# Patient Record
Sex: FEMALE | Race: WHITE | NOT HISPANIC OR LATINO | URBAN - METROPOLITAN AREA
[De-identification: names, ages, dates, MRNs, and addresses within clinical notes are randomized per-mention and may not be internally consistent; named-entity substitution may affect disease eponyms.]

---

## 2020-01-01 ENCOUNTER — INPATIENT (INPATIENT)
Facility: HOSPITAL | Age: 80
LOS: 11 days | Discharge: PSYCHIATRIC OR SUBSTANCE USE FACILITY | End: 2020-08-18
Attending: HOSPITALIST | Admitting: HOSPITALIST
Payer: MEDICARE

## 2020-01-01 ENCOUNTER — APPOINTMENT (OUTPATIENT)
Dept: WOUND CARE | Facility: CLINIC | Age: 80
End: 2020-01-01
Payer: MEDICARE

## 2020-01-01 ENCOUNTER — RX RENEWAL (OUTPATIENT)
Age: 80
End: 2020-01-01

## 2020-01-01 ENCOUNTER — LABORATORY RESULT (OUTPATIENT)
Age: 80
End: 2020-01-01

## 2020-01-01 ENCOUNTER — NON-APPOINTMENT (OUTPATIENT)
Age: 80
End: 2020-01-01

## 2020-01-01 ENCOUNTER — OUTPATIENT (OUTPATIENT)
Dept: OUTPATIENT SERVICES | Facility: HOSPITAL | Age: 80
LOS: 1 days | End: 2020-01-01

## 2020-01-01 ENCOUNTER — INPATIENT (INPATIENT)
Facility: HOSPITAL | Age: 80
LOS: 14 days | Discharge: HOME | End: 2020-09-02
Attending: PSYCHIATRY & NEUROLOGY | Admitting: PSYCHIATRY & NEUROLOGY
Payer: MEDICARE

## 2020-01-01 ENCOUNTER — APPOINTMENT (OUTPATIENT)
Dept: INTERNAL MEDICINE | Facility: CLINIC | Age: 80
End: 2020-01-01
Payer: MEDICARE

## 2020-01-01 ENCOUNTER — TRANSCRIPTION ENCOUNTER (OUTPATIENT)
Age: 80
End: 2020-01-01

## 2020-01-01 VITALS — TEMPERATURE: 97 F

## 2020-01-01 VITALS
SYSTOLIC BLOOD PRESSURE: 182 MMHG | TEMPERATURE: 98 F | OXYGEN SATURATION: 99 % | RESPIRATION RATE: 15 BRPM | DIASTOLIC BLOOD PRESSURE: 81 MMHG | HEART RATE: 64 BPM

## 2020-01-01 VITALS — TEMPERATURE: 98 F | RESPIRATION RATE: 18 BRPM | WEIGHT: 192.02 LBS | HEIGHT: 60 IN

## 2020-01-01 VITALS
HEART RATE: 102 BPM | RESPIRATION RATE: 17 BRPM | SYSTOLIC BLOOD PRESSURE: 104 MMHG | DIASTOLIC BLOOD PRESSURE: 64 MMHG | OXYGEN SATURATION: 98 %

## 2020-01-01 VITALS — TEMPERATURE: 93.8 F

## 2020-01-01 VITALS
BODY MASS INDEX: 34.93 KG/M2 | DIASTOLIC BLOOD PRESSURE: 80 MMHG | TEMPERATURE: 97.7 F | HEIGHT: 61 IN | WEIGHT: 185 LBS | OXYGEN SATURATION: 96 % | SYSTOLIC BLOOD PRESSURE: 130 MMHG | HEART RATE: 84 BPM

## 2020-01-01 VITALS
WEIGHT: 185 LBS | TEMPERATURE: 97.4 F | SYSTOLIC BLOOD PRESSURE: 139 MMHG | BODY MASS INDEX: 39.91 KG/M2 | HEIGHT: 57 IN | DIASTOLIC BLOOD PRESSURE: 85 MMHG | HEART RATE: 103 BPM | RESPIRATION RATE: 16 BRPM

## 2020-01-01 DIAGNOSIS — R60.0 LOCALIZED EDEMA: ICD-10-CM

## 2020-01-01 DIAGNOSIS — I10 ESSENTIAL (PRIMARY) HYPERTENSION: ICD-10-CM

## 2020-01-01 DIAGNOSIS — E03.9 HYPOTHYROIDISM, UNSPECIFIED: ICD-10-CM

## 2020-01-01 DIAGNOSIS — I48.91 UNSPECIFIED ATRIAL FIBRILLATION: ICD-10-CM

## 2020-01-01 DIAGNOSIS — E66.9 OBESITY, UNSPECIFIED: ICD-10-CM

## 2020-01-01 DIAGNOSIS — Z00.00 ENCOUNTER FOR GENERAL ADULT MEDICAL EXAMINATION W/OUT ABNORMAL FINDINGS: ICD-10-CM

## 2020-01-01 DIAGNOSIS — R19.7 DIARRHEA, UNSPECIFIED: ICD-10-CM

## 2020-01-01 DIAGNOSIS — G30.1 ALZHEIMER'S DISEASE WITH LATE ONSET: ICD-10-CM

## 2020-01-01 DIAGNOSIS — I83.893 VARICOSE VEINS OF BILATERAL LOWER EXTREMITIES WITH OTHER COMPLICATIONS: ICD-10-CM

## 2020-01-01 DIAGNOSIS — E53.8 DEFICIENCY OF OTHER SPECIFIED B GROUP VITAMINS: ICD-10-CM

## 2020-01-01 DIAGNOSIS — E11.69 TYPE 2 DIABETES MELLITUS WITH OTHER SPECIFIED COMPLICATION: ICD-10-CM

## 2020-01-01 DIAGNOSIS — Z98.891 HISTORY OF UTERINE SCAR FROM PREVIOUS SURGERY: Chronic | ICD-10-CM

## 2020-01-01 DIAGNOSIS — Z23 ENCOUNTER FOR IMMUNIZATION: ICD-10-CM

## 2020-01-01 DIAGNOSIS — E11.65 TYPE 2 DIABETES MELLITUS WITH HYPERGLYCEMIA: ICD-10-CM

## 2020-01-01 DIAGNOSIS — F02.81 DEMENTIA IN OTHER DISEASES CLASSIFIED ELSEWHERE, UNSPECIFIED SEVERITY, WITH BEHAVIORAL DISTURBANCE: ICD-10-CM

## 2020-01-01 DIAGNOSIS — I49.9 CARDIAC ARRHYTHMIA, UNSPECIFIED: ICD-10-CM

## 2020-01-01 DIAGNOSIS — D69.6 THROMBOCYTOPENIA, UNSPECIFIED: ICD-10-CM

## 2020-01-01 DIAGNOSIS — F03.91 UNSPECIFIED DEMENTIA WITH BEHAVIORAL DISTURBANCE: ICD-10-CM

## 2020-01-01 DIAGNOSIS — Z02.9 ENCOUNTER FOR ADMINISTRATIVE EXAMINATIONS, UNSPECIFIED: ICD-10-CM

## 2020-01-01 DIAGNOSIS — Z29.9 ENCOUNTER FOR PROPHYLACTIC MEASURES, UNSPECIFIED: ICD-10-CM

## 2020-01-01 DIAGNOSIS — R41.0 DISORIENTATION, UNSPECIFIED: ICD-10-CM

## 2020-01-01 DIAGNOSIS — I87.8 OTHER SPECIFIED DISORDERS OF VEINS: ICD-10-CM

## 2020-01-01 DIAGNOSIS — E11.9 TYPE 2 DIABETES MELLITUS W/OUT COMPLICATIONS: ICD-10-CM

## 2020-01-01 DIAGNOSIS — F03.90 UNSPECIFIED DEMENTIA WITHOUT BEHAVIORAL DISTURBANCE: ICD-10-CM

## 2020-01-01 DIAGNOSIS — F22 DELUSIONAL DISORDERS: ICD-10-CM

## 2020-01-01 LAB
ALBUMIN SERPL ELPH-MCNC: 3.5 G/DL — SIGNIFICANT CHANGE UP (ref 3.3–5)
ALBUMIN SERPL ELPH-MCNC: 3.6 G/DL — SIGNIFICANT CHANGE UP (ref 3.3–5)
ALBUMIN SERPL ELPH-MCNC: 3.7 G/DL — SIGNIFICANT CHANGE UP (ref 3.3–5)
ALBUMIN SERPL ELPH-MCNC: 3.9 G/DL
ALBUMIN SERPL ELPH-MCNC: 3.9 G/DL — SIGNIFICANT CHANGE UP (ref 3.3–5)
ALBUMIN SERPL ELPH-MCNC: 4 G/DL — SIGNIFICANT CHANGE UP (ref 3.3–5)
ALBUMIN SERPL ELPH-MCNC: 4.1 G/DL — SIGNIFICANT CHANGE UP (ref 3.3–5)
ALP BLD-CCNC: 73 U/L
ALP SERPL-CCNC: 52 U/L — SIGNIFICANT CHANGE UP (ref 40–120)
ALP SERPL-CCNC: 56 U/L — SIGNIFICANT CHANGE UP (ref 40–120)
ALP SERPL-CCNC: 56 U/L — SIGNIFICANT CHANGE UP (ref 40–120)
ALP SERPL-CCNC: 57 U/L — SIGNIFICANT CHANGE UP (ref 40–120)
ALP SERPL-CCNC: 66 U/L — SIGNIFICANT CHANGE UP (ref 40–120)
ALP SERPL-CCNC: 66 U/L — SIGNIFICANT CHANGE UP (ref 40–120)
ALT FLD-CCNC: 10 U/L — SIGNIFICANT CHANGE UP (ref 4–33)
ALT FLD-CCNC: 11 U/L — SIGNIFICANT CHANGE UP (ref 4–33)
ALT FLD-CCNC: 12 U/L — SIGNIFICANT CHANGE UP (ref 4–33)
ALT FLD-CCNC: 9 U/L — SIGNIFICANT CHANGE UP (ref 4–33)
ALT SERPL-CCNC: 6 U/L
ANION GAP SERPL CALC-SCNC: 13 MMO/L — SIGNIFICANT CHANGE UP (ref 7–14)
ANION GAP SERPL CALC-SCNC: 14 MMO/L — SIGNIFICANT CHANGE UP (ref 7–14)
ANION GAP SERPL CALC-SCNC: 14 MMO/L — SIGNIFICANT CHANGE UP (ref 7–14)
ANION GAP SERPL CALC-SCNC: 14 MMOL/L
ANION GAP SERPL CALC-SCNC: 15 MMO/L — HIGH (ref 7–14)
ANION GAP SERPL CALC-SCNC: 16 MMO/L — HIGH (ref 7–14)
ANION GAP SERPL CALC-SCNC: 18 MMO/L — HIGH (ref 7–14)
ANION GAP SERPL CALC-SCNC: 19 MMO/L — HIGH (ref 7–14)
ANISOCYTOSIS BLD QL: SLIGHT — SIGNIFICANT CHANGE UP
AST SERPL-CCNC: 13 U/L
AST SERPL-CCNC: 15 U/L — SIGNIFICANT CHANGE UP (ref 4–32)
AST SERPL-CCNC: 16 U/L — SIGNIFICANT CHANGE UP (ref 4–32)
AST SERPL-CCNC: 16 U/L — SIGNIFICANT CHANGE UP (ref 4–32)
AST SERPL-CCNC: 17 U/L — SIGNIFICANT CHANGE UP (ref 4–32)
AST SERPL-CCNC: 19 U/L — SIGNIFICANT CHANGE UP (ref 4–32)
AST SERPL-CCNC: 21 U/L — SIGNIFICANT CHANGE UP (ref 4–32)
BASE EXCESS BLDV CALC-SCNC: 3.6 MMOL/L — SIGNIFICANT CHANGE UP
BASE EXCESS BLDV CALC-SCNC: 6.2 MMOL/L — SIGNIFICANT CHANGE UP
BASOPHILS # BLD AUTO: 0.05 K/UL — SIGNIFICANT CHANGE UP (ref 0–0.2)
BASOPHILS # BLD AUTO: 0.05 K/UL — SIGNIFICANT CHANGE UP (ref 0–0.2)
BASOPHILS # BLD AUTO: 0.06 K/UL
BASOPHILS # BLD AUTO: 0.06 K/UL — SIGNIFICANT CHANGE UP (ref 0–0.2)
BASOPHILS # BLD AUTO: 0.06 K/UL — SIGNIFICANT CHANGE UP (ref 0–0.2)
BASOPHILS NFR BLD AUTO: 0.5 % — SIGNIFICANT CHANGE UP (ref 0–2)
BASOPHILS NFR BLD AUTO: 0.5 % — SIGNIFICANT CHANGE UP (ref 0–2)
BASOPHILS NFR BLD AUTO: 0.6 %
BASOPHILS NFR BLD AUTO: 0.6 % — SIGNIFICANT CHANGE UP (ref 0–2)
BASOPHILS NFR BLD AUTO: 0.6 % — SIGNIFICANT CHANGE UP (ref 0–2)
BILIRUB SERPL-MCNC: 0.3 MG/DL — SIGNIFICANT CHANGE UP (ref 0.2–1.2)
BILIRUB SERPL-MCNC: 0.3 MG/DL — SIGNIFICANT CHANGE UP (ref 0.2–1.2)
BILIRUB SERPL-MCNC: 0.4 MG/DL
BILIRUB SERPL-MCNC: 0.4 MG/DL — SIGNIFICANT CHANGE UP (ref 0.2–1.2)
BILIRUB SERPL-MCNC: 0.5 MG/DL — SIGNIFICANT CHANGE UP (ref 0.2–1.2)
BLOOD GAS VENOUS - CREATININE: 0.68 MG/DL — SIGNIFICANT CHANGE UP (ref 0.5–1.3)
BUN SERPL-MCNC: 11 MG/DL — SIGNIFICANT CHANGE UP (ref 7–23)
BUN SERPL-MCNC: 11 MG/DL — SIGNIFICANT CHANGE UP (ref 7–23)
BUN SERPL-MCNC: 14 MG/DL — SIGNIFICANT CHANGE UP (ref 7–23)
BUN SERPL-MCNC: 15 MG/DL — SIGNIFICANT CHANGE UP (ref 7–23)
BUN SERPL-MCNC: 21 MG/DL — SIGNIFICANT CHANGE UP (ref 7–23)
BUN SERPL-MCNC: 26 MG/DL — HIGH (ref 7–23)
BUN SERPL-MCNC: 26 MG/DL — HIGH (ref 7–23)
BUN SERPL-MCNC: 27 MG/DL — HIGH (ref 7–23)
BUN SERPL-MCNC: 30 MG/DL — HIGH (ref 7–23)
BUN SERPL-MCNC: 31 MG/DL — HIGH (ref 7–23)
BUN SERPL-MCNC: 32 MG/DL — HIGH (ref 7–23)
BUN SERPL-MCNC: 33 MG/DL
CALCIUM SERPL-MCNC: 8.7 MG/DL — SIGNIFICANT CHANGE UP (ref 8.4–10.5)
CALCIUM SERPL-MCNC: 8.7 MG/DL — SIGNIFICANT CHANGE UP (ref 8.4–10.5)
CALCIUM SERPL-MCNC: 8.8 MG/DL — SIGNIFICANT CHANGE UP (ref 8.4–10.5)
CALCIUM SERPL-MCNC: 8.9 MG/DL
CALCIUM SERPL-MCNC: 8.9 MG/DL — SIGNIFICANT CHANGE UP (ref 8.4–10.5)
CALCIUM SERPL-MCNC: 8.9 MG/DL — SIGNIFICANT CHANGE UP (ref 8.4–10.5)
CALCIUM SERPL-MCNC: 9 MG/DL — SIGNIFICANT CHANGE UP (ref 8.4–10.5)
CALCIUM SERPL-MCNC: 9.1 MG/DL — SIGNIFICANT CHANGE UP (ref 8.4–10.5)
CALCIUM SERPL-MCNC: 9.2 MG/DL — SIGNIFICANT CHANGE UP (ref 8.4–10.5)
CALCIUM SERPL-MCNC: 9.3 MG/DL — SIGNIFICANT CHANGE UP (ref 8.4–10.5)
CHLORIDE BLDV-SCNC: 104 MMOL/L — SIGNIFICANT CHANGE UP (ref 96–108)
CHLORIDE SERPL-SCNC: 100 MMOL/L — SIGNIFICANT CHANGE UP (ref 98–107)
CHLORIDE SERPL-SCNC: 101 MMOL/L — SIGNIFICANT CHANGE UP (ref 98–107)
CHLORIDE SERPL-SCNC: 101 MMOL/L — SIGNIFICANT CHANGE UP (ref 98–107)
CHLORIDE SERPL-SCNC: 102 MMOL/L — SIGNIFICANT CHANGE UP (ref 98–107)
CHLORIDE SERPL-SCNC: 103 MMOL/L — SIGNIFICANT CHANGE UP (ref 98–107)
CHLORIDE SERPL-SCNC: 108 MMOL/L
CHLORIDE SERPL-SCNC: 97 MMOL/L — LOW (ref 98–107)
CHLORIDE SERPL-SCNC: 99 MMOL/L — SIGNIFICANT CHANGE UP (ref 98–107)
CHLORIDE SERPL-SCNC: 99 MMOL/L — SIGNIFICANT CHANGE UP (ref 98–107)
CHOLEST SERPL-MCNC: 192 MG/DL
CHOLEST/HDLC SERPL: 4.2 RATIO
CLOSURE TME COLL+EPINEP BLD: 238 K/UL — SIGNIFICANT CHANGE UP (ref 150–400)
CO2 SERPL-SCNC: 18 MMOL/L
CO2 SERPL-SCNC: 24 MMOL/L — SIGNIFICANT CHANGE UP (ref 22–31)
CO2 SERPL-SCNC: 25 MMOL/L — SIGNIFICANT CHANGE UP (ref 22–31)
CO2 SERPL-SCNC: 26 MMOL/L — SIGNIFICANT CHANGE UP (ref 22–31)
CO2 SERPL-SCNC: 27 MMOL/L — SIGNIFICANT CHANGE UP (ref 22–31)
CO2 SERPL-SCNC: 27 MMOL/L — SIGNIFICANT CHANGE UP (ref 22–31)
CO2 SERPL-SCNC: 28 MMOL/L — SIGNIFICANT CHANGE UP (ref 22–31)
CORTIS SERPL-MCNC: 11.1 UG/DL — SIGNIFICANT CHANGE UP (ref 2.7–18.4)
CORTIS SERPL-MCNC: 11.3 UG/DL — SIGNIFICANT CHANGE UP (ref 2.7–18.4)
CREAT SERPL-MCNC: 0.68 MG/DL — SIGNIFICANT CHANGE UP (ref 0.5–1.3)
CREAT SERPL-MCNC: 0.7 MG/DL — SIGNIFICANT CHANGE UP (ref 0.5–1.3)
CREAT SERPL-MCNC: 0.73 MG/DL — SIGNIFICANT CHANGE UP (ref 0.5–1.3)
CREAT SERPL-MCNC: 0.81 MG/DL — SIGNIFICANT CHANGE UP (ref 0.5–1.3)
CREAT SERPL-MCNC: 0.86 MG/DL — SIGNIFICANT CHANGE UP (ref 0.5–1.3)
CREAT SERPL-MCNC: 0.89 MG/DL — SIGNIFICANT CHANGE UP (ref 0.5–1.3)
CREAT SERPL-MCNC: 0.9 MG/DL — SIGNIFICANT CHANGE UP (ref 0.5–1.3)
CREAT SERPL-MCNC: 0.99 MG/DL — SIGNIFICANT CHANGE UP (ref 0.5–1.3)
CREAT SERPL-MCNC: 1.01 MG/DL — SIGNIFICANT CHANGE UP (ref 0.5–1.3)
CREAT SERPL-MCNC: 1.03 MG/DL — SIGNIFICANT CHANGE UP (ref 0.5–1.3)
CREAT SERPL-MCNC: 1.08 MG/DL — SIGNIFICANT CHANGE UP (ref 0.5–1.3)
CREAT SERPL-MCNC: 1.85 MG/DL
CULTURE RESULTS: SIGNIFICANT CHANGE UP
CULTURE RESULTS: SIGNIFICANT CHANGE UP
EOSINOPHIL # BLD AUTO: 0.05 K/UL — SIGNIFICANT CHANGE UP (ref 0–0.5)
EOSINOPHIL # BLD AUTO: 0.18 K/UL — SIGNIFICANT CHANGE UP (ref 0–0.5)
EOSINOPHIL # BLD AUTO: 0.18 K/UL — SIGNIFICANT CHANGE UP (ref 0–0.5)
EOSINOPHIL # BLD AUTO: 0.22 K/UL — SIGNIFICANT CHANGE UP (ref 0–0.5)
EOSINOPHIL # BLD AUTO: 0.26 K/UL
EOSINOPHIL NFR BLD AUTO: 0.5 % — SIGNIFICANT CHANGE UP (ref 0–6)
EOSINOPHIL NFR BLD AUTO: 1.6 % — SIGNIFICANT CHANGE UP (ref 0–6)
EOSINOPHIL NFR BLD AUTO: 1.8 % — SIGNIFICANT CHANGE UP (ref 0–6)
EOSINOPHIL NFR BLD AUTO: 2.6 % — SIGNIFICANT CHANGE UP (ref 0–6)
EOSINOPHIL NFR BLD AUTO: 2.8 %
ESTIMATED AVERAGE GLUCOSE: 171 MG/DL
GAS PNL BLDV: 139 MMOL/L — SIGNIFICANT CHANGE UP (ref 136–146)
GAS PNL BLDV: 142 MMOL/L — SIGNIFICANT CHANGE UP (ref 136–146)
GLUCOSE BLDC GLUCOMTR-MCNC: 104 MG/DL — HIGH (ref 70–99)
GLUCOSE BLDC GLUCOMTR-MCNC: 110 MG/DL — HIGH (ref 70–99)
GLUCOSE BLDC GLUCOMTR-MCNC: 112 MG/DL — HIGH (ref 70–99)
GLUCOSE BLDC GLUCOMTR-MCNC: 128 MG/DL — HIGH (ref 70–99)
GLUCOSE BLDC GLUCOMTR-MCNC: 132 MG/DL — HIGH (ref 70–99)
GLUCOSE BLDC GLUCOMTR-MCNC: 133 MG/DL — HIGH (ref 70–99)
GLUCOSE BLDC GLUCOMTR-MCNC: 136 MG/DL — HIGH (ref 70–99)
GLUCOSE BLDC GLUCOMTR-MCNC: 142 MG/DL — HIGH (ref 70–99)
GLUCOSE BLDC GLUCOMTR-MCNC: 143 MG/DL — HIGH (ref 70–99)
GLUCOSE BLDC GLUCOMTR-MCNC: 148 MG/DL — HIGH (ref 70–99)
GLUCOSE BLDC GLUCOMTR-MCNC: 151 MG/DL — HIGH (ref 70–99)
GLUCOSE BLDC GLUCOMTR-MCNC: 156 MG/DL — HIGH (ref 70–99)
GLUCOSE BLDC GLUCOMTR-MCNC: 157 MG/DL — HIGH (ref 70–99)
GLUCOSE BLDC GLUCOMTR-MCNC: 157 MG/DL — HIGH (ref 70–99)
GLUCOSE BLDC GLUCOMTR-MCNC: 158 MG/DL — HIGH (ref 70–99)
GLUCOSE BLDC GLUCOMTR-MCNC: 163 MG/DL — HIGH (ref 70–99)
GLUCOSE BLDC GLUCOMTR-MCNC: 164 MG/DL — HIGH (ref 70–99)
GLUCOSE BLDC GLUCOMTR-MCNC: 165 MG/DL — HIGH (ref 70–99)
GLUCOSE BLDC GLUCOMTR-MCNC: 171 MG/DL — HIGH (ref 70–99)
GLUCOSE BLDC GLUCOMTR-MCNC: 171 MG/DL — HIGH (ref 70–99)
GLUCOSE BLDC GLUCOMTR-MCNC: 172 MG/DL — HIGH (ref 70–99)
GLUCOSE BLDC GLUCOMTR-MCNC: 172 MG/DL — HIGH (ref 70–99)
GLUCOSE BLDC GLUCOMTR-MCNC: 173 MG/DL — HIGH (ref 70–99)
GLUCOSE BLDC GLUCOMTR-MCNC: 174 MG/DL — HIGH (ref 70–99)
GLUCOSE BLDC GLUCOMTR-MCNC: 175 MG/DL — HIGH (ref 70–99)
GLUCOSE BLDC GLUCOMTR-MCNC: 176 MG/DL — HIGH (ref 70–99)
GLUCOSE BLDC GLUCOMTR-MCNC: 177 MG/DL — HIGH (ref 70–99)
GLUCOSE BLDC GLUCOMTR-MCNC: 180 MG/DL — HIGH (ref 70–99)
GLUCOSE BLDC GLUCOMTR-MCNC: 185 MG/DL — HIGH (ref 70–99)
GLUCOSE BLDC GLUCOMTR-MCNC: 189 MG/DL — HIGH (ref 70–99)
GLUCOSE BLDC GLUCOMTR-MCNC: 192 MG/DL — HIGH (ref 70–99)
GLUCOSE BLDC GLUCOMTR-MCNC: 193 MG/DL — HIGH (ref 70–99)
GLUCOSE BLDC GLUCOMTR-MCNC: 195 MG/DL — HIGH (ref 70–99)
GLUCOSE BLDC GLUCOMTR-MCNC: 197 MG/DL — HIGH (ref 70–99)
GLUCOSE BLDC GLUCOMTR-MCNC: 197 MG/DL — HIGH (ref 70–99)
GLUCOSE BLDC GLUCOMTR-MCNC: 198 MG/DL — HIGH (ref 70–99)
GLUCOSE BLDC GLUCOMTR-MCNC: 198 MG/DL — HIGH (ref 70–99)
GLUCOSE BLDC GLUCOMTR-MCNC: 202 MG/DL — HIGH (ref 70–99)
GLUCOSE BLDC GLUCOMTR-MCNC: 205 MG/DL — HIGH (ref 70–99)
GLUCOSE BLDC GLUCOMTR-MCNC: 206 MG/DL — HIGH (ref 70–99)
GLUCOSE BLDC GLUCOMTR-MCNC: 206 MG/DL — HIGH (ref 70–99)
GLUCOSE BLDC GLUCOMTR-MCNC: 210 MG/DL — HIGH (ref 70–99)
GLUCOSE BLDC GLUCOMTR-MCNC: 214 MG/DL — HIGH (ref 70–99)
GLUCOSE BLDC GLUCOMTR-MCNC: 215 MG/DL — HIGH (ref 70–99)
GLUCOSE BLDC GLUCOMTR-MCNC: 216 MG/DL — HIGH (ref 70–99)
GLUCOSE BLDC GLUCOMTR-MCNC: 217 MG/DL — HIGH (ref 70–99)
GLUCOSE BLDC GLUCOMTR-MCNC: 218 MG/DL — HIGH (ref 70–99)
GLUCOSE BLDC GLUCOMTR-MCNC: 219 MG/DL — HIGH (ref 70–99)
GLUCOSE BLDC GLUCOMTR-MCNC: 220 MG/DL — HIGH (ref 70–99)
GLUCOSE BLDC GLUCOMTR-MCNC: 222 MG/DL — HIGH (ref 70–99)
GLUCOSE BLDC GLUCOMTR-MCNC: 223 MG/DL — HIGH (ref 70–99)
GLUCOSE BLDC GLUCOMTR-MCNC: 224 MG/DL — HIGH (ref 70–99)
GLUCOSE BLDC GLUCOMTR-MCNC: 226 MG/DL — HIGH (ref 70–99)
GLUCOSE BLDC GLUCOMTR-MCNC: 228 MG/DL — HIGH (ref 70–99)
GLUCOSE BLDC GLUCOMTR-MCNC: 229 MG/DL — HIGH (ref 70–99)
GLUCOSE BLDC GLUCOMTR-MCNC: 233 MG/DL — HIGH (ref 70–99)
GLUCOSE BLDC GLUCOMTR-MCNC: 233 MG/DL — HIGH (ref 70–99)
GLUCOSE BLDC GLUCOMTR-MCNC: 241 MG/DL — HIGH (ref 70–99)
GLUCOSE BLDC GLUCOMTR-MCNC: 241 MG/DL — HIGH (ref 70–99)
GLUCOSE BLDC GLUCOMTR-MCNC: 243 MG/DL — HIGH (ref 70–99)
GLUCOSE BLDC GLUCOMTR-MCNC: 243 MG/DL — HIGH (ref 70–99)
GLUCOSE BLDC GLUCOMTR-MCNC: 244 MG/DL — HIGH (ref 70–99)
GLUCOSE BLDC GLUCOMTR-MCNC: 246 MG/DL — HIGH (ref 70–99)
GLUCOSE BLDC GLUCOMTR-MCNC: 248 MG/DL — HIGH (ref 70–99)
GLUCOSE BLDC GLUCOMTR-MCNC: 248 MG/DL — HIGH (ref 70–99)
GLUCOSE BLDC GLUCOMTR-MCNC: 255 MG/DL — HIGH (ref 70–99)
GLUCOSE BLDC GLUCOMTR-MCNC: 258 MG/DL — HIGH (ref 70–99)
GLUCOSE BLDC GLUCOMTR-MCNC: 260 MG/DL — HIGH (ref 70–99)
GLUCOSE BLDC GLUCOMTR-MCNC: 260 MG/DL — HIGH (ref 70–99)
GLUCOSE BLDC GLUCOMTR-MCNC: 266 MG/DL — HIGH (ref 70–99)
GLUCOSE BLDC GLUCOMTR-MCNC: 268 MG/DL — HIGH (ref 70–99)
GLUCOSE BLDC GLUCOMTR-MCNC: 283 MG/DL — HIGH (ref 70–99)
GLUCOSE BLDC GLUCOMTR-MCNC: 284 MG/DL — HIGH (ref 70–99)
GLUCOSE BLDC GLUCOMTR-MCNC: 298 MG/DL — HIGH (ref 70–99)
GLUCOSE BLDC GLUCOMTR-MCNC: 330 MG/DL — HIGH (ref 70–99)
GLUCOSE BLDC GLUCOMTR-MCNC: 394 MG/DL — HIGH (ref 70–99)
GLUCOSE BLDV-MCNC: 185 MG/DL — HIGH (ref 70–99)
GLUCOSE BLDV-MCNC: 270 MG/DL — HIGH (ref 70–99)
GLUCOSE SERPL-MCNC: 173 MG/DL
GLUCOSE SERPL-MCNC: 181 MG/DL — HIGH (ref 70–99)
GLUCOSE SERPL-MCNC: 182 MG/DL — HIGH (ref 70–99)
GLUCOSE SERPL-MCNC: 196 MG/DL — HIGH (ref 70–99)
GLUCOSE SERPL-MCNC: 197 MG/DL — HIGH (ref 70–99)
GLUCOSE SERPL-MCNC: 202 MG/DL — HIGH (ref 70–99)
GLUCOSE SERPL-MCNC: 205 MG/DL — HIGH (ref 70–99)
GLUCOSE SERPL-MCNC: 206 MG/DL — HIGH (ref 70–99)
GLUCOSE SERPL-MCNC: 207 MG/DL — HIGH (ref 70–99)
GLUCOSE SERPL-MCNC: 222 MG/DL — HIGH (ref 70–99)
GLUCOSE SERPL-MCNC: 254 MG/DL — HIGH (ref 70–99)
GLUCOSE SERPL-MCNC: 267 MG/DL — HIGH (ref 70–99)
HBA1C BLD-MCNC: 7.5 % — HIGH (ref 4–5.6)
HBA1C MFR BLD HPLC: 7.6 %
HCO3 BLDV-SCNC: 27 MMOL/L — SIGNIFICANT CHANGE UP (ref 20–27)
HCO3 BLDV-SCNC: 28 MMOL/L — HIGH (ref 20–27)
HCT VFR BLD CALC: 31.4 % — LOW (ref 34.5–45)
HCT VFR BLD CALC: 33 % — LOW (ref 34.5–45)
HCT VFR BLD CALC: 33.5 % — LOW (ref 34.5–45)
HCT VFR BLD CALC: 33.9 % — LOW (ref 34.5–45)
HCT VFR BLD CALC: 33.9 % — LOW (ref 34.5–45)
HCT VFR BLD CALC: 34.5 %
HCT VFR BLD CALC: 34.6 % — SIGNIFICANT CHANGE UP (ref 34.5–45)
HCT VFR BLD CALC: 35.7 % — SIGNIFICANT CHANGE UP (ref 34.5–45)
HCT VFR BLD CALC: 35.9 % — SIGNIFICANT CHANGE UP (ref 34.5–45)
HCT VFR BLD CALC: 37.6 % — SIGNIFICANT CHANGE UP (ref 34.5–45)
HCT VFR BLD CALC: 38 % — SIGNIFICANT CHANGE UP (ref 34.5–45)
HCT VFR BLD CALC: 38.4 % — SIGNIFICANT CHANGE UP (ref 34.5–45)
HCT VFR BLD CALC: 40.7 % — SIGNIFICANT CHANGE UP (ref 34.5–45)
HCT VFR BLDV CALC: 34.8 % — SIGNIFICANT CHANGE UP (ref 34.5–45)
HCT VFR BLDV CALC: 40.1 % — SIGNIFICANT CHANGE UP (ref 34.5–45)
HDLC SERPL-MCNC: 46 MG/DL
HGB BLD-MCNC: 10.2 G/DL — LOW (ref 11.5–15.5)
HGB BLD-MCNC: 10.6 G/DL — LOW (ref 11.5–15.5)
HGB BLD-MCNC: 10.7 G/DL — LOW (ref 11.5–15.5)
HGB BLD-MCNC: 10.8 G/DL
HGB BLD-MCNC: 10.9 G/DL — LOW (ref 11.5–15.5)
HGB BLD-MCNC: 10.9 G/DL — LOW (ref 11.5–15.5)
HGB BLD-MCNC: 11 G/DL — LOW (ref 11.5–15.5)
HGB BLD-MCNC: 11.2 G/DL — LOW (ref 11.5–15.5)
HGB BLD-MCNC: 11.6 G/DL — SIGNIFICANT CHANGE UP (ref 11.5–15.5)
HGB BLD-MCNC: 11.9 G/DL — SIGNIFICANT CHANGE UP (ref 11.5–15.5)
HGB BLD-MCNC: 12 G/DL — SIGNIFICANT CHANGE UP (ref 11.5–15.5)
HGB BLD-MCNC: 12.2 G/DL — SIGNIFICANT CHANGE UP (ref 11.5–15.5)
HGB BLD-MCNC: 12.6 G/DL — SIGNIFICANT CHANGE UP (ref 11.5–15.5)
HGB BLDV-MCNC: 11.3 G/DL — LOW (ref 11.5–15.5)
HGB BLDV-MCNC: 13 G/DL — SIGNIFICANT CHANGE UP (ref 11.5–15.5)
IMM GRANULOCYTES NFR BLD AUTO: 0.5 % — SIGNIFICANT CHANGE UP (ref 0–1.5)
IMM GRANULOCYTES NFR BLD AUTO: 0.6 % — SIGNIFICANT CHANGE UP (ref 0–1.5)
IMM GRANULOCYTES NFR BLD AUTO: 0.6 % — SIGNIFICANT CHANGE UP (ref 0–1.5)
IMM GRANULOCYTES NFR BLD AUTO: 0.8 %
IMM GRANULOCYTES NFR BLD AUTO: 0.9 % — SIGNIFICANT CHANGE UP (ref 0–1.5)
LACTATE BLDV-MCNC: 1.9 MMOL/L — SIGNIFICANT CHANGE UP (ref 0.5–2)
LACTATE SERPL-SCNC: 1.9 MMOL/L — SIGNIFICANT CHANGE UP (ref 0.5–2)
LACTATE SERPL-SCNC: 2.4 MMOL/L — HIGH (ref 0.5–2)
LDH SERPL L TO P-CCNC: 188 U/L — SIGNIFICANT CHANGE UP (ref 135–225)
LDLC SERPL CALC-MCNC: 110 MG/DL
LG PLATELETS BLD QL AUTO: SLIGHT — SIGNIFICANT CHANGE UP
LYMPHOCYTES # BLD AUTO: 1.14 K/UL
LYMPHOCYTES # BLD AUTO: 19 % — SIGNIFICANT CHANGE UP (ref 13–44)
LYMPHOCYTES # BLD AUTO: 2.04 K/UL — SIGNIFICANT CHANGE UP (ref 1–3.3)
LYMPHOCYTES # BLD AUTO: 2.08 K/UL — SIGNIFICANT CHANGE UP (ref 1–3.3)
LYMPHOCYTES # BLD AUTO: 2.09 K/UL — SIGNIFICANT CHANGE UP (ref 1–3.3)
LYMPHOCYTES # BLD AUTO: 2.17 K/UL — SIGNIFICANT CHANGE UP (ref 1–3.3)
LYMPHOCYTES # BLD AUTO: 20.5 % — SIGNIFICANT CHANGE UP (ref 13–44)
LYMPHOCYTES # BLD AUTO: 20.6 % — SIGNIFICANT CHANGE UP (ref 13–44)
LYMPHOCYTES # BLD AUTO: 23.9 % — SIGNIFICANT CHANGE UP (ref 13–44)
LYMPHOCYTES NFR BLD AUTO: 12.2 %
MAGNESIUM SERPL-MCNC: 1.6 MG/DL — SIGNIFICANT CHANGE UP (ref 1.6–2.6)
MAGNESIUM SERPL-MCNC: 1.8 MG/DL — SIGNIFICANT CHANGE UP (ref 1.6–2.6)
MAGNESIUM SERPL-MCNC: 1.8 MG/DL — SIGNIFICANT CHANGE UP (ref 1.6–2.6)
MAGNESIUM SERPL-MCNC: 2 MG/DL — SIGNIFICANT CHANGE UP (ref 1.6–2.6)
MAGNESIUM SERPL-MCNC: 2.1 MG/DL — SIGNIFICANT CHANGE UP (ref 1.6–2.6)
MAN DIFF?: NORMAL
MANUAL SMEAR VERIFICATION: SIGNIFICANT CHANGE UP
MCHC RBC-ENTMCNC: 26.8 PG — LOW (ref 27–34)
MCHC RBC-ENTMCNC: 27 PG — SIGNIFICANT CHANGE UP (ref 27–34)
MCHC RBC-ENTMCNC: 27.3 PG — SIGNIFICANT CHANGE UP (ref 27–34)
MCHC RBC-ENTMCNC: 27.5 PG — SIGNIFICANT CHANGE UP (ref 27–34)
MCHC RBC-ENTMCNC: 27.5 PG — SIGNIFICANT CHANGE UP (ref 27–34)
MCHC RBC-ENTMCNC: 27.6 PG — SIGNIFICANT CHANGE UP (ref 27–34)
MCHC RBC-ENTMCNC: 27.8 PG — SIGNIFICANT CHANGE UP (ref 27–34)
MCHC RBC-ENTMCNC: 27.8 PG — SIGNIFICANT CHANGE UP (ref 27–34)
MCHC RBC-ENTMCNC: 27.9 PG — SIGNIFICANT CHANGE UP (ref 27–34)
MCHC RBC-ENTMCNC: 28.2 PG
MCHC RBC-ENTMCNC: 28.6 PG — SIGNIFICANT CHANGE UP (ref 27–34)
MCHC RBC-ENTMCNC: 28.9 PG — SIGNIFICANT CHANGE UP (ref 27–34)
MCHC RBC-ENTMCNC: 29 PG — SIGNIFICANT CHANGE UP (ref 27–34)
MCHC RBC-ENTMCNC: 30.9 % — LOW (ref 32–36)
MCHC RBC-ENTMCNC: 30.9 % — LOW (ref 32–36)
MCHC RBC-ENTMCNC: 31 % — LOW (ref 32–36)
MCHC RBC-ENTMCNC: 31 % — LOW (ref 32–36)
MCHC RBC-ENTMCNC: 31.2 % — LOW (ref 32–36)
MCHC RBC-ENTMCNC: 31.3 GM/DL
MCHC RBC-ENTMCNC: 31.6 % — LOW (ref 32–36)
MCHC RBC-ENTMCNC: 32.1 % — SIGNIFICANT CHANGE UP (ref 32–36)
MCHC RBC-ENTMCNC: 32.2 % — SIGNIFICANT CHANGE UP (ref 32–36)
MCHC RBC-ENTMCNC: 32.4 % — SIGNIFICANT CHANGE UP (ref 32–36)
MCHC RBC-ENTMCNC: 32.5 % — SIGNIFICANT CHANGE UP (ref 32–36)
MCHC RBC-ENTMCNC: 33 % — SIGNIFICANT CHANGE UP (ref 32–36)
MCHC RBC-ENTMCNC: 33.6 % — SIGNIFICANT CHANGE UP (ref 32–36)
MCV RBC AUTO: 84.6 FL — SIGNIFICANT CHANGE UP (ref 80–100)
MCV RBC AUTO: 85.6 FL — SIGNIFICANT CHANGE UP (ref 80–100)
MCV RBC AUTO: 85.6 FL — SIGNIFICANT CHANGE UP (ref 80–100)
MCV RBC AUTO: 85.8 FL — SIGNIFICANT CHANGE UP (ref 80–100)
MCV RBC AUTO: 86.2 FL — SIGNIFICANT CHANGE UP (ref 80–100)
MCV RBC AUTO: 87.5 FL — SIGNIFICANT CHANGE UP (ref 80–100)
MCV RBC AUTO: 87.6 FL — SIGNIFICANT CHANGE UP (ref 80–100)
MCV RBC AUTO: 88.3 FL — SIGNIFICANT CHANGE UP (ref 80–100)
MCV RBC AUTO: 88.7 FL — SIGNIFICANT CHANGE UP (ref 80–100)
MCV RBC AUTO: 89 FL — SIGNIFICANT CHANGE UP (ref 80–100)
MCV RBC AUTO: 89.3 FL — SIGNIFICANT CHANGE UP (ref 80–100)
MCV RBC AUTO: 89.3 FL — SIGNIFICANT CHANGE UP (ref 80–100)
MCV RBC AUTO: 90.1 FL
MICROCYTES BLD QL: SLIGHT — SIGNIFICANT CHANGE UP
MONOCYTES # BLD AUTO: 0.54 K/UL — SIGNIFICANT CHANGE UP (ref 0–0.9)
MONOCYTES # BLD AUTO: 0.56 K/UL
MONOCYTES # BLD AUTO: 0.68 K/UL — SIGNIFICANT CHANGE UP (ref 0–0.9)
MONOCYTES # BLD AUTO: 0.73 K/UL — SIGNIFICANT CHANGE UP (ref 0–0.9)
MONOCYTES # BLD AUTO: 0.78 K/UL — SIGNIFICANT CHANGE UP (ref 0–0.9)
MONOCYTES NFR BLD AUTO: 6 %
MONOCYTES NFR BLD AUTO: 6.3 % — SIGNIFICANT CHANGE UP (ref 2–14)
MONOCYTES NFR BLD AUTO: 6.7 % — SIGNIFICANT CHANGE UP (ref 2–14)
MONOCYTES NFR BLD AUTO: 6.9 % — SIGNIFICANT CHANGE UP (ref 2–14)
MONOCYTES NFR BLD AUTO: 7.1 % — SIGNIFICANT CHANGE UP (ref 2–14)
NEUTROPHILS # BLD AUTO: 5.65 K/UL — SIGNIFICANT CHANGE UP (ref 1.8–7.4)
NEUTROPHILS # BLD AUTO: 7.04 K/UL — SIGNIFICANT CHANGE UP (ref 1.8–7.4)
NEUTROPHILS # BLD AUTO: 7.22 K/UL
NEUTROPHILS # BLD AUTO: 7.51 K/UL — HIGH (ref 1.8–7.4)
NEUTROPHILS # BLD AUTO: 7.79 K/UL — HIGH (ref 1.8–7.4)
NEUTROPHILS NFR BLD AUTO: 66 % — SIGNIFICANT CHANGE UP (ref 43–77)
NEUTROPHILS NFR BLD AUTO: 69.9 % — SIGNIFICANT CHANGE UP (ref 43–77)
NEUTROPHILS NFR BLD AUTO: 70.9 % — SIGNIFICANT CHANGE UP (ref 43–77)
NEUTROPHILS NFR BLD AUTO: 70.9 % — SIGNIFICANT CHANGE UP (ref 43–77)
NEUTROPHILS NFR BLD AUTO: 77.6 %
NRBC # FLD: 0 K/UL — SIGNIFICANT CHANGE UP (ref 0–0)
PCO2 BLDV: 44 MMHG — SIGNIFICANT CHANGE UP (ref 41–51)
PCO2 BLDV: 53 MMHG — HIGH (ref 41–51)
PH BLDV: 7.38 PH — SIGNIFICANT CHANGE UP (ref 7.32–7.43)
PH BLDV: 7.42 PH — SIGNIFICANT CHANGE UP (ref 7.32–7.43)
PHOSPHATE SERPL-MCNC: 2.8 MG/DL — SIGNIFICANT CHANGE UP (ref 2.5–4.5)
PHOSPHATE SERPL-MCNC: 3 MG/DL — SIGNIFICANT CHANGE UP (ref 2.5–4.5)
PHOSPHATE SERPL-MCNC: 3.3 MG/DL — SIGNIFICANT CHANGE UP (ref 2.5–4.5)
PHOSPHATE SERPL-MCNC: 3.4 MG/DL — SIGNIFICANT CHANGE UP (ref 2.5–4.5)
PHOSPHATE SERPL-MCNC: 3.6 MG/DL — SIGNIFICANT CHANGE UP (ref 2.5–4.5)
PHOSPHATE SERPL-MCNC: 3.6 MG/DL — SIGNIFICANT CHANGE UP (ref 2.5–4.5)
PHOSPHATE SERPL-MCNC: 3.8 MG/DL — SIGNIFICANT CHANGE UP (ref 2.5–4.5)
PHOSPHATE SERPL-MCNC: 3.8 MG/DL — SIGNIFICANT CHANGE UP (ref 2.5–4.5)
PHOSPHATE SERPL-MCNC: 3.9 MG/DL — SIGNIFICANT CHANGE UP (ref 2.5–4.5)
PHOSPHATE SERPL-MCNC: 4.2 MG/DL — SIGNIFICANT CHANGE UP (ref 2.5–4.5)
PLATELET # BLD AUTO: 124 K/UL — LOW (ref 150–400)
PLATELET # BLD AUTO: 277 K/UL — SIGNIFICANT CHANGE UP (ref 150–400)
PLATELET # BLD AUTO: 280 K/UL
PLATELET # BLD AUTO: 291 K/UL — SIGNIFICANT CHANGE UP (ref 150–400)
PLATELET # BLD AUTO: 302 K/UL — SIGNIFICANT CHANGE UP (ref 150–400)
PLATELET # BLD AUTO: 311 K/UL — SIGNIFICANT CHANGE UP (ref 150–400)
PLATELET # BLD AUTO: 312 K/UL — SIGNIFICANT CHANGE UP (ref 150–400)
PLATELET # BLD AUTO: 315 K/UL — SIGNIFICANT CHANGE UP (ref 150–400)
PLATELET # BLD AUTO: 329 K/UL — SIGNIFICANT CHANGE UP (ref 150–400)
PLATELET # BLD AUTO: 358 K/UL — SIGNIFICANT CHANGE UP (ref 150–400)
PLATELET # BLD AUTO: 360 K/UL — SIGNIFICANT CHANGE UP (ref 150–400)
PLATELET # BLD AUTO: 382 K/UL — SIGNIFICANT CHANGE UP (ref 150–400)
PLATELET # BLD AUTO: 392 K/UL — SIGNIFICANT CHANGE UP (ref 150–400)
PMV BLD: 10.1 FL — SIGNIFICANT CHANGE UP (ref 7–13)
PMV BLD: 10.2 FL — SIGNIFICANT CHANGE UP (ref 7–13)
PMV BLD: 10.4 FL — SIGNIFICANT CHANGE UP (ref 7–13)
PMV BLD: 10.5 FL — SIGNIFICANT CHANGE UP (ref 7–13)
PMV BLD: 10.5 FL — SIGNIFICANT CHANGE UP (ref 7–13)
PMV BLD: 10.6 FL — SIGNIFICANT CHANGE UP (ref 7–13)
PMV BLD: 10.7 FL — SIGNIFICANT CHANGE UP (ref 7–13)
PMV BLD: 10.7 FL — SIGNIFICANT CHANGE UP (ref 7–13)
PMV BLD: 10.8 FL — SIGNIFICANT CHANGE UP (ref 7–13)
PMV BLD: 11.1 FL — SIGNIFICANT CHANGE UP (ref 7–13)
PO2 BLDV: 27 MMHG — LOW (ref 35–40)
PO2 BLDV: 44 MMHG — HIGH (ref 35–40)
POTASSIUM BLDV-SCNC: 3.8 MMOL/L — SIGNIFICANT CHANGE UP (ref 3.4–4.5)
POTASSIUM BLDV-SCNC: 4 MMOL/L — SIGNIFICANT CHANGE UP (ref 3.4–4.5)
POTASSIUM SERPL-MCNC: 3.4 MMOL/L — LOW (ref 3.5–5.3)
POTASSIUM SERPL-MCNC: 3.4 MMOL/L — LOW (ref 3.5–5.3)
POTASSIUM SERPL-MCNC: 3.5 MMOL/L — SIGNIFICANT CHANGE UP (ref 3.5–5.3)
POTASSIUM SERPL-MCNC: 3.5 MMOL/L — SIGNIFICANT CHANGE UP (ref 3.5–5.3)
POTASSIUM SERPL-MCNC: 3.7 MMOL/L — SIGNIFICANT CHANGE UP (ref 3.5–5.3)
POTASSIUM SERPL-MCNC: 3.9 MMOL/L — SIGNIFICANT CHANGE UP (ref 3.5–5.3)
POTASSIUM SERPL-MCNC: 3.9 MMOL/L — SIGNIFICANT CHANGE UP (ref 3.5–5.3)
POTASSIUM SERPL-MCNC: 4.1 MMOL/L — SIGNIFICANT CHANGE UP (ref 3.5–5.3)
POTASSIUM SERPL-MCNC: 4.4 MMOL/L — SIGNIFICANT CHANGE UP (ref 3.5–5.3)
POTASSIUM SERPL-SCNC: 3.4 MMOL/L — LOW (ref 3.5–5.3)
POTASSIUM SERPL-SCNC: 3.4 MMOL/L — LOW (ref 3.5–5.3)
POTASSIUM SERPL-SCNC: 3.5 MMOL/L — SIGNIFICANT CHANGE UP (ref 3.5–5.3)
POTASSIUM SERPL-SCNC: 3.5 MMOL/L — SIGNIFICANT CHANGE UP (ref 3.5–5.3)
POTASSIUM SERPL-SCNC: 3.7 MMOL/L — SIGNIFICANT CHANGE UP (ref 3.5–5.3)
POTASSIUM SERPL-SCNC: 3.9 MMOL/L — SIGNIFICANT CHANGE UP (ref 3.5–5.3)
POTASSIUM SERPL-SCNC: 3.9 MMOL/L — SIGNIFICANT CHANGE UP (ref 3.5–5.3)
POTASSIUM SERPL-SCNC: 4.1 MMOL/L — SIGNIFICANT CHANGE UP (ref 3.5–5.3)
POTASSIUM SERPL-SCNC: 4.4 MMOL/L — SIGNIFICANT CHANGE UP (ref 3.5–5.3)
POTASSIUM SERPL-SCNC: 4.5 MMOL/L
PROT SERPL-MCNC: 6.2 G/DL — SIGNIFICANT CHANGE UP (ref 6–8.3)
PROT SERPL-MCNC: 6.3 G/DL — SIGNIFICANT CHANGE UP (ref 6–8.3)
PROT SERPL-MCNC: 6.6 G/DL
PROT SERPL-MCNC: 6.7 G/DL — SIGNIFICANT CHANGE UP (ref 6–8.3)
PROT SERPL-MCNC: 6.7 G/DL — SIGNIFICANT CHANGE UP (ref 6–8.3)
PROT SERPL-MCNC: 7 G/DL — SIGNIFICANT CHANGE UP (ref 6–8.3)
PROT SERPL-MCNC: 7.2 G/DL — SIGNIFICANT CHANGE UP (ref 6–8.3)
RBC # BLD: 3.67 M/UL — LOW (ref 3.8–5.2)
RBC # BLD: 3.77 M/UL — LOW (ref 3.8–5.2)
RBC # BLD: 3.81 M/UL — SIGNIFICANT CHANGE UP (ref 3.8–5.2)
RBC # BLD: 3.83 M/UL
RBC # BLD: 3.92 M/UL — SIGNIFICANT CHANGE UP (ref 3.8–5.2)
RBC # BLD: 3.95 M/UL — SIGNIFICANT CHANGE UP (ref 3.8–5.2)
RBC # BLD: 3.96 M/UL — SIGNIFICANT CHANGE UP (ref 3.8–5.2)
RBC # BLD: 4.02 M/UL — SIGNIFICANT CHANGE UP (ref 3.8–5.2)
RBC # BLD: 4.14 M/UL — SIGNIFICANT CHANGE UP (ref 3.8–5.2)
RBC # BLD: 4.29 M/UL — SIGNIFICANT CHANGE UP (ref 3.8–5.2)
RBC # BLD: 4.33 M/UL — SIGNIFICANT CHANGE UP (ref 3.8–5.2)
RBC # BLD: 4.44 M/UL — SIGNIFICANT CHANGE UP (ref 3.8–5.2)
RBC # BLD: 4.56 M/UL — SIGNIFICANT CHANGE UP (ref 3.8–5.2)
RBC # FLD: 14.2 % — SIGNIFICANT CHANGE UP (ref 10.3–14.5)
RBC # FLD: 14.4 % — SIGNIFICANT CHANGE UP (ref 10.3–14.5)
RBC # FLD: 14.4 % — SIGNIFICANT CHANGE UP (ref 10.3–14.5)
RBC # FLD: 14.6 % — HIGH (ref 10.3–14.5)
RBC # FLD: 14.6 % — HIGH (ref 10.3–14.5)
RBC # FLD: 14.7 % — HIGH (ref 10.3–14.5)
RBC # FLD: 14.7 % — HIGH (ref 10.3–14.5)
RBC # FLD: 14.8 % — HIGH (ref 10.3–14.5)
RBC # FLD: 14.9 % — HIGH (ref 10.3–14.5)
RBC # FLD: 14.9 % — HIGH (ref 10.3–14.5)
RBC # FLD: 16.1 %
SAO2 % BLDV: 43.7 % — LOW (ref 60–85)
SAO2 % BLDV: 75.1 % — SIGNIFICANT CHANGE UP (ref 60–85)
SARS-COV-2 RNA SPEC QL NAA+PROBE: SIGNIFICANT CHANGE UP
SODIUM SERPL-SCNC: 139 MMOL/L — SIGNIFICANT CHANGE UP (ref 135–145)
SODIUM SERPL-SCNC: 140 MMOL/L — SIGNIFICANT CHANGE UP (ref 135–145)
SODIUM SERPL-SCNC: 141 MMOL/L
SODIUM SERPL-SCNC: 141 MMOL/L — SIGNIFICANT CHANGE UP (ref 135–145)
SODIUM SERPL-SCNC: 142 MMOL/L — SIGNIFICANT CHANGE UP (ref 135–145)
SODIUM SERPL-SCNC: 143 MMOL/L — SIGNIFICANT CHANGE UP (ref 135–145)
SODIUM SERPL-SCNC: 143 MMOL/L — SIGNIFICANT CHANGE UP (ref 135–145)
SODIUM SERPL-SCNC: 144 MMOL/L — SIGNIFICANT CHANGE UP (ref 135–145)
SPECIMEN SOURCE: SIGNIFICANT CHANGE UP
SPECIMEN SOURCE: SIGNIFICANT CHANGE UP
T3RU NFR SERPL: 1 TBI
T4 AB SER-ACNC: 6.04 UG/DL — SIGNIFICANT CHANGE UP (ref 5.1–13)
T4 FREE SERPL-MCNC: 0.56 NG/DL — LOW (ref 0.9–1.8)
T4 FREE SERPL-MCNC: 0.81 NG/DL — LOW (ref 0.9–1.8)
T4 FREE SERPL-MCNC: 1.1 NG/DL
TRIGL SERPL-MCNC: 181 MG/DL
TSH SERPL-ACNC: 7.11 UIU/ML
TSH SERPL-MCNC: 45.13 UIU/ML — HIGH (ref 0.27–4.2)
VIT B12 SERPL-MCNC: 150 PG/ML — LOW (ref 200–900)
VIT B12 SERPL-MCNC: 667 PG/ML
WBC # BLD: 10.08 K/UL — SIGNIFICANT CHANGE UP (ref 3.8–10.5)
WBC # BLD: 10.17 K/UL — SIGNIFICANT CHANGE UP (ref 3.8–10.5)
WBC # BLD: 10.5 K/UL — SIGNIFICANT CHANGE UP (ref 3.8–10.5)
WBC # BLD: 10.54 K/UL — HIGH (ref 3.8–10.5)
WBC # BLD: 10.58 K/UL — HIGH (ref 3.8–10.5)
WBC # BLD: 11 K/UL — HIGH (ref 3.8–10.5)
WBC # BLD: 7.13 K/UL — SIGNIFICANT CHANGE UP (ref 3.8–10.5)
WBC # BLD: 7.25 K/UL — SIGNIFICANT CHANGE UP (ref 3.8–10.5)
WBC # BLD: 8.55 K/UL — SIGNIFICANT CHANGE UP (ref 3.8–10.5)
WBC # BLD: 9.14 K/UL — SIGNIFICANT CHANGE UP (ref 3.8–10.5)
WBC # BLD: 9.6 K/UL — SIGNIFICANT CHANGE UP (ref 3.8–10.5)
WBC # BLD: 9.68 K/UL — SIGNIFICANT CHANGE UP (ref 3.8–10.5)
WBC # FLD AUTO: 10.08 K/UL — SIGNIFICANT CHANGE UP (ref 3.8–10.5)
WBC # FLD AUTO: 10.17 K/UL — SIGNIFICANT CHANGE UP (ref 3.8–10.5)
WBC # FLD AUTO: 10.5 K/UL — SIGNIFICANT CHANGE UP (ref 3.8–10.5)
WBC # FLD AUTO: 10.54 K/UL — HIGH (ref 3.8–10.5)
WBC # FLD AUTO: 10.58 K/UL — HIGH (ref 3.8–10.5)
WBC # FLD AUTO: 11 K/UL — HIGH (ref 3.8–10.5)
WBC # FLD AUTO: 7.13 K/UL — SIGNIFICANT CHANGE UP (ref 3.8–10.5)
WBC # FLD AUTO: 7.25 K/UL — SIGNIFICANT CHANGE UP (ref 3.8–10.5)
WBC # FLD AUTO: 8.55 K/UL — SIGNIFICANT CHANGE UP (ref 3.8–10.5)
WBC # FLD AUTO: 9.14 K/UL — SIGNIFICANT CHANGE UP (ref 3.8–10.5)
WBC # FLD AUTO: 9.31 K/UL
WBC # FLD AUTO: 9.6 K/UL — SIGNIFICANT CHANGE UP (ref 3.8–10.5)
WBC # FLD AUTO: 9.68 K/UL — SIGNIFICANT CHANGE UP (ref 3.8–10.5)

## 2020-01-01 PROCEDURE — 99233 SBSQ HOSP IP/OBS HIGH 50: CPT | Mod: GC

## 2020-01-01 PROCEDURE — 99232 SBSQ HOSP IP/OBS MODERATE 35: CPT

## 2020-01-01 PROCEDURE — 93010 ELECTROCARDIOGRAM REPORT: CPT

## 2020-01-01 PROCEDURE — 99233 SBSQ HOSP IP/OBS HIGH 50: CPT

## 2020-01-01 PROCEDURE — 99222 1ST HOSP IP/OBS MODERATE 55: CPT

## 2020-01-01 PROCEDURE — 99239 HOSP IP/OBS DSCHRG MGMT >30: CPT

## 2020-01-01 PROCEDURE — 93306 TTE W/DOPPLER COMPLETE: CPT | Mod: 26

## 2020-01-01 PROCEDURE — 71045 X-RAY EXAM CHEST 1 VIEW: CPT | Mod: 26

## 2020-01-01 PROCEDURE — 99223 1ST HOSP IP/OBS HIGH 75: CPT

## 2020-01-01 PROCEDURE — 99223 1ST HOSP IP/OBS HIGH 75: CPT | Mod: GC

## 2020-01-01 PROCEDURE — 93970 EXTREMITY STUDY: CPT | Mod: 26

## 2020-01-01 PROCEDURE — G0008: CPT

## 2020-01-01 PROCEDURE — 99284 EMERGENCY DEPT VISIT MOD MDM: CPT

## 2020-01-01 PROCEDURE — 99238 HOSP IP/OBS DSCHRG MGMT 30/<: CPT

## 2020-01-01 PROCEDURE — 36415 COLL VENOUS BLD VENIPUNCTURE: CPT

## 2020-01-01 PROCEDURE — 90662 IIV NO PRSV INCREASED AG IM: CPT

## 2020-01-01 PROCEDURE — 93922 UPR/L XTREMITY ART 2 LEVELS: CPT

## 2020-01-01 PROCEDURE — 99497 ADVNCD CARE PLAN 30 MIN: CPT | Mod: GC,25

## 2020-01-01 PROCEDURE — 74018 RADEX ABDOMEN 1 VIEW: CPT | Mod: 26

## 2020-01-01 PROCEDURE — 99072 ADDL SUPL MATRL&STAF TM PHE: CPT

## 2020-01-01 PROCEDURE — 99204 OFFICE O/P NEW MOD 45 MIN: CPT | Mod: 25

## 2020-01-01 PROCEDURE — 99231 SBSQ HOSP IP/OBS SF/LOW 25: CPT

## 2020-01-01 PROCEDURE — 99204 OFFICE O/P NEW MOD 45 MIN: CPT

## 2020-01-01 RX ORDER — HYDROCORTISONE 20 MG
25 TABLET ORAL EVERY 8 HOURS
Refills: 0 | Status: DISCONTINUED | OUTPATIENT
Start: 2020-01-01 | End: 2020-01-01

## 2020-01-01 RX ORDER — ASPIRIN/CALCIUM CARB/MAGNESIUM 324 MG
81 TABLET ORAL DAILY
Refills: 0 | Status: DISCONTINUED | OUTPATIENT
Start: 2020-01-01 | End: 2020-01-01

## 2020-01-01 RX ORDER — CIPROFLOXACIN LACTATE 400MG/40ML
0 VIAL (ML) INTRAVENOUS
Qty: 60 | Refills: 0 | DISCHARGE

## 2020-01-01 RX ORDER — INSULIN LISPRO 100/ML
VIAL (ML) SUBCUTANEOUS
Refills: 0 | Status: DISCONTINUED | OUTPATIENT
Start: 2020-01-01 | End: 2020-01-01

## 2020-01-01 RX ORDER — FUROSEMIDE 40 MG
20 TABLET ORAL DAILY
Refills: 0 | Status: DISCONTINUED | OUTPATIENT
Start: 2020-01-01 | End: 2020-01-01

## 2020-01-01 RX ORDER — OLANZAPINE 15 MG/1
5 TABLET, FILM COATED ORAL AT BEDTIME
Refills: 0 | Status: DISCONTINUED | OUTPATIENT
Start: 2020-01-01 | End: 2020-01-01

## 2020-01-01 RX ORDER — AMOXICILLIN AND CLAVULANATE POTASSIUM 875; 125 MG/1; MG/1
875-125 TABLET, COATED ORAL
Qty: 20 | Refills: 0 | Status: DISCONTINUED | COMMUNITY
Start: 2020-01-01

## 2020-01-01 RX ORDER — DEXTROSE 50 % IN WATER 50 %
12.5 SYRINGE (ML) INTRAVENOUS ONCE
Refills: 0 | Status: DISCONTINUED | OUTPATIENT
Start: 2020-01-01 | End: 2020-01-01

## 2020-01-01 RX ORDER — LISINOPRIL 2.5 MG/1
40 TABLET ORAL DAILY
Refills: 0 | Status: DISCONTINUED | OUTPATIENT
Start: 2020-01-01 | End: 2020-01-01

## 2020-01-01 RX ORDER — INSULIN LISPRO 100/ML
VIAL (ML) SUBCUTANEOUS AT BEDTIME
Refills: 0 | Status: DISCONTINUED | OUTPATIENT
Start: 2020-01-01 | End: 2020-01-01

## 2020-01-01 RX ORDER — LISINOPRIL 2.5 MG/1
1 TABLET ORAL
Qty: 0 | Refills: 0 | DISCHARGE
Start: 2020-01-01

## 2020-01-01 RX ORDER — METFORMIN ER 500 MG 500 MG/1
500 TABLET ORAL
Qty: 360 | Refills: 1 | Status: ACTIVE | COMMUNITY
Start: 2020-01-01 | End: 1900-01-01

## 2020-01-01 RX ORDER — LISINOPRIL 20 MG/1
20 TABLET ORAL DAILY
Qty: 90 | Refills: 1 | Status: ACTIVE | COMMUNITY
Start: 2020-01-01 | End: 1900-01-01

## 2020-01-01 RX ORDER — OLANZAPINE 15 MG/1
5 TABLET, FILM COATED ORAL
Refills: 0 | Status: DISCONTINUED | OUTPATIENT
Start: 2020-01-01 | End: 2020-01-01

## 2020-01-01 RX ORDER — LEVOTHYROXINE SODIUM 125 MCG
25 TABLET ORAL AT BEDTIME
Refills: 0 | Status: DISCONTINUED | OUTPATIENT
Start: 2020-01-01 | End: 2020-01-01

## 2020-01-01 RX ORDER — PREGABALIN 225 MG/1
1000 CAPSULE ORAL DAILY
Refills: 0 | Status: DISCONTINUED | OUTPATIENT
Start: 2020-01-01 | End: 2020-01-01

## 2020-01-01 RX ORDER — ASPIRIN/CALCIUM CARB/MAGNESIUM 324 MG
1 TABLET ORAL
Qty: 0 | Refills: 0 | DISCHARGE
Start: 2020-01-01

## 2020-01-01 RX ORDER — OLANZAPINE 15 MG/1
1.25 TABLET, FILM COATED ORAL EVERY 6 HOURS
Refills: 0 | Status: DISCONTINUED | OUTPATIENT
Start: 2020-01-01 | End: 2020-01-01

## 2020-01-01 RX ORDER — VANCOMYCIN HCL 1 G
1000 VIAL (EA) INTRAVENOUS ONCE
Refills: 0 | Status: COMPLETED | OUTPATIENT
Start: 2020-01-01 | End: 2020-01-01

## 2020-01-01 RX ORDER — ESCITALOPRAM OXALATE 10 MG/1
1 TABLET, FILM COATED ORAL
Qty: 30 | Refills: 0
Start: 2020-01-01 | End: 2020-01-01

## 2020-01-01 RX ORDER — GLUCAGON INJECTION, SOLUTION 0.5 MG/.1ML
1 INJECTION, SOLUTION SUBCUTANEOUS ONCE
Refills: 0 | Status: DISCONTINUED | OUTPATIENT
Start: 2020-01-01 | End: 2020-01-01

## 2020-01-01 RX ORDER — LORAZEPAM 1 MG/1
1 TABLET ORAL
Qty: 120 | Refills: 0 | Status: DISCONTINUED | COMMUNITY
Start: 2020-01-01

## 2020-01-01 RX ORDER — DEXTROSE 50 % IN WATER 50 %
25 SYRINGE (ML) INTRAVENOUS ONCE
Refills: 0 | Status: DISCONTINUED | OUTPATIENT
Start: 2020-01-01 | End: 2020-01-01

## 2020-01-01 RX ORDER — INSULIN GLARGINE 100 [IU]/ML
10 INJECTION, SOLUTION SUBCUTANEOUS AT BEDTIME
Refills: 0 | Status: DISCONTINUED | OUTPATIENT
Start: 2020-01-01 | End: 2020-01-01

## 2020-01-01 RX ORDER — DEXTROSE 50 % IN WATER 50 %
15 SYRINGE (ML) INTRAVENOUS ONCE
Refills: 0 | Status: DISCONTINUED | OUTPATIENT
Start: 2020-01-01 | End: 2020-01-01

## 2020-01-01 RX ORDER — FUROSEMIDE 80 MG/1
80 TABLET ORAL
Qty: 90 | Refills: 0 | Status: DISCONTINUED | COMMUNITY
Start: 2020-01-01

## 2020-01-01 RX ORDER — LANOLIN ALCOHOL/MO/W.PET/CERES
6 CREAM (GRAM) TOPICAL AT BEDTIME
Refills: 0 | Status: DISCONTINUED | OUTPATIENT
Start: 2020-01-01 | End: 2020-01-01

## 2020-01-01 RX ORDER — LISINOPRIL 2.5 MG/1
1 TABLET ORAL
Qty: 30 | Refills: 0
Start: 2020-01-01 | End: 2020-01-01

## 2020-01-01 RX ORDER — INSULIN GLARGINE 100 [IU]/ML
15 INJECTION, SOLUTION SUBCUTANEOUS AT BEDTIME
Refills: 0 | Status: DISCONTINUED | OUTPATIENT
Start: 2020-01-01 | End: 2020-01-01

## 2020-01-01 RX ORDER — OLANZAPINE 15 MG/1
2.5 TABLET, FILM COATED ORAL
Refills: 0 | Status: DISCONTINUED | OUTPATIENT
Start: 2020-01-01 | End: 2020-01-01

## 2020-01-01 RX ORDER — METFORMIN HYDROCHLORIDE 850 MG/1
0 TABLET ORAL
Qty: 0 | Refills: 0 | DISCHARGE
Start: 2020-01-01

## 2020-01-01 RX ORDER — RIVAROXABAN 10 MG/1
10 TABLET, FILM COATED ORAL
Qty: 30 | Refills: 0 | Status: DISCONTINUED | COMMUNITY
Start: 2020-01-01

## 2020-01-01 RX ORDER — LANOLIN ALCOHOL/MO/W.PET/CERES
3 CREAM (GRAM) TOPICAL AT BEDTIME
Refills: 0 | Status: DISCONTINUED | OUTPATIENT
Start: 2020-01-01 | End: 2020-01-01

## 2020-01-01 RX ORDER — FUROSEMIDE 40 MG
80 TABLET ORAL DAILY
Refills: 0 | Status: DISCONTINUED | OUTPATIENT
Start: 2020-01-01 | End: 2020-01-01

## 2020-01-01 RX ORDER — HALOPERIDOL DECANOATE 100 MG/ML
0.5 INJECTION INTRAMUSCULAR ONCE
Refills: 0 | Status: COMPLETED | OUTPATIENT
Start: 2020-01-01 | End: 2020-01-01

## 2020-01-01 RX ORDER — BETAMETHASONE DIPROPIONATE 0.5 MG/G
0.05 CREAM TOPICAL
Qty: 45 | Refills: 0 | Status: DISCONTINUED | COMMUNITY
Start: 2020-01-01

## 2020-01-01 RX ORDER — PREGABALIN 225 MG/1
1 CAPSULE ORAL
Qty: 0 | Refills: 0 | DISCHARGE
Start: 2020-01-01

## 2020-01-01 RX ORDER — ASPIRIN/CALCIUM CARB/MAGNESIUM 324 MG
1 TABLET ORAL
Qty: 30 | Refills: 0
Start: 2020-01-01 | End: 2020-01-01

## 2020-01-01 RX ORDER — OLANZAPINE 15 MG/1
2.5 TABLET, FILM COATED ORAL ONCE
Refills: 0 | Status: DISCONTINUED | OUTPATIENT
Start: 2020-01-01 | End: 2020-01-01

## 2020-01-01 RX ORDER — RAMIPRIL 5 MG
0 CAPSULE ORAL
Qty: 180 | Refills: 0 | DISCHARGE

## 2020-01-01 RX ORDER — HALOPERIDOL DECANOATE 100 MG/ML
0.5 INJECTION INTRAMUSCULAR ONCE
Refills: 0 | Status: DISCONTINUED | OUTPATIENT
Start: 2020-01-01 | End: 2020-01-01

## 2020-01-01 RX ORDER — OLANZAPINE 15 MG/1
1 TABLET, FILM COATED ORAL
Qty: 30 | Refills: 0
Start: 2020-01-01 | End: 2020-01-01

## 2020-01-01 RX ORDER — GABAPENTIN 400 MG/1
0 CAPSULE ORAL
Qty: 90 | Refills: 0 | DISCHARGE

## 2020-01-01 RX ORDER — METOPROLOL TARTRATE 50 MG
100 TABLET ORAL DAILY
Refills: 0 | Status: DISCONTINUED | OUTPATIENT
Start: 2020-01-01 | End: 2020-01-01

## 2020-01-01 RX ORDER — GABAPENTIN 300 MG/1
300 CAPSULE ORAL
Qty: 90 | Refills: 0 | Status: DISCONTINUED | COMMUNITY
Start: 2020-01-01

## 2020-01-01 RX ORDER — LISINOPRIL 2.5 MG/1
20 TABLET ORAL DAILY
Refills: 0 | Status: DISCONTINUED | OUTPATIENT
Start: 2020-01-01 | End: 2020-01-01

## 2020-01-01 RX ORDER — MELOXICAM 15 MG/1
15 TABLET ORAL
Qty: 30 | Refills: 0 | Status: DISCONTINUED | COMMUNITY
Start: 2020-01-01

## 2020-01-01 RX ORDER — METFORMIN HYDROCHLORIDE 850 MG/1
0 TABLET ORAL
Qty: 360 | Refills: 0 | DISCHARGE

## 2020-01-01 RX ORDER — HYDROCORTISONE 20 MG
50 TABLET ORAL ONCE
Refills: 0 | Status: COMPLETED | OUTPATIENT
Start: 2020-01-01 | End: 2020-01-01

## 2020-01-01 RX ORDER — LEVOTHYROXINE SODIUM 125 MCG
50 TABLET ORAL
Refills: 0 | Status: DISCONTINUED | OUTPATIENT
Start: 2020-01-01 | End: 2020-01-01

## 2020-01-01 RX ORDER — LEVOTHYROXINE SODIUM 125 MCG
75 TABLET ORAL DAILY
Refills: 0 | Status: DISCONTINUED | OUTPATIENT
Start: 2020-01-01 | End: 2020-01-01

## 2020-01-01 RX ORDER — HALOPERIDOL DECANOATE 100 MG/ML
2 INJECTION INTRAMUSCULAR EVERY 6 HOURS
Refills: 0 | Status: DISCONTINUED | OUTPATIENT
Start: 2020-01-01 | End: 2020-01-01

## 2020-01-01 RX ORDER — FUROSEMIDE 40 MG/1
40 TABLET ORAL DAILY
Qty: 90 | Refills: 1 | Status: ACTIVE | COMMUNITY
Start: 2020-01-01 | End: 1900-01-01

## 2020-01-01 RX ORDER — ENOXAPARIN SODIUM 100 MG/ML
40 INJECTION SUBCUTANEOUS DAILY
Refills: 0 | Status: DISCONTINUED | OUTPATIENT
Start: 2020-01-01 | End: 2020-01-01

## 2020-01-01 RX ORDER — LEVOTHYROXINE SODIUM 0.07 MG/1
75 TABLET ORAL
Qty: 90 | Refills: 1 | Status: ACTIVE | COMMUNITY
Start: 2020-01-01 | End: 1900-01-01

## 2020-01-01 RX ORDER — INSULIN GLARGINE 100 [IU]/ML
8 INJECTION, SOLUTION SUBCUTANEOUS AT BEDTIME
Refills: 0 | Status: DISCONTINUED | OUTPATIENT
Start: 2020-01-01 | End: 2020-01-01

## 2020-01-01 RX ORDER — FUROSEMIDE 40 MG
40 TABLET ORAL DAILY
Refills: 0 | Status: DISCONTINUED | OUTPATIENT
Start: 2020-01-01 | End: 2020-01-01

## 2020-01-01 RX ORDER — INSULIN GLARGINE 100 [IU]/ML
20 INJECTION, SOLUTION SUBCUTANEOUS AT BEDTIME
Refills: 0 | Status: DISCONTINUED | OUTPATIENT
Start: 2020-01-01 | End: 2020-01-01

## 2020-01-01 RX ORDER — LEVOTHYROXINE SODIUM 125 MCG
1 TABLET ORAL
Qty: 0 | Refills: 0 | DISCHARGE
Start: 2020-01-01

## 2020-01-01 RX ORDER — OLANZAPINE 5 MG/1
5 TABLET, FILM COATED ORAL
Qty: 30 | Refills: 0 | Status: ACTIVE | COMMUNITY
Start: 2020-01-01

## 2020-01-01 RX ORDER — LEVOTHYROXINE SODIUM 125 MCG
12.5 TABLET ORAL DAILY
Refills: 0 | Status: DISCONTINUED | OUTPATIENT
Start: 2020-01-01 | End: 2020-01-01

## 2020-01-01 RX ORDER — METOPROLOL SUCCINATE 100 MG/1
100 TABLET, EXTENDED RELEASE ORAL
Qty: 90 | Refills: 1 | Status: ACTIVE | COMMUNITY
Start: 2020-01-01 | End: 1900-01-01

## 2020-01-01 RX ORDER — METOPROLOL TARTRATE 50 MG
0 TABLET ORAL
Qty: 30 | Refills: 0 | DISCHARGE

## 2020-01-01 RX ORDER — HYDROCORTISONE 20 MG
50 TABLET ORAL EVERY 8 HOURS
Refills: 0 | Status: COMPLETED | OUTPATIENT
Start: 2020-01-01 | End: 2020-01-01

## 2020-01-01 RX ORDER — LEVOTHYROXINE SODIUM 125 MCG
1 TABLET ORAL
Qty: 30 | Refills: 0
Start: 2020-01-01 | End: 2020-01-01

## 2020-01-01 RX ORDER — HYDROCORTISONE 20 MG
50 TABLET ORAL EVERY 8 HOURS
Refills: 0 | Status: DISCONTINUED | OUTPATIENT
Start: 2020-01-01 | End: 2020-01-01

## 2020-01-01 RX ORDER — HALOPERIDOL DECANOATE 100 MG/ML
2 INJECTION INTRAMUSCULAR ONCE
Refills: 0 | Status: COMPLETED | OUTPATIENT
Start: 2020-01-01 | End: 2020-01-01

## 2020-01-01 RX ORDER — INSULIN GLARGINE 100 [IU]/ML
5 INJECTION, SOLUTION SUBCUTANEOUS AT BEDTIME
Refills: 0 | Status: DISCONTINUED | OUTPATIENT
Start: 2020-01-01 | End: 2020-01-01

## 2020-01-01 RX ORDER — METFORMIN HYDROCHLORIDE 850 MG/1
1 TABLET ORAL
Qty: 60 | Refills: 0
Start: 2020-01-01 | End: 2020-01-01

## 2020-01-01 RX ORDER — LEVOTHYROXINE SODIUM 125 MCG
50 TABLET ORAL AT BEDTIME
Refills: 0 | Status: DISCONTINUED | OUTPATIENT
Start: 2020-01-01 | End: 2020-01-01

## 2020-01-01 RX ORDER — POTASSIUM CHLORIDE 20 MEQ
40 PACKET (EA) ORAL EVERY 4 HOURS
Refills: 0 | Status: COMPLETED | OUTPATIENT
Start: 2020-01-01 | End: 2020-01-01

## 2020-01-01 RX ORDER — RIVAROXABAN 15 MG-20MG
0 KIT ORAL
Qty: 30 | Refills: 0 | DISCHARGE

## 2020-01-01 RX ORDER — FUROSEMIDE 40 MG
0 TABLET ORAL
Qty: 90 | Refills: 0 | DISCHARGE

## 2020-01-01 RX ORDER — MELOXICAM 15 MG/1
0 TABLET ORAL
Qty: 30 | Refills: 0 | DISCHARGE

## 2020-01-01 RX ORDER — PREGABALIN 225 MG/1
1 CAPSULE ORAL
Qty: 30 | Refills: 0
Start: 2020-01-01 | End: 2020-01-01

## 2020-01-01 RX ORDER — LEVOTHYROXINE SODIUM 125 MCG
75 TABLET ORAL
Refills: 0 | Status: DISCONTINUED | OUTPATIENT
Start: 2020-01-01 | End: 2020-01-01

## 2020-01-01 RX ORDER — METOPROLOL TARTRATE 50 MG
1 TABLET ORAL
Qty: 30 | Refills: 0
Start: 2020-01-01 | End: 2020-01-01

## 2020-01-01 RX ORDER — FUROSEMIDE 40 MG
1 TABLET ORAL
Qty: 0 | Refills: 0 | DISCHARGE
Start: 2020-01-01

## 2020-01-01 RX ORDER — HALOPERIDOL DECANOATE 100 MG/ML
1 INJECTION INTRAMUSCULAR EVERY 6 HOURS
Refills: 0 | Status: DISCONTINUED | OUTPATIENT
Start: 2020-01-01 | End: 2020-01-01

## 2020-01-01 RX ORDER — SODIUM CHLORIDE 9 MG/ML
1000 INJECTION, SOLUTION INTRAVENOUS
Refills: 0 | Status: DISCONTINUED | OUTPATIENT
Start: 2020-01-01 | End: 2020-01-01

## 2020-01-01 RX ORDER — MAGNESIUM OXIDE 400 MG ORAL TABLET 241.3 MG
400 TABLET ORAL
Refills: 0 | Status: COMPLETED | OUTPATIENT
Start: 2020-01-01 | End: 2020-01-01

## 2020-01-01 RX ORDER — RAMIPRIL 10 MG/1
10 CAPSULE ORAL
Qty: 180 | Refills: 0 | Status: DISCONTINUED | COMMUNITY
Start: 2020-02-11 | End: 2020-01-01

## 2020-01-01 RX ORDER — B-COMPLEX WITH VITAMIN C
500-200 TABLET ORAL
Qty: 180 | Refills: 0 | Status: DISCONTINUED | COMMUNITY
Start: 2020-01-01

## 2020-01-01 RX ORDER — ASPIRIN ENTERIC COATED TABLETS 81 MG 81 MG/1
81 TABLET, DELAYED RELEASE ORAL DAILY
Qty: 90 | Refills: 1 | Status: ACTIVE | COMMUNITY
Start: 2020-01-01 | End: 1900-01-01

## 2020-01-01 RX ORDER — LEVOTHYROXINE SODIUM 125 MCG
50 TABLET ORAL ONCE
Refills: 0 | Status: COMPLETED | OUTPATIENT
Start: 2020-01-01 | End: 2020-01-01

## 2020-01-01 RX ORDER — ESCITALOPRAM OXALATE 10 MG/1
10 TABLET, FILM COATED ORAL DAILY
Refills: 0 | Status: DISCONTINUED | OUTPATIENT
Start: 2020-01-01 | End: 2020-01-01

## 2020-01-01 RX ORDER — ESCITALOPRAM OXALATE 10 MG/1
5 TABLET, FILM COATED ORAL DAILY
Refills: 0 | Status: DISCONTINUED | OUTPATIENT
Start: 2020-01-01 | End: 2020-01-01

## 2020-01-01 RX ORDER — ESCITALOPRAM OXALATE 10 MG/1
10 TABLET ORAL
Qty: 30 | Refills: 0 | Status: ACTIVE | COMMUNITY
Start: 2020-01-01

## 2020-01-01 RX ORDER — FUROSEMIDE 40 MG
1 TABLET ORAL
Qty: 30 | Refills: 0
Start: 2020-01-01 | End: 2020-01-01

## 2020-01-01 RX ORDER — METFORMIN HYDROCHLORIDE 850 MG/1
1000 TABLET ORAL
Refills: 0 | Status: DISCONTINUED | OUTPATIENT
Start: 2020-01-01 | End: 2020-01-01

## 2020-01-01 RX ADMIN — Medication 100 MILLIGRAM(S): at 08:46

## 2020-01-01 RX ADMIN — Medication 80 MILLIGRAM(S): at 05:21

## 2020-01-01 RX ADMIN — Medication 100 MILLIGRAM(S): at 09:28

## 2020-01-01 RX ADMIN — OLANZAPINE 2.5 MILLIGRAM(S): 15 TABLET, FILM COATED ORAL at 17:44

## 2020-01-01 RX ADMIN — Medication 81 MILLIGRAM(S): at 11:15

## 2020-01-01 RX ADMIN — ENOXAPARIN SODIUM 40 MILLIGRAM(S): 100 INJECTION SUBCUTANEOUS at 12:01

## 2020-01-01 RX ADMIN — OLANZAPINE 5 MILLIGRAM(S): 15 TABLET, FILM COATED ORAL at 20:40

## 2020-01-01 RX ADMIN — Medication 6 MILLIGRAM(S): at 21:53

## 2020-01-01 RX ADMIN — Medication 20 MILLIGRAM(S): at 08:47

## 2020-01-01 RX ADMIN — Medication 100 MILLIGRAM(S): at 11:39

## 2020-01-01 RX ADMIN — OLANZAPINE 2.5 MILLIGRAM(S): 15 TABLET, FILM COATED ORAL at 17:10

## 2020-01-01 RX ADMIN — Medication 1: at 21:48

## 2020-01-01 RX ADMIN — Medication 50 MICROGRAM(S): at 08:06

## 2020-01-01 RX ADMIN — Medication 50 MILLIGRAM(S): at 05:18

## 2020-01-01 RX ADMIN — OLANZAPINE 5 MILLIGRAM(S): 15 TABLET, FILM COATED ORAL at 21:53

## 2020-01-01 RX ADMIN — OLANZAPINE 5 MILLIGRAM(S): 15 TABLET, FILM COATED ORAL at 21:51

## 2020-01-01 RX ADMIN — Medication 40 MILLIGRAM(S): at 06:14

## 2020-01-01 RX ADMIN — Medication 4: at 09:22

## 2020-01-01 RX ADMIN — METFORMIN HYDROCHLORIDE 1000 MILLIGRAM(S): 850 TABLET ORAL at 10:16

## 2020-01-01 RX ADMIN — Medication 6 MILLIGRAM(S): at 21:52

## 2020-01-01 RX ADMIN — METFORMIN HYDROCHLORIDE 1000 MILLIGRAM(S): 850 TABLET ORAL at 21:45

## 2020-01-01 RX ADMIN — Medication 20 MILLIGRAM(S): at 08:45

## 2020-01-01 RX ADMIN — OLANZAPINE 2.5 MILLIGRAM(S): 15 TABLET, FILM COATED ORAL at 17:34

## 2020-01-01 RX ADMIN — Medication 100 MILLIGRAM(S): at 05:07

## 2020-01-01 RX ADMIN — Medication 6 MILLIGRAM(S): at 20:40

## 2020-01-01 RX ADMIN — METFORMIN HYDROCHLORIDE 1000 MILLIGRAM(S): 850 TABLET ORAL at 21:14

## 2020-01-01 RX ADMIN — PREGABALIN 1000 MICROGRAM(S): 225 CAPSULE ORAL at 11:31

## 2020-01-01 RX ADMIN — INSULIN GLARGINE 20 UNIT(S): 100 INJECTION, SOLUTION SUBCUTANEOUS at 21:08

## 2020-01-01 RX ADMIN — PREGABALIN 1000 MICROGRAM(S): 225 CAPSULE ORAL at 11:38

## 2020-01-01 RX ADMIN — Medication 100 MILLIGRAM(S): at 12:27

## 2020-01-01 RX ADMIN — PREGABALIN 1000 MICROGRAM(S): 225 CAPSULE ORAL at 12:24

## 2020-01-01 RX ADMIN — METFORMIN HYDROCHLORIDE 1000 MILLIGRAM(S): 850 TABLET ORAL at 20:10

## 2020-01-01 RX ADMIN — PREGABALIN 1000 MICROGRAM(S): 225 CAPSULE ORAL at 08:45

## 2020-01-01 RX ADMIN — METFORMIN HYDROCHLORIDE 1000 MILLIGRAM(S): 850 TABLET ORAL at 20:55

## 2020-01-01 RX ADMIN — OLANZAPINE 5 MILLIGRAM(S): 15 TABLET, FILM COATED ORAL at 18:20

## 2020-01-01 RX ADMIN — Medication 75 MICROGRAM(S): at 05:47

## 2020-01-01 RX ADMIN — Medication 81 MILLIGRAM(S): at 13:17

## 2020-01-01 RX ADMIN — Medication 2: at 22:16

## 2020-01-01 RX ADMIN — Medication 40 MILLIGRAM(S): at 05:47

## 2020-01-01 RX ADMIN — LISINOPRIL 40 MILLIGRAM(S): 2.5 TABLET ORAL at 05:39

## 2020-01-01 RX ADMIN — Medication 6: at 18:05

## 2020-01-01 RX ADMIN — PREGABALIN 1000 MICROGRAM(S): 225 CAPSULE ORAL at 20:45

## 2020-01-01 RX ADMIN — METFORMIN HYDROCHLORIDE 1000 MILLIGRAM(S): 850 TABLET ORAL at 08:17

## 2020-01-01 RX ADMIN — Medication 75 MICROGRAM(S): at 06:44

## 2020-01-01 RX ADMIN — METFORMIN HYDROCHLORIDE 1000 MILLIGRAM(S): 850 TABLET ORAL at 08:46

## 2020-01-01 RX ADMIN — Medication 6 MILLIGRAM(S): at 20:55

## 2020-01-01 RX ADMIN — Medication 100 MILLIGRAM(S): at 06:14

## 2020-01-01 RX ADMIN — OLANZAPINE 2.5 MILLIGRAM(S): 15 TABLET, FILM COATED ORAL at 18:06

## 2020-01-01 RX ADMIN — OLANZAPINE 2.5 MILLIGRAM(S): 15 TABLET, FILM COATED ORAL at 17:31

## 2020-01-01 RX ADMIN — METFORMIN HYDROCHLORIDE 1000 MILLIGRAM(S): 850 TABLET ORAL at 11:15

## 2020-01-01 RX ADMIN — PREGABALIN 1000 MICROGRAM(S): 225 CAPSULE ORAL at 08:16

## 2020-01-01 RX ADMIN — Medication 100 MILLIGRAM(S): at 06:44

## 2020-01-01 RX ADMIN — ESCITALOPRAM OXALATE 10 MILLIGRAM(S): 10 TABLET, FILM COATED ORAL at 08:39

## 2020-01-01 RX ADMIN — ESCITALOPRAM OXALATE 10 MILLIGRAM(S): 10 TABLET, FILM COATED ORAL at 09:28

## 2020-01-01 RX ADMIN — PREGABALIN 1000 MICROGRAM(S): 225 CAPSULE ORAL at 11:15

## 2020-01-01 RX ADMIN — PREGABALIN 1000 MICROGRAM(S): 225 CAPSULE ORAL at 12:31

## 2020-01-01 RX ADMIN — METFORMIN HYDROCHLORIDE 1000 MILLIGRAM(S): 850 TABLET ORAL at 22:08

## 2020-01-01 RX ADMIN — Medication 6 MILLIGRAM(S): at 21:45

## 2020-01-01 RX ADMIN — INSULIN GLARGINE 15 UNIT(S): 100 INJECTION, SOLUTION SUBCUTANEOUS at 21:45

## 2020-01-01 RX ADMIN — Medication 2: at 08:08

## 2020-01-01 RX ADMIN — Medication 20 MILLIGRAM(S): at 08:16

## 2020-01-01 RX ADMIN — Medication 250 MILLIGRAM(S): at 02:05

## 2020-01-01 RX ADMIN — OLANZAPINE 5 MILLIGRAM(S): 15 TABLET, FILM COATED ORAL at 20:55

## 2020-01-01 RX ADMIN — Medication 81 MILLIGRAM(S): at 12:31

## 2020-01-01 RX ADMIN — Medication 75 MICROGRAM(S): at 11:07

## 2020-01-01 RX ADMIN — LISINOPRIL 40 MILLIGRAM(S): 2.5 TABLET ORAL at 06:14

## 2020-01-01 RX ADMIN — INSULIN GLARGINE 20 UNIT(S): 100 INJECTION, SOLUTION SUBCUTANEOUS at 21:33

## 2020-01-01 RX ADMIN — HALOPERIDOL DECANOATE 1 MILLIGRAM(S): 100 INJECTION INTRAMUSCULAR at 14:15

## 2020-01-01 RX ADMIN — Medication 81 MILLIGRAM(S): at 11:31

## 2020-01-01 RX ADMIN — Medication 100 MILLIGRAM(S): at 05:53

## 2020-01-01 RX ADMIN — OLANZAPINE 5 MILLIGRAM(S): 15 TABLET, FILM COATED ORAL at 21:38

## 2020-01-01 RX ADMIN — Medication 100 MILLIGRAM(S): at 05:18

## 2020-01-01 RX ADMIN — OLANZAPINE 5 MILLIGRAM(S): 15 TABLET, FILM COATED ORAL at 21:54

## 2020-01-01 RX ADMIN — Medication 80 MILLIGRAM(S): at 12:50

## 2020-01-01 RX ADMIN — Medication 40 MILLIEQUIVALENT(S): at 14:38

## 2020-01-01 RX ADMIN — Medication 2: at 12:29

## 2020-01-01 RX ADMIN — INSULIN GLARGINE 8 UNIT(S): 100 INJECTION, SOLUTION SUBCUTANEOUS at 21:52

## 2020-01-01 RX ADMIN — OLANZAPINE 2.5 MILLIGRAM(S): 15 TABLET, FILM COATED ORAL at 18:14

## 2020-01-01 RX ADMIN — Medication 75 MICROGRAM(S): at 08:51

## 2020-01-01 RX ADMIN — Medication 1: at 17:52

## 2020-01-01 RX ADMIN — Medication 80 MILLIGRAM(S): at 06:15

## 2020-01-01 RX ADMIN — Medication 50 MILLIGRAM(S): at 10:10

## 2020-01-01 RX ADMIN — OLANZAPINE 5 MILLIGRAM(S): 15 TABLET, FILM COATED ORAL at 17:46

## 2020-01-01 RX ADMIN — ESCITALOPRAM OXALATE 5 MILLIGRAM(S): 10 TABLET, FILM COATED ORAL at 08:47

## 2020-01-01 RX ADMIN — Medication 100 MILLIGRAM(S): at 11:32

## 2020-01-01 RX ADMIN — METFORMIN HYDROCHLORIDE 1000 MILLIGRAM(S): 850 TABLET ORAL at 20:40

## 2020-01-01 RX ADMIN — LISINOPRIL 20 MILLIGRAM(S): 2.5 TABLET ORAL at 08:46

## 2020-01-01 RX ADMIN — OLANZAPINE 5 MILLIGRAM(S): 15 TABLET, FILM COATED ORAL at 21:52

## 2020-01-01 RX ADMIN — OLANZAPINE 5 MILLIGRAM(S): 15 TABLET, FILM COATED ORAL at 20:42

## 2020-01-01 RX ADMIN — Medication 4: at 08:00

## 2020-01-01 RX ADMIN — INSULIN GLARGINE 10 UNIT(S): 100 INJECTION, SOLUTION SUBCUTANEOUS at 21:57

## 2020-01-01 RX ADMIN — LISINOPRIL 40 MILLIGRAM(S): 2.5 TABLET ORAL at 11:32

## 2020-01-01 RX ADMIN — METFORMIN HYDROCHLORIDE 1000 MILLIGRAM(S): 850 TABLET ORAL at 08:52

## 2020-01-01 RX ADMIN — Medication 75 MICROGRAM(S): at 05:07

## 2020-01-01 RX ADMIN — PREGABALIN 1000 MICROGRAM(S): 225 CAPSULE ORAL at 08:47

## 2020-01-01 RX ADMIN — METFORMIN HYDROCHLORIDE 1000 MILLIGRAM(S): 850 TABLET ORAL at 21:53

## 2020-01-01 RX ADMIN — OLANZAPINE 2.5 MILLIGRAM(S): 15 TABLET, FILM COATED ORAL at 17:45

## 2020-01-01 RX ADMIN — LISINOPRIL 40 MILLIGRAM(S): 2.5 TABLET ORAL at 06:44

## 2020-01-01 RX ADMIN — OLANZAPINE 1.25 MILLIGRAM(S): 15 TABLET, FILM COATED ORAL at 06:44

## 2020-01-01 RX ADMIN — Medication 1: at 12:14

## 2020-01-01 RX ADMIN — INSULIN GLARGINE 8 UNIT(S): 100 INJECTION, SOLUTION SUBCUTANEOUS at 21:42

## 2020-01-01 RX ADMIN — Medication 25 MICROGRAM(S): at 12:32

## 2020-01-01 RX ADMIN — ENOXAPARIN SODIUM 40 MILLIGRAM(S): 100 INJECTION SUBCUTANEOUS at 17:52

## 2020-01-01 RX ADMIN — Medication 0: at 20:52

## 2020-01-01 RX ADMIN — ENOXAPARIN SODIUM 40 MILLIGRAM(S): 100 INJECTION SUBCUTANEOUS at 10:33

## 2020-01-01 RX ADMIN — LISINOPRIL 20 MILLIGRAM(S): 2.5 TABLET ORAL at 11:38

## 2020-01-01 RX ADMIN — Medication 75 MICROGRAM(S): at 11:15

## 2020-01-01 RX ADMIN — LISINOPRIL 20 MILLIGRAM(S): 2.5 TABLET ORAL at 12:24

## 2020-01-01 RX ADMIN — Medication 75 MICROGRAM(S): at 11:38

## 2020-01-01 RX ADMIN — Medication 6 MILLIGRAM(S): at 21:50

## 2020-01-01 RX ADMIN — Medication 20 MILLIGRAM(S): at 09:28

## 2020-01-01 RX ADMIN — METFORMIN HYDROCHLORIDE 1000 MILLIGRAM(S): 850 TABLET ORAL at 20:42

## 2020-01-01 RX ADMIN — Medication 6 MILLIGRAM(S): at 21:07

## 2020-01-01 RX ADMIN — OLANZAPINE 5 MILLIGRAM(S): 15 TABLET, FILM COATED ORAL at 18:24

## 2020-01-01 RX ADMIN — HALOPERIDOL DECANOATE 1 MILLIGRAM(S): 100 INJECTION INTRAMUSCULAR at 09:55

## 2020-01-01 RX ADMIN — Medication 75 MICROGRAM(S): at 06:14

## 2020-01-01 RX ADMIN — PREGABALIN 1000 MICROGRAM(S): 225 CAPSULE ORAL at 13:17

## 2020-01-01 RX ADMIN — METFORMIN HYDROCHLORIDE 1000 MILLIGRAM(S): 850 TABLET ORAL at 20:47

## 2020-01-01 RX ADMIN — Medication 75 MICROGRAM(S): at 08:39

## 2020-01-01 RX ADMIN — Medication 6 MILLIGRAM(S): at 21:35

## 2020-01-01 RX ADMIN — LISINOPRIL 40 MILLIGRAM(S): 2.5 TABLET ORAL at 06:28

## 2020-01-01 RX ADMIN — Medication 100 MILLIGRAM(S): at 05:28

## 2020-01-01 RX ADMIN — Medication 81 MILLIGRAM(S): at 12:24

## 2020-01-01 RX ADMIN — Medication 100 MILLIGRAM(S): at 05:47

## 2020-01-01 RX ADMIN — HALOPERIDOL DECANOATE 0.5 MILLIGRAM(S): 100 INJECTION INTRAMUSCULAR at 12:59

## 2020-01-01 RX ADMIN — OLANZAPINE 5 MILLIGRAM(S): 15 TABLET, FILM COATED ORAL at 17:35

## 2020-01-01 RX ADMIN — Medication 4: at 12:25

## 2020-01-01 RX ADMIN — Medication 4: at 07:52

## 2020-01-01 RX ADMIN — Medication 2: at 12:30

## 2020-01-01 RX ADMIN — Medication 3 MILLIGRAM(S): at 21:47

## 2020-01-01 RX ADMIN — Medication 3 MILLIGRAM(S): at 21:02

## 2020-01-01 RX ADMIN — INSULIN GLARGINE 15 UNIT(S): 100 INJECTION, SOLUTION SUBCUTANEOUS at 21:46

## 2020-01-01 RX ADMIN — Medication 6 MILLIGRAM(S): at 20:10

## 2020-01-01 RX ADMIN — Medication 1: at 08:51

## 2020-01-01 RX ADMIN — Medication 50 MILLIGRAM(S): at 22:17

## 2020-01-01 RX ADMIN — OLANZAPINE 1.25 MILLIGRAM(S): 15 TABLET, FILM COATED ORAL at 05:18

## 2020-01-01 RX ADMIN — Medication 2: at 17:10

## 2020-01-01 RX ADMIN — Medication 40 MILLIGRAM(S): at 05:07

## 2020-01-01 RX ADMIN — Medication 81 MILLIGRAM(S): at 12:32

## 2020-01-01 RX ADMIN — LISINOPRIL 40 MILLIGRAM(S): 2.5 TABLET ORAL at 05:07

## 2020-01-01 RX ADMIN — Medication 100 MILLIGRAM(S): at 11:15

## 2020-01-01 RX ADMIN — LISINOPRIL 20 MILLIGRAM(S): 2.5 TABLET ORAL at 09:28

## 2020-01-01 RX ADMIN — Medication 80 MILLIGRAM(S): at 05:01

## 2020-01-01 RX ADMIN — Medication 0: at 21:18

## 2020-01-01 RX ADMIN — LISINOPRIL 40 MILLIGRAM(S): 2.5 TABLET ORAL at 05:47

## 2020-01-01 RX ADMIN — Medication 81 MILLIGRAM(S): at 08:47

## 2020-01-01 RX ADMIN — Medication 80 MILLIGRAM(S): at 05:53

## 2020-01-01 RX ADMIN — Medication 6 MILLIGRAM(S): at 20:42

## 2020-01-01 RX ADMIN — LISINOPRIL 40 MILLIGRAM(S): 2.5 TABLET ORAL at 10:16

## 2020-01-01 RX ADMIN — Medication 2: at 07:45

## 2020-01-01 RX ADMIN — Medication 4: at 21:52

## 2020-01-01 RX ADMIN — Medication 81 MILLIGRAM(S): at 17:02

## 2020-01-01 RX ADMIN — Medication 81 MILLIGRAM(S): at 09:28

## 2020-01-01 RX ADMIN — Medication 40 MILLIGRAM(S): at 10:16

## 2020-01-01 RX ADMIN — Medication 10: at 12:14

## 2020-01-01 RX ADMIN — Medication 4: at 12:29

## 2020-01-01 RX ADMIN — Medication 1: at 08:14

## 2020-01-01 RX ADMIN — Medication 2: at 08:45

## 2020-01-01 RX ADMIN — INSULIN GLARGINE 20 UNIT(S): 100 INJECTION, SOLUTION SUBCUTANEOUS at 21:45

## 2020-01-01 RX ADMIN — Medication 12.5 MICROGRAM(S): at 05:52

## 2020-01-01 RX ADMIN — Medication 75 MICROGRAM(S): at 07:22

## 2020-01-01 RX ADMIN — Medication 20 MILLIGRAM(S): at 11:15

## 2020-01-01 RX ADMIN — Medication 81 MILLIGRAM(S): at 08:16

## 2020-01-01 RX ADMIN — Medication 81 MILLIGRAM(S): at 10:16

## 2020-01-01 RX ADMIN — Medication 6 MILLIGRAM(S): at 21:51

## 2020-01-01 RX ADMIN — Medication 100 MILLIGRAM(S): at 06:16

## 2020-01-01 RX ADMIN — Medication 75 MICROGRAM(S): at 09:28

## 2020-01-01 RX ADMIN — Medication 75 MICROGRAM(S): at 09:21

## 2020-01-01 RX ADMIN — Medication 40 MILLIGRAM(S): at 05:39

## 2020-01-01 RX ADMIN — ESCITALOPRAM OXALATE 5 MILLIGRAM(S): 10 TABLET, FILM COATED ORAL at 12:24

## 2020-01-01 RX ADMIN — Medication 20 MILLIGRAM(S): at 08:39

## 2020-01-01 RX ADMIN — OLANZAPINE 2.5 MILLIGRAM(S): 15 TABLET, FILM COATED ORAL at 16:20

## 2020-01-01 RX ADMIN — Medication 40 MILLIEQUIVALENT(S): at 10:34

## 2020-01-01 RX ADMIN — Medication 100 MILLIGRAM(S): at 05:39

## 2020-01-01 RX ADMIN — METFORMIN HYDROCHLORIDE 1000 MILLIGRAM(S): 850 TABLET ORAL at 21:07

## 2020-01-01 RX ADMIN — PREGABALIN 1000 MICROGRAM(S): 225 CAPSULE ORAL at 12:37

## 2020-01-01 RX ADMIN — Medication 100 MILLIGRAM(S): at 08:38

## 2020-01-01 RX ADMIN — INSULIN GLARGINE 20 UNIT(S): 100 INJECTION, SOLUTION SUBCUTANEOUS at 21:12

## 2020-01-01 RX ADMIN — PREGABALIN 1000 MICROGRAM(S): 225 CAPSULE ORAL at 09:21

## 2020-01-01 RX ADMIN — Medication 2: at 17:27

## 2020-01-01 RX ADMIN — Medication 80 MILLIGRAM(S): at 06:29

## 2020-01-01 RX ADMIN — Medication 6 MILLIGRAM(S): at 21:43

## 2020-01-01 RX ADMIN — PREGABALIN 1000 MICROGRAM(S): 225 CAPSULE ORAL at 08:39

## 2020-01-01 RX ADMIN — OLANZAPINE 5 MILLIGRAM(S): 15 TABLET, FILM COATED ORAL at 17:19

## 2020-01-01 RX ADMIN — Medication 100 MILLIGRAM(S): at 10:16

## 2020-01-01 RX ADMIN — Medication 1: at 12:27

## 2020-01-01 RX ADMIN — Medication 1: at 20:42

## 2020-01-01 RX ADMIN — METFORMIN HYDROCHLORIDE 1000 MILLIGRAM(S): 850 TABLET ORAL at 20:49

## 2020-01-01 RX ADMIN — Medication 50 MICROGRAM(S): at 10:52

## 2020-01-01 RX ADMIN — Medication 75 MICROGRAM(S): at 10:16

## 2020-01-01 RX ADMIN — Medication 4: at 08:01

## 2020-01-01 RX ADMIN — Medication 50 MICROGRAM(S): at 08:48

## 2020-01-01 RX ADMIN — LISINOPRIL 40 MILLIGRAM(S): 2.5 TABLET ORAL at 09:21

## 2020-01-01 RX ADMIN — Medication 81 MILLIGRAM(S): at 10:33

## 2020-01-01 RX ADMIN — METFORMIN HYDROCHLORIDE 1000 MILLIGRAM(S): 850 TABLET ORAL at 12:23

## 2020-01-01 RX ADMIN — INSULIN GLARGINE 20 UNIT(S): 100 INJECTION, SOLUTION SUBCUTANEOUS at 21:50

## 2020-01-01 RX ADMIN — Medication 81 MILLIGRAM(S): at 12:23

## 2020-01-01 RX ADMIN — OLANZAPINE 5 MILLIGRAM(S): 15 TABLET, FILM COATED ORAL at 20:10

## 2020-01-01 RX ADMIN — LISINOPRIL 40 MILLIGRAM(S): 2.5 TABLET ORAL at 08:02

## 2020-01-01 RX ADMIN — Medication 100 MILLIGRAM(S): at 05:01

## 2020-01-01 RX ADMIN — Medication 6 MILLIGRAM(S): at 22:17

## 2020-01-01 RX ADMIN — Medication 20 MILLIGRAM(S): at 12:24

## 2020-01-01 RX ADMIN — ESCITALOPRAM OXALATE 5 MILLIGRAM(S): 10 TABLET, FILM COATED ORAL at 08:16

## 2020-01-01 RX ADMIN — Medication 100 MILLIGRAM(S): at 06:28

## 2020-01-01 RX ADMIN — PREGABALIN 1000 MICROGRAM(S): 225 CAPSULE ORAL at 12:23

## 2020-01-01 RX ADMIN — PREGABALIN 1000 MICROGRAM(S): 225 CAPSULE ORAL at 10:16

## 2020-01-01 RX ADMIN — OLANZAPINE 2.5 MILLIGRAM(S): 15 TABLET, FILM COATED ORAL at 20:42

## 2020-01-01 RX ADMIN — LISINOPRIL 40 MILLIGRAM(S): 2.5 TABLET ORAL at 05:53

## 2020-01-01 RX ADMIN — LISINOPRIL 40 MILLIGRAM(S): 2.5 TABLET ORAL at 06:16

## 2020-01-01 RX ADMIN — OLANZAPINE 1.25 MILLIGRAM(S): 15 TABLET, FILM COATED ORAL at 12:51

## 2020-01-01 RX ADMIN — LISINOPRIL 20 MILLIGRAM(S): 2.5 TABLET ORAL at 08:17

## 2020-01-01 RX ADMIN — Medication 81 MILLIGRAM(S): at 12:01

## 2020-01-01 RX ADMIN — LISINOPRIL 40 MILLIGRAM(S): 2.5 TABLET ORAL at 05:28

## 2020-01-01 RX ADMIN — ENOXAPARIN SODIUM 40 MILLIGRAM(S): 100 INJECTION SUBCUTANEOUS at 12:50

## 2020-01-01 RX ADMIN — Medication 20 MILLIGRAM(S): at 11:31

## 2020-01-01 RX ADMIN — PREGABALIN 1000 MICROGRAM(S): 225 CAPSULE ORAL at 09:28

## 2020-01-01 RX ADMIN — INSULIN GLARGINE 5 UNIT(S): 100 INJECTION, SOLUTION SUBCUTANEOUS at 21:51

## 2020-01-01 RX ADMIN — Medication 75 MICROGRAM(S): at 12:24

## 2020-01-01 RX ADMIN — METFORMIN HYDROCHLORIDE 1000 MILLIGRAM(S): 850 TABLET ORAL at 08:38

## 2020-01-01 RX ADMIN — Medication 81 MILLIGRAM(S): at 08:45

## 2020-01-01 RX ADMIN — OLANZAPINE 5 MILLIGRAM(S): 15 TABLET, FILM COATED ORAL at 18:02

## 2020-01-01 RX ADMIN — ENOXAPARIN SODIUM 40 MILLIGRAM(S): 100 INJECTION SUBCUTANEOUS at 12:32

## 2020-01-01 RX ADMIN — Medication 2: at 17:04

## 2020-01-01 RX ADMIN — Medication 1: at 21:45

## 2020-01-01 RX ADMIN — Medication 2: at 08:20

## 2020-01-01 RX ADMIN — Medication 40 MILLIEQUIVALENT(S): at 09:43

## 2020-01-01 RX ADMIN — Medication 81 MILLIGRAM(S): at 09:21

## 2020-01-01 RX ADMIN — Medication 1: at 17:30

## 2020-01-01 RX ADMIN — LISINOPRIL 20 MILLIGRAM(S): 2.5 TABLET ORAL at 08:38

## 2020-01-01 RX ADMIN — LISINOPRIL 40 MILLIGRAM(S): 2.5 TABLET ORAL at 05:01

## 2020-01-01 RX ADMIN — OLANZAPINE 5 MILLIGRAM(S): 15 TABLET, FILM COATED ORAL at 21:50

## 2020-01-01 RX ADMIN — ENOXAPARIN SODIUM 40 MILLIGRAM(S): 100 INJECTION SUBCUTANEOUS at 13:17

## 2020-01-01 RX ADMIN — OLANZAPINE 1.25 MILLIGRAM(S): 15 TABLET, FILM COATED ORAL at 13:31

## 2020-01-01 RX ADMIN — Medication 20 MILLIGRAM(S): at 11:38

## 2020-01-01 RX ADMIN — METFORMIN HYDROCHLORIDE 1000 MILLIGRAM(S): 850 TABLET ORAL at 09:28

## 2020-01-01 RX ADMIN — Medication 40 MILLIGRAM(S): at 06:44

## 2020-01-01 RX ADMIN — LISINOPRIL 40 MILLIGRAM(S): 2.5 TABLET ORAL at 12:27

## 2020-01-01 RX ADMIN — Medication 4: at 17:32

## 2020-01-01 RX ADMIN — Medication 2: at 17:02

## 2020-01-01 RX ADMIN — INSULIN GLARGINE 10 UNIT(S): 100 INJECTION, SOLUTION SUBCUTANEOUS at 21:52

## 2020-01-01 RX ADMIN — OLANZAPINE 2.5 MILLIGRAM(S): 15 TABLET, FILM COATED ORAL at 20:45

## 2020-01-01 RX ADMIN — Medication 75 MICROGRAM(S): at 08:46

## 2020-01-01 RX ADMIN — Medication 81 MILLIGRAM(S): at 08:38

## 2020-01-01 RX ADMIN — OLANZAPINE 5 MILLIGRAM(S): 15 TABLET, FILM COATED ORAL at 21:49

## 2020-01-01 RX ADMIN — OLANZAPINE 2.5 MILLIGRAM(S): 15 TABLET, FILM COATED ORAL at 18:07

## 2020-01-01 RX ADMIN — Medication 75 MICROGRAM(S): at 05:39

## 2020-01-01 RX ADMIN — Medication 2: at 08:11

## 2020-01-01 RX ADMIN — INSULIN GLARGINE 20 UNIT(S): 100 INJECTION, SOLUTION SUBCUTANEOUS at 21:23

## 2020-01-01 RX ADMIN — LISINOPRIL 40 MILLIGRAM(S): 2.5 TABLET ORAL at 11:15

## 2020-01-01 RX ADMIN — OLANZAPINE 5 MILLIGRAM(S): 15 TABLET, FILM COATED ORAL at 21:45

## 2020-01-01 RX ADMIN — Medication 6 MILLIGRAM(S): at 21:49

## 2020-01-01 RX ADMIN — Medication 40 MILLIGRAM(S): at 09:21

## 2020-01-01 RX ADMIN — INSULIN GLARGINE 20 UNIT(S): 100 INJECTION, SOLUTION SUBCUTANEOUS at 21:30

## 2020-01-01 RX ADMIN — OLANZAPINE 5 MILLIGRAM(S): 15 TABLET, FILM COATED ORAL at 21:07

## 2020-01-01 RX ADMIN — INSULIN GLARGINE 10 UNIT(S): 100 INJECTION, SOLUTION SUBCUTANEOUS at 20:41

## 2020-01-01 RX ADMIN — Medication 81 MILLIGRAM(S): at 11:38

## 2020-01-01 RX ADMIN — METFORMIN HYDROCHLORIDE 1000 MILLIGRAM(S): 850 TABLET ORAL at 21:30

## 2020-01-01 RX ADMIN — Medication 1: at 16:37

## 2020-01-01 RX ADMIN — Medication 50 MICROGRAM(S): at 08:27

## 2020-01-01 RX ADMIN — PREGABALIN 1000 MICROGRAM(S): 225 CAPSULE ORAL at 12:01

## 2020-01-01 RX ADMIN — Medication 75 MICROGRAM(S): at 08:16

## 2020-08-06 NOTE — ED PROVIDER NOTE - NS ED ROS FT
CONSTITUTIONAL: No fevers, no chills  Eyes: No vision changes  Cardiovascular: No Chest pain  Respiratory: No SOB  Gastrointestinal: No n/v/d, no abd pain  Genitourinary: no dysuria, no hematuria  PSYCHIATRIC: +Hx depression  Endocrine: No unexplained weight gain

## 2020-08-06 NOTE — ED ADULT NURSE NOTE - OBJECTIVE STATEMENT
Padmini RN:  Pt received in spot 13, A&OX4, NAD.  Pt sent in by Dr. Baker for IV antibiotics for possible cellulitis to lower extremities.  Pt with B/L lower extremity swelling and redness noted.  Pt states "they're swollen."  Pt denies any other physical complaints.  Appears comfortable.  Labs sent, 18g to L AC.  Rectal temp 99.2F, stage 1 to sacrum.  Report given to primary RN.  Will continue to monitor.

## 2020-08-06 NOTE — ED ADULT TRIAGE NOTE - CCCP TRG CHIEF CMPLNT
Spoke to Chandler Hassan and she states Pt is doing better today, will call surgeon if any further problems. b/l LLE swelling b/l LE swelling

## 2020-08-06 NOTE — ED PROVIDER NOTE - PHYSICAL EXAMINATION
General:  NAD  HEENT: pupils equal and reactive, normal external ears bilaterally   Cardiac: RRR, no MRG appreciated  Resp: lungs clear to auscultation bilaterally, symmetric chest wall rise  Abd: soft, nontender, nondistended,   : no CVA tenderness  Neuro: Moving all extremities  Skin:  normal color for race  Lower ext: BL swelling with redness to both LE, bl EXT warm, no pitting edema of LE

## 2020-08-06 NOTE — ED ADULT TRIAGE NOTE - CHIEF COMPLAINT QUOTE
Pt sent by Dr. Baker for IV antibiotics for cellutitis to b/l lower extremities. B/l legs observed to be swollen at this time. pmhx of type 2 DM.

## 2020-08-06 NOTE — ED PROVIDER NOTE - CLINICAL SUMMARY MEDICAL DECISION MAKING FREE TEXT BOX
Darren PGY-3:  81yo f HX dm here with LE swelling, as per daughter states this is acute 1 month ago had no leg swelling, is s/p course augmentin. In this elderly diabetic F will cover for mRSA w/ vanc, BL duplex studies of LE to r/o DVT as cause of swelling and anticipate admission for further tx for cellulitis

## 2020-08-06 NOTE — ED PROVIDER NOTE - OBJECTIVE STATEMENT
# [id number] marcelina Collateral from daughter :  Went to PCP due to worsening leg swelling, worried about cellulitis prompting referral to ED. Not responding to abx. No fevers, "legs feel like they are on fire"    PCP:  [phone] Olivia     # [id number] marcelina Collateral from daughter Raquel [phone] Olivia    :  Went to PCP due to worsening leg swelling, worried about cellulitis prompting referral to ED. Not responding to abx. No fevers, "legs feel like they are on fire"    PCP:     # [id number] marcelina 81yo F h/o HTN, DM    Pt poor historian, does not know why she is on furosemide and xarelto. denies heart failure. Denies CP/SOB.    Collateral from daughter Raquel [phone] Sadafpalomakyreelety :  Went to PCP due to worsening leg swelling, worried about cellulitis prompting referral to ED. Not responding to abx. No fevers, "legs feel like they are on fire".     PCP:     # [id number] marcelina 79yo F h/o HTN, DM here w/ cc of BL LE swelling and redness    Pt poor historian, does not know why she is on furosemide and xarelto. attempted to use  # [id number] marcelina unable to communicate with patient. poor historian, does not know history or why pt is here.  denies heart failure. Denies CP/SOB.    Collateral from daughter Raquel [phone] Olivia :  Went to PCP due to worsening leg swelling, worried about cellulitis prompting referral to ED. Not responding to abx. No fevers, "legs feel like they are on fire".

## 2020-08-06 NOTE — ED ADULT NURSE REASSESSMENT NOTE - NS ED NURSE REASSESS COMMENT FT1
Pt A&Ox3. Reports she would like to leave AMA, she does not believe it is necessary to stay. MD Simmons explained risks of leaving AMA with education. IV access discontinued. NAD. VS as noted.

## 2020-08-06 NOTE — ED PROVIDER NOTE - ATTENDING CONTRIBUTION TO CARE
I have seen and examined the patient on the patient´s visit date. I have reviewed the note written by Darren Hernandez MD  on that visit day. I have supervised and participated as necessary in the performance of procedures indicated for patient management and was available at all phases of the patient´s visit when needed. We discussed the history, physical exam findings, management plan, and  medical decision making. I have made my additions, exceptions, and revisions within the chart and I agree with H and P as documented in its entirety. The data and my interpretation of any data collected from labs, interventions and imaging appear below as well as my independent medical decision making and considerations    The patient is a 80y obese Azeri speaking Female who has an unclear past medical and surgery history PTED with bilateral lower extremity swelling and redness and pain not responsive to antibiotics; most history has been collected from daughter Raquel by Dr Hernandez    PTED with   Vital Signs Last 24 Hrs  T(C): 37 (07 Aug 2020 00:39), Max: 37.3 (06 Aug 2020 22:17)  T(F): 98.6 (07 Aug 2020 00:39), Max: 99.2 (06 Aug 2020 22:17)  HR: 75 (07 Aug 2020 00:39) (64 - 103)  BP: 112/68 (07 Aug 2020 00:39) (112/68 - 182/81)  BP(mean): --  RR: 20 (07 Aug 2020 00:39) (15 - 22)  SpO2: 95% (07 Aug 2020 00:39) (95% - 100%)  PE: as described; my additions and exceptions are noted in the chart  DATA:   EKG:  Lab Results:                        10.6   8.55  )-----------( 311      ( 06 Aug 2020 22:00 )             33.5   Mean Cell Volume: 84.6 fL (08-06-20 @ 22:00) Auto Neutrophil %: 66.0 % (08-06-20 @ 22:00) Auto Eosinophil %: 2.6 % (08-06-20 @ 22:00)    08-06    141  |  99  |  14  ----------------------------<  197<H>  3.7   |  28  |  0.70    Ca    9.1      06 Aug 2020 22:00    TPro  7.0  /  Alb  4.1  /  TBili  0.3  /  DBili  x   /  AST  15  /  ALT  11  /  AlkPhos  57  08-06     IMAGING: IMPRESSION:     Poor visualization of Bilateral posterior tibial and peroneal veins but no obvious thrombosis seen   MDM:  IMP (WILTON): multiple antibiotic failures and negative studies= less likely infectious; most probably stasis dermatitis given bilateral redness and failure to progress Patient ability to care for self impaired apparently   Management (Plan):  would continue antibiotics only 2/2 unclear whether partially treated cellulitis failing 2/2 venous stasis and pt inability to care for self (leg elevation, etic) vs stasis dermatitis from venous insufficiency  admit   vascular consult inpt I have seen and examined the patient on the patient´s visit date. I have reviewed the note written by Darren Hernandez MD  on that visit day. I have supervised and participated as necessary in the performance of procedures indicated for patient management and was available at all phases of the patient´s visit when needed. We discussed the history, physical exam findings, management plan, and  medical decision making. I have made my additions, exceptions, and revisions within the chart and I agree with H and P as documented in its entirety. The data and my interpretation of any data collected from labs, interventions and imaging appear below as well as my independent medical decision making and considerations    The patient is a 80y obese Canadian speaking Female who has an unclear past medical and surgery history PTED with bilateral lower extremity swelling and redness and pain not responsive to antibiotics; most history has been collected from daughter Raquel by Dr Hernandez   Vital Signs Last 24 Hrs  T(C): 37 (07 Aug 2020 00:39), Max: 37.3 (06 Aug 2020 22:17)  T(F): 98.6 (07 Aug 2020 00:39), Max: 99.2 (06 Aug 2020 22:17)  HR: 75 (07 Aug 2020 00:39) (64 - 103)  BP: 112/68 (07 Aug 2020 00:39) (112/68 - 182/81)  BP(mean): --  RR: 20 (07 Aug 2020 00:39) (15 - 22)  SpO2: 95% (07 Aug 2020 00:39) (95% - 100%)  PE: as described; my additions and exceptions are noted in the chart  DATA:   Lab Results:                10.6   8.55  )-----------( 311      ( 06 Aug 2020 22:00 )             33.5   Mean Cell Volume: 84.6 fL (08-06-20 @ 22:00) Auto Neutrophil %: 66.0 % (08-06-20 @ 22:00) Auto Eosinophil %: 2.6 % (08-06-20 @ 22:00)    08-06    141  |  99  |  14  ----------------------------<  197<H>  3.7   |  28  |  0.70    Ca    9.1      06 Aug 2020 22:00  TPro  7.0  /  Alb  4.1  /  TBili  0.3  /  DBili  x   /  AST  15  /  ALT  11  /  AlkPhos  57  08-06     IMAGING: IMPRESSION:   Poor visualization of Bilateral posterior tibial and peroneal veins but no obvious thrombosis seen   MDM:  IMP (WILTON): multiple antibiotic failures and negative studies= less likely infectious; most probably stasis dermatitis given bilateral redness and failure to progress Patient ability to care for self impaired apparently   Management (Plan):  would continue antibiotics only 2/2 unclear whether partially treated cellulitis failing 2/2 venous stasis and pt inability to care for self (leg elevation, etic) vs stasis dermatitis from venous insufficiency  admit vascular consult inpt

## 2020-08-07 NOTE — H&P ADULT - NSHPLABSRESULTS_GEN_ALL_CORE
LABS: Personally reviewed labs, imaging, and ECG                          10.7   10.08 )-----------( 312      ( 07 Aug 2020 09:40 )             34.6       08-07    141  |  100  |  11  ----------------------------<  222<H>  3.9   |  26  |  0.68    Ca    8.9      07 Aug 2020 09:40  Phos  3.4     08-07  Mg     1.6     08-07    TPro  6.7  /  Alb  3.9  /  TBili  0.5  /  DBili  x   /  AST  17  /  ALT  9   /  AlkPhos  56  08-07       LIVER FUNCTIONS - ( 07 Aug 2020 09:40 )  Alb: 3.9 g/dL / Pro: 6.7 g/dL / ALK PHOS: 56 u/L / ALT: 9 u/L / AST: 17 u/L / GGT: x                            Lactate Trend  08-07 @ 09:40 Lactate:2.4             CAPILLARY BLOOD GLUCOSE      POCT Blood Glucose.: 174 mg/dL (07 Aug 2020 12:14)            RADIOLOGY & ADDITIONAL TESTS:    < from: US Duplex Venous Lower Ext Complete, Bilateral (08.06.20 @ 23:32) >  EXAM:  US DPLX LWR EXT VEINS COMPL BI      PROCEDURE DATE:  Aug  6 2020     INTERPRETATION:  CLINICAL INDICATION: Bilateral leg swelling, assess DVT.    TECHNIQUE: Grayscale, color Doppler and spectral Doppler ultrasound was utilized to evaluate bilateral lower extremity deep venous system.    COMPARISON: None.    FINDINGS: There is no obvious thrombosis in bilateral common femoral veins, femoral veins, popliteal veins or gastrocnemius veins. Bilateral posterior tibial veins and peroneal veins were not visualized due to soft tissue edema.    IMPRESSION:    Bilateral posterior tibial veins and peroneal vein were not visualized, limiting complete evaluation. No obvious thrombosis in the visualized bilateral lower extremity deep veins.    JORDAN MULLIGAN M.D., ATTENDING RADIOLOGIST  This document has been electronically signed. Aug  6 2020 11:36PM  < end of copied text >      EKG:  showing atrial fibrillation LABS: Personally reviewed labs, imaging, and ECG                          10.7   10.08 )-----------( 312      ( 07 Aug 2020 09:40 )             34.6       08-07    141  |  100  |  11  ----------------------------<  222<H>  3.9   |  26  |  0.68    Ca    8.9      07 Aug 2020 09:40  Phos  3.4     08-07  Mg     1.6     08-07    TPro  6.7  /  Alb  3.9  /  TBili  0.5  /  DBili  x   /  AST  17  /  ALT  9   /  AlkPhos  56  08-07       LIVER FUNCTIONS - ( 07 Aug 2020 09:40 )  Alb: 3.9 g/dL / Pro: 6.7 g/dL / ALK PHOS: 56 u/L / ALT: 9 u/L / AST: 17 u/L / GGT: x                            Lactate Trend  08-07 @ 09:40 Lactate:2.4             CAPILLARY BLOOD GLUCOSE      POCT Blood Glucose.: 174 mg/dL (07 Aug 2020 12:14)            RADIOLOGY & ADDITIONAL TESTS:    < from: US Duplex Venous Lower Ext Complete, Bilateral (08.06.20 @ 23:32) >  EXAM:  US DPLX LWR EXT VEINS COMPL BI      PROCEDURE DATE:  Aug  6 2020     INTERPRETATION:  CLINICAL INDICATION: Bilateral leg swelling, assess DVT.    TECHNIQUE: Grayscale, color Doppler and spectral Doppler ultrasound was utilized to evaluate bilateral lower extremity deep venous system.    COMPARISON: None.    FINDINGS: There is no obvious thrombosis in bilateral common femoral veins, femoral veins, popliteal veins or gastrocnemius veins. Bilateral posterior tibial veins and peroneal veins were not visualized due to soft tissue edema.    IMPRESSION:    Bilateral posterior tibial veins and peroneal vein were not visualized, limiting complete evaluation. No obvious thrombosis in the visualized bilateral lower extremity deep veins.    JORDAN MULLIGAN M.D., ATTENDING RADIOLOGIST  This document has been electronically signed. Aug  6 2020 11:36PM  < end of copied text >      EKG: irregular rate, P waves of different morphology, ND interval prolonged 160.

## 2020-08-07 NOTE — H&P ADULT - PROBLEM SELECTOR PLAN 7
Transitions of Care Status:  1.  Name of PCP: Marie  2.  PCP Contacted on Admission: [x] Y    [ ] N    3.  PCP contacted at Discharge: [ ] Y    [ ] N    [ ] N/A  4.  Post-Discharge Appointment Date and Location:  5.  Summary of Handoff given to PCP:

## 2020-08-07 NOTE — BEHAVIORAL HEALTH ASSESSMENT NOTE - NSBHCONSULTMEDS_PSY_A_CORE FT
Start standing Zyprexa 2.5mg po 2000  Start melatonin 3mg po qhs Start standing Zyprexa 2.5mg po 2000, as long as qtc <500ms   Start melatonin 3mg po qhs

## 2020-08-07 NOTE — H&P ADULT - NSHPPHYSICALEXAM_GEN_ALL_CORE
Physical Exam:  Gen: Alert, elderly woman with obese body habitus, in NAD  HEENT: NCAT, EOMI, clear conjunctiva, no erythema or exudates in the oropharynx, mmm  Neck: Supple, no JVD, no LAD  CV: RRR, S1S2, no m/r/g  Resp: CTAB, normal respiratory effort  Abd: Soft, NT, ND, normal bowel sounds  Ext: no edema, no clubbing or cyanosis  Neuro: AOx3, CN2-12 grossly intact, EMMANUEL  Skin: warm, perfused Physical Exam:  Gen: Alert, elderly woman with obese body habitus, in NAD  HEENT: NCAT, EOMI, clear conjunctiva, mmm  Neck: Supple, no JVD, no LAD  CV: irregularly irregular, S1S2 +  Resp: Bibasilar crackles, normal respiratory effort  Abd: Soft, NT, ND, normal bowel sounds  Ext: Symmetric edema, erythema in BLE from feet up to high shins - chronic venous stasis changes. No ulcers, purulence, abscess, crepitus. Not tender to palpation.    Neuro: AOx3, CN2-12 grossly intact, EMMANUEL  Skin: warm, perfused

## 2020-08-07 NOTE — H&P ADULT - PROBLEM SELECTOR PLAN 2
Per PCP, no known history of Afib. Irregularly irregular HS and EKG showing afib. She was on dipyramidole and clopidogrel in the past, but unclear for what indication.  - CTM to verify if paroxysmal (resolves within 7 days)  - Consider AC  - Check TSH, free T4 Per PCP, no known history of Afib. Irregularly irregular HS and EKG with irregular rate/rhythm, different p wave morphologies, and prolonged IA interval. She was on dipyramidole and clopidogrel in the past, but unclear for what indication.  - Monitor on telemetry  - Consult cardiology  - Consider AC  - Check TSH, free T4

## 2020-08-07 NOTE — H&P ADULT - PROBLEM SELECTOR PLAN 4
- Start with lasix 40mg daily, monitor fluid status  - On Ramipril 10mg daily BID at home, start Lisinopril 40mg daily  - On Metoprolol scc  daily at home, continue dose

## 2020-08-07 NOTE — BEHAVIORAL HEALTH ASSESSMENT NOTE - NSBHADMITCOUNSEL_PSY_A_CORE
importance of adherence to chosen treatment/instructions for management, treatment and follow up/risk factor reduction/client/family/caregiver education/risks and benefits of treatment options

## 2020-08-07 NOTE — BEHAVIORAL HEALTH ASSESSMENT NOTE - NSBHCHARTREVIEWVS_PSY_A_CORE FT
Vital Signs Last 24 Hrs  T(C): 36.9 (07 Aug 2020 12:25), Max: 37.3 (06 Aug 2020 22:17)  T(F): 98.5 (07 Aug 2020 12:25), Max: 99.2 (06 Aug 2020 22:17)  HR: 84 (07 Aug 2020 12:25) (64 - 103)  BP: 147/83 (07 Aug 2020 12:25) (112/68 - 182/81)  BP(mean): --  RR: 18 (07 Aug 2020 12:25) (15 - 22)  SpO2: 97% (07 Aug 2020 12:25) (95% - 100%)

## 2020-08-07 NOTE — H&P ADULT - PROBLEM SELECTOR PLAN 1
Worsening b/l leg swelling, redness x 1 month, unresponsive to augmentin prescribed by PCP for cellulitis, likely secondary to chronic venous insufficiency vs. newfound CHF given concurrent finding of crackles on lung exam and Afib on EKG.  - Check TTE, CXR  - Start with lasix dose 40mg daily and monitor volume status, strict I/Os, fluid restriction  - DASH diet  - Fall precautions  - BLE duplex negative for DVT's

## 2020-08-07 NOTE — BEHAVIORAL HEALTH ASSESSMENT NOTE - SUMMARY
80F (primarily Cook Islander-speaking) w/ hx of HTN, DM, obesity, and anxiety presented on 8/6 with worsening b/l leg swelling, redness x 1 month likely secondary to chronic venous insufficiency vs. newfound CHF. Pt with longstanding delusions/paranoia mostly around issues with her neighbors (going on around 2 years as per daughter), has no psychotropic medication trials to her knowledge for this particular issue, but may have been briefly treated for a nervous breakdown in the remote past. Will initiate antipsychotic at this time to target her paranoid delusions. No safety concerns currently.

## 2020-08-07 NOTE — H&P ADULT - PROBLEM SELECTOR PLAN 3
Per PCP and daughter, pt exhibits paranoid thinking and behavior. Additionally has visual hallucinations, may be d/t underlying psychiatric disorder.  - Psych consult

## 2020-08-07 NOTE — BEHAVIORAL HEALTH ASSESSMENT NOTE - NSBHCHARTREVIEWLAB_PSY_A_CORE FT
CBC Full  -  ( 07 Aug 2020 09:40 )  WBC Count : 10.08 K/uL  RBC Count : 3.92 M/uL  Hemoglobin : 10.7 g/dL  Hematocrit : 34.6 %  Platelet Count - Automated : 312 K/uL  Mean Cell Volume : 88.3 fL  Mean Cell Hemoglobin : 27.3 pg  Mean Cell Hemoglobin Concentration : 30.9 %  Auto Neutrophil # : 7.04 K/uL  Auto Lymphocyte # : 2.08 K/uL  Auto Monocyte # : 0.68 K/uL  Auto Eosinophil # : 0.18 K/uL  Auto Basophil # : 0.05 K/uL  Auto Neutrophil % : 69.9 %  Auto Lymphocyte % : 20.6 %  Auto Monocyte % : 6.7 %  Auto Eosinophil % : 1.8 %  Auto Basophil % : 0.5 %    08-07    141  |  100  |  11  ----------------------------<  222<H>  3.9   |  26  |  0.68    Ca    8.9      07 Aug 2020 09:40  Phos  3.4     08-07  Mg     1.6     08-07    TPro  6.7  /  Alb  3.9  /  TBili  0.5  /  DBili  x   /  AST  17  /  ALT  9   /  AlkPhos  56  08-07    LIVER FUNCTIONS - ( 07 Aug 2020 09:40 )  Alb: 3.9 g/dL / Pro: 6.7 g/dL / ALK PHOS: 56 u/L / ALT: 9 u/L / AST: 17 u/L / GGT: x

## 2020-08-07 NOTE — H&P ADULT - NSHPSOCIALHISTORY_GEN_ALL_CORE
Lives with . 2 daughters nearby.    Pt reports no smoking, no alcohol use, no recreational drug use.

## 2020-08-07 NOTE — H&P ADULT - PROBLEM SELECTOR PLAN 5
- On metformin 500mg BID, will hold for now  - Monitor FS   - SSI before meals and at bedtime  - Check A1c

## 2020-08-07 NOTE — BEHAVIORAL HEALTH ASSESSMENT NOTE - NSBHADMITCOORDWITH_PSY_A_CORE
medical staff/family/Caregiver/Discussed plan of care and risks/benefits of psychotropic medications with pt's daughter Raquel

## 2020-08-07 NOTE — H&P ADULT - ASSESSMENT
80F (primarily Barbadian-speaking) w/ hx of HTN, DM, obesity, and anxiety presented on 8/6 with worsening b/l leg swelling, redness x 1 month likely secondary to chronic venous insufficiency vs. newfound CHF.

## 2020-08-07 NOTE — H&P ADULT - NSHPREVIEWOFSYSTEMS_GEN_ALL_CORE
General: No fevers, chills  HEENT: No headaches, acute visual changes, rhinorrhea, sore throat, dysphagia  CVS: No chest pain  Resp: No cough, dyspnea, wheezing  GI: No abdominal pain, nausea, vomiting, diarrhea  : No dysuria, frequency, hematuria, or discharge. + stress incontinence  MSK: No joint pain or swelling  Ext: + edema  Skin: No rashes or itching  Heme: No easy bruising or petechiae  Neuro: No numbness or tingling  Psych: + visual hallucinations, anxiety, paranoia  Endocrine: No excessive heat or cold symptoms, polyuria, polydipsia
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
## 2020-08-07 NOTE — BEHAVIORAL HEALTH ASSESSMENT NOTE - NSBHCONSULTRECOMMENDOTHER_PSY_A_CORE FT
The patient would also benefit from maintenance of regular sleep/wake cycles, frequent re-orientation, family member at bedside, ensuring personal eyeglasses or hearing aides available if used, avoidance of benzodiazepines and anticholinergic medications, and judicious use of opiates for pain control if necessary.

## 2020-08-07 NOTE — BEHAVIORAL HEALTH ASSESSMENT NOTE - COMMENTS ON VIOLENCE RISK/PROTECTIVE FACTORS:
Writer received call from Mati, stating his desire to get into the IOP program as recommended by Dr. Henriquez. Writer will attempt to call him again today and get him scheduled.   n/a

## 2020-08-07 NOTE — H&P ADULT - HISTORY OF PRESENT ILLNESS
80F (primarily French-speaking) w/ hx of HTN, DM, obesity, and anxiety presented on 8/6 from PCP's office for worsening b/l leg swelling and redness x 1 month.  Pt is a poor historian with difficulty confirming and remembering her medical history. Most of the history of obtained from PCP (Dr. Salazar) and her daughter Raquel.  For the past ~month, pt has had leg swelling, worse than her baseline swelling, and new redness.  PCP reports he noted phlebitis/cellulitis and prescribed augmentin on 7/15. Leg swelling/redness did not improve on augmentin.  Pt reports no pain, although in the ED she was reported to express "fire on her legs."  She denies fever/chills, leg itchiness. She denies exposure to wooded/grassy areas, ticks/insect bites, seawater/beaches.  Per PCP and daughter, patient has issues keeping her medical appointments and has questionable adherence to her medications, including furosemide, ramipril.  PCP denies history of Afib or other heart disease. PCP reports he prescribed xarelto 10mg daily for concern for PVD/lower extremities DVT in patient.  Pt takes gabapentin for diabetic neuropathy.  Pt denies numbness, tingling, or pain of her feet.  Pt was also prescribed betamethasone diproprionate and lactic-acid urea lotion for the skin changes on legs. At baseline, pt has bilateral leg weakness and uses a cane to walk.  She has frequent falls and had a recent fall without injury.     Of note, both daughter and PCP reports she has paranoid thinking and behavior - e.g. thinking that certain people are out to hurt her, trying to not leave the house for this reason.  Per daughter, patient also has visual hallucinations. On history taking,  also noted patient would frequently interrupt to talk about unrelated subjects - e.g. a man at the bank, a woman who lives upstairs. 80F (primarily Divehi-speaking) w/ hx of HTN, DM, obesity, and anxiety presented on 8/6 from PCP's office for worsening b/l leg swelling and redness x 1 month.  Pt is a poor historian with difficulty confirming and remembering her medical history. Most of the history of obtained from PCP (Dr. Salazar) and her daughter Raquel.  For the past ~month, pt has had leg swelling, worse than her baseline swelling, and new redness.  PCP reports he noted phlebitis/cellulitis and prescribed augmentin on 7/15. Leg swelling/redness did not improve on augmentin.  Pt reports no pain, although in the ED she was reported to express "fire on her legs."  She denies fever/chills, leg itchiness. She denies exposure to wooded/grassy areas, ticks/insect bites, seawater/beaches.  Per PCP and daughter, patient has issues keeping her medical appointments and has questionable adherence to her medications, including furosemide, ramipril.  PCP denies history of Afib or other heart disease. PCP reports he prescribed xarelto 10mg daily for concern for PVD/lower extremities DVT in patient.  Pt takes gabapentin for diabetic neuropathy.  Pt denies numbness, tingling, or pain of her feet.  Pt was also prescribed betamethasone diproprionate and lactic-acid urea lotion for the skin changes on legs. At baseline, pt has bilateral leg weakness and uses a cane to walk.  She has frequent falls and had a recent fall without injury.     Of note, both daughter and PCP reports she has paranoid thinking and behavior - e.g. thinking that certain people are out to hurt her, trying to not leave the house for this reason.  Per daughter, patient also has visual hallucinations. On history taking,  also noted patient would frequently interrupt to talk about unrelated subjects - e.g. a man at the bank, a woman who lives upstairs.     Other possible PMH include hypothyroidism given she was on synthroid in the past, depression, and possible VTE given she was prescribed clopidogrel and dipyridamole several years ago.

## 2020-08-07 NOTE — CONSULT NOTE ADULT - SUBJECTIVE AND OBJECTIVE BOX
EP Attending    HISTORY OF PRESENT ILLNESS:   Ms George is an 81yo community-dwelling woman who speaks only Cambodian.  Interview conducted by  phone in her native language.  She presented with subacute worsening of baseline LE edema and worsening redness.  Getting more short of breath too.  Symptom onset over at least 1 month.  She has very limited knowlege of her medical history, and the interview tends to ramble into social issues with her  and her upstairs apartment.  She states she is short of breath with minimal exertion- walking on flat ground, and her legs are hot, swollen, and painful to the touch.  She does not feel any palpitations or racing heartbeats.  No syncope or pre-syncope.     Apparently, she has been prescribed low-dose Xarelto for possible DVT, without a clear diagnosis.  This redness has also been refractory to empiric antibiotics (augmentin)    A 10 pt ROS is otherwise negative.    PAST MEDICAL & SURGICAL HISTORY:  Anxiety  Obesity  DM (diabetes mellitus)  HTN (hypertension)  History of         MEDICATIONS  (STANDING):  dextrose 5%. 1000 milliLiter(s) (50 mL/Hr) IV Continuous <Continuous>  dextrose 50% Injectable 12.5 Gram(s) IV Push once  dextrose 50% Injectable 25 Gram(s) IV Push once  dextrose 50% Injectable 25 Gram(s) IV Push once  enoxaparin Injectable 40 milliGRAM(s) SubCutaneous daily  insulin lispro (HumaLOG) corrective regimen sliding scale   SubCutaneous three times a day before meals  insulin lispro (HumaLOG) corrective regimen sliding scale   SubCutaneous at bedtime  lisinopril 40 milliGRAM(s) Oral daily  metoprolol succinate  milliGRAM(s) Oral daily    Allergies    No Known Allergies    Intolerances      FAMILY HISTORY:    Non-contributary for premature coronary disease or sudden cardiac death    SOCIAL HISTORY:    [x ] Non-smoker  [ ] Smoker  [ ] Alcohol    PHYSICAL EXAM:  T(C): 36.9 (20 @ 12:25), Max: 37.3 (20 @ 22:17)  HR: 84 (20 @ 12:25) (64 - 103)  BP: 147/83 (20 @ 12:25) (112/68 - 182/81)  RR: 18 (20 @ 12:25) (15 - 22)  SpO2: 97% (08-07-20 @ 12:25) (95% - 100%)  Wt(kg): --    Appearance: small frame but obese woman in no acute distress, cooperative.   HEENT:   Normal oral mucosa, PERRL, EOMI	  Lymphatic: No lymphadenopathy.  +++ edema above the knee.  Cardiovascular: irregular S1 S2, No JVD, No murmurs , Peripheral pulses palpable 2+ bilateral radial arteries.  Unable to appreciate pedal pulses due to edema.  Respiratory: Lungs clear to auscultation, normal effort 	  Gastrointestinal:  Soft, Non-tender, + BS	  Skin: No rashes, No ecchymoses, No cyanosis, warm to touch  Musculoskeletal: Normal range of motion, normal strength.  Below the knee, legs have redness to the point of being purple in the toes, and skin changes consistent with chronic venous stasis.  Psychiatry:  Mood & affect difficult to ascertain due to language barrier.  Rambling speech and tangential thought processes.      TELEMETRY: 	None    ECG:  	Reported as Multifocal Atrial Tachy, but not on chart.  None to review.  	  LABS:	 	                          10.7   10.08 )-----------( 312      ( 07 Aug 2020 09:40 )             34.6     08-07    141  |  100  |  11  ----------------------------<  222<H>  3.9   |  26  |  0.68    Ca    8.9      07 Aug 2020 09:40  Phos  3.4     08-07  Mg     1.6     08-07    TPro  6.7  /  Alb  3.9  /  TBili  0.5  /  DBili  x   /  AST  17  /  ALT  9   /  AlkPhos  56  08-07    ASSESSMENT/PLAN: 	80y Female with leg edema and shortness of breath.  Undocumented atrial arrhythmia without clear symptom-rhythm correlation.  Continue home beta blocker, but move to telemetry floor for continuous monitoring.  EKG with release orders has been placed to allow nurses to call for an EKG when going >100bpm on telemetry.  Echocardiogram.  No apparent DVT above the knee, but below the knees not well visualized.  No empiric anticoagulation recommended beyond an aspirin 81mg daily.  Replace oral K >4 and Mg>2 while diuresing.  Will follow.      Campbell Sanchez M.D.  Cardiac Electrophysiology    office [phone]  pager [phone]

## 2020-08-08 NOTE — PROGRESS NOTE ADULT - PROBLEM SELECTOR PLAN 7
Transitions of Care Status:  1.  Name of PCP: Marie  2.  PCP Contacted on Admission: [x] Y    [ ] N    3.  PCP contacted at Discharge: [ ] Y    [ ] N    [ ] N/A  4.  Post-Discharge Appointment Date and Location:  5.  Summary of Handoff given to PCP: - DVT prophylaxis: Lovenox

## 2020-08-08 NOTE — PROGRESS NOTE ADULT - ATTENDING COMMENTS
Patient seen and examined by myself , case discussed  with resident ,agree with the above finding and plan  B/LE edema and erythema; likely secondary to chronic venous insufficiency  , monitoring off Abx ,   Afib, CHF; c/w lasix, ACEI, BB, EP eval appreciated f/u TTE   Paranoid delusions : psych eval appreciated , c/w ATC and  PRN Zyprexa

## 2020-08-08 NOTE — PROGRESS NOTE ADULT - ATTENDING COMMENTS
Patient seen by PA earlier this morning.  By time of my arrival to [facility] this afternoon, patient had already left the building.  Was refusing telemetry monitoring and EKG, so unable to make any progress on possible arrhythmia issues. Seen and agree w/ PA.  Work up for arrhythmia proceeding with great difficulty. Refusing telemetry monitoring and EKG.  Reiterated importance of these tests.  Also refusing bloodwork.  Suspect psychiatric problems as well contributing to this, as she is unable to comply with most basic / noninvasive protocols for inpatient workup.

## 2020-08-08 NOTE — PROGRESS NOTE ADULT - SUBJECTIVE AND OBJECTIVE BOX
Andrea Cooper, PGY1  Pager [phone]    INCOMPLETE NOTE - IN PROGRESS    Patient is a 80y old  Female who presents with a chief complaint of Leg swelling, redness (07 Aug 2020 17:42)      SUBJECTIVE/INTERVAL EVENTS: Patient seen and examined at bedside.    MEDICATIONS  (STANDING):  dextrose 5%. 1000 milliLiter(s) (50 mL/Hr) IV Continuous <Continuous>  dextrose 50% Injectable 12.5 Gram(s) IV Push once  dextrose 50% Injectable 25 Gram(s) IV Push once  dextrose 50% Injectable 25 Gram(s) IV Push once  enoxaparin Injectable 40 milliGRAM(s) SubCutaneous daily  insulin lispro (HumaLOG) corrective regimen sliding scale   SubCutaneous three times a day before meals  insulin lispro (HumaLOG) corrective regimen sliding scale   SubCutaneous at bedtime  lisinopril 40 milliGRAM(s) Oral daily  magnesium oxide 400 milliGRAM(s) Oral two times a day with meals  melatonin 3 milliGRAM(s) Oral at bedtime  metoprolol succinate  milliGRAM(s) Oral daily  OLANZapine 2.5 milliGRAM(s) Oral <User Schedule>    MEDICATIONS  (PRN):  dextrose 40% Gel 15 Gram(s) Oral once PRN Blood Glucose LESS THAN 70 milliGRAM(s)/deciliter  glucagon  Injectable 1 milliGRAM(s) IntraMuscular once PRN Glucose LESS THAN 70 milligrams/deciliter  OLANZapine 1.25 milliGRAM(s) Oral every 6 hours PRN agitation  OLANZapine Injectable 1.25 milliGRAM(s) IntraMuscular every 6 hours PRN agitation      VITAL SIGNS:  T(F): 98.5 (08-08-20 @ 05:24), Max: 98.5 (08-07-20 @ 12:25)  HR: 90 (08-08-20 @ 05:24) (63 - 90)  BP: 141/74 (08-08-20 @ 05:24) (139/90 - 158/103)  RR: 18 (08-08-20 @ 05:24) (18 - 18)  SpO2: 97% (08-08-20 @ 05:24) (97% - 99%)    I&O's Summary    07 Aug 2020 07:01  -  08 Aug 2020 06:39  --------------------------------------------------------  IN: 118 mL / OUT: 300 mL / NET: -182 mL      Daily Height in cm: 152.4 (07 Aug 2020 07:04)    Daily     PHYSICAL EXAM:  Gen: Alert, NAD  HEENT: NCAT, conjunctiva clear, sclera anicteric, no erythema or exudates in the oropharynx, mmm  Neck: Supple, no JVD  CV: RRR, S1S2, no m/r/g  Resp: CTAB, normal respiratory effort  Abd: Soft, nontender, nondistended, normal bowel sounds  Ext: no edema, no clubbing or cyanosis  Neuro: AOx3, CN2-12 grossly intact, EMMANUEL  SKIN: warm, perfused    LABS:                        10.7   10.08 )-----------( 312      ( 07 Aug 2020 09:40 )             34.6     Hgb Trend: 10.7<--, 10.6<--  08-07    141  |  100  |  11  ----------------------------<  222<H>  3.9   |  26  |  0.68    Ca    8.9      07 Aug 2020 09:40  Phos  3.4     08-07  Mg     1.6     08-07    TPro  6.7  /  Alb  3.9  /  TBili  0.5  /  DBili  x   /  AST  17  /  ALT  9   /  AlkPhos  56  08-07    Creatinine Trend: 0.68<--, 0.70<--  LIVER FUNCTIONS - ( 07 Aug 2020 09:40 )  Alb: 3.9 g/dL / Pro: 6.7 g/dL / ALK PHOS: 56 u/L / ALT: 9 u/L / AST: 17 u/L / GGT: x                     CAPILLARY BLOOD GLUCOSE      POCT Blood Glucose.: 136 mg/dL (07 Aug 2020 21:40)  POCT Blood Glucose.: 176 mg/dL (07 Aug 2020 18:11)  POCT Blood Glucose.: 157 mg/dL (07 Aug 2020 17:26)  POCT Blood Glucose.: 174 mg/dL (07 Aug 2020 12:14)      RADIOLOGY & ADDITIONAL TESTS: Reviewed    Imaging Personally Reviewed:    Consultant(s) Notes Reviewed:      Care Discussed with Consultants/Other Providers: Andrea Cooper, PGY1  Pager [phone]      Patient is a 80y old  Female who presents with a chief complaint of Leg swelling, redness (07 Aug 2020 17:42)      SUBJECTIVE/INTERVAL EVENTS: Overnight, patient refused labs and took of telemtery monitoring. Pt walking around and reports no complaints this morning. Patient seen and examined at bedside.    MEDICATIONS  (STANDING):  dextrose 5%. 1000 milliLiter(s) (50 mL/Hr) IV Continuous <Continuous>  dextrose 50% Injectable 12.5 Gram(s) IV Push once  dextrose 50% Injectable 25 Gram(s) IV Push once  dextrose 50% Injectable 25 Gram(s) IV Push once  enoxaparin Injectable 40 milliGRAM(s) SubCutaneous daily  insulin lispro (HumaLOG) corrective regimen sliding scale   SubCutaneous three times a day before meals  insulin lispro (HumaLOG) corrective regimen sliding scale   SubCutaneous at bedtime  lisinopril 40 milliGRAM(s) Oral daily  magnesium oxide 400 milliGRAM(s) Oral two times a day with meals  melatonin 3 milliGRAM(s) Oral at bedtime  metoprolol succinate  milliGRAM(s) Oral daily  OLANZapine 2.5 milliGRAM(s) Oral <User Schedule>    MEDICATIONS  (PRN):  dextrose 40% Gel 15 Gram(s) Oral once PRN Blood Glucose LESS THAN 70 milliGRAM(s)/deciliter  glucagon  Injectable 1 milliGRAM(s) IntraMuscular once PRN Glucose LESS THAN 70 milligrams/deciliter  OLANZapine 1.25 milliGRAM(s) Oral every 6 hours PRN agitation  OLANZapine Injectable 1.25 milliGRAM(s) IntraMuscular every 6 hours PRN agitation      VITAL SIGNS:  T(F): 98.5 (08-08-20 @ 05:24), Max: 98.5 (08-07-20 @ 12:25)  HR: 90 (08-08-20 @ 05:24) (63 - 90)  BP: 141/74 (08-08-20 @ 05:24) (139/90 - 158/103)  RR: 18 (08-08-20 @ 05:24) (18 - 18)  SpO2: 97% (08-08-20 @ 05:24) (97% - 99%)    I&O's Summary    07 Aug 2020 07:01  -  08 Aug 2020 06:39  --------------------------------------------------------  IN: 118 mL / OUT: 300 mL / NET: -182 mL      Daily Height in cm: 152.4 (07 Aug 2020 07:04)    Daily       Physical Exam:  	Gen: Alert, elderly woman with obese body habitus, in NAD  	HEENT: NCAT, EOMI, clear conjunctiva, mmm  	Neck: Supple, no JVD, no LAD  	CV: irregularly irregular, S1S2 +  	Resp: Bibasilar crackles, normal respiratory effort  	Abd: Soft, NT, ND, normal bowel sounds  	Ext: Symmetric edema, erythema in BLE from feet up to high shins - chronic venous stasis changes. No ulcers, purulence, abscess, crepitus. Not tender to palpation.    	Neuro: AOx3, CN2-12 grossly intact, EMMANUEL    Skin: warm, perfused    LABS:                        10.7   10.08 )-----------( 312      ( 07 Aug 2020 09:40 )             34.6     Hgb Trend: 10.7<--, 10.6<--  08-07    141  |  100  |  11  ----------------------------<  222<H>  3.9   |  26  |  0.68    Ca    8.9      07 Aug 2020 09:40  Phos  3.4     08-07  Mg     1.6     08-07    TPro  6.7  /  Alb  3.9  /  TBili  0.5  /  DBili  x   /  AST  17  /  ALT  9   /  AlkPhos  56  08-07    Creatinine Trend: 0.68<--, 0.70<--  LIVER FUNCTIONS - ( 07 Aug 2020 09:40 )  Alb: 3.9 g/dL / Pro: 6.7 g/dL / ALK PHOS: 56 u/L / ALT: 9 u/L / AST: 17 u/L / GGT: x                     CAPILLARY BLOOD GLUCOSE      POCT Blood Glucose.: 136 mg/dL (07 Aug 2020 21:40)  POCT Blood Glucose.: 176 mg/dL (07 Aug 2020 18:11)  POCT Blood Glucose.: 157 mg/dL (07 Aug 2020 17:26)  POCT Blood Glucose.: 174 mg/dL (07 Aug 2020 12:14)      RADIOLOGY & ADDITIONAL TESTS: Reviewed    Imaging Personally Reviewed:    Consultant(s) Notes Reviewed:      Care Discussed with Consultants/Other Providers: Andrea Cooper, PGY1  Pager [phone]      Patient is a 80y old  Female who presents with a chief complaint of Leg swelling, redness (07 Aug 2020 17:42)      SUBJECTIVE/INTERVAL EVENTS: Overnight, patient refused labs and took of telemtery monitoring. Pt walking around and reports no complaints this morning. Patient seen and examined at bedside.    MEDICATIONS  (STANDING):  dextrose 5%. 1000 milliLiter(s) (50 mL/Hr) IV Continuous <Continuous>  dextrose 50% Injectable 12.5 Gram(s) IV Push once  dextrose 50% Injectable 25 Gram(s) IV Push once  dextrose 50% Injectable 25 Gram(s) IV Push once  enoxaparin Injectable 40 milliGRAM(s) SubCutaneous daily  insulin lispro (HumaLOG) corrective regimen sliding scale   SubCutaneous three times a day before meals  insulin lispro (HumaLOG) corrective regimen sliding scale   SubCutaneous at bedtime  lisinopril 40 milliGRAM(s) Oral daily  magnesium oxide 400 milliGRAM(s) Oral two times a day with meals  melatonin 3 milliGRAM(s) Oral at bedtime  metoprolol succinate  milliGRAM(s) Oral daily  OLANZapine 2.5 milliGRAM(s) Oral <User Schedule>    MEDICATIONS  (PRN):  dextrose 40% Gel 15 Gram(s) Oral once PRN Blood Glucose LESS THAN 70 milliGRAM(s)/deciliter  glucagon  Injectable 1 milliGRAM(s) IntraMuscular once PRN Glucose LESS THAN 70 milligrams/deciliter  OLANZapine 1.25 milliGRAM(s) Oral every 6 hours PRN agitation  OLANZapine Injectable 1.25 milliGRAM(s) IntraMuscular every 6 hours PRN agitation      VITAL SIGNS:  T(F): 98.5 (08-08-20 @ 05:24), Max: 98.5 (08-07-20 @ 12:25)  HR: 90 (08-08-20 @ 05:24) (63 - 90)  BP: 141/74 (08-08-20 @ 05:24) (139/90 - 158/103)  RR: 18 (08-08-20 @ 05:24) (18 - 18)  SpO2: 97% (08-08-20 @ 05:24) (97% - 99%)    I&O's Summary    07 Aug 2020 07:01  -  08 Aug 2020 06:39  --------------------------------------------------------  IN: 118 mL / OUT: 300 mL / NET: -182 mL      Daily Height in cm: 152.4 (07 Aug 2020 07:04)    Daily       Physical Exam:  	Gen: Alert, elderly woman with obese body habitus, in NAD  	HEENT: NCAT, EOMI, clear conjunctiva, mmm  	Neck: Supple, no JVD, no LAD  	CV: irregular, S1S2 +  	Resp: normal respiratory effort, CTAB   	Abd: Soft, NT, ND, normal bowel sounds  	Ext: Symmetric edema, erythema in BLE to high shins - chronic venous stasis changes. No ulcers, purulence, abscess, crepitus. Not tender to palpation.    	Neuro: AOx3, CN2-12 grossly intact, EMMANUEL    Skin: warm, perfused    LABS:                        10.7   10.08 )-----------( 312      ( 07 Aug 2020 09:40 )             34.6     Hgb Trend: 10.7<--, 10.6<--  08-07    141  |  100  |  11  ----------------------------<  222<H>  3.9   |  26  |  0.68    Ca    8.9      07 Aug 2020 09:40  Phos  3.4     08-07  Mg     1.6     08-07    TPro  6.7  /  Alb  3.9  /  TBili  0.5  /  DBili  x   /  AST  17  /  ALT  9   /  AlkPhos  56  08-07    Creatinine Trend: 0.68<--, 0.70<--  LIVER FUNCTIONS - ( 07 Aug 2020 09:40 )  Alb: 3.9 g/dL / Pro: 6.7 g/dL / ALK PHOS: 56 u/L / ALT: 9 u/L / AST: 17 u/L / GGT: x                     CAPILLARY BLOOD GLUCOSE      POCT Blood Glucose.: 136 mg/dL (07 Aug 2020 21:40)  POCT Blood Glucose.: 176 mg/dL (07 Aug 2020 18:11)  POCT Blood Glucose.: 157 mg/dL (07 Aug 2020 17:26)  POCT Blood Glucose.: 174 mg/dL (07 Aug 2020 12:14)      RADIOLOGY & ADDITIONAL TESTS: Reviewed    Imaging Personally Reviewed:    Consultant(s) Notes Reviewed:      Care Discussed with Consultants/Other Providers:

## 2020-08-08 NOTE — PROGRESS NOTE ADULT - PROBLEM SELECTOR PLAN 2
Per PCP, no known history of Afib. Irregularly irregular HS and EKG with irregular rate/rhythm, different p wave morphologies, and prolonged WI interval. She was on dipyramidole and clopidogrel in the past, but unclear for what indication.  - Monitor on telemetry  - Consult cardiology  - Consider AC  - Check TSH, free T4 Per PCP, no known history of Afib. Irregularly irregular HS and EKG with irregular rate/rhythm, different p wave morphologies, and prolonged NC interval. She was on dipyramidole and clopidogrel in the past, but unclear for what indication.  - EP following, appreciate recs - start aspirin 81mg   - CTM on telemetry (pt refusing)  - F/u TSH, free T4 (pt refusing labs)

## 2020-08-08 NOTE — PROGRESS NOTE ADULT - PROBLEM SELECTOR PROBLEM 5
Type 2 diabetes mellitus with other specified complication, without long-term current use of insulin Hypertension, unspecified type

## 2020-08-08 NOTE — PROGRESS NOTE ADULT - PROBLEM SELECTOR PLAN 1
Worsening b/l leg swelling, redness x 1 month, unresponsive to augmentin prescribed by PCP for cellulitis, likely secondary to chronic venous insufficiency vs. newfound CHF given concurrent finding of crackles on lung exam and Afib on EKG.  - Check TTE, CXR  - Start with lasix dose 40mg daily and monitor volume status, strict I/Os, fluid restriction  - DASH diet  - Fall precautions  - BLE duplex negative for DVT's Worsening b/l leg swelling, redness x 1 month, unresponsive to augmentin prescribed by PCP for cellulitis, likely secondary to chronic venous insufficiency vs. newfound CHF given concurrent finding of crackles on lung exam and Afib on EKG.  - CXR clear  - F/u TTE  - C/w home Lasix 80mg daily and monitor volume status, strict I/Os, fluid restriction  - DASH diet  - Fall precautions  - BLE duplex negative for DVT's, although unable to visualize above knees

## 2020-08-08 NOTE — PROGRESS NOTE ADULT - PROBLEM SELECTOR PLAN 3
Per PCP and daughter, pt exhibits paranoid thinking and behavior. Additionally has visual hallucinations, may be d/t underlying psychiatric disorder.  - Psych consult Paranoid delusions per PCP and daughter and on exam. Additionally has visual hallucinations, may be d/t underlying psychiatric disorder.  - Psych following, appreciate recs  - Olanzapine 2.5mg PO at 20:00, PRN Olazapine 1.25mg PO/IM q6h  - Melatonin 4mg PO QHS  - EKG daily to check QTc

## 2020-08-08 NOTE — PROGRESS NOTE ADULT - PROBLEM SELECTOR PLAN 5
- On metformin 500mg BID, will hold for now  - Monitor FS   - SSI before meals and at bedtime  - Check A1c - Lasix 80mg daily, monitor fluid status  - c/w Lisinopril 40mg daily, on Ramipril 10mg daily BID at home  - c/w home Metoprolol scc  daily

## 2020-08-08 NOTE — PROGRESS NOTE ADULT - PROBLEM SELECTOR PROBLEM 6
Prophylactic measure Type 2 diabetes mellitus with other specified complication, without long-term current use of insulin

## 2020-08-08 NOTE — PROGRESS NOTE ADULT - SUBJECTIVE AND OBJECTIVE BOX
Patient appears comfortable, refusing labs or tele. Per RN, upon using [place]  phone she was A&Ox3   	    MEDICATIONS  (STANDING):  dextrose 5%. 1000 milliLiter(s) (50 mL/Hr) IV Continuous <Continuous>  dextrose 50% Injectable 12.5 Gram(s) IV Push once  dextrose 50% Injectable 25 Gram(s) IV Push once  dextrose 50% Injectable 25 Gram(s) IV Push once  enoxaparin Injectable 40 milliGRAM(s) SubCutaneous daily  furosemide    Tablet 80 milliGRAM(s) Oral daily  insulin lispro (HumaLOG) corrective regimen sliding scale   SubCutaneous three times a day before meals  insulin lispro (HumaLOG) corrective regimen sliding scale   SubCutaneous at bedtime  lisinopril 40 milliGRAM(s) Oral daily  melatonin 3 milliGRAM(s) Oral at bedtime  metoprolol succinate  milliGRAM(s) Oral daily  OLANZapine 2.5 milliGRAM(s) Oral <User Schedule>      LABS:	 	                          10.7   10.08 )-----------( 312      ( 07 Aug 2020 09:40 )             34.6     Hemoglobin: 10.7 g/dL (08-07 @ 09:40)  Hemoglobin: 10.6 g/dL (08-06 @ 22:00)    08-07    141  |  100  |  11  ----------------------------<  222<H>  3.9   |  26  |  0.68    Ca    8.9      07 Aug 2020 09:40  Phos  3.4     08-07  Mg     1.6     08-07    TPro  6.7  /  Alb  3.9  /  TBili  0.5  /  DBili  x   /  AST  17  /  ALT  9   /  AlkPhos  56  08-07      PHYSICAL EXAM:  T(C): 36.9 (08-08-20 @ 05:24), Max: 36.9 (08-08-20 @ 05:24)  HR: 90 (08-08-20 @ 05:24) (63 - 90)  BP: 141/74 (08-08-20 @ 05:24) (139/90 - 158/103)  RR: 18 (08-08-20 @ 05:24) (18 - 18)  SpO2: 97% (08-08-20 @ 05:24) (97% - 99%)    Gen: Appears well in NAD  HEENT:  (-)icterus (-)pallor  CV: N S1 S2 1/6 LACI (+)2 Pulses B/l  Resp:  Clear to ausculatation B/L, normal effort  GI: (+) BS Soft, NT, ND  Lymph:  (-)Edema, (-)obvious lymphadenopathy  Skin: Warm to touch, Normal turgor  Psych: unable to determine    TELEMETRY: 	SR, APCs          ASSESSMENT/PLAN: 	  80y Female with leg edema and shortness of breath.  Undocumented atrial arrhythmia without clear symptom-rhythm correlation.    --Continue home beta blocker  --EKG with release orders has been placed to allow nurses to call for an EKG when going >100bpm on telemetry.  --await Echocardiogram.  --No apparent DVT above the knee, but below the knees not well visualized.  No empiric anticoagulation recommended beyond an aspirin 81mg daily.  --Replace oral K >4 and Mg>2 while diuresing.

## 2020-08-08 NOTE — PROGRESS NOTE ADULT - PROBLEM SELECTOR PLAN 6
- DVT prophylaxis: Lovenox - On metformin 500mg BID, will hold for now  - Monitor FS   - SSI before meals and at bedtime  - Check A1c

## 2020-08-08 NOTE — PROGRESS NOTE ADULT - ASSESSMENT
80F (primarily [place]an-speaking) w/ hx of HTN, DM, obesity, and anxiety presented on 8/6 with worsening b/l leg swelling, redness x 1 month likely secondary to chronic venous insufficiency vs. newfound CHF. 80F (primarily [place]-speaking) w/ hx of HTN, DM, obesity, and anxiety presented on 8/6 with worsening b/l leg swelling, redness x 1 month likely secondary to chronic venous insufficiency vs. new CHF.

## 2020-08-09 NOTE — PROGRESS NOTE ADULT - PROBLEM SELECTOR PLAN 1
Worsening b/l leg swelling, redness x 1 month, unresponsive to augmentin prescribed by PCP for cellulitis, likely secondary to chronic venous insufficiency vs. newfound CHF given concurrent finding of crackles on lung exam and Afib on EKG.  - CXR clear  - F/u TTE  - C/w home Lasix 80mg daily and monitor volume status, strict I/Os, fluid restriction  - DASH diet  - Fall precautions  - BLE duplex negative for DVT's, although unable to visualize above knees

## 2020-08-09 NOTE — PROGRESS NOTE ADULT - PROBLEM SELECTOR PLAN 5
Paranoid delusions per PCP and daughter and on exam. Additionally has visual hallucinations, may be d/t underlying psychiatric disorder.  - Psych following, appreciate recs  - Olanzapine 2.5mg PO at 20:00, PRN Olazapine 1.25mg PO/IM q6h  - Melatonin 4mg PO QHS  - EKG daily to check QTc

## 2020-08-09 NOTE — PROGRESS NOTE ADULT - SUBJECTIVE AND OBJECTIVE BOX
PROGRESS NOTE:   ************************************************  Authoted by: Mark Hellerman, MD PGY2  Internal Medicine  Pager: JACOB: [phone]; [facility]: [phone]  *************************************************    Patient is a 80y old  Female who presents with a chief complaint of Leg swelling, redness (08 Aug 2020 13:03)      SUBJECTIVE / OVERNIGHT EVENTS:   - No events overnight  - The patient was seen and examined at bedside.     REVIEW OF SYSTEMS:  - No fever, chills, chest pain, sob, n/v/d    Gen: Alert, elderly woman with obese body habitus, in NAD  HEENT: NCAT, EOMI, clear conjunctiva, mmm  Neck: no JVD, no LAD  CV: irregular, S1S2 +  Resp: normal respiratory effort, CTAB   Abd: Soft, NT, ND, normal bowel sounds  Ext: Symmetric edema, erythema in BLE to high shins - chronic venous stasis changes. No ulcers, purulence, abscess, crepitus. Not tender to palpation.    Neuro: AOx3  Skin: warm, perfused    MEDICATIONS  (STANDING):  aspirin  chewable 81 milliGRAM(s) Oral daily  dextrose 5%. 1000 milliLiter(s) (50 mL/Hr) IV Continuous <Continuous>  dextrose 50% Injectable 12.5 Gram(s) IV Push once  dextrose 50% Injectable 25 Gram(s) IV Push once  dextrose 50% Injectable 25 Gram(s) IV Push once  enoxaparin Injectable 40 milliGRAM(s) SubCutaneous daily  furosemide    Tablet 80 milliGRAM(s) Oral daily  insulin lispro (HumaLOG) corrective regimen sliding scale   SubCutaneous three times a day before meals  insulin lispro (HumaLOG) corrective regimen sliding scale   SubCutaneous at bedtime  lisinopril 40 milliGRAM(s) Oral daily  melatonin 3 milliGRAM(s) Oral at bedtime  metoprolol succinate  milliGRAM(s) Oral daily  OLANZapine 2.5 milliGRAM(s) Oral <User Schedule>  potassium chloride    Tablet ER 40 milliEquivalent(s) Oral every 4 hours    MEDICATIONS  (PRN):  dextrose 40% Gel 15 Gram(s) Oral once PRN Blood Glucose LESS THAN 70 milliGRAM(s)/deciliter  glucagon  Injectable 1 milliGRAM(s) IntraMuscular once PRN Glucose LESS THAN 70 milligrams/deciliter  OLANZapine 1.25 milliGRAM(s) Oral every 6 hours PRN agitation  OLANZapine Injectable 1.25 milliGRAM(s) IntraMuscular every 6 hours PRN agitation      CAPILLARY BLOOD GLUCOSE      POCT Blood Glucose.: 180 mg/dL (09 Aug 2020 07:54)  POCT Blood Glucose.: 244 mg/dL (08 Aug 2020 21:18)  POCT Blood Glucose.: 210 mg/dL (08 Aug 2020 16:45)    I&O's Summary    08 Aug 2020 07:01  -  09 Aug 2020 07:00  --------------------------------------------------------  IN: 1263 mL / OUT: 2200 mL / NET: -937 mL        PHYSICAL EXAM:  Vital Signs Last 24 Hrs  T(C): 36.9 (09 Aug 2020 06:15), Max: 37.1 (08 Aug 2020 13:00)  T(F): 98.5 (09 Aug 2020 06:15), Max: 98.7 (08 Aug 2020 13:00)  HR: 70 (09 Aug 2020 06:15) (47 - 88)  BP: 154/81 (09 Aug 2020 06:15) (134/62 - 154/81)  BP(mean): --  RR: 18 (09 Aug 2020 06:15) (17 - 18)  SpO2: 99% (09 Aug 2020 06:15) (97% - 99%)    CONSTITUTIONAL: NAD, well-developed  RESPIRATORY: Normal respiratory effort; lungs are clear to auscultation bilaterally  CARDIOVASCULAR: Regular rate and rhythm, normal S1 and S2, no murmur/rub/gallop; No lower extremity edema; Peripheral pulses are 2+ bilaterally  ABDOMEN: Nontender to palpation, normoactive bowel sounds, no rebound/guarding; No hepatosplenomegaly  MUSCLOSKELETAL: no clubbing or cyanosis of digits; no joint swelling or tenderness to palpation  PSYCH: A+O to person, place, and time; affect appropriate    LABS:                        10.2   7.13  )-----------( 315      ( 09 Aug 2020 06:11 )             31.4     08-09    144  |  103  |  11  ----------------------------<  181<H>  3.4<L>   |  28  |  0.73    Ca    8.7      09 Aug 2020 06:11  Phos  3.9     08-09  Mg     1.8     08-09    TPro  6.2  /  Alb  3.5  /  TBili  0.3  /  DBili  x   /  AST  16  /  ALT  9   /  AlkPhos  52  08-09              Culture - Blood (collected 07 Aug 2020 02:05)  Source: .Blood Blood-Peripheral  Preliminary Report (08 Aug 2020 03:01):    No growth to date.    Culture - Blood (collected 07 Aug 2020 02:04)  Source: .Blood Blood-Venous  Preliminary Report (08 Aug 2020 03:01):    No growth to date.        RADIOLOGY & ADDITIONAL TESTS:  Results Reviewed:   Imaging Personally Reviewed:  Electrocardiogram Personally Reviewed:    COORDINATION OF CARE:  Care Discussed with Consultants/Other Providers [Y/N]:  Prior or Outpatient Records Reviewed [Y/N]:

## 2020-08-09 NOTE — PROGRESS NOTE ADULT - ATTENDING COMMENTS
Patient seen and examined by myself , case discussed  with resident ,agree with the above finding and plan  B/LE edema and erythema; likely secondary to chronic venous insufficiency  , monitoring off Abx , improving with lasix, appears less erythematous today    Afib, CHF; c/w lasix, ACEI, BB, EP eval appreciated f/u TTE   Paranoid delusions : psych eval appreciated , c/w ATC and  PRN Zyprexa  Hypothyroid : pt noted to have TSH of 45 and FT4 0.5 ,  will start low dose synthroid as pt with Afib , will monitor for arrythmia . consider Endo eval in am  B12 deficiency : will supplement

## 2020-08-09 NOTE — PROGRESS NOTE ADULT - PROBLEM SELECTOR PLAN 3
TSH 45, FT4 0.5  Starting IV synthroid 25 mcg daily with plan to transition to PO synthroid 50 PO daily

## 2020-08-09 NOTE — PROGRESS NOTE ADULT - ASSESSMENT
80F (primarily [place]-speaking) w/ hx of HTN, DM, obesity, and anxiety presented on 8/6 with worsening b/l leg swelling, redness x 1 month likely secondary to chronic venous insufficiency vs. new CHF.

## 2020-08-09 NOTE — PROGRESS NOTE ADULT - PROBLEM SELECTOR PLAN 2
Per PCP, no known history of Afib. Irregularly irregular HS and EKG with irregular rate/rhythm, different p wave morphologies, and prolonged [place] interval. She was on dipyramidole and clopidogrel in the past, but unclear for what indication.  - EP following, appreciate recs - start aspirin 81mg   - CTM on telemetry (pt refusing)  - TSH 45, FT4 0.5

## 2020-08-09 NOTE — CONSULT NOTE ADULT - SUBJECTIVE AND OBJECTIVE BOX
CHIEF COMPLAINT:Patient is a 80y old  Female who presents with a chief complaint of Leg swelling, redness (09 Aug 2020 11:04)      HISTORY OF PRESENT ILLNESS:HPI:  80F (primarily Pal[place]-speaking) w/ hx of HTN, DM, obesity, and anxiety presented on  from PCP's office for worsening b/l leg swelling and redness x 1 month.  Pt is a poor historian with difficulty confirming and remembering her medical history. Most of the history of obtained from PCP (Dr. Salazar) and her daughter Raquel.  For the past ~month, pt has had leg swelling, worse than her baseline swelling, and new redness.  PCP reports he noted phlebitis/cellulitis and prescribed augmentin on 7/15. Leg swelling/redness did not improve on augmentin.  Pt reports no pain, although in the ED she was reported to express "fire on her legs."  She denies fever/chills, leg itchiness. She denies exposure to wooded/grassy areas, ticks/insect bites, seawater/beaches.  Per PCP and daughter, patient has issues keeping her medical appointments and has questionable adherence to her medications, including furosemide, ramipril.  PCP denies history of Afib or other heart disease. PCP reports he prescribed xarelto 10mg daily for concern for PVD/lower extremities DVT in patient.  Pt takes gabapentin for diabetic neuropathy.  Pt denies numbness, tingling, or pain of her feet.  Pt was also prescribed betamethasone diproprionate and lactic-acid urea lotion for the skin changes on legs. At baseline, pt has bilateral leg weakness and uses a cane to walk.  She has frequent falls and had a recent fall without injury.     Of note, both daughter and PCP reports she has paranoid thinking and behavior - e.g. thinking that certain people are out to hurt her, trying to not leave the house for this reason.  Per daughter, patient also has visual hallucinations. On history taking,  also noted patient would frequently interrupt to talk about unrelated subjects - e.g. a man at the bank, a woman who lives upstairs.     Other possible PMH include hypothyroidism given she was on synthroid in the past, depression, and possible VTE given she was prescribed clopidogrel and dipyridamole several years ago. (07 Aug 2020 11:27)      PAST MEDICAL & SURGICAL HISTORY:  Anxiety  Obesity  DM (diabetes mellitus)  HTN (hypertension)  History of           MEDICATIONS:  aspirin  chewable 81 milliGRAM(s) Oral daily  enoxaparin Injectable 40 milliGRAM(s) SubCutaneous daily  furosemide    Tablet 80 milliGRAM(s) Oral daily  lisinopril 40 milliGRAM(s) Oral daily  metoprolol succinate  milliGRAM(s) Oral daily        melatonin 3 milliGRAM(s) Oral at bedtime  OLANZapine 1.25 milliGRAM(s) Oral every 6 hours PRN  OLANZapine 2.5 milliGRAM(s) Oral <User Schedule>  OLANZapine Injectable 1.25 milliGRAM(s) IntraMuscular every 6 hours PRN      dextrose 40% Gel 15 Gram(s) Oral once PRN  dextrose 50% Injectable 12.5 Gram(s) IV Push once  dextrose 50% Injectable 25 Gram(s) IV Push once  dextrose 50% Injectable 25 Gram(s) IV Push once  glucagon  Injectable 1 milliGRAM(s) IntraMuscular once PRN  insulin lispro (HumaLOG) corrective regimen sliding scale   SubCutaneous three times a day before meals  insulin lispro (HumaLOG) corrective regimen sliding scale   SubCutaneous at bedtime    cyanocobalamin 1000 MICROGram(s) Oral daily  dextrose 5%. 1000 milliLiter(s) IV Continuous <Continuous>      FAMILY HISTORY:      Non-contributory    SOCIAL HISTORY:    [ ] Tobacco  [ ] Drugs  [ ] Alcohol    Allergies    No Known Allergies    Intolerances    	    REVIEW OF SYSTEMS:  CONSTITUTIONAL: No fever  EYES: No eye pain, visual disturbances, or discharge  ENMT:  No difficulty hearing, tinnitus  NECK: No pain or stiffness  RESPIRATORY: No cough, wheezing,  CARDIOVASCULAR: No chest pain, palpitations, passing out, dizziness, or leg swelling  GASTROINTESTINAL:  No nausea, vomiting, diarrhea or constipation. No melena.  GENITOURINARY: No dysuria, hematuria  NEUROLOGICAL: No stroke like symptoms  SKIN: No burning or lesions   ENDOCRINE: No heat or cold intolerance  MUSCULOSKELETAL: No joint pain or swelling  PSYCHIATRIC: No  anxiety, mood swings  HEME/LYMPH: No bleeding gums  ALLERGY AND IMMUNOLOGIC: No hives or eczema	    All other ROS negative    PHYSICAL EXAM:  T(C): 37 (20 @ 19:30), Max: 37 (20 @ 19:30)  HR: 79 (20 @ 19:30) (47 - 98)  BP: 140/75 (20 @ 19:30) (128/66 - 154/81)  RR: 17 (20 @ 19:30) (16 - 18)  SpO2: 97% (20 @ 19:30) (97% - 99%)  Wt(kg): --  I&O's Summary    08 Aug 2020 07:  -  09 Aug 2020 07:00  --------------------------------------------------------  IN: 1263 mL / OUT: 2200 mL / NET: -937 mL    09 Aug 2020 07:  -  09 Aug 2020 23:25  --------------------------------------------------------  IN: 840 mL / OUT: 2400 mL / NET: -1560 mL        Appearance: Normal	  HEENT:   Normal oral mucosa, EOMI	  Cardiovascular:  S1 S2, No JVD,    Respiratory: Lungs clear to auscultation	  Psychiatry: Alert  Gastrointestinal:  Soft, Non-tender, + BS	  Skin: No rashes   Neurologic: Non-focal  Extremities:  No edema  Vascular: Peripheral pulses palpable    	    	  	  CARDIAC MARKERS:  Labs personally reviewed by me                                  10.2   7.13  )-----------( 315      ( 09 Aug 2020 06:11 )             31.4         144  |  103  |  11  ----------------------------<  181<H>  3.4<L>   |  28  |  0.73    Ca    8.7      09 Aug 2020 06:11  Phos  3.9     08-09  Mg     1.8     -    TPro  6.2  /  Alb  3.5  /  TBili  0.3  /  DBili  x   /  AST  16  /  ALT  9   /  AlkPhos  52            EKG: Personally reviewed by me - REJI        Assessment /Plan:   1. tachycardia - So far, no sustained arrhythmia.  She has sinus rhythm with frequent APCs but no sustained multifocal atrial tachycardia or AFib.  Aspirin 81mg only / no anticoagulation recommended at this time per EP  TTE normal   Consider outpt 30 day monitor          Mikey Nj DO [facility]  Cardiovascular Medicine  [address] [place] [address], [address]  Office [phone]  Cell [phone]

## 2020-08-09 NOTE — PROGRESS NOTE ADULT - PROBLEM SELECTOR PLAN 6
- Lasix 80mg daily, monitor fluid status  - c/w Lisinopril 40mg daily, on Ramipril 10mg daily BID at home  - c/w home Metoprolol scc  daily

## 2020-08-09 NOTE — PROGRESS NOTE ADULT - SUBJECTIVE AND OBJECTIVE BOX
Patient appears comfortable.  She is wearing telemtry today.  She waves me away as she is trying to eat breakfast.  ROS refused.    aspirin  chewable 81 milliGRAM(s) Oral daily  dextrose 40% Gel 15 Gram(s) Oral once PRN  dextrose 5%. 1000 milliLiter(s) IV Continuous <Continuous>  dextrose 50% Injectable 12.5 Gram(s) IV Push once  dextrose 50% Injectable 25 Gram(s) IV Push once  dextrose 50% Injectable 25 Gram(s) IV Push once  enoxaparin Injectable 40 milliGRAM(s) SubCutaneous daily  furosemide    Tablet 80 milliGRAM(s) Oral daily  glucagon  Injectable 1 milliGRAM(s) IntraMuscular once PRN  insulin lispro (HumaLOG) corrective regimen sliding scale   SubCutaneous three times a day before meals  insulin lispro (HumaLOG) corrective regimen sliding scale   SubCutaneous at bedtime  lisinopril 40 milliGRAM(s) Oral daily  melatonin 3 milliGRAM(s) Oral at bedtime  metoprolol succinate  milliGRAM(s) Oral daily  OLANZapine 1.25 milliGRAM(s) Oral every 6 hours PRN  OLANZapine 2.5 milliGRAM(s) Oral <User Schedule>  OLANZapine Injectable 1.25 milliGRAM(s) IntraMuscular every 6 hours PRN  potassium chloride    Tablet ER 40 milliEquivalent(s) Oral every 4 hours                            10.2   7.13  )-----------( 315      ( 09 Aug 2020 06:11 )             31.4       08-09    144  |  103  |  11  ----------------------------<  181<H>  3.4<L>   |  28  |  0.73    Ca    8.7      09 Aug 2020 06:11  Phos  3.9     08-09  Mg     1.8     08-09    TPro  6.2  /  Alb  3.5  /  TBili  0.3  /  DBili  x   /  AST  16  /  ALT  9   /  AlkPhos  52  08-09      T(C): 36.9 (08-09-20 @ 10:35), Max: 37.1 (08-08-20 @ 13:00)  HR: 98 (08-09-20 @ 10:35) (47 - 98)  BP: 128/66 (08-09-20 @ 10:35) (128/66 - 154/81)  RR: 16 (08-09-20 @ 10:35) (16 - 18)  SpO2: 98% (08-09-20 @ 10:35) (97% - 99%)  Wt(kg): --    I&O's Summary    08 Aug 2020 07:01  -  09 Aug 2020 07:00  --------------------------------------------------------  IN: 1263 mL / OUT: 2200 mL / NET: -937 mL    Gen: Appears well in NAD  HEENT:  (-)icterus (-)pallor  CV: N S1 S2 1/6 LACI (+)2 Pulses B/l  Resp:  Clear to ausculatation B/L, normal effort  GI: (+) BS Soft, NT, ND  Lymph:  (-)Edema, (-)obvious lymphadenopathy  Skin: Warm to touch, Normal turgor  Psych: unable to determine    TELEMETRY: 	SR, with frequent PACs.  Area reported as AFib has significant artifact and cannot be confirmed.      ASSESSMENT/PLAN: 	  80y Female with leg edema and shortness of breath.  Undocumented atrial arrhythmia without clear symptom-rhythm correlation.    --Continue home beta blocker  --EKG with release orders has been placed to allow nurses to call for an EKG when going >100bpm on telemetry.  Advised Tele technician to order as needed.  --await Echocardiogram.  --So far, no sustained arrhythmia.  She has sinus rhythm with frequent APCs but no sustained multifocal atrial tachycardia or AFib.  Aspirin 81mg only / no anticoagulation recommended at this time.    Campbell Sanchez M.D.  Cardiac Electrophysiology  [phone]

## 2020-08-10 NOTE — CONSULT NOTE ADULT - SUBJECTIVE AND OBJECTIVE BOX
HPI:  80F (primarily [place]-speaking) w/ hx of HTN, DM, obesity, anxiety, ?Hypothyroidism who presented on  from PCP's office for worsening b/l leg swelling and redness x 1 month.  Pt is a poor historian with difficulty confirming and remembering her medical history. Most of the history was obtained from PCP (Dr. Salazar) and her daughter Raquel.  For the past ~month, pt has had leg swelling, worse than her baseline swelling, and new redness. PCP reports he noted phlebitis/cellulitis and prescribed augmentin on 7/15. Leg swelling/redness did not improve on augmentin.  Pt reports no pain, although in the ED she was reported to express "fire on her legs."  She denies fever/chills, leg itchiness. She denies exposure to wooded/grassy areas, ticks/insect bites, seawater/beaches.  Per PCP and daughter, patient has issues keeping her medical appointments and has questionable adherence to her medications, including furosemide, ramipril.  PCP denies history of Afib or other heart disease. PCP reports he prescribed xarelto 10mg daily for concern for PVD/lower extremities DVT in patient.  Pt takes gabapentin for diabetic neuropathy.  Pt denies numbness, tingling, or pain of her feet.  Pt was also prescribed betamethasone diproprionate and lactic-acid urea lotion for the skin changes on legs. At baseline, pt has bilateral leg weakness and uses a cane to walk.  She has frequent falls and had a recent fall without injury. Of note, both daughter and PCP reports she has paranoid thinking and behavior - e.g. thinking that certain people are out to hurt her, trying to not leave the house for this reason.  Per daughter, patient also has visual hallucinations. On history taking,  also noted patient would frequently interrupt to talk about unrelated subjects - e.g. a man at the bank, a woman who lives upstairs.     LE edema attributed to chronic venous insufficiency. TTE unremarkable. No evidence of arrhythmia on telemetry. Patient started on olanzapine given underlying paranoid delusions. TSH noted to be elevated at 45. Patient started on IV synthroid 50mcg and steroids.    Endocrine consulted given elevated TSH levels.     Endocrine History:    History obtained from Daughter Raquel. Patient was getting agitated when asked questions and was not aware that she was in the hospital-- even while using [place] interpretor. She reported no to all review of system questions as noted below.     At baseline, patient lives at home with . She is independent with ADLs. Takes her own medications. Per daughter, patient has a h/o paranoia, but not officially diagnosed and not on any medications.     Hypothyroidism:  Patient diagnosed with hypothyroidism many years ago. Per daughter patient should be on synthroid daily. Spoke to patient's pharmacy ([facility])-- they reported that patient was last prescribed synthroid in 2018-- at that time patient was taking 150mcg. Unclear why it was discontinued or not re-filled.    No reports of weight loss/gain, heat/cold intolerance, palpitations, constipation, diarrhea  Currently post-menopausal  Change in baseline mental status-- delusions have worsened    Diabetes:   Diagnosed many years ago. Currently taking Metformin 500mg BID. Never used insulin. She checks her BG levels every morning, although unclear what the levels are. H/o neuropathy but no retinopathy.    PAST MEDICAL & SURGICAL HISTORY:  Anxiety  Obesity  DM (diabetes mellitus)  HTN (hypertension)  History of     FAMILY HISTORY:  Sister has DM2    Social History:  - Lives with   - Denies tobacco, EtOH or illicit drug use    Outpatient Medications:  - Gabapentin 300mg TID  - Ramipril 10mg BID  - Metformin ER 500mg BID  - Meloxicam  - Xarelto 10mg daily    MEDICATIONS  (STANDING):  aspirin  chewable 81 milliGRAM(s) Oral daily  cyanocobalamin 1000 MICROGram(s) Oral daily  dextrose 5%. 1000 milliLiter(s) (50 mL/Hr) IV Continuous <Continuous>  dextrose 50% Injectable 12.5 Gram(s) IV Push once  dextrose 50% Injectable 25 Gram(s) IV Push once  dextrose 50% Injectable 25 Gram(s) IV Push once  enoxaparin Injectable 40 milliGRAM(s) SubCutaneous daily  furosemide    Tablet 80 milliGRAM(s) Oral daily  hydrocortisone sodium succinate Injectable 50 milliGRAM(s) IV Push every 8 hours  insulin lispro (HumaLOG) corrective regimen sliding scale   SubCutaneous three times a day before meals  insulin lispro (HumaLOG) corrective regimen sliding scale   SubCutaneous at bedtime  levothyroxine Injectable 50 MICROGram(s) IV Push at bedtime  lisinopril 40 milliGRAM(s) Oral daily  melatonin 6 milliGRAM(s) Oral at bedtime  metoprolol succinate  milliGRAM(s) Oral daily  OLANZapine 2.5 milliGRAM(s) Oral <User Schedule>  OLANZapine 5 milliGRAM(s) Oral at bedtime    MEDICATIONS  (PRN):  dextrose 40% Gel 15 Gram(s) Oral once PRN Blood Glucose LESS THAN 70 milliGRAM(s)/deciliter  glucagon  Injectable 1 milliGRAM(s) IntraMuscular once PRN Glucose LESS THAN 70 milligrams/deciliter  OLANZapine 1.25 milliGRAM(s) Oral every 6 hours PRN agitation  OLANZapine Injectable 1.25 milliGRAM(s) IntraMuscular every 6 hours PRN agitation    Allergies  No Known Allergies    Review of Systems:  Constitutional: No fever  Eyes: No blurry vision  Neuro: No tremors  HEENT: No pain  Cardiovascular: No chest pain, palpitations  Respiratory: No SOB, no cough  GI: No nausea, vomiting, abdominal pain  : No dysuria  Skin: no rash  Endocrine: no polyuria, polydipsia    ALL OTHER SYSTEMS REVIEWED AND NEGATIVE    PHYSICAL EXAM:  VITALS: T(C): 36.5 (08-10-20 @ 12:26)  T(F): 97.7 (08-10-20 @ 12:26), Max: 98.6 (20 @ 19:30)  HR: 74 (08-10-20 @ 12:30) (56 - 79)  BP: 134/81 (08-10-20 @ 12:26) (127/85 - 140/75)  RR:  (16 - 18)  SpO2:  (92% - 98%)  Wt(kg): 91 kg  GENERAL: NAD, well-groomed  MSK: moving all extremities  Neuro: A&Ox1 (to self only)  Patient refusing the rest of the exam-- was getting very agiated    POCT Blood Glucose.: 233 mg/dL (08-10-20 @ 11:52)  POCT Blood Glucose.: 243 mg/dL (08-10-20 @ 09:27)  POCT Blood Glucose.: 173 mg/dL (20 @ 21:09)  POCT Blood Glucose.: 172 mg/dL (20 @ 17:17)  POCT Blood Glucose.: 214 mg/dL (20 @ 12:27)  POCT Blood Glucose.: 180 mg/dL (20 @ 07:54)  POCT Blood Glucose.: 244 mg/dL (20 @ 21:18)  POCT Blood Glucose.: 210 mg/dL (20 @ 16:45)  POCT Blood Glucose.: 228 mg/dL (20 @ 07:46)  POCT Blood Glucose.: 136 mg/dL (20 @ 21:40)  POCT Blood Glucose.: 176 mg/dL (20 @ 18:11)  POCT Blood Glucose.: 157 mg/dL (20 @ 17:26)                            12.2   11.00 )-----------( 392      ( 10 Aug 2020 09:45 )             38.0       08-10    142  |  101  |  15  ----------------------------<  267<H>  4.4   |  26  |  0.86    EGFR if : 73  EGFR if non : 63    Ca    9.1      08-10  Mg     1.8     -10  Phos  3.8     -10    TPro  7.2  /  Alb  4.0  /  TBili  0.5  /  DBili  x   /  AST  21  /  ALT  12  /  AlkPhos  66  08-10    Thyroid Function Tests:   @ 06:11 TSH 45.13 FreeT4 0.56 T3 -- Anti TPO -- Anti Thyroglobulin Ab -- TSI -- HPI:  80F (primarily [place]-speaking) w/ hx of HTN, DM, obesity, anxiety, ?Hypothyroidism who presented on  from PCP's office for worsening b/l leg swelling and redness x 1 month.  Pt is a poor historian with difficulty confirming and remembering her medical history. Most of the history was obtained from PCP (Dr. Salazar) and her daughter Raquel.  For the past ~month, pt has had leg swelling, worse than her baseline swelling, and new redness. PCP reports he noted phlebitis/cellulitis and prescribed augmentin on 7/15. Leg swelling/redness did not improve on augmentin.  Pt reports no pain, although in the ED she was reported to express "fire on her legs."  She denies fever/chills, leg itchiness. She denies exposure to wooded/grassy areas, ticks/insect bites, seawater/beaches.  Per PCP and daughter, patient has issues keeping her medical appointments and has questionable adherence to her medications, including furosemide, ramipril.  PCP denies history of Afib or other heart disease. PCP reports he prescribed xarelto 10mg daily for concern for PVD/lower extremities DVT in patient.  Pt takes gabapentin for diabetic neuropathy.  Pt denies numbness, tingling, or pain of her feet.  Pt was also prescribed betamethasone diproprionate and lactic-acid urea lotion for the skin changes on legs. At baseline, pt has bilateral leg weakness and uses a cane to walk.  She has frequent falls and had a recent fall without injury. Of note, both daughter and PCP reports she has paranoid thinking and behavior - e.g. thinking that certain people are out to hurt her, trying to not leave the house for this reason.  Per daughter, patient also has visual hallucinations. On history taking,  also noted patient would frequently interrupt to talk about unrelated subjects - e.g. a man at the bank, a woman who lives upstairs.     LE edema attributed to chronic venous insufficiency. TTE unremarkable. No evidence of arrhythmia on telemetry. Patient started on olanzapine given underlying paranoid delusions. TSH noted to be elevated at 45. Patient started on IV synthroid 50mcg and steroids.    Endocrine consulted given elevated TSH levels.     Endocrine History:    History obtained from Daughter Raquel. Patient was getting agitated when asked questions and was not aware that she was in the hospital-- even while using [place] interpretor. She reported no to all review of system questions as noted below.     At baseline, patient lives at home with . She is independent with ADLs. Takes her own medications. Per daughter, patient has a h/o paranoia, but not officially diagnosed and not on any medications.     Hypothyroidism:  Patient diagnosed with hypothyroidism many years ago. Per daughter patient should be on synthroid daily. Spoke to patient's pharmacy ([facility])-- they reported that patient was last prescribed synthroid in 2018-- at that time patient was taking 150mcg. Unclear why it was discontinued or not re-filled.    No reports of weight loss/gain, heat/cold intolerance, palpitations, constipation, diarrhea  Currently post-menopausal  Change in baseline mental status-- delusions have worsened    Diabetes:   Diagnosed many years ago. Currently taking Metformin 500mg BID. Never used insulin. She checks her BG levels every morning, although unclear what the levels are. H/o neuropathy but no retinopathy.    PAST MEDICAL & SURGICAL HISTORY:  Anxiety  Obesity  DM (diabetes mellitus)  HTN (hypertension)  History of     FAMILY HISTORY:  Sister has DM2    Social History:  - Lives with   - Denies tobacco, EtOH or illicit drug use    Outpatient Medications:  - Gabapentin 300mg TID  - Ramipril 10mg BID  - Metformin ER 500mg BID  - Meloxicam  - Xarelto 10mg daily    MEDICATIONS  (STANDING):  aspirin  chewable 81 milliGRAM(s) Oral daily  cyanocobalamin 1000 MICROGram(s) Oral daily  dextrose 5%. 1000 milliLiter(s) (50 mL/Hr) IV Continuous <Continuous>  dextrose 50% Injectable 12.5 Gram(s) IV Push once  dextrose 50% Injectable 25 Gram(s) IV Push once  dextrose 50% Injectable 25 Gram(s) IV Push once  enoxaparin Injectable 40 milliGRAM(s) SubCutaneous daily  furosemide    Tablet 80 milliGRAM(s) Oral daily  hydrocortisone sodium succinate Injectable 50 milliGRAM(s) IV Push every 8 hours  insulin lispro (HumaLOG) corrective regimen sliding scale   SubCutaneous three times a day before meals  insulin lispro (HumaLOG) corrective regimen sliding scale   SubCutaneous at bedtime  levothyroxine Injectable 50 MICROGram(s) IV Push at bedtime  lisinopril 40 milliGRAM(s) Oral daily  melatonin 6 milliGRAM(s) Oral at bedtime  metoprolol succinate  milliGRAM(s) Oral daily  OLANZapine 2.5 milliGRAM(s) Oral <User Schedule>  OLANZapine 5 milliGRAM(s) Oral at bedtime    MEDICATIONS  (PRN):  dextrose 40% Gel 15 Gram(s) Oral once PRN Blood Glucose LESS THAN 70 milliGRAM(s)/deciliter  glucagon  Injectable 1 milliGRAM(s) IntraMuscular once PRN Glucose LESS THAN 70 milligrams/deciliter  OLANZapine 1.25 milliGRAM(s) Oral every 6 hours PRN agitation  OLANZapine Injectable 1.25 milliGRAM(s) IntraMuscular every 6 hours PRN agitation    Allergies  No Known Allergies    Review of Systems:  Constitutional: No fever  Eyes: No blurry vision  Neuro: No tremors  HEENT: No pain  Cardiovascular: No chest pain, palpitations  Respiratory: No SOB, no cough  GI: No nausea, vomiting, abdominal pain  : No dysuria  Skin: no rash  Endocrine: no polyuria, polydipsia    ALL OTHER SYSTEMS REVIEWED AND NEGATIVE    PHYSICAL EXAM:  VITALS: T(C): 36.5 (08-10-20 @ 12:26)  T(F): 97.7 (08-10-20 @ 12:26), Max: 98.6 (20 @ 19:30)  HR: 74 (08-10-20 @ 12:30) (56 - 79)  BP: 134/81 (08-10-20 @ 12:26) (127/85 - 140/75)  RR:  (16 - 18)  SpO2:  (92% - 98%)  Wt(kg): 91 kg  GENERAL: NAD, well-groomed  MSK: moving all extremities  Neuro: A&Ox1 (to self only)  Patient refusing the rest of the exam-- was getting very agitated    POCT Blood Glucose.: 233 mg/dL (08-10-20 @ 11:52)  POCT Blood Glucose.: 243 mg/dL (08-10-20 @ 09:27)  POCT Blood Glucose.: 173 mg/dL (20 @ 21:09)  POCT Blood Glucose.: 172 mg/dL (20 @ 17:17)  POCT Blood Glucose.: 214 mg/dL (20 @ 12:27)  POCT Blood Glucose.: 180 mg/dL (20 @ 07:54)  POCT Blood Glucose.: 244 mg/dL (20 @ 21:18)  POCT Blood Glucose.: 210 mg/dL (20 @ 16:45)  POCT Blood Glucose.: 228 mg/dL (20 @ 07:46)  POCT Blood Glucose.: 136 mg/dL (20 @ 21:40)  POCT Blood Glucose.: 176 mg/dL (20 @ 18:11)  POCT Blood Glucose.: 157 mg/dL (20 @ 17:26)                            12.2   11.00 )-----------( 392      ( 10 Aug 2020 09:45 )             38.0       08-10    142  |  101  |  15  ----------------------------<  267<H>  4.4   |  26  |  0.86    EGFR if : 73  EGFR if non : 63    Ca    9.1      08-10  Mg     1.8     -10  Phos  3.8     -10    TPro  7.2  /  Alb  4.0  /  TBili  0.5  /  DBili  x   /  AST  21  /  ALT  12  /  AlkPhos  66  08-10    Thyroid Function Tests:   @ 06:11 TSH 45.13 FreeT4 0.56 T3 -- Anti TPO -- Anti Thyroglobulin Ab -- TSI --

## 2020-08-10 NOTE — PROGRESS NOTE ADULT - PROBLEM SELECTOR PLAN 6
- Lasix 80mg daily, monitor fluid status  - c/w Lisinopril 40mg daily, on Ramipril 10mg daily BID at home  - c/w home Metoprolol scc  daily - Lasix 80mg daily, monitor fluid status  - c/w Lisinopril 40mg daily, (on Ramipril 10mg daily BID at home)  - c/w home Metoprolol scc  daily

## 2020-08-10 NOTE — PROGRESS NOTE ADULT - PROBLEM SELECTOR PROBLEM 9
Prophylactic measure Repair Performed By Another Provider Text (Leave Blank If You Do Not Want): After the tissue was excised the defect was repaired by another provider.

## 2020-08-10 NOTE — PROGRESS NOTE ADULT - ASSESSMENT
80F (primarily Honduran-speaking) w/ hx of HTN, DM, obesity, and anxiety presented on 8/6 with worsening b/l leg swelling, redness x 1 month likely secondary to chronic venous insufficiency vs. new CHF.

## 2020-08-10 NOTE — RAPID RESPONSE TEAM SUMMARY - NSSITUATIONBACKGROUNDRRT_GEN_ALL_CORE
80F (primarily Maldivian-speaking) w/ hx of HTN, DM, obesity, and anxiety presented on 8/6 with worsening b/l leg swelling, redness x 1 month likely secondary to chronic venous insufficiency.  Patient had TTE done with normal EF. Patient also with dementia with psychosis.  Patient was given IM Xyprexa 1.5mg at 6AM.  RRT 80F (primarily Macedonian-speaking) w/ hx of HTN, DM, obesity, and anxiety presented on 8/6 with worsening b/l leg swelling, redness x 1 month likely secondary to chronic venous insufficiency.  Patient had TTE done with normal EF. Patient also with dementia with psychosis.  Patient was given IM Xyprexa 1.5mg at 6AM.  RRT called for unresponsiveness.  Patient's VSS.  Patient's awake and moving all 4 extremities.  Labs not sent in AM but sent during rapid.  Differential diagnosis includes medication-induced vs hypothyroidism masking adrenal insufficiency.

## 2020-08-10 NOTE — PROGRESS NOTE ADULT - PROBLEM SELECTOR PLAN 4
Paranoid delusions per PCP and daughter and on exam. Additionally has visual hallucinations, may be d/t underlying psychiatric disorder.  - Psych following, appreciate recs  - Olanzapine 2.5mg PO at 20:00, PRN Olazapine 1.25mg PO/IM q6h  - Melatonin 4mg PO QHS  - EKG daily to check QTc Paranoid delusions per PCP and daughter and on exam. Additionally has visual hallucinations, may be d/t underlying psychiatric disorder.  - Psych following, appreciate recs  - Olanzapine 2.5mg PO at 6pm, will add addnl 5mg standing olanzapine at bedtime  given increased agitation   - keep PRN Olanzapine 1.25mg PO/IM q6h  - Melatonin 6mg PO QHS  - EKG daily to check QTc

## 2020-08-10 NOTE — PROVIDER CONTACT NOTE (OTHER) - SITUATION
Patient noted to be confused to time, place, and situation. Patient stated that the nurse is her daughter and we are at her daughter's house. Patient in bed. Patient not agitated at this time.

## 2020-08-10 NOTE — PROVIDER CONTACT NOTE (OTHER) - ASSESSMENT
Patient appears confused to situation and place at this time. Patient agitated, yelling at staff. Reorientation provided as needed.

## 2020-08-10 NOTE — PROGRESS NOTE ADULT - PROBLEM SELECTOR PLAN 9
- DVT prophylaxis: Lovenox - DVT prophylaxis: Lovenox  Diet: CC Diet   Dispo: Pending resolution of agitation and regimen for hypothyroidism

## 2020-08-10 NOTE — PROGRESS NOTE BEHAVIORAL HEALTH - NSBHFUPINTERVALHXFT_PSY_A_CORE
Chart reviewed and case d/w floor RN. This AM pt was rather agitated, yelling, ripped out IV, etc. Given Zyprexa PRN at 6AM (1.5mg). She remained inconsolable after. Primary team was going to give her Haldol PRN but then she was found to be unresponsive by RN, and Rapid Response was called. She recovered spontaneously. Chart noted for mild leukocytosis.     Pt seen on rounds, interviewed with rudimentary Hebrew. Reports she is in "a house" but when told it was a hospital she agrees in a suggestible manner. She confabulates about what she does not understand. Denies any concerns or requests at this time, and seems calm. No focal neuro deficits noted on exam. Exam rather limited by her dementia/delirium.

## 2020-08-10 NOTE — PROGRESS NOTE ADULT - PROBLEM SELECTOR PLAN 1
Worsening b/l leg swelling, redness x 1 month, unresponsive to augmentin prescribed by PCP for cellulitis, likely secondary to chronic venous insufficiency vs. newfound CHF given concurrent finding of crackles on lung exam and Afib on EKG.  - CXR clear  - TTE overall unremarkable, EF>50%   - C/w home Lasix 80mg daily and monitor volume status, strict I/Os, fluid restriction  - DASH diet  - Fall precautions  - BLE duplex negative for DVT's, although unable to visualize above knees

## 2020-08-10 NOTE — PROVIDER CONTACT NOTE (OTHER) - BACKGROUND
Patient admitted for Lower extremity edema. PMH of anxiety, HTN, DM type 2. Patient received IM zyprexa at 650 AM for agitation, see EMAR.

## 2020-08-10 NOTE — PROGRESS NOTE ADULT - PROBLEM SELECTOR PLAN 5
Paranoid delusions per PCP and daughter and on exam. Additionally has visual hallucinations, may be d/t underlying psychiatric disorder.  - Psych following, appreciate recs  - Olanzapine 2.5mg PO at 20:00, PRN Olazapine 1.25mg PO/IM q6h  - Melatonin 4mg PO QHS  - EKG daily to check QTc Paranoid delusions per PCP and daughter and on exam. Additionally has visual hallucinations, may be d/t underlying psychiatric disorder.  - Psych following, appreciate recs  Olanzapine 2.5mg PO at 6pm, will add addnl 5mg standing olanzapine at bedtime  given increased agitation   - keep PRN Olanzapine 1.25mg PO/IM q6h  - Melatonin 6mg PO QHS  - EKG daily to check QTc

## 2020-08-10 NOTE — CONSULT NOTE ADULT - ATTENDING COMMENTS
80F with severe hypothyroidism, was on levothyroxine in the past (per pharmacy 150 mcg in 2018 but not filled since then).  Here noted with high TSH, low FT4 and this AM patient was altered and therefore decided to treat more aggressively with IV levothyroxine and IV hydrocortisone for concern of possible myxedema like picture, although vital signs are not fully supportive. Patient is now alert and yelling in Tristanian and per primary team responded well to therapies. Can taper off steroid and recheck HPA axis once off steroid. Continue levothyroxine 50 mcg IV daily for now. Will follow. Complex patient high level decision making.    Gina Carver MD  Division of Endocrinology  Pager: [phone]    If after 6PM or before 9AM, or on weekends/holidays, please call endocrine answering service for assistance ([phone]).  For nonurgent matters email [email or web address] for assistance.

## 2020-08-10 NOTE — PROGRESS NOTE ADULT - ATTENDING COMMENTS
S/P RRT for altered MS, etiology not clear, ? due to medication vs severe hypothyroidism, r/o adrenal insuff.   D/W endo attending, will give one dose of Hydrocortisone 50mg IVP and Synthroid 50mcg IVP X1 as per Endo rec.   Will f/u Endo consult rec.

## 2020-08-10 NOTE — PROGRESS NOTE ADULT - PROBLEM SELECTOR PLAN 2
Per PCP, no known history of Afib. Irregularly irregular HS and EKG with irregular rate/rhythm, different p wave morphologies, and prolonged AL interval. She was on dipyramidole and clopidogrel in the past, but unclear for what indication.  - EP following, appreciate recs - start aspirin 81mg   - CTM on telemetry (pt refusing)  - TSH 45, FT4 0.5 Per PCP, no known history of Afib. Irregularly irregular HS and EKG with irregular rate/rhythm, different p wave morphologies, and prolonged ID interval. She was on dipyramidole and clopidogrel in the past, but unclear for what indication.  - EP following, appreciate recs - c/w aspirin 81mg   - CTM on telemetry (pt refusing)  - TSH 45, FT4 0.5

## 2020-08-10 NOTE — PROVIDER CONTACT NOTE (OTHER) - RECOMMENDATIONS
MD to bedside to assess patient and update patient on plan of care Haley Rojas to bedside to assess patient and update patient on plan of care

## 2020-08-10 NOTE — PROVIDER CONTACT NOTE (OTHER) - ASSESSMENT
Patient noted to be confused to time, place, and situation. Patient stated that the nurse is her daughter and she is at her daughter's house.  Patient in bed. Patient not agitated at this time.

## 2020-08-10 NOTE — PROVIDER CONTACT NOTE (OTHER) - SITUATION
Patient agitated at this time. Patient threatening to leave, refusing finger sticks, AM labs, tele monitoring, and care at this time.

## 2020-08-10 NOTE — PROVIDER CONTACT NOTE (OTHER) - ACTION/TREATMENT ORDERED:
MD at bedside to assess patient. PRN medications ordered for agitation. MD Ricky Carcamo at bedside to assess patient. PRN medications ordered for agitation.

## 2020-08-10 NOTE — PROVIDER CONTACT NOTE (OTHER) - ACTION/TREATMENT ORDERED:
Reorient patient as needed and continue to monitor. Patient kept safe under nurse supervision. MD Amparo Murcia at the bedside to assess patient.

## 2020-08-10 NOTE — PROGRESS NOTE ADULT - ATTENDING COMMENTS
Agree w/ PA.  Patient unable to be understood with  even in Armenian because she rambles. Possible dementia.  Telemetry shows frequent PACs, but no sustained MAT or AFib.  No change in medica lmanagement.

## 2020-08-10 NOTE — CONSULT NOTE ADULT - ASSESSMENT
80F (primarily Kyrgyz-speaking) with PMhx of HTN, DM, obesity, anxiety, hypothyroidism, and paranoid delusions presenting with LE edema attributed to chronic venous insufficiency. Family also reported worsening of baseline paranoia, now on olanzapine. In addition, patient noted to have elevated TSH levels and low FT4 for which Endocrine was consulted.    Patient has a h/o of hypothyroidism; however per discussion with outpatient pharmacy she has been off of synthroid since 2018. At that time she was on 150mcg daily. Patient noted to be altered with intermittent bradycardia initially on presentation. TSH 45 and FT4 0.5. Although, low clinical suspicion for myxedema coma, current clinical signs and thyroid studies are consistent with severe hypothyroidism likely 2/2 to poor adherence.     Problem 2: Hypothyroidism  - Continue Synthroid 50mcg IV qhs  - Repeat TFTs in 3 days  - Patient currently on hydrocortisone [id number]. Transition to [id number] starting 8/11/20  - Recheck cortisol levels after patient is tapered off of steroids    Problem 2: Type 2 Diabetes  - Moderate dose correctional scale AC meals and qhs  - Consistent Carb diet  - Fingerstick goal (140-180)    Problem 3: Vitamin B12 deficiency  - Patient on supplements     Discussed plan with primary team    Case discussed with Attending Physician. Follow up Attending attestation for final recommendations. Please feel free to page the number below for any further questions.     Yeimi Castanon M.D  Endocrine Fellow  Pager #[phone] 80F (primarily Lao-speaking) with PMhx of HTN, DM, obesity, anxiety, hypothyroidism, and paranoid delusions presenting with LE edema attributed to chronic venous insufficiency. Family also reported worsening of baseline paranoia, now on olanzapine. In addition, patient noted to have elevated TSH levels and low FT4 for which Endocrine was consulted.    Patient has a h/o of hypothyroidism; however per discussion with outpatient pharmacy she has been off of synthroid since 2018. At that time she was on 150mcg daily. Patient noted to be altered with intermittent bradycardia initially on presentation. TSH 45 and FT4 0.5. Although, low clinical suspicion for myxedema coma, current clinical signs and thyroid studies are consistent with severe hypothyroidism likely 2/2 to poor adherence.     Problem 1: Severe Hypothyroidism  - Continue Synthroid 50mcg IV daily - next dose due tomorrow AM so rescheduled the time to avoid getting 2 doses today.  - Repeat Free T4 in 3 days  - Patient currently on hydrocortisone [id number]. Transition to [id number] starting 8/11/20  - Recheck cortisol levels after patient is tapered off of steroids    Problem 2: Type 2 Diabetes  - Moderate dose correctional scale AC meals and qhs  - Consistent Carb diet  - Fingerstick goal (140-180)  -Plan to resume metformin at dc if no contraindication    Problem 3: Vitamin B12 deficiency  - Patient on supplements     Discussed plan with primary team    Case discussed with Attending Physician. Follow up Attending attestation for final recommendations. Please feel free to page the number below for any further questions.     Yeimi Castanon M.D  Endocrine Fellow  Pager #[phone]

## 2020-08-10 NOTE — PROGRESS NOTE ADULT - SUBJECTIVE AND OBJECTIVE BOX
Patient is a 80y old  Female who presents with a chief complaint of Leg swelling, redness (09 Aug 2020 20:25)      SUBJECTIVE / OVERNIGHT EVENTS: Overnight patient with delusion, thinking nurse staff was her niece. Intermittently refusing labs and to be placed on telemetry.     MEDICATIONS  (STANDING):  aspirin  chewable 81 milliGRAM(s) Oral daily  cyanocobalamin 1000 MICROGram(s) Oral daily  dextrose 5%. 1000 milliLiter(s) (50 mL/Hr) IV Continuous <Continuous>  dextrose 50% Injectable 12.5 Gram(s) IV Push once  dextrose 50% Injectable 25 Gram(s) IV Push once  dextrose 50% Injectable 25 Gram(s) IV Push once  enoxaparin Injectable 40 milliGRAM(s) SubCutaneous daily  furosemide    Tablet 80 milliGRAM(s) Oral daily  insulin lispro (HumaLOG) corrective regimen sliding scale   SubCutaneous three times a day before meals  insulin lispro (HumaLOG) corrective regimen sliding scale   SubCutaneous at bedtime  levothyroxine 12.5 MICROGram(s) Oral daily  lisinopril 40 milliGRAM(s) Oral daily  melatonin 3 milliGRAM(s) Oral at bedtime  metoprolol succinate  milliGRAM(s) Oral daily  OLANZapine 2.5 milliGRAM(s) Oral <User Schedule>    MEDICATIONS  (PRN):  dextrose 40% Gel 15 Gram(s) Oral once PRN Blood Glucose LESS THAN 70 milliGRAM(s)/deciliter  glucagon  Injectable 1 milliGRAM(s) IntraMuscular once PRN Glucose LESS THAN 70 milligrams/deciliter  OLANZapine 1.25 milliGRAM(s) Oral every 6 hours PRN agitation  OLANZapine Injectable 1.25 milliGRAM(s) IntraMuscular every 6 hours PRN agitation      Vital Signs Last 24 Hrs  T(C): 36.9 (10 Aug 2020 05:45), Max: 37 (09 Aug 2020 19:30)  T(F): 98.5 (10 Aug 2020 05:45), Max: 98.6 (09 Aug 2020 19:30)  HR: 70 (10 Aug 2020 05:45) (62 - 98)  BP: 130/80 (10 Aug 2020 05:45) (128/66 - 152/88)  BP(mean): --  RR: 17 (10 Aug 2020 05:45) (16 - 17)  SpO2: 98% (10 Aug 2020 05:45) (97% - 98%)  CAPILLARY BLOOD GLUCOSE      POCT Blood Glucose.: 173 mg/dL (09 Aug 2020 21:09)  POCT Blood Glucose.: 172 mg/dL (09 Aug 2020 17:17)  POCT Blood Glucose.: 214 mg/dL (09 Aug 2020 12:27)  POCT Blood Glucose.: 180 mg/dL (09 Aug 2020 07:54)    I&O's Summary    09 Aug 2020 07:01  -  10 Aug 2020 07:00  --------------------------------------------------------  IN: 1380 mL / OUT: 2900 mL / NET: -1520 mL        PHYSICAL EXAM:  GENERAL: NAD, well-developed  HEAD:  Atraumatic, Normocephalic  EYES: EOMI, PERRLA, conjunctiva and sclera clear  NECK: Supple, No JVD  CHEST/LUNG: Clear to auscultation bilaterally; No wheeze  HEART: Regular rate and rhythm; No murmurs, rubs, or gallops  ABDOMEN: Soft, Nontender, Nondistended; Bowel sounds present  EXTREMITIES:  2+ Peripheral Pulses, No clubbing, cyanosis, or edema  PSYCH: AAOx3  NEUROLOGY: non-focal  SKIN: No rashes or lesions    LABS:                        10.2   7.13  )-----------( 315      ( 09 Aug 2020 06:11 )             31.4     08-09    144  |  103  |  11  ----------------------------<  181<H>  3.4<L>   |  28  |  0.73    Ca    8.7      09 Aug 2020 06:11  Phos  3.9     08-09  Mg     1.8     08-09    TPro  6.2  /  Alb  3.5  /  TBili  0.3  /  DBili  x   /  AST  16  /  ALT  9   /  AlkPhos  52  08-09                  RADIOLOGY & ADDITIONAL TESTS:    Imaging Personally Reviewed:    Consultant(s) Notes Reviewed:      Care Discussed with Consultants/Other Providers: Patient is a 80y old  Female who presents with a chief complaint of Leg swelling, redness (09 Aug 2020 20:25)      SUBJECTIVE / OVERNIGHT EVENTS: Overnight patient with delusion, thinking nurse staff was her niece. Intermittently refusing labs and to be placed on telemetry. Patient agitated this AM requiring frequent redirection. RRT was called 40 mins later due to change in mental status. Patient was found to to be arousable.     MEDICATIONS  (STANDING):  aspirin  chewable 81 milliGRAM(s) Oral daily  cyanocobalamin 1000 MICROGram(s) Oral daily  dextrose 5%. 1000 milliLiter(s) (50 mL/Hr) IV Continuous <Continuous>  dextrose 50% Injectable 12.5 Gram(s) IV Push once  dextrose 50% Injectable 25 Gram(s) IV Push once  dextrose 50% Injectable 25 Gram(s) IV Push once  enoxaparin Injectable 40 milliGRAM(s) SubCutaneous daily  furosemide    Tablet 80 milliGRAM(s) Oral daily  insulin lispro (HumaLOG) corrective regimen sliding scale   SubCutaneous three times a day before meals  insulin lispro (HumaLOG) corrective regimen sliding scale   SubCutaneous at bedtime  levothyroxine 12.5 MICROGram(s) Oral daily  lisinopril 40 milliGRAM(s) Oral daily  melatonin 3 milliGRAM(s) Oral at bedtime  metoprolol succinate  milliGRAM(s) Oral daily  OLANZapine 2.5 milliGRAM(s) Oral <User Schedule>    MEDICATIONS  (PRN):  dextrose 40% Gel 15 Gram(s) Oral once PRN Blood Glucose LESS THAN 70 milliGRAM(s)/deciliter  glucagon  Injectable 1 milliGRAM(s) IntraMuscular once PRN Glucose LESS THAN 70 milligrams/deciliter  OLANZapine 1.25 milliGRAM(s) Oral every 6 hours PRN agitation  OLANZapine Injectable 1.25 milliGRAM(s) IntraMuscular every 6 hours PRN agitation      Vital Signs Last 24 Hrs  T(C): 36.9 (10 Aug 2020 05:45), Max: 37 (09 Aug 2020 19:30)  T(F): 98.5 (10 Aug 2020 05:45), Max: 98.6 (09 Aug 2020 19:30)  HR: 70 (10 Aug 2020 05:45) (62 - 98)  BP: 130/80 (10 Aug 2020 05:45) (128/66 - 152/88)  BP(mean): --  RR: 17 (10 Aug 2020 05:45) (16 - 17)  SpO2: 98% (10 Aug 2020 05:45) (97% - 98%)  CAPILLARY BLOOD GLUCOSE      POCT Blood Glucose.: 173 mg/dL (09 Aug 2020 21:09)  POCT Blood Glucose.: 172 mg/dL (09 Aug 2020 17:17)  POCT Blood Glucose.: 214 mg/dL (09 Aug 2020 12:27)  POCT Blood Glucose.: 180 mg/dL (09 Aug 2020 07:54)    I&O's Summary    09 Aug 2020 07:01  -  10 Aug 2020 07:00  --------------------------------------------------------  IN: 1380 mL / OUT: 2900 mL / NET: -1520 mL        PHYSICAL EXAM:  GENERAL: NAD, sittyin in chair, yelling in Albanian   HEAD:  Atraumatic, Normocephalic  EYES: conjunctiva and sclera clear  CHEST/LUNG: Clear to auscultation bilaterally; No wheeze  HEART: Regular rate and rhythm; No murmurs, rubs, or gallops  ABDOMEN: Soft, Nontender, Nondistended; Bowel sounds present  EXTREMITIES:  2+ Peripheral Pulses, No clubbing, cyanosis, or edema  PSYCH: AAOx2  NEUROLOGY: non-focal, able to move all extremities   SKIN: LE redness improved, 1+ edema improved from previos     LABS:                        10.2   7.13  )-----------( 315      ( 09 Aug 2020 06:11 )             31.4     08-09    144  |  103  |  11  ----------------------------<  181<H>  3.4<L>   |  28  |  0.73    Ca    8.7      09 Aug 2020 06:11  Phos  3.9     08-09  Mg     1.8     08-09    TPro  6.2  /  Alb  3.5  /  TBili  0.3  /  DBili  x   /  AST  16  /  ALT  9   /  AlkPhos  52  08-09

## 2020-08-10 NOTE — PROGRESS NOTE ADULT - SUBJECTIVE AND OBJECTIVE BOX
Patient appears comfortable. Events noted. Unable to obtain ROS.     MEDICATIONS  (STANDING):  aspirin  chewable 81 milliGRAM(s) Oral daily  cyanocobalamin 1000 MICROGram(s) Oral daily  dextrose 5%. 1000 milliLiter(s) (50 mL/Hr) IV Continuous <Continuous>  dextrose 50% Injectable 12.5 Gram(s) IV Push once  dextrose 50% Injectable 25 Gram(s) IV Push once  dextrose 50% Injectable 25 Gram(s) IV Push once  enoxaparin Injectable 40 milliGRAM(s) SubCutaneous daily  furosemide    Tablet 80 milliGRAM(s) Oral daily  insulin lispro (HumaLOG) corrective regimen sliding scale   SubCutaneous three times a day before meals  insulin lispro (HumaLOG) corrective regimen sliding scale   SubCutaneous at bedtime  levothyroxine 12.5 MICROGram(s) Oral daily  lisinopril 40 milliGRAM(s) Oral daily  melatonin 6 milliGRAM(s) Oral at bedtime  metoprolol succinate  milliGRAM(s) Oral daily  OLANZapine 2.5 milliGRAM(s) Oral <User Schedule>    MEDICATIONS  (PRN):  dextrose 40% Gel 15 Gram(s) Oral once PRN Blood Glucose LESS THAN 70 milliGRAM(s)/deciliter  glucagon  Injectable 1 milliGRAM(s) IntraMuscular once PRN Glucose LESS THAN 70 milligrams/deciliter  OLANZapine 1.25 milliGRAM(s) Oral every 6 hours PRN agitation  OLANZapine Injectable 1.25 milliGRAM(s) IntraMuscular every 6 hours PRN agitation      LABS:                     12.2   11.00 )-----------( 392      ( 10 Aug 2020 09:45 )             38.0     142  |  101  |  15  ----------------------------<  267<H>  4.4   |  26  |  0.86    Ca    9.1      10 Aug 2020 09:45  Phos  3.8     08-10  Mg     1.8     08-10    TPro  7.2  /  Alb  4.0  /  TBili  0.5  /  DBili  x   /  AST  21  /  ALT  12  /  AlkPhos  66  08-10    Creatinine Trend: 0.86<--, 0.73<--, 0.68<--, 0.70<--           PHYSICAL EXAM  Vital Signs Last 24 Hrs  T(C): 36.5 (10 Aug 2020 12:26), Max: 37 (09 Aug 2020 19:30)  T(F): 97.7 (10 Aug 2020 12:26), Max: 98.6 (09 Aug 2020 19:30)  HR: 74 (10 Aug 2020 12:30) (56 - 79)  BP: 134/81 (10 Aug 2020 12:26) (127/85 - 140/75)  RR: 18 (10 Aug 2020 12:26) (16 - 18)  SpO2: 96% (10 Aug 2020 12:26) (92% - 98%)      Gen: Appears well in NAD  HEENT:  (-)icterus (-)pallor  CV: N S1 S2 1/6 LACI (+)2 Pulses B/l  Resp:  Clear to ausculatation B/L, normal effort  GI: (+) BS Soft, NT, ND  Lymph:  (-)Edema, (-)obvious lymphadenopathy  Skin: Warm to touch, Normal turgor  Psych: unable to determine    TELEMETRY: 	SR/ST with frequent PACs.     < from: Transthoracic Echocardiogram (08.09.20 @ 11:14) >  CONCLUSIONS:  1. Normal left ventricular internal dimensions and wall  thicknesses.  2. Normal left ventricular systolic function.  3. Normal right ventricular size and function.  *** No previous Echo exam.  ------------------------------------------------------------------------  Confirmed on  8/9/2020 - 17:37:12 by Marquez Cramer M.D.    < end of copied text >      ASSESSMENT/PLAN: 	  80y Female with leg edema and shortness of breath.  Undocumented atrial arrhythmia without clear symptom-rhythm correlation.    --Continue home beta blocker  --EKG with release orders has been placed to allow nurses to call for an EKG when going >100bpm on telemetry.  Advised Tele technician to order as needed.  --Echo noted with Normal LV/RV function  --So far, no sustained arrhythmia.  She has sinus rhythm with frequent APCs but no sustained multifocal atrial tachycardia or AFib.  Aspirin 81mg only / no anticoagulation recommended at this time.

## 2020-08-10 NOTE — PROVIDER CONTACT NOTE (OTHER) - ASSESSMENT
Patient alert at this time. Patient agitated, yelling and swinging at staff. Patient refusing to take medications and becomes more agitated while encouraging and educating patient on importance of medications.

## 2020-08-10 NOTE — PROGRESS NOTE ADULT - PROBLEM SELECTOR PLAN 3
TSH 45, FT4 0.5 cpuld be cause of arrythmia  - Now on 12.5 mcgs PO daily  - Endo Consult in AM? TSH 45, FT4 0.5 could be cause of arrythmia  - Increased to 50 mcgs IV  - started on stress dose steroids hydrocortisone 50mg q8 for 3 doses, and will decrease to hydrocortisone 25 mgs q8 for 3 doses  - Endo consulted, awating recs

## 2020-08-11 NOTE — PROGRESS NOTE ADULT - PROBLEM SELECTOR PLAN 1
Worsening b/l leg swelling, redness x 1 month, unresponsive to augmentin prescribed by PCP for cellulitis, likely secondary to chronic venous insufficiency vs. newfound CHF given concurrent finding of crackles on lung exam and Afib on EKG.  - CXR clear  - TTE overall unremarkable, EF>50%   - C/w home Lasix 80mg daily and monitor volume status, strict I/Os, fluid restriction  - DASH diet  - Fall precautions  - BLE duplex negative for DVT's, although unable to visualize above knees Severe hypothyroidism, initially concerning for myxedema coma but has been alert. TSH 45, FT4 0.5. Also could be cause of arrythmia.  - c/w Synthroid 50 IV daily   - S/p stress dose steroids hydrocortisone 50mg q8 for 3 doses, plan was to decrease dose to 25 mg q8 for 3 doses today. However, concerning for increased agitation with steroids.  Per endo, ok to d/c hydrocortisone. 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 TSH 45, FT4 0.5. Also could be cause of arrythmia.  - c/w Synthroid 50 IV daily   - S/p stress dose steroids hydrocortisone 50mg q8 for 3 doses, plan was to decrease dose to 25 mg q8 for 3 doses today. However, concerning for increased agitation with steroids and low concern for adrenal insufficiency. Per [place], ok to d/c hydrocortisone.  - check AM cortisol tomorrow.

## 2020-08-11 NOTE — PROGRESS NOTE ADULT - ATTENDING COMMENTS
Gina Carver MD  Division of Endocrinology  Pager: [phone]    If after 6PM or before 9AM, or on weekends/holidays, please call endocrine answering service for assistance ([phone]).  For nonurgent matters email [email or web address] for assistance.

## 2020-08-11 NOTE — PROGRESS NOTE ADULT - SUBJECTIVE AND OBJECTIVE BOX
Patient is a 80y old  Female who presents with a chief complaint of Leg swelling, redness (11 Aug 2020 13:01)      INTERVAL HISTORY: feels ok    	  MEDICATIONS:  furosemide    Tablet 80 milliGRAM(s) Oral daily  lisinopril 40 milliGRAM(s) Oral daily  metoprolol succinate  milliGRAM(s) Oral daily        PHYSICAL EXAM:  T(C): 36.7 (08-11-20 @ 21:34), Max: 36.7 (08-11-20 @ 21:34)  HR: 67 (08-11-20 @ 21:34) (67 - 99)  BP: 120/58 (08-11-20 @ 21:34) (107/67 - 153/87)  RR: 17 (08-11-20 @ 21:34) (17 - 18)  SpO2: 97% (08-11-20 @ 21:34) (97% - 98%)  Wt(kg): --  I&O's Summary    10 Aug 2020 07:01  -  11 Aug 2020 07:00  --------------------------------------------------------  IN: 0 mL / OUT: 200 mL / NET: -200 mL    11 Aug 2020 07:01  -  11 Aug 2020 23:49  --------------------------------------------------------  IN: 640 mL / OUT: 0 mL / NET: 640 mL          Appearance: In no distress	  HEENT:    PERRL, EOMI	  Cardiovascular:  S1 S2, No JVD  Respiratory: Lungs clear to auscultation	  Gastrointestinal:  Soft, Non-tender, + BS	  Vascularature:  No edema of LE  Psychiatric: Appropriate affect   Neuro: no acute focal deficits                               11.0   10.58 )-----------( 382      ( 11 Aug 2020 06:30 )             33.9     08-11    142  |  99  |  27<H>  ----------------------------<  254<H>  4.1   |  24  |  1.03    Ca    8.8      11 Aug 2020 06:30  Phos  3.6     08-11  Mg     2.0     08-11    TPro  6.7  /  Alb  3.7  /  TBili  0.5  /  DBili  x   /  AST  19  /  ALT  10  /  AlkPhos  66  08-11        Labs personally reviewed      Assessment /Plan:   1. tachycardia - So far, no sustained arrhythmia.  She has sinus rhythm with frequent APCs but no sustained multifocal atrial tachycardia or AFib.  Aspirin 81mg only / no anticoagulation recommended at this time per EP  TTE normal   Consider outpt 30 day monitor         Mikey Nj DO [facility]  Cardiovascular Medicine  [address] [place] [address], [address]  Office: [phone]  Cell: [phone]

## 2020-08-11 NOTE — PROGRESS NOTE ADULT - PROBLEM SELECTOR PROBLEM 6
Hypertension, unspecified type Type 2 diabetes mellitus with other specified complication, without long-term current use of insulin

## 2020-08-11 NOTE — PROGRESS NOTE ADULT - PROBLEM SELECTOR PLAN 8
- B12< 150  - started on cyanocobalamin 1000mcgs daily - DVT prophylaxis: Lovenox  Diet: CC Diet   Dispo: Pending resolution of agitation and regimen for hypothyroidism

## 2020-08-11 NOTE — PROGRESS NOTE ADULT - SUBJECTIVE AND OBJECTIVE BOX
Patient appears comfortable. No distress. Unable to obtain ROS.       MEDICATIONS  (STANDING):  aspirin  chewable 81 milliGRAM(s) Oral daily  cyanocobalamin 1000 MICROGram(s) Oral daily  dextrose 5%. 1000 milliLiter(s) (50 mL/Hr) IV Continuous <Continuous>  dextrose 50% Injectable 12.5 Gram(s) IV Push once  dextrose 50% Injectable 25 Gram(s) IV Push once  dextrose 50% Injectable 25 Gram(s) IV Push once  enoxaparin Injectable 40 milliGRAM(s) SubCutaneous daily  furosemide    Tablet 80 milliGRAM(s) Oral daily  hydrocortisone sodium succinate Injectable 25 milliGRAM(s) IV Push every 8 hours  insulin lispro (HumaLOG) corrective regimen sliding scale   SubCutaneous three times a day before meals  insulin lispro (HumaLOG) corrective regimen sliding scale   SubCutaneous at bedtime  levothyroxine Injectable 50 MICROGram(s) IV Push <User Schedule>  lisinopril 40 milliGRAM(s) Oral daily  melatonin 6 milliGRAM(s) Oral at bedtime  metoprolol succinate  milliGRAM(s) Oral daily  OLANZapine 2.5 milliGRAM(s) Oral <User Schedule>  OLANZapine 5 milliGRAM(s) Oral at bedtime    MEDICATIONS  (PRN):  dextrose 40% Gel 15 Gram(s) Oral once PRN Blood Glucose LESS THAN 70 milliGRAM(s)/deciliter  glucagon  Injectable 1 milliGRAM(s) IntraMuscular once PRN Glucose LESS THAN 70 milligrams/deciliter  OLANZapine 1.25 milliGRAM(s) Oral every 6 hours PRN agitation      LABS:                            11.0   10.58 )-----------( 382      ( 11 Aug 2020 06:30 )             33.9     Hemoglobin: 11.0 g/dL (08-11 @ 06:30)  Hemoglobin: 12.2 g/dL (08-10 @ 09:45)  Hemoglobin: 10.2 g/dL (08-09 @ 06:11)  Hemoglobin: 10.7 g/dL (08-07 @ 09:40)  Hemoglobin: 10.6 g/dL (08-06 @ 22:00)    08-11    142  |  99  |  27<H>  ----------------------------<  254<H>  4.1   |  24  |  1.03    Ca    8.8      11 Aug 2020 06:30  Phos  3.6     08-11  Mg     2.0     08-11    TPro  6.7  /  Alb  3.7  /  TBili  0.5  /  DBili  x   /  AST  19  /  ALT  10  /  AlkPhos  66  08-11    Creatinine Trend: 1.03<--, 0.86<--, 0.73<--, 0.68<--, 0.70<--             08-10-20 @ 07:01  -  08-11-20 @ 07:00  --------------------------------------------------------  IN: 0 mL / OUT: 200 mL / NET: -200 mL        PHYSICAL EXAM  Vital Signs Last 24 Hrs  T(C): 36.4 (11 Aug 2020 05:16), Max: 36.7 (10 Aug 2020 22:14)  T(F): 97.6 (11 Aug 2020 05:16), Max: 98 (10 Aug 2020 22:14)  HR: 99 (11 Aug 2020 08:21) (56 - 99)  BP: 153/87 (11 Aug 2020 08:00) (107/67 - 153/87)  BP(mean): --  RR: 18 (11 Aug 2020 05:16) (18 - 18)  SpO2: 98% (11 Aug 2020 05:16) (96% - 98%)      Gen: Appears well in NAD  HEENT:  (-)icterus (-)pallor  CV: N S1 S2 1/6 LACI (+)2 Pulses B/l  Resp:  Clear to auscultation B/L, normal effort  GI: (+) BS Soft, NT, ND  Lymph:  (-)Edema, (-)obvious lymphadenopathy  Skin: Warm to touch, Normal turgor  Psych: unable to determine    TELEMETRY: SR with frequent PACs    < from: Transthoracic Echocardiogram (08.09.20 @ 11:14) >  CONCLUSIONS:  1. Normal left ventricular internal dimensions and wall  thicknesses.  2. Normal left ventricular systolic function.  3. Normal right ventricular size and function.  *** No previous Echo exam.  ------------------------------------------------------------------------  Confirmed on  8/9/2020 - 17:37:12 by Marquez Cramer M.D.    < end of copied text >      ASSESSMENT/PLAN: 	  80y Female with leg edema and shortness of breath.  Undocumented atrial arrhythmia without clear symptom-rhythm correlation.    --Continue home beta blocker  --EKG with release orders has been placed to allow nurses to call for an EKG when going >100bpm on telemetry.  Advised Tele technician to order as needed.  --Echo noted with Normal LV/RV function  --So far, no sustained arrhythmia.  She has sinus rhythm with frequent APCs but no sustained multifocal atrial tachycardia or AFib.  Aspirin 81mg only / no anticoagulation recommended at this time.  --Will continue to monitor    Leroy Colón PA-C  Pager: [phone]

## 2020-08-11 NOTE — PROGRESS NOTE ADULT - SUBJECTIVE AND OBJECTIVE BOX
Andrea Cooper, PGY1  Pager [phone]    INCOMPLETE NOTE - IN PROGRESS    Patient is a 80y old  Female who presents with a chief complaint of Leg swelling, redness (10 Aug 2020 16:01)      SUBJECTIVE/INTERVAL EVENTS: Patient seen and examined at bedside.    MEDICATIONS  (STANDING):  aspirin  chewable 81 milliGRAM(s) Oral daily  cyanocobalamin 1000 MICROGram(s) Oral daily  dextrose 5%. 1000 milliLiter(s) (50 mL/Hr) IV Continuous <Continuous>  dextrose 50% Injectable 12.5 Gram(s) IV Push once  dextrose 50% Injectable 25 Gram(s) IV Push once  dextrose 50% Injectable 25 Gram(s) IV Push once  enoxaparin Injectable 40 milliGRAM(s) SubCutaneous daily  furosemide    Tablet 80 milliGRAM(s) Oral daily  hydrocortisone sodium succinate Injectable 25 milliGRAM(s) IV Push every 8 hours  insulin lispro (HumaLOG) corrective regimen sliding scale   SubCutaneous three times a day before meals  insulin lispro (HumaLOG) corrective regimen sliding scale   SubCutaneous at bedtime  levothyroxine Injectable 50 MICROGram(s) IV Push <User Schedule>  lisinopril 40 milliGRAM(s) Oral daily  melatonin 6 milliGRAM(s) Oral at bedtime  metoprolol succinate  milliGRAM(s) Oral daily  OLANZapine 2.5 milliGRAM(s) Oral <User Schedule>  OLANZapine 5 milliGRAM(s) Oral at bedtime    MEDICATIONS  (PRN):  dextrose 40% Gel 15 Gram(s) Oral once PRN Blood Glucose LESS THAN 70 milliGRAM(s)/deciliter  glucagon  Injectable 1 milliGRAM(s) IntraMuscular once PRN Glucose LESS THAN 70 milligrams/deciliter  haloperidol    Injectable 2 milliGRAM(s) IntraMuscular every 6 hours PRN Severe Agitation  OLANZapine 1.25 milliGRAM(s) Oral every 6 hours PRN agitation  OLANZapine Injectable 1.25 milliGRAM(s) IntraMuscular every 6 hours PRN agitation      VITAL SIGNS:  T(F): 97.6 (08-11-20 @ 05:16), Max: 98.2 (08-10-20 @ 09:25)  HR: 95 (08-11-20 @ 05:16) (56 - 95)  BP: 107/67 (08-11-20 @ 05:16) (107/67 - 134/81)  RR: 18 (08-11-20 @ 05:16) (16 - 18)  SpO2: 98% (08-11-20 @ 05:16) (92% - 98%)    I&O's Summary    09 Aug 2020 07:01  -  10 Aug 2020 07:00  --------------------------------------------------------  IN: 1380 mL / OUT: 2900 mL / NET: -1520 mL    10 Aug 2020 07:01  -  11 Aug 2020 06:27  --------------------------------------------------------  IN: 0 mL / OUT: 0 mL / NET: 0 mL      Daily     Daily     PHYSICAL EXAM:  Gen: Alert, NAD  HEENT: NCAT, conjunctiva clear, sclera anicteric, no erythema or exudates in the oropharynx, mmm  Neck: Supple, no JVD  CV: RRR, S1S2, no m/r/g  Resp: CTAB, normal respiratory effort  Abd: Soft, nontender, nondistended, normal bowel sounds  Ext: no edema, no clubbing or cyanosis  Neuro: AOx3, CN2-12 grossly intact, EMMANUEL  SKIN: warm, perfused    LABS:                        12.2   11.00 )-----------( 392      ( 10 Aug 2020 09:45 )             38.0     Hgb Trend: 12.2<--, 10.2<--, 10.7<--, 10.6<--  08-10    142  |  101  |  15  ----------------------------<  267<H>  4.4   |  26  |  0.86    Ca    9.1      10 Aug 2020 09:45  Phos  3.8     08-10  Mg     1.8     08-10    TPro  7.2  /  Alb  4.0  /  TBili  0.5  /  DBili  x   /  AST  21  /  ALT  12  /  AlkPhos  66  08-10    Creatinine Trend: 0.86<--, 0.73<--, 0.68<--, 0.70<--  LIVER FUNCTIONS - ( 10 Aug 2020 09:45 )  Alb: 4.0 g/dL / Pro: 7.2 g/dL / ALK PHOS: 66 u/L / ALT: 12 u/L / AST: 21 u/L / GGT: x                     CAPILLARY BLOOD GLUCOSE      POCT Blood Glucose.: 255 mg/dL (10 Aug 2020 21:22)  POCT Blood Glucose.: 260 mg/dL (10 Aug 2020 16:48)  POCT Blood Glucose.: 233 mg/dL (10 Aug 2020 11:52)  POCT Blood Glucose.: 243 mg/dL (10 Aug 2020 09:27)      RADIOLOGY & ADDITIONAL TESTS: Reviewed    Imaging Personally Reviewed:    Consultant(s) Notes Reviewed:      Care Discussed with Consultants/Other Providers: Andrea Harrison, PGY1  Pager [phone]    Patient is a 80y old  Female who presents with a chief complaint of Leg swelling, redness (10 Aug 2020 16:01)      SUBJECTIVE/INTERVAL EVENTS: Patient seen and examined at bedside.    MEDICATIONS  (STANDING):  aspirin  chewable 81 milliGRAM(s) Oral daily  cyanocobalamin 1000 MICROGram(s) Oral daily  dextrose 5%. 1000 milliLiter(s) (50 mL/Hr) IV Continuous <Continuous>  dextrose 50% Injectable 12.5 Gram(s) IV Push once  dextrose 50% Injectable 25 Gram(s) IV Push once  dextrose 50% Injectable 25 Gram(s) IV Push once  enoxaparin Injectable 40 milliGRAM(s) SubCutaneous daily  furosemide    Tablet 80 milliGRAM(s) Oral daily  hydrocortisone sodium succinate Injectable 25 milliGRAM(s) IV Push every 8 hours  insulin lispro (HumaLOG) corrective regimen sliding scale   SubCutaneous three times a day before meals  insulin lispro (HumaLOG) corrective regimen sliding scale   SubCutaneous at bedtime  levothyroxine Injectable 50 MICROGram(s) IV Push <User Schedule>  lisinopril 40 milliGRAM(s) Oral daily  melatonin 6 milliGRAM(s) Oral at bedtime  metoprolol succinate  milliGRAM(s) Oral daily  OLANZapine 2.5 milliGRAM(s) Oral <User Schedule>  OLANZapine 5 milliGRAM(s) Oral at bedtime    MEDICATIONS  (PRN):  dextrose 40% Gel 15 Gram(s) Oral once PRN Blood Glucose LESS THAN 70 milliGRAM(s)/deciliter  glucagon  Injectable 1 milliGRAM(s) IntraMuscular once PRN Glucose LESS THAN 70 milligrams/deciliter  haloperidol    Injectable 2 milliGRAM(s) IntraMuscular every 6 hours PRN Severe Agitation  OLANZapine 1.25 milliGRAM(s) Oral every 6 hours PRN agitation  OLANZapine Injectable 1.25 milliGRAM(s) IntraMuscular every 6 hours PRN agitation      VITAL SIGNS:  T(F): 97.6 (08-11-20 @ 05:16), Max: 98.2 (08-10-20 @ 09:25)  HR: 95 (08-11-20 @ 05:16) (56 - 95)  BP: 107/67 (08-11-20 @ 05:16) (107/67 - 134/81)  RR: 18 (08-11-20 @ 05:16) (16 - 18)  SpO2: 98% (08-11-20 @ 05:16) (92% - 98%)    I&O's Summary    09 Aug 2020 07:01  -  10 Aug 2020 07:00  --------------------------------------------------------  IN: 1380 mL / OUT: 2900 mL / NET: -1520 mL    10 Aug 2020 07:01  -  11 Aug 2020 06:27  --------------------------------------------------------  IN: 0 mL / OUT: 0 mL / NET: 0 mL      Daily     Daily     PHYSICAL EXAM:  GENERAL: NAD, resting in bed, calm   HEAD:  Atraumatic, Normocephalic  EYES: EOMI, conjunctiva and sclera clear  CHEST/LUNG: Clear to auscultation bilaterally; No wheeze  HEART: Regular rate and rhythm; No murmurs, rubs, or gallops  ABDOMEN: Soft, Nontender, Nondistended; Bowel sounds present  EXTREMITIES: LE edema improved, 1+ to mid-shins. LE redness much improved, up to ankles.  PSYCH: AAOx2  NEUROLOGY: non-focal, able to move all extremities        LABS:                        12.2   11.00 )-----------( 392      ( 10 Aug 2020 09:45 )             38.0     Hgb Trend: 12.2<--, 10.2<--, 10.7<--, 10.6<--  08-10    142  |  101  |  15  ----------------------------<  267<H>  4.4   |  26  |  0.86    Ca    9.1      10 Aug 2020 09:45  Phos  3.8     08-10  Mg     1.8     08-10    TPro  7.2  /  Alb  4.0  /  TBili  0.5  /  DBili  x   /  AST  21  /  ALT  12  /  AlkPhos  66  08-10    Creatinine Trend: 0.86<--, 0.73<--, 0.68<--, 0.70<--  LIVER FUNCTIONS - ( 10 Aug 2020 09:45 )  Alb: 4.0 g/dL / Pro: 7.2 g/dL / ALK PHOS: 66 u/L / ALT: 12 u/L / AST: 21 u/L / GGT: x                     CAPILLARY BLOOD GLUCOSE      POCT Blood Glucose.: 255 mg/dL (10 Aug 2020 21:22)  POCT Blood Glucose.: 260 mg/dL (10 Aug 2020 16:48)  POCT Blood Glucose.: 233 mg/dL (10 Aug 2020 11:52)  POCT Blood Glucose.: 243 mg/dL (10 Aug 2020 09:27)      RADIOLOGY & ADDITIONAL TESTS: Reviewed    Imaging Personally Reviewed:    Consultant(s) Notes Reviewed:      Care Discussed with Consultants/Other Providers:

## 2020-08-11 NOTE — PROGRESS NOTE ADULT - PROBLEM SELECTOR PLAN 6
- Lasix 80mg daily, monitor fluid status  - c/w Lisinopril 40mg daily, (on Ramipril 10mg daily BID at home)  - c/w home Metoprolol scc  daily FS in 200s, possibly d/t steroids  - On metformin 500mg BID, will hold for now  - Monitor FS   - SSI before meals and at bedtime

## 2020-08-11 NOTE — PROGRESS NOTE ADULT - PROBLEM SELECTOR PLAN 2
Per PCP, no known history of Afib. Irregularly irregular HS and EKG with irregular rate/rhythm, different p wave morphologies, and prolonged AR interval. She was on dipyramidole and clopidogrel in the past, but unclear for what indication.  - EP following, appreciate recs - c/w aspirin 81mg   - CTM on telemetry (pt refusing)  - TSH 45, FT4 0.5 Worsening b/l leg swelling, redness x 1 month, unresponsive to augmentin prescribed by PCP for cellulitis, likely secondary to chronic venous insufficiency vs. hypothyroidism.   - C/w home Lasix 80mg daily and monitor volume status, strict I/Os, fluid restriction  - DASH diet  - Fall precautions  - TTE overall unremarkable, EF>50%   - BLE duplex negative for DVT's, although unable to visualize above knees

## 2020-08-11 NOTE — PROGRESS NOTE ADULT - ASSESSMENT
80F (primarily Bahamian-speaking) with PMhx of HTN, DM2, obesity, anxiety, hypothyroidism, and paranoid delusions presenting with LE edema attributed to chronic venous insufficiency. Family also reported worsening of baseline paranoia, now on olanzapine. In addition, patient noted to have elevated TSH levels and low FT4 for which Endocrine was consulted. Patient has a h/o of hypothyroidism; however per discussion with outpatient pharmacy she has been off of synthroid since 2018. At that time she was on 150mcg daily. Patient noted to be altered with intermittent bradycardia initially on presentation. TSH 45 and FT4 0.5. Although, low clinical suspicion for myxedema coma, current clinical signs and thyroid studies are consistent with severe hypothyroidism likely 2/2 to poor adherence.  Complex patient, high level decision making.    Interval events - increased agitation may be secondary to IV steroids.    Problem 1: Severe Hypothyroidism  -Since low clinical suspicion of adrenal insufficiency would discontinue IV hydrocortisone for now (received only a few doses x 1 day so ok to stop abruptly). Will monitor clinically to watch for any signs of adrenal insufficiency such as hypotension, hypothermia. If any concern for hemodynamic instability can resume hydrocortisone 50mg IV q12h.  - Continue Synthroid 50mcg IV daily in AM   - Repeat Free T4 in 2 days (8/13/20)  - Recheck 8AM cortisol level tomorrow. (level checked today while on steroid is not reliable).    Problem 2: Type 2 Diabetes  -BG elevated - please change diet to consistent carb diet.  Expect down trend in glucose off steroid.  - continue Moderate dose correctional scale AC meals and qhs  - Fingerstick goal (100-180)  -Plan to resume metformin 500mg BID at dc if no contraindication    Problem 3: Vitamin B12 deficiency  - Patient on supplements

## 2020-08-11 NOTE — PROGRESS NOTE BEHAVIORAL HEALTH - NSBHFUPINTERVALHXFT_PSY_A_CORE
Chart reviewed. Overnight night refusing PO medications. Intermittently frustrated/irritable. Today awake, much improved, more attentive, oriented to self and "hospital" as well as general context ("leg problems"). She is irritated by environment, wants to go home, upset that "there are people here that are making money and not doing anything!" She denied psychiatric concerns though limited in her awareness/insight.

## 2020-08-11 NOTE — PROGRESS NOTE ADULT - PROBLEM SELECTOR PLAN 7
- On metformin 500mg BID, will hold for now  - Monitor FS   - SSI before meals and at bedtime - B12< 150  - started on cyanocobalamin 1000mcgs daily

## 2020-08-11 NOTE — PROGRESS NOTE ADULT - PROBLEM SELECTOR PLAN 4
Paranoid delusions per PCP and daughter and on exam. Additionally has visual hallucinations, may be d/t underlying psychiatric disorder.  - Psych following, appreciate recs  - Olanzapine 2.5mg PO at 6pm, will add addnl 5mg standing olanzapine at bedtime  given increased agitation   - keep PRN Olanzapine 1.25mg PO/IM q6h  - Melatonin 6mg PO QHS  - EKG daily to check QTc Paranoid delusions per PCP and daughter and on exam. Additionally has visual hallucinations, may be d/t underlying psychiatric disorder.  - Psych following, appreciate recs  - Olanzapine 2.5mg PO at 6pm, will add addnl 5mg standing olanzapine at bedtime  given increased agitation   - Added PRN Haldol 2mg IM q6h for moderate to severe agitation given agitation not managed on olanzapine.  - Melatonin 6mg PO QHS  - EKG daily to check QTc

## 2020-08-11 NOTE — PROGRESS NOTE ADULT - ATTENDING COMMENTS
Seen and agree w/ PA.  Patient speaks only Wolof and may have some dementia. Always difficult to obtain ROS.  Monitoring shows only sinus with APC's, no AFIB and no sustained MAT.  Continue current cardiac plan.

## 2020-08-11 NOTE — PROGRESS NOTE ADULT - PROBLEM SELECTOR PLAN 9
- DVT prophylaxis: Lovenox  Diet: CC Diet   Dispo: Pending resolution of agitation and regimen for hypothyroidism Transitions of Care Status:  1.  Name of PCP: Marie  2.  PCP Contacted on Admission: [x] Y    [ ] N    3.  PCP contacted at Discharge: [ ] Y    [ ] N    [ ] N/A  4.  Post-Discharge Appointment Date and Location:  5.  Summary of Handoff given to PCP:

## 2020-08-11 NOTE — PROGRESS NOTE ADULT - PROBLEM SELECTOR PLAN 5
Paranoid delusions per PCP and daughter and on exam. Additionally has visual hallucinations, may be d/t underlying psychiatric disorder.  - Psych following, appreciate recs  Olanzapine 2.5mg PO at 6pm, will add addnl 5mg standing olanzapine at bedtime  given increased agitation   - keep PRN Olanzapine 1.25mg PO/IM q6h  - Melatonin 6mg PO QHS  - EKG daily to check QTc - Lasix 80mg daily, monitor fluid status  - c/w Lisinopril 40mg daily, (on Ramipril 10mg daily BID at home)  - c/w home Metoprolol scc  daily

## 2020-08-11 NOTE — PROGRESS NOTE ADULT - PROBLEM SELECTOR PROBLEM 7
Type 2 diabetes mellitus with other specified complication, without long-term current use of insulin B12 deficiency

## 2020-08-11 NOTE — PROGRESS NOTE ADULT - PROBLEM SELECTOR PLAN 3
TSH 45, FT4 0.5 could be cause of arrythmia  - Increased to 50 mcgs IV  - started on stress dose steroids hydrocortisone 50mg q8 for 3 doses, and will decrease to hydrocortisone 25 mgs q8 for 3 doses  - Endo consulted, awating recs Per PCP, no known history of Afib. Irregularly irregular HS and EKG with irregular rate/rhythm, different p wave morphologies, and prolonged WA interval. She was on dipyramidole and clopidogrel in the past, but unclear for what indication.  - EP following, appreciate recs - c/w aspirin 81mg   - CTM on telemetry (pt refusing)  - TSH 45, FT4 0.5

## 2020-08-11 NOTE — PROGRESS NOTE ADULT - SUBJECTIVE AND OBJECTIVE BOX
Chief Complaint: Severe hypothyroidism, DM2    History: Patient noted by staff to have increased agitation overnight.  No hypoglycemia events noted. No hypotension noted.    MEDICATIONS  (STANDING):  aspirin  chewable 81 milliGRAM(s) Oral daily  cyanocobalamin 1000 MICROGram(s) Oral daily  dextrose 5%. 1000 milliLiter(s) (50 mL/Hr) IV Continuous <Continuous>  dextrose 50% Injectable 12.5 Gram(s) IV Push once  dextrose 50% Injectable 25 Gram(s) IV Push once  dextrose 50% Injectable 25 Gram(s) IV Push once  enoxaparin Injectable 40 milliGRAM(s) SubCutaneous daily  furosemide    Tablet 80 milliGRAM(s) Oral daily  insulin lispro (HumaLOG) corrective regimen sliding scale   SubCutaneous three times a day before meals  insulin lispro (HumaLOG) corrective regimen sliding scale   SubCutaneous at bedtime  levothyroxine Injectable 50 MICROGram(s) IV Push <User Schedule>  lisinopril 40 milliGRAM(s) Oral daily  melatonin 6 milliGRAM(s) Oral at bedtime  metoprolol succinate  milliGRAM(s) Oral daily  OLANZapine 2.5 milliGRAM(s) Oral <User Schedule>  OLANZapine 5 milliGRAM(s) Oral at bedtime    MEDICATIONS  (PRN):  dextrose 40% Gel 15 Gram(s) Oral once PRN Blood Glucose LESS THAN 70 milliGRAM(s)/deciliter  glucagon  Injectable 1 milliGRAM(s) IntraMuscular once PRN Glucose LESS THAN 70 milligrams/deciliter  haloperidol    Injectable 2 milliGRAM(s) IntraMuscular every 6 hours PRN Moderate-Severe Agitation  OLANZapine 1.25 milliGRAM(s) Oral every 6 hours PRN agitation      Allergies    No Known Allergies    Intolerances      Review of Systems:  UNABLE TO OBTAIN    PHYSICAL EXAM:  VITALS: T(C): 36.6 (08-11-20 @ 11:39)  T(F): 97.8 (08-11-20 @ 11:39), Max: 98 (08-10-20 @ 22:14)  HR: 69 (08-11-20 @ 11:39) (69 - 99)  BP: 134/68 (08-11-20 @ 11:39) (107/67 - 153/87)  RR:  (18 - 18)  SpO2:  (98% - 98%)  Wt(kg): --  GENERAL: NAD,   EYES: No proptosis,  anicteric  HEENT:  Atraumatic, Normocephalic, moist mucous membranes  RESPIRATORY: nonlabored respirations  PSYCH: Alert and awake    CAPILLARY BLOOD GLUCOSE      POCT Blood Glucose.: 394 mg/dL (11 Aug 2020 11:34)  POCT Blood Glucose.: 229 mg/dL (11 Aug 2020 07:41)  POCT Blood Glucose.: 255 mg/dL (10 Aug 2020 21:22)  POCT Blood Glucose.: 260 mg/dL (10 Aug 2020 16:48)      08-11    142  |  99  |  27<H>  ----------------------------<  254<H>  4.1   |  24  |  1.03    EGFR if : 59  EGFR if non : 51    Ca    8.8      08-11  Mg     2.0     08-11  Phos  3.6     08-11    TPro  6.7  /  Alb  3.7  /  TBili  0.5  /  DBili  x   /  AST  19  /  ALT  10  /  AlkPhos  66  08-11      A1C with Estimated Average Glucose: 7.5 % (08-09-20 @ 06:11)      Thyroid Function Tests:  08-09 @ 06:11 TSH 45.13 FreeT4 0.56 T3 -- Anti TPO -- Anti Thyroglobulin Ab -- TSI --

## 2020-08-11 NOTE — PROGRESS NOTE ADULT - ATTENDING COMMENTS
D/W Dr Hinojosa, Matthew attending. Since no clear signs of adrenal insufficiency at this time, will d/c Hydrocortisone and repeat AM Cortisol level tomorrow as per Endo rec. D/W Dr Hinojosa, Matthew attending. Since no clear signs of adrenal insufficiency at this time, will d/c Hydrocortisone and repeat AM Cortisol level tomorrow as per Endo rec.    Spoke to daughter Raquel and Luciana on the phone to provide an update, a family meeting with 2 daughters scheduled for Thursday at 2:30Pm.

## 2020-08-11 NOTE — PROGRESS NOTE ADULT - ASSESSMENT
80F (primarily Peruvian-speaking) w/ hx of HTN, DM, obesity, and anxiety presented on 8/6 with worsening b/l leg swelling, redness x 1 month likely secondary to chronic venous insufficiency vs. new CHF. 80F (primarily Indonesian-speaking) w/ hx of HTN, DM, obesity, and anxiety presented on 8/6 with worsening b/l leg swelling, redness x 1 month likely secondary to chronic venous insufficiency vs hypothyroidism. Course complicated by severe hypothyroidism, started on IV synthroid and IV hydrocortisone.

## 2020-08-12 NOTE — PROVIDER CONTACT NOTE (OTHER) - SITUATION
Pt refusing FS, Medication, and EKG at this time. Pt noted to get aggressive and attempt to hit staff. Education provided to patient on importance.

## 2020-08-12 NOTE — PROGRESS NOTE ADULT - SUBJECTIVE AND OBJECTIVE BOX
Patient appears comfortable. No distress. Refused tele overnight. Unable to obtain ROS.       MEDICATIONS  (STANDING):  aspirin  chewable 81 milliGRAM(s) Oral daily  cyanocobalamin 1000 MICROGram(s) Oral daily  dextrose 5%. 1000 milliLiter(s) (50 mL/Hr) IV Continuous <Continuous>  dextrose 50% Injectable 12.5 Gram(s) IV Push once  dextrose 50% Injectable 25 Gram(s) IV Push once  dextrose 50% Injectable 25 Gram(s) IV Push once  enoxaparin Injectable 40 milliGRAM(s) SubCutaneous daily  furosemide    Tablet 80 milliGRAM(s) Oral daily  insulin lispro (HumaLOG) corrective regimen sliding scale   SubCutaneous three times a day before meals  insulin lispro (HumaLOG) corrective regimen sliding scale   SubCutaneous at bedtime  levothyroxine Injectable 50 MICROGram(s) IV Push <User Schedule>  lisinopril 40 milliGRAM(s) Oral daily  melatonin 6 milliGRAM(s) Oral at bedtime  metoprolol succinate  milliGRAM(s) Oral daily  OLANZapine 2.5 milliGRAM(s) Oral <User Schedule>  OLANZapine 5 milliGRAM(s) Oral at bedtime    MEDICATIONS  (PRN):  dextrose 40% Gel 15 Gram(s) Oral once PRN Blood Glucose LESS THAN 70 milliGRAM(s)/deciliter  glucagon  Injectable 1 milliGRAM(s) IntraMuscular once PRN Glucose LESS THAN 70 milligrams/deciliter  haloperidol    Injectable 1 milliGRAM(s) IntraMuscular every 6 hours PRN Severe Agitation  OLANZapine 1.25 milliGRAM(s) Oral every 6 hours PRN agitation      LABS:                            10.9   9.68  )-----------( 329      ( 12 Aug 2020 06:30 )             33.0     143  |  100  |  32<H>  ----------------------------<  196<H>  3.7   |  28  |  1.01    Ca    8.7      12 Aug 2020 06:30  Phos  3.6     08-12  Mg     2.0     08-12    TPro  6.3  /  Alb  3.6  /  TBili  0.4  /  DBili  x   /  AST  16  /  ALT  9   /  AlkPhos  56  08-12    Creatinine Trend: 1.01<--, 1.03<--, 0.86<--, 0.73<--, 0.68<--, 0.70<--      PHYSICAL EXAM  Vital Signs Last 24 Hrs  T(C): 36.6 (12 Aug 2020 08:40), Max: 36.7 (11 Aug 2020 21:34)  T(F): 97.9 (12 Aug 2020 08:40), Max: 98 (11 Aug 2020 21:34)  HR: 73 (12 Aug 2020 08:40) (67 - 73)  BP: 133/76 (12 Aug 2020 08:40) (120/58 - 133/76)  RR: 18 (12 Aug 2020 08:40) (17 - 18)  SpO2: 96% (12 Aug 2020 08:40) (96% - 97%)    Gen: Appears well in NAD  HEENT:  (-)icterus (-)pallor  CV: N S1 S2 1/6 LACI (+)2 Pulses B/l  Resp:  Clear to auscultation B/L, normal effort  GI: (+) BS Soft, NT, ND  Lymph:  (-)Edema, (-)obvious lymphadenopathy  Skin: Warm to touch, Normal turgor  Psych: unable to determine    TELEMETRY: SR with frequent PACs    < from: Transthoracic Echocardiogram (08.09.20 @ 11:14) >  CONCLUSIONS:  1. Normal left ventricular internal dimensions and wall  thicknesses.  2. Normal left ventricular systolic function.  3. Normal right ventricular size and function.  *** No previous Echo exam.  ------------------------------------------------------------------------  Confirmed on  8/9/2020 - 17:37:12 by Marquez Cramer M.D.    < end of copied text >      ASSESSMENT/PLAN: 	  80y Female with leg edema and shortness of breath.  Undocumented atrial arrhythmia without clear symptom-rhythm correlation.    --Continue home beta blocker  --EKG with release orders has been placed to allow nurses to call for an EKG when going >100bpm on telemetry.  Advised Tele technician to order as needed.  --Echo noted with Normal LV/RV function  --So far, no sustained arrhythmia.  She has sinus rhythm with frequent APCs but no sustained multifocal atrial tachycardia or AFib.  Aspirin 81mg only / no anticoagulation recommended at this time.  --Will continue to monitor

## 2020-08-12 NOTE — PROGRESS NOTE ADULT - PROBLEM SELECTOR PLAN 5
- Lasix 80mg daily, monitor fluid status  - c/w Lisinopril 40mg daily, (on Ramipril 10mg daily BID at home)  - c/w home Metoprolol scc  daily

## 2020-08-12 NOTE — PROVIDER CONTACT NOTE (OTHER) - SITUATION
Despite multiple attempts pt continues to refuse tele box monitoring or EKG. Education provided to patient.

## 2020-08-12 NOTE — PROGRESS NOTE BEHAVIORAL HEALTH - NSBHCONSULTMEDS_PSY_A_CORE FT
Olanzapine 2.5mg Q6PM and 5mg HS
Olanzapine 2.5mg Q6PM and 5mg HS
Recommend Increasing standing Zyprexa to 2.5mg 6PM and 5mg HS

## 2020-08-12 NOTE — PROVIDER CONTACT NOTE (OTHER) - SITUATION
Pt refusing FS, VS, telemetry, and EKG at this time. Pt noted to get aggressive and attempt to hit staff. Education provided to patient on importance.

## 2020-08-12 NOTE — PROGRESS NOTE BEHAVIORAL HEALTH - NSBHCHARTREVIEWLAB_PSY_A_CORE FT
CBC Full  -  ( 12 Aug 2020 06:30 )  WBC Count : 9.68 K/uL  RBC Count : 3.77 M/uL  Hemoglobin : 10.9 g/dL  Hematocrit : 33.0 %  Platelet Count - Automated : 329 K/uL  Mean Cell Volume : 87.5 fL  Mean Cell Hemoglobin : 28.9 pg  Mean Cell Hemoglobin Concentration : 33.0 %  Auto Neutrophil # : x  Auto Lymphocyte # : x  Auto Monocyte # : x  Auto Eosinophil # : x  Auto Basophil # : x  Auto Neutrophil % : x  Auto Lymphocyte % : x  Auto Monocyte % : x  Auto Eosinophil % : x  Auto Basophil % : x      08-12    143  |  100  |  32<H>  ----------------------------<  196<H>  3.7   |  28  |  1.01    Ca    8.7      12 Aug 2020 06:30  Phos  3.6     08-12  Mg     2.0     08-12    TPro  6.3  /  Alb  3.6  /  TBili  0.4  /  DBili  x   /  AST  16  /  ALT  9   /  AlkPhos  56  08-12      LIVER FUNCTIONS - ( 12 Aug 2020 06:30 )  Alb: 3.6 g/dL / Pro: 6.3 g/dL / ALK PHOS: 56 u/L / ALT: 9 u/L / AST: 16 u/L / GGT: x

## 2020-08-12 NOTE — PROGRESS NOTE ADULT - PROBLEM SELECTOR PLAN 3
Per PCP, no known history of Afib. Irregularly irregular HS and EKG with irregular rate/rhythm, different p wave morphologies, and prolonged IA interval. She was on dipyramidole and clopidogrel in the past, but unclear for what indication.  - EP following, appreciate recs - c/w aspirin 81mg   - CTM on telemetry (pt refusing)  - TSH 45, FT4 0.5 Per PCP, no known history of Afib. Irregularly irregular HS and EKG with irregular rate/rhythm, different p wave morphologies, and prolonged MA interval.  - EP following, appreciate recs - c/w aspirin 81mg   - CTM on telemetry (pt refusing)  - Per cards, can consider 30 day outpatient monitoring

## 2020-08-12 NOTE — PROGRESS NOTE ADULT - ATTENDING COMMENTS
Agree w/ PA.  nothing extra to do today, continue aspirin and beta blocker.  no sustained arrhythmia.

## 2020-08-12 NOTE — PROVIDER CONTACT NOTE (OTHER) - ASSESSMENT
A&O x2, reorientation provided as needed. Breathing non-labored on room air. Pt denying tele monitoring. Vital signs stable. Pt noted to be in and out of sleep at this time.

## 2020-08-12 NOTE — PROGRESS NOTE ADULT - SUBJECTIVE AND OBJECTIVE BOX
Andrea Cooper, PGY1  Pager [phone]    INCOMPLETE NOTE - IN PROGRESS    Patient is a 80y old  Female who presents with a chief complaint of Leg swelling, redness (11 Aug 2020 16:48)      SUBJECTIVE/INTERVAL EVENTS: Patient seen and examined at bedside.    MEDICATIONS  (STANDING):  aspirin  chewable 81 milliGRAM(s) Oral daily  cyanocobalamin 1000 MICROGram(s) Oral daily  dextrose 5%. 1000 milliLiter(s) (50 mL/Hr) IV Continuous <Continuous>  dextrose 50% Injectable 12.5 Gram(s) IV Push once  dextrose 50% Injectable 25 Gram(s) IV Push once  dextrose 50% Injectable 25 Gram(s) IV Push once  enoxaparin Injectable 40 milliGRAM(s) SubCutaneous daily  furosemide    Tablet 80 milliGRAM(s) Oral daily  insulin lispro (HumaLOG) corrective regimen sliding scale   SubCutaneous three times a day before meals  insulin lispro (HumaLOG) corrective regimen sliding scale   SubCutaneous at bedtime  levothyroxine Injectable 50 MICROGram(s) IV Push <User Schedule>  lisinopril 40 milliGRAM(s) Oral daily  melatonin 6 milliGRAM(s) Oral at bedtime  metoprolol succinate  milliGRAM(s) Oral daily  OLANZapine 2.5 milliGRAM(s) Oral <User Schedule>  OLANZapine 5 milliGRAM(s) Oral at bedtime    MEDICATIONS  (PRN):  dextrose 40% Gel 15 Gram(s) Oral once PRN Blood Glucose LESS THAN 70 milliGRAM(s)/deciliter  glucagon  Injectable 1 milliGRAM(s) IntraMuscular once PRN Glucose LESS THAN 70 milligrams/deciliter  haloperidol    Injectable 1 milliGRAM(s) IntraMuscular every 6 hours PRN Severe Agitation  OLANZapine 1.25 milliGRAM(s) Oral every 6 hours PRN agitation      VITAL SIGNS:  T(F): 97.7 (08-12-20 @ 06:09), Max: 98 (08-11-20 @ 21:34)  HR: 67 (08-12-20 @ 06:09) (67 - 99)  BP: 122/66 (08-12-20 @ 06:09) (120/58 - 153/87)  RR: 18 (08-12-20 @ 06:09) (17 - 18)  SpO2: 97% (08-12-20 @ 06:09) (97% - 98%)    I&O's Summary    10 Aug 2020 07:01  -  11 Aug 2020 07:00  --------------------------------------------------------  IN: 0 mL / OUT: 200 mL / NET: -200 mL    11 Aug 2020 07:01  -  12 Aug 2020 06:27  --------------------------------------------------------  IN: 640 mL / OUT: 200 mL / NET: 440 mL      Daily     Daily     PHYSICAL EXAM:  GENERAL: NAD, resting in bed   HEAD:  Atraumatic, Normocephalic  EYES: EOMI, conjunctiva and sclera clear  CHEST/LUNG: Clear to auscultation bilaterally; No wheeze  HEART: Regular rate and rhythm; No murmurs, rubs, or gallops  ABDOMEN: Soft, Nontender, Nondistended; Bowel sounds present  EXTREMITIES: LE edema improved, 1+ to mid-shins. LE redness much improved, up to ankles.  PSYCH: AAOx2  NEUROLOGY: non-focal, able to move all extremities    LABS:                        11.0   10.58 )-----------( 382      ( 11 Aug 2020 06:30 )             33.9     Hgb Trend: 11.0<--, 12.2<--, 10.2<--, 10.7<--, 10.6<--  08-11    142  |  99  |  27<H>  ----------------------------<  254<H>  4.1   |  24  |  1.03    Ca    8.8      11 Aug 2020 06:30  Phos  3.6     08-11  Mg     2.0     08-11    TPro  6.7  /  Alb  3.7  /  TBili  0.5  /  DBili  x   /  AST  19  /  ALT  10  /  AlkPhos  66  08-11    Creatinine Trend: 1.03<--, 0.86<--, 0.73<--, 0.68<--, 0.70<--  LIVER FUNCTIONS - ( 11 Aug 2020 06:30 )  Alb: 3.7 g/dL / Pro: 6.7 g/dL / ALK PHOS: 66 u/L / ALT: 10 u/L / AST: 19 u/L / GGT: x                     CAPILLARY BLOOD GLUCOSE      POCT Blood Glucose.: 195 mg/dL (11 Aug 2020 20:51)  POCT Blood Glucose.: 217 mg/dL (11 Aug 2020 16:48)  POCT Blood Glucose.: 394 mg/dL (11 Aug 2020 11:34)  POCT Blood Glucose.: 229 mg/dL (11 Aug 2020 07:41)      RADIOLOGY & ADDITIONAL TESTS: Reviewed    Imaging Personally Reviewed:    Consultant(s) Notes Reviewed:      Care Discussed with Consultants/Other Providers: Andrea Harrison, PGY1  Pager [phone]    Patient is a 80y old  Female who presents with a chief complaint of Leg swelling, redness (11 Aug 2020 16:48)      SUBJECTIVE/INTERVAL EVENTS: Patient seen and examined at bedside.    MEDICATIONS  (STANDING):  aspirin  chewable 81 milliGRAM(s) Oral daily  cyanocobalamin 1000 MICROGram(s) Oral daily  dextrose 5%. 1000 milliLiter(s) (50 mL/Hr) IV Continuous <Continuous>  dextrose 50% Injectable 12.5 Gram(s) IV Push once  dextrose 50% Injectable 25 Gram(s) IV Push once  dextrose 50% Injectable 25 Gram(s) IV Push once  enoxaparin Injectable 40 milliGRAM(s) SubCutaneous daily  furosemide    Tablet 80 milliGRAM(s) Oral daily  insulin lispro (HumaLOG) corrective regimen sliding scale   SubCutaneous three times a day before meals  insulin lispro (HumaLOG) corrective regimen sliding scale   SubCutaneous at bedtime  levothyroxine Injectable 50 MICROGram(s) IV Push <User Schedule>  lisinopril 40 milliGRAM(s) Oral daily  melatonin 6 milliGRAM(s) Oral at bedtime  metoprolol succinate  milliGRAM(s) Oral daily  OLANZapine 2.5 milliGRAM(s) Oral <User Schedule>  OLANZapine 5 milliGRAM(s) Oral at bedtime    MEDICATIONS  (PRN):  dextrose 40% Gel 15 Gram(s) Oral once PRN Blood Glucose LESS THAN 70 milliGRAM(s)/deciliter  glucagon  Injectable 1 milliGRAM(s) IntraMuscular once PRN Glucose LESS THAN 70 milligrams/deciliter  haloperidol    Injectable 1 milliGRAM(s) IntraMuscular every 6 hours PRN Severe Agitation  OLANZapine 1.25 milliGRAM(s) Oral every 6 hours PRN agitation      VITAL SIGNS:  T(F): 97.7 (08-12-20 @ 06:09), Max: 98 (08-11-20 @ 21:34)  HR: 67 (08-12-20 @ 06:09) (67 - 99)  BP: 122/66 (08-12-20 @ 06:09) (120/58 - 153/87)  RR: 18 (08-12-20 @ 06:09) (17 - 18)  SpO2: 97% (08-12-20 @ 06:09) (97% - 98%)    I&O's Summary    10 Aug 2020 07:01  -  11 Aug 2020 07:00  --------------------------------------------------------  IN: 0 mL / OUT: 200 mL / NET: -200 mL    11 Aug 2020 07:01  -  12 Aug 2020 06:27  --------------------------------------------------------  IN: 640 mL / OUT: 200 mL / NET: 440 mL      Daily     Daily     PHYSICAL EXAM:  GENERAL: NAD, resting in bed   HEAD:  Atraumatic, Normocephalic  EYES: EOMI, conjunctiva and sclera clear  CHEST/LUNG: Clear to auscultation bilaterally; No wheeze  HEART: Regular rate and rhythm; No murmurs, rubs, or gallops  ABDOMEN: Soft, Nontender, Nondistended; Bowel sounds present  EXTREMITIES: BLE edema and redness to mid-shins, worsened compared to day prior.  PSYCH: AAOx2  NEUROLOGY: non-focal, able to move all extremities    LABS:                        11.0   10.58 )-----------( 382      ( 11 Aug 2020 06:30 )             33.9     Hgb Trend: 11.0<--, 12.2<--, 10.2<--, 10.7<--, 10.6<--  08-11    142  |  99  |  27<H>  ----------------------------<  254<H>  4.1   |  24  |  1.03    Ca    8.8      11 Aug 2020 06:30  Phos  3.6     08-11  Mg     2.0     08-11    TPro  6.7  /  Alb  3.7  /  TBili  0.5  /  DBili  x   /  AST  19  /  ALT  10  /  AlkPhos  66  08-11    Creatinine Trend: 1.03<--, 0.86<--, 0.73<--, 0.68<--, 0.70<--  LIVER FUNCTIONS - ( 11 Aug 2020 06:30 )  Alb: 3.7 g/dL / Pro: 6.7 g/dL / ALK PHOS: 66 u/L / ALT: 10 u/L / AST: 19 u/L / GGT: x                     CAPILLARY BLOOD GLUCOSE      POCT Blood Glucose.: 195 mg/dL (11 Aug 2020 20:51)  POCT Blood Glucose.: 217 mg/dL (11 Aug 2020 16:48)  POCT Blood Glucose.: 394 mg/dL (11 Aug 2020 11:34)  POCT Blood Glucose.: 229 mg/dL (11 Aug 2020 07:41)      RADIOLOGY & ADDITIONAL TESTS: Reviewed    Imaging Personally Reviewed:    Consultant(s) Notes Reviewed:      Care Discussed with Consultants/Other Providers:

## 2020-08-12 NOTE — PROGRESS NOTE ADULT - ASSESSMENT
80F (primarily [place] Verdean-speaking) w/ hx of HTN, DM, obesity, and anxiety presented on 8/6 with worsening b/l leg swelling, redness x 1 month likely secondary to chronic venous insufficiency vs hypothyroidism. Course complicated by severe hypothyroidism, started on IV synthroid and IV hydrocortisone. 80F (primarily Belgian-speaking) w/ hx of HTN, DM, obesity, and anxiety presented on 8/6 with worsening b/l leg swelling, redness x 1 month likely secondary to chronic venous insufficiency. 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

## 2020-08-12 NOTE — PROGRESS NOTE ADULT - ASSESSMENT
80F (primarily Portuguese-speaking) with PMhx of HTN, DM2, obesity, anxiety, hypothyroidism, and paranoid delusions presenting with LE edema attributed to chronic venous insufficiency. Family also reported worsening of baseline paranoia, now on olanzapine. In addition, patient noted to have elevated TSH levels and low FT4 for which Endocrine was consulted. Patient has a h/o of hypothyroidism; however per discussion with outpatient pharmacy she has been off of synthroid since 2018. At that time she was on 150mcg daily. Patient noted to be altered with intermittent bradycardia initially on presentation. TSH 45 and FT4 0.5. Although, low clinical suspicion for myxedema coma, current clinical signs and thyroid studies are consistent with severe hypothyroidism likely 2/2 to poor adherence.  Complex patient, high level decision making.    Interval events - increased agitation may be secondary to IV steroids.    Problem 1: Severe Hypothyroidism  -Since low clinical suspicion of adrenal insufficiency would discontinue IV hydrocortisone for now (received only a few doses x 1 day so ok to stop abruptly). Will monitor clinically to watch for any signs of adrenal insufficiency such as hypotension, hypothermia. If any concern for hemodynamic instability can resume hydrocortisone 50mg IV q12h.  - Continue Synthroid 50mcg IV daily in AM   - Repeat Free T4 in 2 days (8/13/20)    Problem 2: Type 2 Diabetes  -BG elevated - please change diet to consistent carb diet.  Expect down trend in glucose off steroid.  - continue Moderate dose correctional scale AC meals and qhs  - Fingerstick goal (100-180)  -Plan to resume metformin 500mg BID at dc if no contraindication    Problem 3: Vitamin B12 deficiency  - Patient on supplements 80F (primarily Finnish-speaking) with PMhx of HTN, DM2, obesity, anxiety, hypothyroidism, and paranoid delusions presenting with LE edema attributed to chronic venous insufficiency. Family also reported worsening of baseline paranoia, now on olanzapine. In addition, patient noted to have elevated TSH levels and low FT4 for which Endocrine was consulted. Patient has a h/o of hypothyroidism; however per discussion with outpatient pharmacy she has been off of synthroid since 2018. At that time she was on 150mcg daily. Patient noted to be altered with intermittent bradycardia initially on presentation. TSH 45 and FT4 0.5. Although, low clinical suspicion for myxedema coma, current clinical signs and thyroid studies are consistent with severe hypothyroidism likely 2/2 to poor adherence.  Complex patient, high level decision making.    Problem 1: Severe Hypothyroidism  -Since low clinical suspicion of adrenal insufficiency discontinued IV hydrocortisone yesterday AM.  Cortisol at 6:30AM today is 11.3 indicating adequate adrenal function. No further steroid is needed for endocrine purposes.  - Continue levothyroxine 50mcg IV daily in AM   - Repeat Free T4 in AM    Problem 2: Type 2 Diabetes  -BG elevated with some improvement noted after stopped steroid.  -Add Lantus 5 units qhs  - continue Moderate dose correctional scale AC meals and qhs  - Fingerstick goal (100-180)  -Plan to resume metformin 500mg BID at dc if no contraindication    Problem 3: Vitamin B12 deficiency  - Patient on supplements

## 2020-08-12 NOTE — PROGRESS NOTE BEHAVIORAL HEALTH - CASE SUMMARY
80 F PMH dementia, HTN, DM, CHF, admitted for cellulitis/CHF, with backdrop hx of paranoia/confusion. highly irritable and requiring PRNs as well as CO. Unclear dispo, pending family meeting 8/13.

## 2020-08-12 NOTE — PROVIDER CONTACT NOTE (OTHER) - ASSESSMENT
A&O x2, reorientation provided as needed. Breathing non-labored on room air. Pt refusing tele monitoring. Pt refuses VS. Pt noted to be in and out of sleep at this time.

## 2020-08-12 NOTE — PROGRESS NOTE ADULT - PROBLEM SELECTOR PLAN 8
- DVT prophylaxis: Lovenox  Diet: CC Diet   Dispo: Pending resolution of agitation and regimen for hypothyroidism

## 2020-08-12 NOTE — PROGRESS NOTE ADULT - PROBLEM SELECTOR PLAN 2
Worsening b/l leg swelling, redness x 1 month, unresponsive to augmentin prescribed by PCP for cellulitis, likely secondary to chronic venous insufficiency vs. hypothyroidism.   - C/w home Lasix 80mg daily and monitor volume status, strict I/Os, fluid restriction  - DASH diet  - Fall precautions  - TTE overall unremarkable, EF>50%   - BLE duplex negative for DVT's, although unable to visualize above knees Worsening b/l leg swelling, redness x 1 month, unresponsive to augmentin prescribed by PCP for cellulitis, likely secondary to chronic venous insufficiency   - C/w home Lasix 80mg daily and monitor volume status, strict I/Os, fluid restriction  - DASH diet  - Fall precautions

## 2020-08-12 NOTE — PROGRESS NOTE ADULT - SUBJECTIVE AND OBJECTIVE BOX
Chief Complaint: Severe hypothyroidism    History: Agitation noted this AM, received Haldol.      MEDICATIONS  (STANDING):  aspirin  chewable 81 milliGRAM(s) Oral daily  cyanocobalamin 1000 MICROGram(s) Oral daily  dextrose 5%. 1000 milliLiter(s) (50 mL/Hr) IV Continuous <Continuous>  dextrose 50% Injectable 12.5 Gram(s) IV Push once  dextrose 50% Injectable 25 Gram(s) IV Push once  dextrose 50% Injectable 25 Gram(s) IV Push once  enoxaparin Injectable 40 milliGRAM(s) SubCutaneous daily  furosemide    Tablet 80 milliGRAM(s) Oral daily  insulin lispro (HumaLOG) corrective regimen sliding scale   SubCutaneous three times a day before meals  insulin lispro (HumaLOG) corrective regimen sliding scale   SubCutaneous at bedtime  levothyroxine Injectable 50 MICROGram(s) IV Push <User Schedule>  lisinopril 40 milliGRAM(s) Oral daily  melatonin 6 milliGRAM(s) Oral at bedtime  metoprolol succinate  milliGRAM(s) Oral daily  OLANZapine 2.5 milliGRAM(s) Oral <User Schedule>  OLANZapine 5 milliGRAM(s) Oral at bedtime    MEDICATIONS  (PRN):  dextrose 40% Gel 15 Gram(s) Oral once PRN Blood Glucose LESS THAN 70 milliGRAM(s)/deciliter  glucagon  Injectable 1 milliGRAM(s) IntraMuscular once PRN Glucose LESS THAN 70 milligrams/deciliter  haloperidol    Injectable 1 milliGRAM(s) IntraMuscular every 6 hours PRN Severe Agitation  OLANZapine 1.25 milliGRAM(s) Oral every 6 hours PRN agitation      Allergies    No Known Allergies    Intolerances      Review of Systems:  Constitutional: No fever  Eyes: No blurry vision  Neuro: No tremors  HEENT: No pain  Cardiovascular: No chest pain, palpitations  Respiratory: No SOB, no cough  GI: No nausea, vomiting, abdominal pain  : No dysuria  Skin: no rash  Psych: no depression  Endocrine: no polyuria, polydipsia  Hem/lymph: no swelling  Osteoporosis: no fractures    ALL OTHER SYSTEMS REVIEWED AND NEGATIVE    UNABLE TO OBTAIN    PHYSICAL EXAM:  VITALS: T(C): 36.4 (08-12-20 @ 12:05)  T(F): 97.6 (08-12-20 @ 12:05), Max: 98 (08-11-20 @ 21:34)  HR: 70 (08-12-20 @ 12:05) (67 - 73)  BP: 112/69 (08-12-20 @ 12:05) (112/69 - 133/76)  RR:  (17 - 18)  SpO2:  (96% - 97%)  Wt(kg): --  GENERAL: NAD, well-groomed, well-developed  EYES: No proptosis, no lid lag, anicteric  HEENT:  Atraumatic, Normocephalic, moist mucous membranes  THYROID: Normal size, no palpable nodules  RESPIRATORY: Clear to auscultation bilaterally; No rales, rhonchi, wheezing  CARDIOVASCULAR: Regular rate and rhythm; No murmurs; no peripheral edema  GI: Soft, nontender, non distended  SKIN: Dry, intact, No rashes or lesions  MUSCULOSKELETAL: Full range of motion, normal strength  NEURO: extraocular movements intact, no tremor  PSYCH: Alert and oriented x 3, normal affect, normal mood    CAPILLARY BLOOD GLUCOSE      POCT Blood Glucose.: 248 mg/dL (12 Aug 2020 12:22)  POCT Blood Glucose.: 197 mg/dL (12 Aug 2020 07:36)  POCT Blood Glucose.: 195 mg/dL (11 Aug 2020 20:51)  POCT Blood Glucose.: 217 mg/dL (11 Aug 2020 16:48)      08-12    143  |  100  |  32<H>  ----------------------------<  196<H>  3.7   |  28  |  1.01    EGFR if : 60  EGFR if non : 52    Ca    8.7      08-12  Mg     2.0     08-12  Phos  3.6     08-12    TPro  6.3  /  Alb  3.6  /  TBili  0.4  /  DBili  x   /  AST  16  /  ALT  9   /  AlkPhos  56  08-12      A1C with Estimated Average Glucose: 7.5 % (08-09-20 @ 06:11)      Thyroid Function Tests:  08-09 @ 06:11 TSH 45.13 FreeT4 0.56 T3 -- Anti TPO -- Anti Thyroglobulin Ab -- TSI -- Chief Complaint: Severe hypothyroidism    History: Agitation noted this AM, received Haldol.  Poor historian.    MEDICATIONS  (STANDING):  aspirin  chewable 81 milliGRAM(s) Oral daily  cyanocobalamin 1000 MICROGram(s) Oral daily  dextrose 5%. 1000 milliLiter(s) (50 mL/Hr) IV Continuous <Continuous>  dextrose 50% Injectable 12.5 Gram(s) IV Push once  dextrose 50% Injectable 25 Gram(s) IV Push once  dextrose 50% Injectable 25 Gram(s) IV Push once  enoxaparin Injectable 40 milliGRAM(s) SubCutaneous daily  furosemide    Tablet 80 milliGRAM(s) Oral daily  insulin lispro (HumaLOG) corrective regimen sliding scale   SubCutaneous three times a day before meals  insulin lispro (HumaLOG) corrective regimen sliding scale   SubCutaneous at bedtime  levothyroxine Injectable 50 MICROGram(s) IV Push <User Schedule>  lisinopril 40 milliGRAM(s) Oral daily  melatonin 6 milliGRAM(s) Oral at bedtime  metoprolol succinate  milliGRAM(s) Oral daily  OLANZapine 2.5 milliGRAM(s) Oral <User Schedule>  OLANZapine 5 milliGRAM(s) Oral at bedtime    MEDICATIONS  (PRN):  dextrose 40% Gel 15 Gram(s) Oral once PRN Blood Glucose LESS THAN 70 milliGRAM(s)/deciliter  glucagon  Injectable 1 milliGRAM(s) IntraMuscular once PRN Glucose LESS THAN 70 milligrams/deciliter  haloperidol    Injectable 1 milliGRAM(s) IntraMuscular every 6 hours PRN Severe Agitation  OLANZapine 1.25 milliGRAM(s) Oral every 6 hours PRN agitation      Allergies    No Known Allergies    Intolerances      Review of Systems:    UNABLE TO OBTAIN    PHYSICAL EXAM:  VITALS: T(C): 36.4 (08-12-20 @ 12:05)  T(F): 97.6 (08-12-20 @ 12:05), Max: 98 (08-11-20 @ 21:34)  HR: 70 (08-12-20 @ 12:05) (67 - 73)  BP: 112/69 (08-12-20 @ 12:05) (112/69 - 133/76)  RR:  (17 - 18)  SpO2:  (96% - 97%)  Wt(kg): --  GENERAL: NAD, well-groomed, well-developed  EYES: No proptosis, no lid lag, anicteric  HEENT:  Atraumatic, Normocephalic, moist mucous membranes  RESPIRATORY: nonlabored respirations    CAPILLARY BLOOD GLUCOSE      POCT Blood Glucose.: 248 mg/dL (12 Aug 2020 12:22)  POCT Blood Glucose.: 197 mg/dL (12 Aug 2020 07:36)  POCT Blood Glucose.: 195 mg/dL (11 Aug 2020 20:51)  POCT Blood Glucose.: 217 mg/dL (11 Aug 2020 16:48)      08-12    143  |  100  |  32<H>  ----------------------------<  196<H>  3.7   |  28  |  1.01    EGFR if : 60  EGFR if non : 52    Ca    8.7      08-12  Mg     2.0     08-12  Phos  3.6     08-12    TPro  6.3  /  Alb  3.6  /  TBili  0.4  /  DBili  x   /  AST  16  /  ALT  9   /  AlkPhos  56  08-12      A1C with Estimated Average Glucose: 7.5 % (08-09-20 @ 06:11)      Thyroid Function Tests:  08-09 @ 06:11 TSH 45.13 FreeT4 0.56 T3 -- Anti TPO -- Anti Thyroglobulin Ab -- TSI --

## 2020-08-12 NOTE — PROGRESS NOTE ADULT - ATTENDING COMMENTS
Gina Carver MD  Division of Endocrinology  Pager: [phone]    If after 6PM or before 9AM, or on weekends/holidays, please call endocrine answering service for assistance ([phone]).  For nonurgent matters email [email or web address] for assistance.

## 2020-08-12 NOTE — PROGRESS NOTE BEHAVIORAL HEALTH - NSBHFUPINTERVALHXFT_PSY_A_CORE
Per chart, pt received no PRNs overnight but did receive Haldol 1mg IM at 9:00 this morning for agitation. Per PCA, pt has been very agitated and is unlikely to cooperate with EKG. On interview, pt is frustrated/irritable. Pt states that everybody is bothering her and that she has not been feeling good all day. Pt states that she needs to go home as no one is helping her here. Per chart, pt received no PRNs overnight but did receive Haldol 1mg IM at 9:00 this morning for agitation. Per PCA, pt has been very agitated and is unlikely to cooperate with EKG. On interview, pt is frustrated/irritable. Pt states that everybody is bothering her and that she has not been feeling good all day. Pt states that she needs to go home as no one is helping her here.    Spoke with pt's two daughters, Raquel and Luciana regarding discharge plan. Daughters believe pt is best suited for a rehab facility and plan to talk with the medical team and SW tomorrow at an in person meeting at 2:30pm. Discussed option of pt returning home on Zyprexa and following up outpatient with psychiatry vs. a [facility] admission to the geriatric floor. At this time, daughters do not feel comfortable making this decision though Raquel does not feel the pt would be able to return home given that the pt currently lives with her 90 y.o.  who would likely not be able to take care of the pt. Raquel also believes that the pt would "go crazy" if the option of [facility] was brought up to her as the pt denies any psychiatric conditions and is completely in denial.  Daughter also does not think the pt would take her medication in the outpatient setting. Daughters would appreciate if psychiatric team could join the family meeting on 8/13.

## 2020-08-12 NOTE — PROVIDER CONTACT NOTE (OTHER) - ASSESSMENT
A&O x2, reorientation provided as needed. Primarily Occitan speaking. Breathing non-labored on room air. B/L LE red and eliseo in appearance. VS stable. Pt denies cardiac symptoms at this time.

## 2020-08-12 NOTE — PROGRESS NOTE ADULT - PROBLEM SELECTOR PLAN 1
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 TSH 45, FT4 0.5. Also could be cause of arrythmia.  - c/w Synthroid 50 IV daily   - S/p stress dose steroids hydrocortisone 50mg q8 for 3 doses, plan was to decrease dose to 25 mg q8 for 3 doses today. However, concerning for increased agitation with steroids and low concern for adrenal insufficiency. Per [place], ok to d/c hydrocortisone.  - check AM cortisol tomorrow. 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 TSH 45, FT4 0.5. Also could be cause of arrythmia.  - c/w Synthroid 50 IV daily   - S/p stress dose steroids hydrocortisone 50mg q8 for 3 doses. 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 TSH 45, FT4 0.5. Also could be cause of arrythmia.  - c/w Synthroid 50 IV daily   - S/p stress dose steroids hydrocortisone 50mg q8 for 3 doses. AM cortisol wnl, unlikely to be in adrenal insufficiency  - Repeat T4 on 8/13/20

## 2020-08-12 NOTE — PROGRESS NOTE ADULT - PROBLEM SELECTOR PLAN 6
FS in 200s, possibly d/t steroids  - On metformin 500mg BID, will hold for now  - Monitor FS   - SSI before meals and at bedtime

## 2020-08-13 NOTE — CHART NOTE - NSCHARTNOTEFT_GEN_A_CORE
Paged by RN    Patient in afib she received an EKG this morning. Otherwise refusing to wear telemetry. She is asymptomatic no dizziness, chest pain, shortness of breath. Prior hx of afib appreciated.     Available for any further concerns or questions.     Drew Paged by RN    Patient in afib HR 89 she received an EKG this morning. Otherwise refusing to wear telemetry. She is asymptomatic no dizziness, chest pain, shortness of breath. Prior hx of afib appreciated.     Available for any further concerns or questions.     Drew

## 2020-08-13 NOTE — PROGRESS NOTE ADULT - ATTENDING COMMENTS
Gina Carver MD  Division of Endocrinology  Pager: [phone]    If after 6PM or before 9AM, or on weekends/holidays, please call endocrine answering service for assistance ([phone]).  For nonurgent matters email [email or web address] for assistance.

## 2020-08-13 NOTE — DISCHARGE NOTE PROVIDER - NSDCMRMEDTOKEN_GEN_ALL_CORE_FT
aspirin 81 mg oral tablet, chewable: 1 tab(s) orally once a day  cyanocobalamin 1000 mcg oral tablet: 1 tab(s) orally once a day  FUROSEMIDE  80 MG TABS:   glipiZIDE 10 mg oral tablet: 1 tab(s) orally once a day  levothyroxine 75 mcg (0.075 mg) oral tablet: 1 tab(s) orally   lisinopril 40 mg oral tablet: 1 tab(s) orally once a day  METFORMIN HYDROCHLORIDE ER  500 MG TB24:   METOPROLOL SUCCINATE ER  100 MG TB24:   RAMIPRIL  10 MG CAPS: aspirin 81 mg oral tablet, chewable: 1 tab(s) orally once a day  cyanocobalamin 1000 mcg oral tablet: 1 tab(s) orally once a day  furosemide 40 mg oral tablet: 1 tab(s) orally once a day  glipiZIDE 10 mg oral tablet: 1 tab(s) orally once a day  levothyroxine 75 mcg (0.075 mg) oral tablet: 1 tab(s) orally   METFORMIN HYDROCHLORIDE ER  500 MG TB24:   METOPROLOL SUCCINATE ER  100 MG TB24:   RAMIPRIL  10 MG CAPS: aspirin 81 mg oral tablet, chewable: 1 tab(s) orally once a day  cyanocobalamin 1000 mcg oral tablet: 1 tab(s) orally once a day  furosemide 40 mg oral tablet: 1 tab(s) orally once a day  levothyroxine 75 mcg (0.075 mg) oral tablet: 1 tab(s) orally   metFORMIN 1000 mg oral tablet, extended release: orally 2 times a day  METOPROLOL SUCCINATE ER  100 MG TB24:   RAMIPRIL  10 MG CAPS: aspirin 81 mg oral delayed release tablet: 1 tab(s) orally once a day  cyanocobalamin 1000 mcg oral tablet: 1 tab(s) orally once a day  furosemide 40 mg oral tablet: 1 tab(s) orally once a day  levothyroxine 75 mcg (0.075 mg) oral tablet: 1 tab(s) orally   metFORMIN 1000 mg oral tablet, extended release: orally 2 times a day  METOPROLOL SUCCINATE ER  100 MG TB24:   RAMIPRIL  10 MG CAPS:

## 2020-08-13 NOTE — DIETITIAN INITIAL EVALUATION ADULT. - ENERGY NEEDS
Pt's height: 60" ->8/7            IBW: 100#+/-10% Pt's height: 60" ->8/7             IBW: 100#+/-10%

## 2020-08-13 NOTE — PROVIDER CONTACT NOTE (OTHER) - SITUATION
Pt refusing FS and medication at time despite education. Pt noted to get aggressive with staff with attempted. Pt noted to be extremely agitated after daughters visit.

## 2020-08-13 NOTE — PROGRESS NOTE ADULT - PROBLEM SELECTOR PLAN 6
FS in 200s, possibly d/t steroids  - On metformin 500mg BID, will hold for now  - Monitor FS   - SSI before meals and at bedtime - On metformin 500mg BID at home, will hold for now  - Monitor FS   - SSI before meals and at bedtime  - Lantus 5mg QHS

## 2020-08-13 NOTE — PROGRESS NOTE ADULT - PROBLEM SELECTOR PLAN 1
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 TSH 45, FT4 0.5. Also could be cause of arrythmia.  - c/w Synthroid 50 IV daily   - S/p stress dose steroids hydrocortisone 50mg q8 for 3 doses. 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 TSH 45, FT4 0.5 --> 0.81 this AM  - c/w Synthroid 50 IV daily  - f/u with Endo regarding transition from IV to PO synthroid

## 2020-08-13 NOTE — DIETITIAN INITIAL EVALUATION ADULT. - OTHER INFO
Pt 81 yo female (Bhutanese-speaking) with PMHx of HTN, DM, obesity and anxiety presented on 8/6 with worsening b/l leg swelling, redness x 1 month likely secondary to chronic venous insufficiency; Pt also with dementia with psychosis - per chart review.  At time of visit Pt asleep; nurse asked RDN not to awake/bother Pt. Per nurse Pt ate ~50% of breakfast this morning. Per nurse Pt does not eat lunch but eats dinner. Nurse also stated Pt's bottom denture harts her (according to yesterday's conversation with Pt). Will recommend to change PO diet consistency to Mechanical Soft. Will recommend to add PO supplement: Glucerna shake - 2x daily to Pt's diet rx. No report of swallowing difficulty; no report of nausea, vomiting or diarrhea @ this time. +BM (8/12) - per flow sheet. No report of pressure injury @ present.   Of note Pt's HbA1c level 7.5% (8/9). Unable to discuss Consistent Carbohydrate diet at present. RDN remains available. Consult nutrition if warranted.

## 2020-08-13 NOTE — PROGRESS NOTE ADULT - PROBLEM SELECTOR PLAN 4
Paranoid delusions per PCP and daughter and on exam. Additionally has visual hallucinations, may be d/t underlying psychiatric disorder.  - Psych following, appreciate recs  - Olanzapine 2.5mg PO at 6pm, will add addnl 5mg standing olanzapine at bedtime  given increased agitation   - Added PRN Haldol 2mg IM q6h for moderate to severe agitation given agitation not managed on olanzapine.  - Melatonin 6mg PO QHS  - EKG daily to check QTc  - Family meeting with psych at 2:30PM 8/13 - family meeting done today; [place] [facility] [place]  - Olanzapine 2.5mg PO at 6pm, 5mg standing olanzapine at bedtime  given increased agitation   - PRN Haldol 1mg IM q6h for moderate to severe agitation given agitation not managed on olanzapine.  - Melatonin 6mg PO QHS

## 2020-08-13 NOTE — PROGRESS NOTE ADULT - PROBLEM SELECTOR PLAN 5
- Lasix 80mg daily, monitor fluid status  - c/w Lisinopril 40mg daily, (on Ramipril 10mg daily BID at home)  - c/w home Metoprolol scc  daily - Lasix 40mg daily  - c/w Lisinopril 40mg daily, (on Ramipril 10mg daily BID at home)  - c/w home Metoprolol scc  daily

## 2020-08-13 NOTE — DISCHARGE NOTE PROVIDER - HOSPITAL COURSE
HPI:    80F (primarily Greek-speaking) w/ hx of HTN, DM, obesity, and anxiety presented on 8/6 from PCP's office for worsening b/l leg swelling and redness x 1 month.  Pt is a poor historian with difficulty confirming and remembering her medical history. Most of the history of obtained from PCP (Dr. Salazar) and her daughter Raquel.  For the past ~month, pt has had leg swelling, worse than her baseline swelling, and new redness.  PCP reports he noted phlebitis/cellulitis and prescribed augmentin on 7/15. Leg swelling/redness did not improve on augmentin.  Pt reports no pain, although in the ED she was reported to express "fire on her legs."  She denies fever/chills, leg itchiness. She denies exposure to wooded/grassy areas, ticks/insect bites, seawater/beaches.  Per PCP and daughter, patient has issues keeping her medical appointments and has questionable adherence to her medications, including furosemide, ramipril.  PCP denies history of Afib or other heart disease. PCP reports he prescribed xarelto 10mg daily for concern for PVD/lower extremities DVT in patient.  Pt takes gabapentin for diabetic neuropathy.  Pt denies numbness, tingling, or pain of her feet.  Pt was also prescribed betamethasone diproprionate and lactic-acid urea lotion for the skin changes on legs. At baseline, pt has bilateral leg weakness and uses a cane to walk.  She has frequent falls and had a recent fall without injury.         Of note, both daughter and PCP reports she has paranoid thinking and behavior - e.g. thinking that certain people are out to hurt her, trying to not leave the house for this reason.  Per daughter, patient also has visual hallucinations. On history taking,  also noted patient would frequently interrupt to talk about unrelated subjects - e.g. a man at the bank, a woman who lives upstairs.         Other possible PMH include hypothyroidism given she was on synthroid in the past. (07 Aug 2020 11:27)        Hospital course:    Pt was admitted for BLE edema. She was started on lasix 80mg daily (home dose), metoprolol succinate 100mg daily, lisinopril 40 daily. TTE showed normal left ventricular internal dimensions and wall thicknesses, normal left and right ventricular systolic function. CXR clear lungs. She was found to have arrhythmia on EKG, possibly multifocal atrial tachycardia. Per cardiology, pt was continued on metoprolol and started on aspirin 81mg daily.  Electrophysiology had no further recommendations, with possible consideration of 30 day outpatient cardiac monitoring. Patient was found to have severe hypothyroidism, with TSH 45 and T4 0.56. She was started on IV synthroid and IV hydrocortisone for possible adrenal insufficiency. Once IV hydrocortisone was discontinued, AM cortisol was checked and was within normal limits. Free T4 improved to 0.81 and pt was transitioned to PO synthroid 75mcg. Leg edema markedly improved with lasix, elevation, and synthroid.  Leg edema likely secondary to chronic venous insufficiency vs. hypothyroidism. Leg edema and cardiac arrhythmia may be d/t underlying hypothyroidism. Pt was also found to have b12 deficiency and was started on b12 1000mg PO daily.        Psych consulted for paranoia/delusional thinking and agitation. Per psych, pt was given standing olanzapine 2.5mg 6pm and 5mg at bedtime. PRN olanzapine 1.25mg q6h and PRN haldol 1mg IM for agitation. Throughout the course, pt was agitated, physically aggressive with staff, especially for fingersticks and sliding scale insulin injections. Pt frequently refusing telemetry and blood draws.  After lengthy discussion with pt's 2 daughters and psych team, it was agreed that patient required inpatient psychiatric admission. HPI:    80F (primarily Arabic-speaking) w/ hx of HTN, DM, obesity, and anxiety presented on 8/6 from PCP's office for worsening b/l leg swelling and redness x 1 month.  Pt is a poor historian with difficulty confirming and remembering her medical history. Most of the history of obtained from PCP (Dr. Salazar) and her daughter Raquel.  For the past ~month, pt has had leg swelling, worse than her baseline swelling, and new redness.  PCP reports he noted phlebitis/cellulitis and prescribed augmentin on 7/15. Leg swelling/redness did not improve on augmentin.  Pt reports no pain, although in the ED she was reported to express "fire on her legs."  She denies fever/chills, leg itchiness. She denies exposure to wooded/grassy areas, ticks/insect bites, seawater/beaches.  Per PCP and daughter, patient has issues keeping her medical appointments and has questionable adherence to her medications, including furosemide, ramipril.  PCP denies history of Afib or other heart disease. PCP reports he prescribed xarelto 10mg daily for concern for PVD/lower extremities DVT in patient.  Pt takes gabapentin for diabetic neuropathy.  Pt denies numbness, tingling, or pain of her feet.  Pt was also prescribed betamethasone diproprionate and lactic-acid urea lotion for the skin changes on legs. At baseline, pt has bilateral leg weakness and uses a cane to walk.  She has frequent falls and had a recent fall without injury.         Of note, both daughter and PCP reports she has paranoid thinking and behavior - e.g. thinking that certain people are out to hurt her, trying to not leave the house for this reason.  Per daughter, patient also has visual hallucinations. On history taking,  also noted patient would frequently interrupt to talk about unrelated subjects - e.g. a man at the bank, a woman who lives upstairs.         Other possible PMH include hypothyroidism given she was on synthroid in the past. (07 Aug 2020 11:27)        Hospital course:    Pt was admitted for BLE edema. She was started on lasix 80mg daily (home dose), metoprolol succinate 100mg daily, lisinopril 40 daily. TTE showed normal left ventricular internal dimensions and wall thicknesses, normal left and right ventricular systolic function. CXR clear lungs. She was found to have arrhythmia on EKG, possibly multifocal atrial tachycardia. Per cardiology, pt was continued on metoprolol and started on aspirin 81mg daily.  Electrophysiology had no further recommendations, with possible consideration of 30 day outpatient cardiac monitoring. Patient was found to have severe hypothyroidism, with TSH 45 and T4 0.56. She was started on IV synthroid and IV hydrocortisone for possible adrenal insufficiency. Once IV hydrocortisone was discontinued, AM cortisol was checked and was within normal limits. Free T4 improved to 0.81 and pt was transitioned to PO synthroid 75mcg. Leg edema markedly improved with lasix, elevation, and synthroid.  Leg edema likely secondary to chronic venous insufficiency vs. hypothyroidism. Leg edema and cardiac arrhythmia may be d/t underlying hypothyroidism. Pt was also found to have b12 deficiency and was started on b12 1000mg PO daily.  Per endo, pt's family reported pt is not taking glipizide at home, only metformin. Endo recommended discharge on increased dose of metformin at 1000mg BID.         Psych consulted for paranoia/delusional thinking and agitation. Per psych, pt was given standing olanzapine 2.5mg 6pm and 5mg at bedtime. PRN olanzapine 1.25mg q6h and PRN haldol 1mg IM for agitation. Throughout the course, pt was agitated, physically aggressive with staff, especially for fingersticks and sliding scale insulin injections. Pt frequently refusing telemetry and blood draws.  After lengthy discussion with pt's 2 daughters and psych team, it was agreed that patient required inpatient psychiatric admission. HPI:    80F (primarily Pashto-speaking) w/ hx of HTN, DM, obesity, and anxiety presented on 8/6 from PCP's office for worsening b/l leg swelling and redness x 1 month.  Pt is a poor historian with difficulty confirming and remembering her medical history. Most of the history of obtained from PCP (Dr. Salazar) and her daughter Raquel.  For the past ~month, pt has had leg swelling, worse than her baseline swelling, and new redness.  PCP reports he noted phlebitis/cellulitis and prescribed augmentin on 7/15. Leg swelling/redness did not improve on augmentin.  Pt reports no pain, although in the ED she was reported to express "fire on her legs."  She denies fever/chills, leg itchiness. She denies exposure to wooded/grassy areas, ticks/insect bites, seawater/beaches.  Per PCP and daughter, patient has issues keeping her medical appointments and has questionable adherence to her medications, including furosemide, ramipril.  PCP denies history of Afib or other heart disease. PCP reports he prescribed xarelto 10mg daily for concern for PVD/lower extremities DVT in patient.  Pt takes gabapentin for diabetic neuropathy.  Pt denies numbness, tingling, or pain of her feet.  Pt was also prescribed betamethasone diproprionate and lactic-acid urea lotion for the skin changes on legs. At baseline, pt has bilateral leg weakness and uses a cane to walk.  She has frequent falls and had a recent fall without injury.         Of note, both daughter and PCP reports she has paranoid thinking and behavior - e.g. thinking that certain people are out to hurt her, trying to not leave the house for this reason.  Per daughter, patient also has visual hallucinations. On history taking,  also noted patient would frequently interrupt to talk about unrelated subjects - e.g. a man at the bank, a woman who lives upstairs.         Other possible PMH include hypothyroidism given she was on synthroid in the past. (07 Aug 2020 11:27)        Hospital course:    Pt was admitted for BLE edema. She was started on lasix 80mg daily (home dose), metoprolol succinate 100mg daily, lisinopril 40 daily. TTE showed normal left ventricular internal dimensions and wall thicknesses, normal left and right ventricular systolic function. CXR clear lungs. She was found to have arrhythmia on EKG, possibly multifocal atrial tachycardia. Per cardiology, pt was continued on metoprolol and started on aspirin 81mg daily.  Electrophysiology had no further recommendations, with possible consideration of 30 day outpatient cardiac monitoring. Patient was found to have severe hypothyroidism, with TSH 45 and T4 0.56. She was started on IV synthroid and IV hydrocortisone for possible adrenal insufficiency. Once IV hydrocortisone was discontinued, AM cortisol was checked and was within normal limits. Free T4 improved to 0.81 and pt was transitioned to PO synthroid 75mcg. Pt would need to have FT4 level checked again in 4 weeks from 8/13.  Leg edema markedly improved with lasix, elevation, and synthroid.  Leg edema likely secondary to chronic venous insufficiency vs. hypothyroidism. Leg edema and cardiac arrhythmia may be d/t underlying hypothyroidism. Pt was also found to have b12 deficiency and was started on b12 1000mg PO daily.  Per endo, pt's family reported pt is not taking glipizide at home, only metformin. Endo recommended discharge on increased dose of metformin at 1000mg BID.         Psych consulted for paranoia/delusional thinking and agitation. Per psych, pt was given standing olanzapine 2.5mg 6pm and 5mg at bedtime. PRN olanzapine 1.25mg q6h and PRN haldol 1mg IM for agitation. Throughout the course, pt was agitated, physically aggressive with staff, especially for fingersticks and sliding scale insulin injections. Pt frequently refusing telemetry and blood draws.  After lengthy discussion with pt's 2 daughters and psych team, it was agreed that patient required inpatient psychiatric admission. HPI:    80F (primarily Yi-speaking) w/ hx of HTN, DM, obesity, and anxiety presented on 8/6 from PCP's office for worsening b/l leg swelling and redness x 1 month.  Pt is a poor historian with difficulty confirming and remembering her medical history. Most of the history of obtained from PCP (Dr. Salazar) and her daughter Raquel.  For the past ~month, pt has had leg swelling, worse than her baseline swelling, and new redness.  PCP reports he noted phlebitis/cellulitis and prescribed augmentin on 7/15. Leg swelling/redness did not improve on augmentin.  Pt reports no pain, although in the ED she was reported to express "fire on her legs."  She denies fever/chills, leg itchiness. She denies exposure to wooded/grassy areas, ticks/insect bites, seawater/beaches.  Per PCP and daughter, patient has issues keeping her medical appointments and has questionable adherence to her medications, including furosemide, ramipril.  PCP denies history of Afib or other heart disease. PCP reports he prescribed xarelto 10mg daily for concern for PVD/lower extremities DVT in patient.  Pt takes gabapentin for diabetic neuropathy.  Pt denies numbness, tingling, or pain of her feet.  Pt was also prescribed betamethasone diproprionate and lactic-acid urea lotion for the skin changes on legs. At baseline, pt has bilateral leg weakness and uses a cane to walk.  She has frequent falls and had a recent fall without injury.         Of note, both daughter and PCP reports she has paranoid thinking and behavior - e.g. thinking that certain people are out to hurt her, trying to not leave the house for this reason.  Per daughter, patient also has visual hallucinations. On history taking,  also noted patient would frequently interrupt to talk about unrelated subjects - e.g. a man at the bank, a woman who lives upstairs.         Other possible PMH include hypothyroidism given she was on synthroid in the past. (07 Aug 2020 11:27)        Hospital course:    Pt was admitted for BLE edema. She was started on lasix 80mg daily (home dose), metoprolol succinate 100mg daily, lisinopril 40 daily. TTE showed normal left ventricular internal dimensions and wall thicknesses, normal left and right ventricular systolic function. CXR clear lungs. She was found to have arrhythmia on EKG, possibly multifocal atrial tachycardia. Per cardiology, pt was continued on metoprolol and started on aspirin 81mg daily.  Electrophysiology had no further recommendations, with possible consideration of 30 day outpatient cardiac monitoring. Patient was found to have severe hypothyroidism, with TSH 45 and T4 0.56. She was started on IV synthroid and IV hydrocortisone for possible adrenal insufficiency. Once IV hydrocortisone was discontinued, AM cortisol was checked and was within normal limits. Free T4 improved to 0.81 and pt was transitioned to PO synthroid 75mcg. Pt would need to have FT4 level checked again in 4 weeks from 8/13.  Leg edema markedly improved with lasix, elevation, and synthroid.  Leg edema likely secondary to chronic venous insufficiency vs. hypothyroidism. Leg edema and cardiac arrhythmia may be d/t underlying hypothyroidism. Pt was also found to have b12 deficiency and was started on b12 1000mg PO daily.  Per endo, pt's family reported pt is not taking glipizide at home, only metformin. Endo recommended discharge on increased dose of metformin at 1000mg BID. Pt with mild abd pain, Abd XR revealed cholelithiasis.        Psych consulted for paranoia/delusional thinking and agitation. Per psych, pt was given standing olanzapine 2.5mg 6pm and 5mg at bedtime. PRN olanzapine 1.25mg q6h and PRN haldol 1mg IM for agitation. Throughout the course, pt was agitated, physically aggressive with staff, especially for fingersticks and sliding scale insulin injections. Pt frequently refusing telemetry and blood draws.  After lengthy discussion with pt's 2 daughters and psych team, it was agreed that patient required inpatient psychiatric admission.

## 2020-08-13 NOTE — DISCHARGE NOTE PROVIDER - NSDCCPCAREPLAN_GEN_ALL_CORE_FT
PRINCIPAL DISCHARGE DIAGNOSIS  Diagnosis: Lower extremity edema  Assessment and Plan of Treatment: You were hospitalized for worsening leg swelling and redness. We treated this with lasix, synthroid, and elevation.  The swelling is likely caused by chronic venous insufficiency (veins in the legs not efficiency moving blood up from your legs) and/or hypothyroidism.  It is important that you continue to take your medications as prescribed including synthroid, lasix.  You may need to elevate your legs often to manage the swelling.  Please follow up with a primary care physician to manage the leg swelling.      SECONDARY DISCHARGE DIAGNOSES  Diagnosis: Hypothyroid  Assessment and Plan of Treatment: You were found to have very low thyroid function, this is called hypothyroidism.  We treated you with IV synth    Diagnosis: Venous insufficiency  Assessment and Plan of Treatment: PRINCIPAL DISCHARGE DIAGNOSIS  Diagnosis: Lower extremity edema  Assessment and Plan of Treatment: You were hospitalized for worsening leg swelling and redness. We treated this with lasix, synthroid, and elevation.  The swelling is likely caused by chronic venous insufficiency (veins in the legs not efficiency moving blood up from your legs) and/or hypothyroidism.  It is important that you continue to take your medications as prescribed including lasix and synthroid.  You may need to elevate your legs often to manage the swelling.  Please follow up with a primary care physician to manage the leg swelling.      SECONDARY DISCHARGE DIAGNOSES  Diagnosis: Dementia with psychosis  Assessment and Plan of Treatment: You were found to have confusion and agitation from dementia and psychosis.  We managed the symptoms with medical treatment while the other medical issues were being addressed.  After discussion with your family and the psychiatrist, we decided that your psychosis will be best taken care of by admitting you to a psychiatric facility.    Diagnosis: Hypothyroid  Assessment and Plan of Treatment: You were found to have very low thyroid function, this is called hypothyroidism.  We treated you with IV synthroid for several days and then we transitioned to treating you with oral synthroid.  It is important that you continue taking oral synthroid daily to manage your hypothyroidism and prevent any complications such as fatigue, heart failure, or coma. Please follow up with a primary care physician for your thyroid disease.    Diagnosis: Cardiac arrhythmia, unspecified cardiac arrhythmia type  Assessment and Plan of Treatment: You were found to have an irregular heart rhythm that may be due to "multifocal atrial pacing" or "premature atrial contractions."  Please continue to take metoprolol succinate 100mg daily to control your heart rate.  We also started you on aspirin 81mg daily. Please continue to take aspirin 81mg daily.  Talk to your primary care physician to see if he/she would want to put you on a 30 day heart monitor.    Diagnosis: B12 deficiency  Assessment and Plan of Treatment: We found that you have anemia from having low levels of vitamin B12.  We started you on B12, aka cobalamin, supplements.  Please continue to take this supplement everyday to prevent anemia from worsening and causing symptoms of fatigue, weakness, and confusion. PRINCIPAL DISCHARGE DIAGNOSIS  Diagnosis: Lower extremity edema  Assessment and Plan of Treatment: You were hospitalized for worsening leg swelling and redness. We treated this with lasix, synthroid, and elevation.  The swelling is likely caused by chronic venous insufficiency (veins in the legs not efficiency moving blood up from your legs) and/or hypothyroidism.  It is important that you continue to take your medications as prescribed including lasix and synthroid.  You may need to elevate your legs often to manage the swelling.  Please follow up with a primary care physician to manage the leg swelling.      SECONDARY DISCHARGE DIAGNOSES  Diagnosis: Dementia with psychosis  Assessment and Plan of Treatment: You were found to have confusion and agitation from dementia and psychosis.  We managed the symptoms with medical treatment while the other medical issues were being addressed.  After discussion with your family and the psychiatrist, we decided that your psychosis will be best taken care of by admitting you to a psychiatric facility.    Diagnosis: Hypothyroid  Assessment and Plan of Treatment: You were found to have very low thyroid function, this is called hypothyroidism.  We treated you with IV synthroid for several days and then we transitioned to treating you with oral synthroid.  It is important that you continue taking oral synthroid daily to manage your hypothyroidism and prevent any complications such as fatigue, heart failure, or coma. Please follow up with a primary care physician for your thyroid disease.    Diagnosis: Cardiac arrhythmia, unspecified cardiac arrhythmia type  Assessment and Plan of Treatment: You were found to have an irregular heart rhythm that may be due to "multifocal atrial pacing" or "premature atrial contractions."  Please continue to take metoprolol succinate 100mg daily to control your heart rate.  We also started you on aspirin 81mg daily. Please continue to take aspirin 81mg daily.  Talk to your primary care physician to see if he/she would want to put you on a 30 day heart monitor.    Diagnosis: B12 deficiency  Assessment and Plan of Treatment: We found that you have low levels of vitamin B12.  We started you on B12, aka cobalamin, supplements.  Please continue to take this supplement everyday to prevent weakness, confusion, or anemia.

## 2020-08-13 NOTE — DIETITIAN INITIAL EVALUATION ADULT. - DIET TYPE
supplement (specify)/consistent carbohydrate (no snacks)/Glucerna Therapeutic Nutrition 8oz. 2x daily (will provide additional ~440 Kcals, ~20 gm Protein) PO diet consistent carbohydrate (no snacks)/Glucerna Therapeutic Nutrition 8oz. 2x daily (will provide additional ~440 Kcals, ~20 gm Protein)/mechanical soft/supplement (specify)/low sodium

## 2020-08-13 NOTE — PROGRESS NOTE ADULT - SUBJECTIVE AND OBJECTIVE BOX
Chief Complaint: Hypothyroidism, DM2    History: Patient intermittently refusing interventions, including evening fingersticks and Lantus dose.  Has been receiving IV levothyroxine.    MEDICATIONS  (STANDING):  aspirin  chewable 81 milliGRAM(s) Oral daily  cyanocobalamin 1000 MICROGram(s) Oral daily  dextrose 5%. 1000 milliLiter(s) (50 mL/Hr) IV Continuous <Continuous>  dextrose 50% Injectable 12.5 Gram(s) IV Push once  dextrose 50% Injectable 25 Gram(s) IV Push once  dextrose 50% Injectable 25 Gram(s) IV Push once  enoxaparin Injectable 40 milliGRAM(s) SubCutaneous daily  insulin glargine Injectable (LANTUS) 5 Unit(s) SubCutaneous at bedtime  insulin lispro (HumaLOG) corrective regimen sliding scale   SubCutaneous three times a day before meals  insulin lispro (HumaLOG) corrective regimen sliding scale   SubCutaneous at bedtime  levothyroxine Injectable 50 MICROGram(s) IV Push <User Schedule>  lisinopril 40 milliGRAM(s) Oral daily  melatonin 6 milliGRAM(s) Oral at bedtime  metoprolol succinate  milliGRAM(s) Oral daily  OLANZapine 2.5 milliGRAM(s) Oral <User Schedule>  OLANZapine 5 milliGRAM(s) Oral at bedtime    MEDICATIONS  (PRN):  dextrose 40% Gel 15 Gram(s) Oral once PRN Blood Glucose LESS THAN 70 milliGRAM(s)/deciliter  glucagon  Injectable 1 milliGRAM(s) IntraMuscular once PRN Glucose LESS THAN 70 milligrams/deciliter  haloperidol    Injectable 1 milliGRAM(s) IntraMuscular every 6 hours PRN Severe Agitation  OLANZapine 1.25 milliGRAM(s) Oral every 6 hours PRN agitation      Allergies    No Known Allergies    Intolerances      Review of Systems:  UNABLE TO OBTAIN    PHYSICAL EXAM:  VITALS: T(C): 36.9 (08-13-20 @ 12:23)  T(F): 98.5 (08-13-20 @ 12:23), Max: 98.6 (08-13-20 @ 04:25)  HR: 94 (08-13-20 @ 12:23) (76 - 94)  BP: 125/51 (08-13-20 @ 12:23) (125/51 - 142/52)  RR:  (18 - 18)  SpO2:  (95% - 97%)  Wt(kg): --  GENERAL: NAD, well-groomed, well-developed  EYES: No proptosis, no lid lag, anicteric  HEENT:  Atraumatic, Normocephalic, moist mucous membranes  RESPIRATORY: nonlabored respirations    CAPILLARY BLOOD GLUCOSE      POCT Blood Glucose.: 197 mg/dL (13 Aug 2020 11:56)  POCT Blood Glucose.: 241 mg/dL (13 Aug 2020 07:58)      08-13    143  |  102  |  31<H>  ----------------------------<  206<H>  3.9   |  25  |  0.90    EGFR if : 69  EGFR if non : 60    Ca    8.9      08-13  Mg     2.0     08-13  Phos  3.8     08-13    TPro  6.3  /  Alb  3.6  /  TBili  0.4  /  DBili  x   /  AST  16  /  ALT  9   /  AlkPhos  56  08-12      A1C with Estimated Average Glucose: 7.5 % (08-09-20 @ 06:11)      Thyroid Function Tests:  08-13 @ 06:41 TSH -- FreeT4 0.81 T3 -- Anti TPO -- Anti Thyroglobulin Ab -- TSI --  08-09 @ 06:11 TSH 45.13 FreeT4 0.56 T3 -- Anti TPO -- Anti Thyroglobulin Ab -- TSI --

## 2020-08-13 NOTE — PROGRESS NOTE ADULT - ATTENDING COMMENTS
Improving clinically.  Will decrease Lasix to 40mg PO daily.  Family meeting for ACP scheduled for 2:30Pm today.

## 2020-08-13 NOTE — PROGRESS NOTE ADULT - ASSESSMENT
80F (primarily Chadian-speaking) w/ hx of HTN, DM, obesity, and anxiety presented on 8/6 with worsening b/l leg swelling, redness x 1 month likely secondary to chronic venous insufficiency. 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 80F (primarily Congolese-speaking) w/ hx of HTN, DM, obesity, and anxiety presented on 8/6 with worsening b/l leg swelling, redness x 1 month likely secondary to chronic venous insufficiency. 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 Free T4 improving.

## 2020-08-13 NOTE — CHART NOTE - NSCHARTNOTEFT_GEN_A_CORE
Medical Attending ACP Note:  met with pt's 2 daughters, Carola together with Dr Ponce, Psych attending, Dr Redd, PGY2,  Elizabeth for 35min about ACP. I updated family about pt's medical condition and Dr Camara updated family about pt's psychiatric issues.  According to daughter, pt lives with her 95 years old  who has dementia at home prior to admission. Daughters have been involved in care for the pt and have been making medical decisions for the pt.  Daughters stated that pt has long history of psychiatric symptoms, delusional, hallucinating, refusing medical and psychiatric care for years. They agreed with inpatient psychiatric care when medically stable. They understood pt's medical issues and agreed with current plan of care. Issue of possible outpatient cardiac monitor discussed with them, they understood but stated that pt would not comply with outpatient cardiac monitoring due to her psychiatric condition.  Both daughters agreed with transferring pt to [facility] for inpatient psychiatric care when medically cleared.  All paper work completed for the transfer.

## 2020-08-13 NOTE — PROGRESS NOTE ADULT - SUBJECTIVE AND OBJECTIVE BOX
Andrea Cooper, PGY1  Pager [phone]    INCOMPLETE NOTE - IN PROGRESS    Patient is a 80y old  Female who presents with a chief complaint of Leg swelling, redness (12 Aug 2020 15:43)      SUBJECTIVE/INTERVAL EVENTS: Patient seen and examined at bedside.    MEDICATIONS  (STANDING):  aspirin  chewable 81 milliGRAM(s) Oral daily  cyanocobalamin 1000 MICROGram(s) Oral daily  dextrose 5%. 1000 milliLiter(s) (50 mL/Hr) IV Continuous <Continuous>  dextrose 50% Injectable 12.5 Gram(s) IV Push once  dextrose 50% Injectable 25 Gram(s) IV Push once  dextrose 50% Injectable 25 Gram(s) IV Push once  enoxaparin Injectable 40 milliGRAM(s) SubCutaneous daily  furosemide    Tablet 80 milliGRAM(s) Oral daily  insulin glargine Injectable (LANTUS) 5 Unit(s) SubCutaneous at bedtime  insulin lispro (HumaLOG) corrective regimen sliding scale   SubCutaneous three times a day before meals  insulin lispro (HumaLOG) corrective regimen sliding scale   SubCutaneous at bedtime  levothyroxine Injectable 50 MICROGram(s) IV Push <User Schedule>  lisinopril 40 milliGRAM(s) Oral daily  melatonin 6 milliGRAM(s) Oral at bedtime  metoprolol succinate  milliGRAM(s) Oral daily  OLANZapine 2.5 milliGRAM(s) Oral <User Schedule>  OLANZapine 5 milliGRAM(s) Oral at bedtime    MEDICATIONS  (PRN):  dextrose 40% Gel 15 Gram(s) Oral once PRN Blood Glucose LESS THAN 70 milliGRAM(s)/deciliter  glucagon  Injectable 1 milliGRAM(s) IntraMuscular once PRN Glucose LESS THAN 70 milligrams/deciliter  haloperidol    Injectable 1 milliGRAM(s) IntraMuscular every 6 hours PRN Severe Agitation  OLANZapine 1.25 milliGRAM(s) Oral every 6 hours PRN agitation      VITAL SIGNS:  T(F): 98.6 (08-13-20 @ 04:25), Max: 98.6 (08-13-20 @ 04:25)  HR: 76 (08-13-20 @ 04:25) (70 - 76)  BP: 142/52 (08-13-20 @ 04:25) (112/69 - 142/52)  RR: 18 (08-13-20 @ 04:25) (17 - 18)  SpO2: 95% (08-13-20 @ 04:25) (95% - 97%)    I&O's Summary    11 Aug 2020 07:01  -  12 Aug 2020 07:00  --------------------------------------------------------  IN: 1040 mL / OUT: 200 mL / NET: 840 mL    12 Aug 2020 07:01  -  13 Aug 2020 06:24  --------------------------------------------------------  IN: 695 mL / OUT: 300 mL / NET: 395 mL      Daily     Daily     PHYSICAL EXAM:  Gen: Alert, NAD  HEENT: NCAT, conjunctiva clear, sclera anicteric, no erythema or exudates in the oropharynx, mmm  Neck: Supple, no JVD  CV: RRR, S1S2, no m/r/g  Resp: CTAB, normal respiratory effort  Abd: Soft, nontender, nondistended, normal bowel sounds  Ext: no edema, no clubbing or cyanosis  Neuro: AOx3, CN2-12 grossly intact, EMMANUEL  SKIN: warm, perfused    LABS:                        10.9   9.68  )-----------( 329      ( 12 Aug 2020 06:30 )             33.0     Hgb Trend: 10.9<--, 11.0<--, 12.2<--, 10.2<--, 10.7<--  08-12    143  |  100  |  32<H>  ----------------------------<  196<H>  3.7   |  28  |  1.01    Ca    8.7      12 Aug 2020 06:30  Phos  3.6     08-12  Mg     2.0     08-12    TPro  6.3  /  Alb  3.6  /  TBili  0.4  /  DBili  x   /  AST  16  /  ALT  9   /  AlkPhos  56  08-12    Creatinine Trend: 1.01<--, 1.03<--, 0.86<--, 0.73<--, 0.68<--, 0.70<--  LIVER FUNCTIONS - ( 12 Aug 2020 06:30 )  Alb: 3.6 g/dL / Pro: 6.3 g/dL / ALK PHOS: 56 u/L / ALT: 9 u/L / AST: 16 u/L / GGT: x                     CAPILLARY BLOOD GLUCOSE      POCT Blood Glucose.: 248 mg/dL (12 Aug 2020 12:22)  POCT Blood Glucose.: 197 mg/dL (12 Aug 2020 07:36)      RADIOLOGY & ADDITIONAL TESTS: Reviewed    Imaging Personally Reviewed:    Consultant(s) Notes Reviewed:      Care Discussed with Consultants/Other Providers: Andrea Cooper, PGY1  Pager [phone]      Patient is a 80y old  Female who presents with a chief complaint of Leg swelling, redness (12 Aug 2020 15:43)      SUBJECTIVE/INTERVAL EVENTS:  - Continues to have arrhythmia, NAEON  - Pt calm this AM, mildly agitated with fingersticks and insulin injection  - Patient seen and examined at bedside.    MEDICATIONS  (STANDING):  aspirin  chewable 81 milliGRAM(s) Oral daily  cyanocobalamin 1000 MICROGram(s) Oral daily  dextrose 5%. 1000 milliLiter(s) (50 mL/Hr) IV Continuous <Continuous>  dextrose 50% Injectable 12.5 Gram(s) IV Push once  dextrose 50% Injectable 25 Gram(s) IV Push once  dextrose 50% Injectable 25 Gram(s) IV Push once  enoxaparin Injectable 40 milliGRAM(s) SubCutaneous daily  furosemide    Tablet 80 milliGRAM(s) Oral daily  insulin glargine Injectable (LANTUS) 5 Unit(s) SubCutaneous at bedtime  insulin lispro (HumaLOG) corrective regimen sliding scale   SubCutaneous three times a day before meals  insulin lispro (HumaLOG) corrective regimen sliding scale   SubCutaneous at bedtime  levothyroxine Injectable 50 MICROGram(s) IV Push <User Schedule>  lisinopril 40 milliGRAM(s) Oral daily  melatonin 6 milliGRAM(s) Oral at bedtime  metoprolol succinate  milliGRAM(s) Oral daily  OLANZapine 2.5 milliGRAM(s) Oral <User Schedule>  OLANZapine 5 milliGRAM(s) Oral at bedtime    MEDICATIONS  (PRN):  dextrose 40% Gel 15 Gram(s) Oral once PRN Blood Glucose LESS THAN 70 milliGRAM(s)/deciliter  glucagon  Injectable 1 milliGRAM(s) IntraMuscular once PRN Glucose LESS THAN 70 milligrams/deciliter  haloperidol    Injectable 1 milliGRAM(s) IntraMuscular every 6 hours PRN Severe Agitation  OLANZapine 1.25 milliGRAM(s) Oral every 6 hours PRN agitation      VITAL SIGNS:  T(F): 98.6 (08-13-20 @ 04:25), Max: 98.6 (08-13-20 @ 04:25)  HR: 76 (08-13-20 @ 04:25) (70 - 76)  BP: 142/52 (08-13-20 @ 04:25) (112/69 - 142/52)  RR: 18 (08-13-20 @ 04:25) (17 - 18)  SpO2: 95% (08-13-20 @ 04:25) (95% - 97%)    I&O's Summary    11 Aug 2020 07:01  -  12 Aug 2020 07:00  --------------------------------------------------------  IN: 1040 mL / OUT: 200 mL / NET: 840 mL    12 Aug 2020 07:01  -  13 Aug 2020 06:24  --------------------------------------------------------  IN: 695 mL / OUT: 300 mL / NET: 395 mL      Daily     Daily     PHYSICAL EXAM:  Gen: Alert, NAD, intermittently yelling in Serbian   HEENT: NCAT, conjunctiva clear  Neck: Supple  CV: RRR, S1S2  Resp: CTAB, normal respiratory effort  Abd: Soft, nontender, nondistended, normal bowel sounds  Ext: BLE edema and erythema to low shins, markedly improved from admission  Neuro: appropriate speech, CN2-12 grossly intact, EMMANUEL  SKIN: warm, perfused    LABS:                        10.9   9.68  )-----------( 329      ( 12 Aug 2020 06:30 )             33.0     Hgb Trend: 10.9<--, 11.0<--, 12.2<--, 10.2<--, 10.7<--  08-12    143  |  100  |  32<H>  ----------------------------<  196<H>  3.7   |  28  |  1.01    Ca    8.7      12 Aug 2020 06:30  Phos  3.6     08-12  Mg     2.0     08-12    TPro  6.3  /  Alb  3.6  /  TBili  0.4  /  DBili  x   /  AST  16  /  ALT  9   /  AlkPhos  56  08-12    Creatinine Trend: 1.01<--, 1.03<--, 0.86<--, 0.73<--, 0.68<--, 0.70<--  LIVER FUNCTIONS - ( 12 Aug 2020 06:30 )  Alb: 3.6 g/dL / Pro: 6.3 g/dL / ALK PHOS: 56 u/L / ALT: 9 u/L / AST: 16 u/L / GGT: x                     CAPILLARY BLOOD GLUCOSE      POCT Blood Glucose.: 248 mg/dL (12 Aug 2020 12:22)  POCT Blood Glucose.: 197 mg/dL (12 Aug 2020 07:36)      RADIOLOGY & ADDITIONAL TESTS: Reviewed    Imaging Personally Reviewed:    Consultant(s) Notes Reviewed:      Care Discussed with Consultants/Other Providers:

## 2020-08-13 NOTE — PROVIDER CONTACT NOTE (OTHER) - ASSESSMENT
A&O x2-3, reorientation provided as needed. Breathing non-labored on room air. Pt continues to deny tele monitoring. Vital signs stable. Pt noted to be in and out of sleep at this time.

## 2020-08-13 NOTE — CHART NOTE - NSCHARTNOTEFT_GEN_A_CORE
Called for abnormal findings on tele.     Reports of wandering atrial pacemaker. EKG ordered. Lead II suggestive of ectopic foci with varied P wave morphologies. Perhaps EP can follow up and give expert opinion    Otherwise no acute findings detected on EKG.    Available for any concerns. Thank you for your page    Drew

## 2020-08-13 NOTE — CHART NOTE - NSCHARTNOTEFT_GEN_A_CORE
Patient is now wearing tele.     HR to 120s with ambulation. Asymptomatic.     No sustained arrhythmia, only asymptomatic APCs per EP Cards.     Available for any concerns.     Drew

## 2020-08-13 NOTE — DISCHARGE NOTE PROVIDER - NSDCFUADDAPPT_GEN_ALL_CORE_FT
Please follow up with a primary care doctor.  You can call this number to find a PCP: *** Please follow up with a primary care doctor.  You can call this number to find a PCP: [phone]

## 2020-08-13 NOTE — PROGRESS NOTE ADULT - PROBLEM SELECTOR PLAN 2
Worsening b/l leg swelling, redness x 1 month, unresponsive to augmentin prescribed by PCP for cellulitis, likely secondary to chronic venous insufficiency   - C/w home Lasix 80mg daily and monitor volume status, strict I/Os, fluid restriction  - DASH diet  - Fall precautions Much improved from admission. Likely 2/2 chronic venous insufficiency vs. hypothyroidism.   - decrease lasix to 40mg daily  - Fall precautions

## 2020-08-13 NOTE — PROGRESS NOTE ADULT - ASSESSMENT
80F (primarily Omani-speaking) with PMhx of HTN, DM2, obesity, anxiety, hypothyroidism, and paranoid delusions presenting with LE edema attributed to chronic venous insufficiency. Family also reported worsening of baseline paranoia, now on olanzapine. In addition, patient noted to have elevated TSH levels (45) and low FT4 (0.56) for which Endocrine was consulted. Patient has a h/o of hypothyroidism; however per discussion with outpatient pharmacy she has been off of synthroid since 2018.     Problem 1: Severe Hypothyroidism    Free T4 this AM 0.81 improved from 0.56 while on levothyroxine 50 mcg IV daily.  Ok to transition to oral dosing now.  Recommend levothyroxine 75 mcg po daily.  Repeat TSH, free T4 in 4 weeks to determine need for further adjustments.    AM cortisol 11.3 indicating adequate adrenal function. No further steroid is needed for endocrine purposes.    Problem 2: Type 2 Diabetes  -BG elevated   -Recommend Lantus 5 units qhs (patient refused last night)  - continue Moderate dose correctional scale AC meals and qhs  - Fingerstick goal (100-180)  -Plan to resume metformin 500mg BID at dc if no contraindication    Problem 3: Vitamin B12 deficiency  - Patient on supplements

## 2020-08-13 NOTE — DIETITIAN INITIAL EVALUATION ADULT. - ADD RECOMMEND
1. Encourage & assist Pt with meals; Monitor PO diet tolerance; Honor food preferences;        2. Monitor labs, hydration status; 1. Encourage & assist Pt with meals; Monitor PO diet tolerance; Honor food preferences;        2. Monitor labs, hydration status;        3. Recommend to follow up with appropriate RDN upon discharge for the purposes of long-term nutrition evaluation and diet education

## 2020-08-13 NOTE — DIETITIAN INITIAL EVALUATION ADULT. - PROBLEM SELECTOR PLAN 2
Per PCP, no known history of Afib. Irregularly irregular HS and EKG with irregular rate/rhythm, different p wave morphologies, and prolonged SD interval. She was on dipyramidole and clopidogrel in the past, but unclear for what indication.  - Monitor on telemetry  - Consult cardiology  - Consider AC  - Check TSH, free T4

## 2020-08-13 NOTE — PROGRESS NOTE ADULT - SUBJECTIVE AND OBJECTIVE BOX
Patient appears comfortable. No distress. Refused tele overnight. Unable to obtain ROS.       MEDICATIONS  (STANDING):  aspirin  chewable 81 milliGRAM(s) Oral daily  cyanocobalamin 1000 MICROGram(s) Oral daily  dextrose 5%. 1000 milliLiter(s) (50 mL/Hr) IV Continuous <Continuous>  dextrose 50% Injectable 12.5 Gram(s) IV Push once  dextrose 50% Injectable 25 Gram(s) IV Push once  dextrose 50% Injectable 25 Gram(s) IV Push once  enoxaparin Injectable 40 milliGRAM(s) SubCutaneous daily  furosemide    Tablet 80 milliGRAM(s) Oral daily  insulin lispro (HumaLOG) corrective regimen sliding scale   SubCutaneous three times a day before meals  insulin lispro (HumaLOG) corrective regimen sliding scale   SubCutaneous at bedtime  levothyroxine Injectable 50 MICROGram(s) IV Push <User Schedule>  lisinopril 40 milliGRAM(s) Oral daily  melatonin 6 milliGRAM(s) Oral at bedtime  metoprolol succinate  milliGRAM(s) Oral daily  OLANZapine 2.5 milliGRAM(s) Oral <User Schedule>  OLANZapine 5 milliGRAM(s) Oral at bedtime    MEDICATIONS  (PRN):  dextrose 40% Gel 15 Gram(s) Oral once PRN Blood Glucose LESS THAN 70 milliGRAM(s)/deciliter  glucagon  Injectable 1 milliGRAM(s) IntraMuscular once PRN Glucose LESS THAN 70 milligrams/deciliter  haloperidol    Injectable 1 milliGRAM(s) IntraMuscular every 6 hours PRN Severe Agitation  OLANZapine 1.25 milliGRAM(s) Oral every 6 hours PRN agitation      LABS:                            10.9   9.68  )-----------( 329      ( 12 Aug 2020 06:30 )             33.0     143  |  100  |  32<H>  ----------------------------<  196<H>  3.7   |  28  |  1.01    Ca    8.7      12 Aug 2020 06:30  Phos  3.6     08-12  Mg     2.0     08-12    TPro  6.3  /  Alb  3.6  /  TBili  0.4  /  DBili  x   /  AST  16  /  ALT  9   /  AlkPhos  56  08-12    Creatinine Trend: 1.01<--, 1.03<--, 0.86<--, 0.73<--, 0.68<--, 0.70<--      PHYSICAL EXAM  Vital Signs Last 24 Hrs  T(C): 36.6 (12 Aug 2020 08:40), Max: 36.7 (11 Aug 2020 21:34)  T(F): 97.9 (12 Aug 2020 08:40), Max: 98 (11 Aug 2020 21:34)  HR: 73 (12 Aug 2020 08:40) (67 - 73)  BP: 133/76 (12 Aug 2020 08:40) (120/58 - 133/76)  RR: 18 (12 Aug 2020 08:40) (17 - 18)  SpO2: 96% (12 Aug 2020 08:40) (96% - 97%)    Gen: Appears well in NAD  HEENT:  (-)icterus (-)pallor  CV: N S1 S2 1/6 LACI (+)2 Pulses B/l  Resp:  Clear to auscultation B/L, normal effort  GI: (+) BS Soft, NT, ND  Lymph:  (-)Edema, (-)obvious lymphadenopathy  Skin: Warm to touch, Normal turgor  Psych: unable to determine    TELEMETRY: SR with frequent PACs    < from: Transthoracic Echocardiogram (08.09.20 @ 11:14) >  CONCLUSIONS:  1. Normal left ventricular internal dimensions and wall  thicknesses.  2. Normal left ventricular systolic function.  3. Normal right ventricular size and function.  *** No previous Echo exam.  ------------------------------------------------------------------------  Confirmed on  8/9/2020 - 17:37:12 by Marquez Cramer M.D.    < end of copied text >      ASSESSMENT/PLAN: 	  80y Female with leg edema and shortness of breath.  Undocumented atrial arrhythmia without clear symptom-rhythm correlation.    --Continue home beta blocker.   --Echo noted with Normal LV/RV function  --So far, no sustained arrhythmia.  Has APCs that are asymptomatic.   --Will continue to monitor    Campbell Sanchez M.D.  Cardiac Electrophysiology  [phone]

## 2020-08-13 NOTE — CHART NOTE - NSCHARTNOTEFT_GEN_A_CORE
Patient is refusing to wear telemetry. Hx of afib appreciated, cards EP following. On toprol.     RN will continue to encourage telemetry. this is ongoing issue with this patient. I am available to help.    Drew

## 2020-08-13 NOTE — PROGRESS NOTE ADULT - PROBLEM SELECTOR PLAN 3
Per PCP, no known history of Afib. Irregularly irregular HS and EKG with irregular rate/rhythm, different p wave morphologies, and prolonged NE interval.  - EP following, appreciate recs - c/w aspirin 81mg   - CTM on telemetry (pt refusing)  - Per cards, can consider 30 day outpatient monitoring Atrial tachycardia. Possibly 2/2 hypothyroidism.  - EP following, appreciate recs  - pt refusing tele  - c/w aspirin 81mg

## 2020-08-14 PROBLEM — E66.9 OBESITY, UNSPECIFIED: Chronic | Status: ACTIVE | Noted: 2020-01-01

## 2020-08-14 PROBLEM — I10 ESSENTIAL (PRIMARY) HYPERTENSION: Chronic | Status: ACTIVE | Noted: 2020-01-01

## 2020-08-14 PROBLEM — E11.9 TYPE 2 DIABETES MELLITUS WITHOUT COMPLICATIONS: Chronic | Status: ACTIVE | Noted: 2020-01-01

## 2020-08-14 PROBLEM — F41.9 ANXIETY DISORDER, UNSPECIFIED: Chronic | Status: ACTIVE | Noted: 2020-01-01

## 2020-08-14 NOTE — PROGRESS NOTE ADULT - ATTENDING COMMENTS
Gina Carver MD  Division of Endocrinology  Pager: [phone]    If after 6PM or before 9AM, or on weekends/holidays, please call endocrine answering service for assistance ([phone]).  For nonurgent matters email [email or web address] for assistance.

## 2020-08-14 NOTE — PROGRESS NOTE ADULT - PROBLEM SELECTOR PLAN 6
- On metformin 500mg BID at home, will hold for now  - Monitor FS   - SSI before meals and at bedtime  - Lantus 5mg QHS - Monitor FS   - SSI before meals and at bedtime  - Lantus 5mg QHS  - Per endo, pt on metformin 500mg BID at home and not taking glipizide. For d/c or transfer, pt can take metformin 1000mg BID

## 2020-08-14 NOTE — PROGRESS NOTE ADULT - SUBJECTIVE AND OBJECTIVE BOX
Chief Complaint: Hypothyroidism, DM2    History: No overnight events.  Patient is resting at time of visit.  No hypoglycemia events noted.  One FS refused yesterday.    MEDICATIONS  (STANDING):  aspirin  chewable 81 milliGRAM(s) Oral daily  cyanocobalamin 1000 MICROGram(s) Oral daily  dextrose 5%. 1000 milliLiter(s) (50 mL/Hr) IV Continuous <Continuous>  dextrose 50% Injectable 12.5 Gram(s) IV Push once  dextrose 50% Injectable 25 Gram(s) IV Push once  dextrose 50% Injectable 25 Gram(s) IV Push once  enoxaparin Injectable 40 milliGRAM(s) SubCutaneous daily  furosemide    Tablet 40 milliGRAM(s) Oral daily  insulin glargine Injectable (LANTUS) 5 Unit(s) SubCutaneous at bedtime  insulin lispro (HumaLOG) corrective regimen sliding scale   SubCutaneous three times a day before meals  insulin lispro (HumaLOG) corrective regimen sliding scale   SubCutaneous at bedtime  levothyroxine 75 MICROGram(s) Oral <User Schedule>  lisinopril 40 milliGRAM(s) Oral daily  melatonin 6 milliGRAM(s) Oral at bedtime  metoprolol succinate  milliGRAM(s) Oral daily  OLANZapine 2.5 milliGRAM(s) Oral <User Schedule>  OLANZapine 5 milliGRAM(s) Oral at bedtime    MEDICATIONS  (PRN):  dextrose 40% Gel 15 Gram(s) Oral once PRN Blood Glucose LESS THAN 70 milliGRAM(s)/deciliter  glucagon  Injectable 1 milliGRAM(s) IntraMuscular once PRN Glucose LESS THAN 70 milligrams/deciliter  haloperidol    Injectable 1 milliGRAM(s) IntraMuscular every 6 hours PRN Severe Agitation  OLANZapine 1.25 milliGRAM(s) Oral every 6 hours PRN agitation      Allergies    No Known Allergies    Intolerances      Review of Systems:    UNABLE TO OBTAIN    PHYSICAL EXAM:  VITALS: T(C): 36.5 (08-14-20 @ 12:23)  T(F): 97.7 (08-14-20 @ 12:23), Max: 99 (08-13-20 @ 20:24)  HR: 70 (08-14-20 @ 12:23) (55 - 84)  BP: 113/63 (08-14-20 @ 12:23) (98/50 - 131/63)  RR:  (17 - 18)  SpO2:  (94% - 97%)  Wt(kg): --  GENERAL: NAD, appears comfortable  EYES: No proptosis, no lid lag, anicteric  HEENT:  Atraumatic, Normocephalic, moist mucous membranes  RESPIRATORY: nonlabored respirations    CAPILLARY BLOOD GLUCOSE      POCT Blood Glucose.: 148 mg/dL (14 Aug 2020 11:46)  POCT Blood Glucose.: 202 mg/dL (14 Aug 2020 09:07)  POCT Blood Glucose.: 330 mg/dL (13 Aug 2020 21:46)      08-14    142  |  97<L>  |  30<H>  ----------------------------<  202<H>  3.5   |  27  |  1.08    EGFR if : 55  EGFR if non : 48    Ca    9.1      08-14  Mg     2.0     08-14  Phos  4.2     08-14    TPro  6.3  /  Alb  3.6  /  TBili  0.4  /  DBili  x   /  AST  16  /  ALT  9   /  AlkPhos  56  08-12      A1C with Estimated Average Glucose: 7.5 % (08-09-20 @ 06:11)      Thyroid Function Tests:  08-13 @ 06:41 TSH -- FreeT4 0.81 T3 -- Anti TPO -- Anti Thyroglobulin Ab -- TSI --  08-09 @ 06:11 TSH 45.13 FreeT4 0.56 T3 -- Anti TPO -- Anti Thyroglobulin Ab -- TSI --

## 2020-08-14 NOTE — PROGRESS NOTE ADULT - PROBLEM SELECTOR PLAN 2
Much improved from admission. Likely 2/2 chronic venous insufficiency vs. hypothyroidism.   - decrease lasix to 40mg daily  - Fall precautions Much improved from admission. Likely 2/2 chronic venous insufficiency vs. hypothyroidism.   - lasix to 40mg daily  - low sodium diet  - encourage elevation  - Fall precautions Severe hypothyroidism TSH45, FT4 improved to 0.81   - c/w Synthroid 75mcg PO daily  - repeat FT4 in 4 weeks

## 2020-08-14 NOTE — PROGRESS NOTE ADULT - SUBJECTIVE AND OBJECTIVE BOX
EP ATTENDING    tele: NSR with APCs, no AF documented to date    she denies palpitations, syncope, nor angina, ROS otherwise -    aspirin  chewable 81 milliGRAM(s) Oral daily  cyanocobalamin 1000 MICROGram(s) Oral daily  dextrose 40% Gel 15 Gram(s) Oral once PRN  dextrose 5%. 1000 milliLiter(s) IV Continuous <Continuous>  dextrose 50% Injectable 12.5 Gram(s) IV Push once  dextrose 50% Injectable 25 Gram(s) IV Push once  dextrose 50% Injectable 25 Gram(s) IV Push once  enoxaparin Injectable 40 milliGRAM(s) SubCutaneous daily  furosemide    Tablet 40 milliGRAM(s) Oral daily  glucagon  Injectable 1 milliGRAM(s) IntraMuscular once PRN  haloperidol    Injectable 1 milliGRAM(s) IntraMuscular every 6 hours PRN  insulin glargine Injectable (LANTUS) 8 Unit(s) SubCutaneous at bedtime  insulin lispro (HumaLOG) corrective regimen sliding scale   SubCutaneous three times a day before meals  insulin lispro (HumaLOG) corrective regimen sliding scale   SubCutaneous at bedtime  lisinopril 40 milliGRAM(s) Oral daily  melatonin 6 milliGRAM(s) Oral at bedtime  metoprolol succinate  milliGRAM(s) Oral daily  OLANZapine 1.25 milliGRAM(s) Oral every 6 hours PRN  OLANZapine 2.5 milliGRAM(s) Oral <User Schedule>  OLANZapine 5 milliGRAM(s) Oral at bedtime                            12.6   10.54 )-----------( 358      ( 14 Aug 2020 06:50 )             40.7       08-14    142  |  97<L>  |  30<H>  ----------------------------<  202<H>  3.5   |  27  |  1.08    Ca    9.1      14 Aug 2020 06:50  Phos  4.2     08-14  Mg     2.0     08-14              T(C): 36.5 (08-14-20 @ 12:23), Max: 37.2 (08-13-20 @ 20:24)  HR: 70 (08-14-20 @ 12:23) (55 - 84)  BP: 113/63 (08-14-20 @ 12:23) (98/50 - 131/63)  RR: 18 (08-14-20 @ 12:23) (17 - 18)  SpO2: 97% (08-14-20 @ 12:23) (94% - 97%)  Wt(kg): --    I&O's Summary    13 Aug 2020 07:01  -  14 Aug 2020 07:00  --------------------------------------------------------  IN: 1110 mL / OUT: 0 mL / NET: 1110 mL    no JVD  RRR, no murmurs  CTAB  soft nt/nd  no c/c/e    EKG: NSR with APCs  echo: normal LVEF    A/P) 81 y/o female PMH HTN and DM a/w LE edema/erythema. EP called for an abnormal EKG consistent with NSR with multifocal APCs. There has been no evidence of AF (yet)    -continue toprol xl 100mg daily  -recommend outpatient event monitoring  -no indication for a/c at this time as no AF has been noted  -no further inpatient EP workup needed      Fei Carranza M.D., [facility]  Cardiac Electrophysiology  [facility]  [address] [place] [address], [address] [place] [address]  [email or web address]    office [phone]  pager [phone]

## 2020-08-14 NOTE — PROGRESS NOTE ADULT - PROBLEM SELECTOR PLAN 1
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 TSH 45, FT4 0.5 --> 0.81 this AM  - c/w Synthroid 50 IV daily  - f/u with Endo regarding transition from IV to PO synthroid Severe hypothyroidism TSH45, FT4 improved to 0.81   - c/w Synthroid 75mcg PO daily Severe hypothyroidism TSH45, FT4 improved to 0.81   - c/w Synthroid 75mcg PO daily  - repeat FT4 in 4 weeks - family meeting done today; pt to be transferred to [facility] [place], awaiting bed  - Olanzapine 2.5mg PO at 6pm, 5mg standing olanzapine at bedtime  given increased agitation   - PRN Haldol 1mg IM q6h for moderate to severe agitation given agitation not managed on olanzapine.  - Melatonin 6mg PO QHS

## 2020-08-14 NOTE — PROGRESS NOTE ADULT - ASSESSMENT
80F (primarily Azerbaijani-speaking) with PMhx of HTN, DM2, obesity, anxiety, hypothyroidism, and paranoid delusions presenting with LE edema attributed to chronic venous insufficiency. Family also reported worsening of baseline paranoia, now on olanzapine. In addition, patient noted to have elevated TSH levels (45) and low FT4 (0.56) for which Endocrine was consulted. Patient has a h/o of hypothyroidism; however per discussion with outpatient pharmacy she has been off of synthroid since 2018.     Problem 1: Severe Hypothyroidism    Free T4 this AM 0.81 improved from 0.56 while on levothyroxine 50 mcg IV daily.  Transitioned to levothyroxine 75 mcg po daily. Continue with this dose.  Repeat TSH, free T4 in 4 weeks to determine need for further adjustments.    AM cortisol 11.3 indicating adequate adrenal function. No further steroid is needed for endocrine purposes.    Problem 2: Type 2 Diabetes  -BG elevated   -Recommend while inpatient increase Lantus to 8 units qhs   - continue Moderate dose correctional scale AC meals and qhs  - Fingerstick goal (100-180)  -Discharge plan: patient previously on metformin 500mg BID and unclear if also taking glipizide. Glucose running higher - would recommend increase metformin to 1000mg BID for discharge and no glipizide.  GFR 48 today, acceptable for metformin use.   But, if GFR decreases to 30-45 would need to lower metformin back to 500mg BID.    Problem 3: Vitamin B12 deficiency  - Patient on supplements

## 2020-08-14 NOTE — PROGRESS NOTE ADULT - SUBJECTIVE AND OBJECTIVE BOX
Andrea Harrison, PGY1  Pager [phone]    INCOMPLETE NOTE - IN PROGRESS    Patient is a 80y old  Female who presents with a chief complaint of Leg swelling, redness (13 Aug 2020 17:56)      SUBJECTIVE/INTERVAL EVENTS: Patient seen and examined at bedside.    MEDICATIONS  (STANDING):  aspirin  chewable 81 milliGRAM(s) Oral daily  cyanocobalamin 1000 MICROGram(s) Oral daily  dextrose 5%. 1000 milliLiter(s) (50 mL/Hr) IV Continuous <Continuous>  dextrose 50% Injectable 12.5 Gram(s) IV Push once  dextrose 50% Injectable 25 Gram(s) IV Push once  dextrose 50% Injectable 25 Gram(s) IV Push once  enoxaparin Injectable 40 milliGRAM(s) SubCutaneous daily  furosemide    Tablet 40 milliGRAM(s) Oral daily  insulin glargine Injectable (LANTUS) 5 Unit(s) SubCutaneous at bedtime  insulin lispro (HumaLOG) corrective regimen sliding scale   SubCutaneous three times a day before meals  insulin lispro (HumaLOG) corrective regimen sliding scale   SubCutaneous at bedtime  levothyroxine 75 MICROGram(s) Oral <User Schedule>  lisinopril 40 milliGRAM(s) Oral daily  melatonin 6 milliGRAM(s) Oral at bedtime  metoprolol succinate  milliGRAM(s) Oral daily  OLANZapine 2.5 milliGRAM(s) Oral <User Schedule>  OLANZapine 5 milliGRAM(s) Oral at bedtime    MEDICATIONS  (PRN):  dextrose 40% Gel 15 Gram(s) Oral once PRN Blood Glucose LESS THAN 70 milliGRAM(s)/deciliter  glucagon  Injectable 1 milliGRAM(s) IntraMuscular once PRN Glucose LESS THAN 70 milligrams/deciliter  haloperidol    Injectable 1 milliGRAM(s) IntraMuscular every 6 hours PRN Severe Agitation  OLANZapine 1.25 milliGRAM(s) Oral every 6 hours PRN agitation      VITAL SIGNS:  T(F): 99 (20 @ 04:24), Max: 99 (20 @ 20:24)  HR: 71 (20 @ 04:24) (66 - 94)  BP: 107/51 (20 @ 04:24) (107/51 - 134/61)  RR: 18 (20 @ 04:24) (17 - 18)  SpO2: 94% (20 @ 04:24) (94% - 97%)    I&O's Summary    12 Aug 2020 07:  -  13 Aug 2020 07:00  --------------------------------------------------------  IN: 695 mL / OUT: 300 mL / NET: 395 mL    13 Aug 2020 07:  -  14 Aug 2020 06:35  --------------------------------------------------------  IN: 1110 mL / OUT: 0 mL / NET: 1110 mL      Daily     Daily Weight in k (13 Aug 2020 15:36)    PHYSICAL EXAM:  Gen: Alert, NAD  HEENT: NCAT, conjunctiva clear, sclera anicteric, no erythema or exudates in the oropharynx, mmm  Neck: Supple, no JVD  CV: RRR, S1S2, no m/r/g  Resp: CTAB, normal respiratory effort  Abd: Soft, nontender, nondistended, normal bowel sounds  Ext: no edema, no clubbing or cyanosis  Neuro: AOx3, CN2-12 grossly intact, EMMANUEL  SKIN: warm, perfused    LABS:                        11.6   10.50 )-----------( 360      ( 13 Aug 2020 06:41 )             37.6     Hgb Trend: 11.6<--, 10.9<--, 11.0<--, 12.2<--, 10.2<--  08-13    143  |  102  |  31<H>  ----------------------------<  206<H>  3.9   |  25  |  0.90    Ca    8.9      13 Aug 2020 06:41  Phos  3.8     08-13  Mg     2.0     0813      Creatinine Trend: 0.90<--, 1.01<--, 1.03<--, 0.86<--, 0.73<--, 0.68<--              CAPILLARY BLOOD GLUCOSE      POCT Blood Glucose.: 330 mg/dL (13 Aug 2020 21:46)  POCT Blood Glucose.: 197 mg/dL (13 Aug 2020 11:56)  POCT Blood Glucose.: 241 mg/dL (13 Aug 2020 07:58)      RADIOLOGY & ADDITIONAL TESTS: Reviewed    Imaging Personally Reviewed:    Consultant(s) Notes Reviewed:      Care Discussed with Consultants/Other Providers: Andrea Harrison, PGY1  Pager [phone]    Patient is a 80y old  Female who presents with a chief complaint of Leg swelling, redness (13 Aug 2020 17:56)      SUBJECTIVE/INTERVAL EVENTS:   - Back on telemetry overnight, had wandering atrial pacing nonsustained  - HR 120s with ambulation, asymptomatic  - No acute complaints this AM    MEDICATIONS  (STANDING):  aspirin  chewable 81 milliGRAM(s) Oral daily  cyanocobalamin 1000 MICROGram(s) Oral daily  dextrose 5%. 1000 milliLiter(s) (50 mL/Hr) IV Continuous <Continuous>  dextrose 50% Injectable 12.5 Gram(s) IV Push once  dextrose 50% Injectable 25 Gram(s) IV Push once  dextrose 50% Injectable 25 Gram(s) IV Push once  enoxaparin Injectable 40 milliGRAM(s) SubCutaneous daily  furosemide    Tablet 40 milliGRAM(s) Oral daily  insulin glargine Injectable (LANTUS) 5 Unit(s) SubCutaneous at bedtime  insulin lispro (HumaLOG) corrective regimen sliding scale   SubCutaneous three times a day before meals  insulin lispro (HumaLOG) corrective regimen sliding scale   SubCutaneous at bedtime  levothyroxine 75 MICROGram(s) Oral <User Schedule>  lisinopril 40 milliGRAM(s) Oral daily  melatonin 6 milliGRAM(s) Oral at bedtime  metoprolol succinate  milliGRAM(s) Oral daily  OLANZapine 2.5 milliGRAM(s) Oral <User Schedule>  OLANZapine 5 milliGRAM(s) Oral at bedtime    MEDICATIONS  (PRN):  dextrose 40% Gel 15 Gram(s) Oral once PRN Blood Glucose LESS THAN 70 milliGRAM(s)/deciliter  glucagon  Injectable 1 milliGRAM(s) IntraMuscular once PRN Glucose LESS THAN 70 milligrams/deciliter  haloperidol    Injectable 1 milliGRAM(s) IntraMuscular every 6 hours PRN Severe Agitation  OLANZapine 1.25 milliGRAM(s) Oral every 6 hours PRN agitation      VITAL SIGNS:  T(F): 99 (20 @ 04:24), Max: 99 (20 @ 20:24)  HR: 71 (20 @ 04:24) (66 - 94)  BP: 107/51 (20 @ 04:24) (107/51 - 134/61)  RR: 18 (20 @ 04:24) (17 - 18)  SpO2: 94% (20 @ 04:24) (94% - 97%)    I&O's Summary    12 Aug 2020 07:  -  13 Aug 2020 07:00  --------------------------------------------------------  IN: 695 mL / OUT: 300 mL / NET: 395 mL    13 Aug 2020 07:  -  14 Aug 2020 06:35  --------------------------------------------------------  IN: 1110 mL / OUT: 0 mL / NET: 1110 mL      Daily     Daily Weight in k (13 Aug 2020 15:36)    PHYSICAL EXAM:  Gen: Sleeping, arouses to verbal/tactile stimuli, NAD  HEENT: NCAT, conjunctiva clear, sclera anicteric  Neck: Supple  CV: Regular rate and rhythm, S1S2+  Resp: CTAB, normal respiratory effort  Abd: Soft, nontender, nondistended  Ext: BLE improved edema and erythema up to mid-shins  Neuro: AOx1, EMMANUEL  SKIN: chronic venous stasis changes in BLE. warm, perfused    LABS:                        11.6   10.50 )-----------( 360      ( 13 Aug 2020 06:41 )             37.6     Hgb Trend: 11.6<--, 10.9<--, 11.0<--, 12.2<--, 10.2<--  08-13    143  |  102  |  31<H>  ----------------------------<  206<H>  3.9   |  25  |  0.90    Ca    8.9      13 Aug 2020 06:41  Phos  3.8     08-13  Mg     2.0     08-13      Creatinine Trend: 0.90<--, 1.01<--, 1.03<--, 0.86<--, 0.73<--, 0.68<--              CAPILLARY BLOOD GLUCOSE      POCT Blood Glucose.: 330 mg/dL (13 Aug 2020 21:46)  POCT Blood Glucose.: 197 mg/dL (13 Aug 2020 11:56)  POCT Blood Glucose.: 241 mg/dL (13 Aug 2020 07:58)      RADIOLOGY & ADDITIONAL TESTS: Reviewed    Imaging Personally Reviewed:    Consultant(s) Notes Reviewed:      Care Discussed with Consultants/Other Providers:

## 2020-08-14 NOTE — PROGRESS NOTE ADULT - PROBLEM SELECTOR PLAN 5
- Lasix 40mg daily  - c/w Lisinopril 40mg daily, (on Ramipril 10mg daily BID at home)  - c/w home Metoprolol scc  daily

## 2020-08-14 NOTE — PROGRESS NOTE ADULT - PROBLEM SELECTOR PLAN 4
- family meeting done today; pt to be transferred to [facility] [place], awaiting bed  - Olanzapine 2.5mg PO at 6pm, 5mg standing olanzapine at bedtime  given increased agitation   - PRN Haldol 1mg IM q6h for moderate to severe agitation given agitation not managed on olanzapine.  - Melatonin 6mg PO QHS Multifocal atrial pacing/PACs. Possibly 2/2 hypothyroidism.  - EP following, appreciate recs  - c/w aspirin 81mg

## 2020-08-14 NOTE — PROGRESS NOTE ADULT - PROBLEM SELECTOR PLAN 9
Transitions of Care Status:  1.  Name of PCP: Marie  2.  PCP Contacted on Admission: [x] Y    [ ] N    3.  PCP contacted at Discharge: [ ] Y    [ ] N    [ ] N/A  4.  Post-Discharge Appointment Date and Location:  5.  Summary of Handoff given to PCP: Dispo pending availability at inpatient psych (clive)    Transitions of Care Status:  1.  Name of PCP: Marie  2.  PCP Contacted on Admission: [x] Y    [ ] N    3.  PCP contacted at Discharge: [ ] Y    [ ] N    [ ] N/A  4.  Post-Discharge Appointment Date and Location:  5.  Summary of Handoff given to PCP:

## 2020-08-14 NOTE — PROGRESS NOTE ADULT - ASSESSMENT
80F (primarily Czech-speaking) w/ hx of HTN, DM, obesity, and anxiety presented on 8/6 with worsening b/l leg swelling, redness x 1 month likely secondary to chronic venous insufficiency. 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 Free T4 improving. 80F (primarily Cameroonian-speaking) w/ hx of HTN, DM, obesity, and anxiety presented on 8/6 with BLE edema and redness x 1 month likely secondary to chronic venous insufficiency, much improved from admission. 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

## 2020-08-14 NOTE — PROGRESS NOTE ADULT - PROBLEM SELECTOR PLAN 3
Atrial tachycardia. Possibly 2/2 hypothyroidism.  - EP following, appreciate recs  - pt refusing tele  - c/w aspirin 81mg Multifocal atrial pacing/PACs. Possibly 2/2 hypothyroidism.  - EP following, appreciate recs  - c/w aspirin 81mg Much improved from admission. Likely 2/2 chronic venous insufficiency vs. hypothyroidism.   - lasix to 40mg daily  - low sodium diet  - encourage elevation  - Fall precautions

## 2020-08-14 NOTE — PROGRESS NOTE ADULT - ATTENDING COMMENTS
Medically stable for transfer to [facility].  Spoke to Daughter Raquel on the phone, she agreed with transfer to [facility] when a bed is available.   Total time: 35min

## 2020-08-15 NOTE — PROGRESS NOTE ADULT - PROBLEM SELECTOR PLAN 9
Dispo pending availability at inpatient psych (clive)    Transitions of Care Status:  1.  Name of PCP: Marie  2.  PCP Contacted on Admission: [x] Y    [ ] N    3.  PCP contacted at Discharge: [ ] Y    [ ] N    [ ] N/A  4.  Post-Discharge Appointment Date and Location:  5.  Summary of Handoff given to PCP:

## 2020-08-15 NOTE — PROGRESS NOTE ADULT - ASSESSMENT
80F (primarily Singaporean-speaking) w/ hx of HTN, DM, obesity, and anxiety presented on 8/6 with BLE edema and redness x 1 month likely secondary to chronic venous insufficiency, much improved from admission. 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

## 2020-08-15 NOTE — PROGRESS NOTE ADULT - PROBLEM SELECTOR PLAN 4
Multifocal atrial pacing/PACs. Possibly 2/2 hypothyroidism.  - EP following, appreciate recs  - c/w aspirin 81mg

## 2020-08-15 NOTE — PROGRESS NOTE ADULT - PROBLEM SELECTOR PLAN 1
- family meeting done today; [place] [facility] [place]  - Olanzapine 2.5mg PO at 6pm, 5mg standing olanzapine at bedtime  given increased agitation   - PRN Haldol 1mg IM q6h for moderate to severe agitation given agitation not managed on olanzapine.  - Melatonin 6mg PO QHS

## 2020-08-15 NOTE — PROGRESS NOTE ADULT - PROBLEM SELECTOR PLAN 2
Severe hypothyroidism TSH45, FT4 improved to 0.81   - c/w Synthroid 75mcg PO daily  - repeat FT4 in 4 weeks

## 2020-08-15 NOTE — PROGRESS NOTE ADULT - SUBJECTIVE AND OBJECTIVE BOX
Andrea Harrison, PGY1  Pager [phone]    INCOMPLETE NOTE - IN PROGRESS    Patient is a 80y old  Female who presents with a chief complaint of Leg swelling, redness (14 Aug 2020 16:01)      SUBJECTIVE/INTERVAL EVENTS: Patient seen and examined at bedside.    MEDICATIONS  (STANDING):  aspirin  chewable 81 milliGRAM(s) Oral daily  cyanocobalamin 1000 MICROGram(s) Oral daily  dextrose 5%. 1000 milliLiter(s) (50 mL/Hr) IV Continuous <Continuous>  dextrose 50% Injectable 12.5 Gram(s) IV Push once  dextrose 50% Injectable 25 Gram(s) IV Push once  dextrose 50% Injectable 25 Gram(s) IV Push once  enoxaparin Injectable 40 milliGRAM(s) SubCutaneous daily  furosemide    Tablet 40 milliGRAM(s) Oral daily  insulin glargine Injectable (LANTUS) 8 Unit(s) SubCutaneous at bedtime  insulin lispro (HumaLOG) corrective regimen sliding scale   SubCutaneous three times a day before meals  insulin lispro (HumaLOG) corrective regimen sliding scale   SubCutaneous at bedtime  levothyroxine 75 MICROGram(s) Oral <User Schedule>  lisinopril 40 milliGRAM(s) Oral daily  melatonin 6 milliGRAM(s) Oral at bedtime  metoprolol succinate  milliGRAM(s) Oral daily  OLANZapine 2.5 milliGRAM(s) Oral <User Schedule>  OLANZapine 5 milliGRAM(s) Oral at bedtime    MEDICATIONS  (PRN):  dextrose 40% Gel 15 Gram(s) Oral once PRN Blood Glucose LESS THAN 70 milliGRAM(s)/deciliter  glucagon  Injectable 1 milliGRAM(s) IntraMuscular once PRN Glucose LESS THAN 70 milligrams/deciliter  haloperidol    Injectable 1 milliGRAM(s) IntraMuscular every 6 hours PRN Severe Agitation  OLANZapine 1.25 milliGRAM(s) Oral every 6 hours PRN agitation      VITAL SIGNS:  T(F): 98.2 (08-15-20 @ 05:33), Max: 98.2 (08-15-20 @ 05:33)  HR: 88 (08-15-20 @ 05:33) (55 - 88)  BP: 112/63 (08-15-20 @ 05:33) (98/50 - 123/63)  RR: 18 (08-15-20 @ 05:33) (18 - 18)  SpO2: 99% (08-15-20 @ 05:33) (97% - 99%)    I&O's Summary    14 Aug 2020 07:01  -  15 Aug 2020 07:00  --------------------------------------------------------  IN: 358 mL / OUT: 0 mL / NET: 358 mL      Daily     Daily     PHYSICAL EXAM:  Gen: Alert, NAD  HEENT: NCAT, conjunctiva clear, sclera anicteric, no erythema or exudates in the oropharynx, mmm  Neck: Supple, no JVD  CV: RRR, S1S2, no m/r/g  Resp: CTAB, normal respiratory effort  Abd: Soft, nontender, nondistended, normal bowel sounds  Ext: no edema, no clubbing or cyanosis  Neuro: AOx3, CN2-12 grossly intact, EMMANUEL  SKIN: warm, perfused    LABS:                        11.2   10.17 )-----------( 302      ( 15 Aug 2020 06:21 )             35.9     Hgb Trend: 11.2<--, 12.6<--, 11.6<--, 10.9<--, 11.0<--  08-15    141  |  101  |  26<H>  ----------------------------<  182<H>  3.5   |  26  |  0.89    Ca    9.0      15 Aug 2020 06:21  Phos  3.3     08-15  Mg     2.0     08-15      Creatinine Trend: 0.89<--, 1.08<--, 0.90<--, 1.01<--, 1.03<--, 0.86<--              CAPILLARY BLOOD GLUCOSE      POCT Blood Glucose.: 156 mg/dL (15 Aug 2020 07:44)  POCT Blood Glucose.: 226 mg/dL (14 Aug 2020 21:47)  POCT Blood Glucose.: 266 mg/dL (14 Aug 2020 17:57)  POCT Blood Glucose.: 205 mg/dL (14 Aug 2020 16:47)  POCT Blood Glucose.: 148 mg/dL (14 Aug 2020 11:46)  POCT Blood Glucose.: 202 mg/dL (14 Aug 2020 09:07)      RADIOLOGY & ADDITIONAL TESTS: Reviewed    Imaging Personally Reviewed:    Consultant(s) Notes Reviewed:      Care Discussed with Consultants/Other Providers: Andrea Cooper, PGY1  Pager [phone]      Patient is a 80y old  Female who presents with a chief complaint of Leg swelling, redness (14 Aug 2020 16:01)      SUBJECTIVE/INTERVAL EVENTS:  - Agitated overnight, but able to be redirected. Otherwise, NAEON  - Patient seen and examined at bedside.    MEDICATIONS  (STANDING):  aspirin  chewable 81 milliGRAM(s) Oral daily  cyanocobalamin 1000 MICROGram(s) Oral daily  dextrose 5%. 1000 milliLiter(s) (50 mL/Hr) IV Continuous <Continuous>  dextrose 50% Injectable 12.5 Gram(s) IV Push once  dextrose 50% Injectable 25 Gram(s) IV Push once  dextrose 50% Injectable 25 Gram(s) IV Push once  enoxaparin Injectable 40 milliGRAM(s) SubCutaneous daily  furosemide    Tablet 40 milliGRAM(s) Oral daily  insulin glargine Injectable (LANTUS) 8 Unit(s) SubCutaneous at bedtime  insulin lispro (HumaLOG) corrective regimen sliding scale   SubCutaneous three times a day before meals  insulin lispro (HumaLOG) corrective regimen sliding scale   SubCutaneous at bedtime  levothyroxine 75 MICROGram(s) Oral <User Schedule>  lisinopril 40 milliGRAM(s) Oral daily  melatonin 6 milliGRAM(s) Oral at bedtime  metoprolol succinate  milliGRAM(s) Oral daily  OLANZapine 2.5 milliGRAM(s) Oral <User Schedule>  OLANZapine 5 milliGRAM(s) Oral at bedtime    MEDICATIONS  (PRN):  dextrose 40% Gel 15 Gram(s) Oral once PRN Blood Glucose LESS THAN 70 milliGRAM(s)/deciliter  glucagon  Injectable 1 milliGRAM(s) IntraMuscular once PRN Glucose LESS THAN 70 milligrams/deciliter  haloperidol    Injectable 1 milliGRAM(s) IntraMuscular every 6 hours PRN Severe Agitation  OLANZapine 1.25 milliGRAM(s) Oral every 6 hours PRN agitation      VITAL SIGNS:  T(F): 98.2 (08-15-20 @ 05:33), Max: 98.2 (08-15-20 @ 05:33)  HR: 88 (08-15-20 @ 05:33) (55 - 88)  BP: 112/63 (08-15-20 @ 05:33) (98/50 - 123/63)  RR: 18 (08-15-20 @ 05:33) (18 - 18)  SpO2: 99% (08-15-20 @ 05:33) (97% - 99%)    I&O's Summary    14 Aug 2020 07:01  -  15 Aug 2020 07:00  --------------------------------------------------------  IN: 358 mL / OUT: 0 mL / NET: 358 mL      Daily     Daily     PHYSICAL EXAM:  Gen: Sitting in chair, sleeping, arouses to verbal/tactile stimuli, NAD  HEENT: NCAT, conjunctiva clear, sclera anicteric  Neck: Supple  CV: Regular rate and rhythm, S1S2+  Resp: CTAB, normal respiratory effort  Abd: Soft, nontender, nondistended  Ext: BLE edema and erythema up to mid-shins, worsened from yesterday as was not elevated  Neuro: AOx1, EMMANUEL  SKIN: chronic venous stasis changes in BLE. warm, perfused      LABS:                        11.2   10.17 )-----------( 302      ( 15 Aug 2020 06:21 )             35.9     Hgb Trend: 11.2<--, 12.6<--, 11.6<--, 10.9<--, 11.0<--  08-15    141  |  101  |  26<H>  ----------------------------<  182<H>  3.5   |  26  |  0.89    Ca    9.0      15 Aug 2020 06:21  Phos  3.3     08-15  Mg     2.0     08-15      Creatinine Trend: 0.89<--, 1.08<--, 0.90<--, 1.01<--, 1.03<--, 0.86<--              CAPILLARY BLOOD GLUCOSE      POCT Blood Glucose.: 156 mg/dL (15 Aug 2020 07:44)  POCT Blood Glucose.: 226 mg/dL (14 Aug 2020 21:47)  POCT Blood Glucose.: 266 mg/dL (14 Aug 2020 17:57)  POCT Blood Glucose.: 205 mg/dL (14 Aug 2020 16:47)  POCT Blood Glucose.: 148 mg/dL (14 Aug 2020 11:46)  POCT Blood Glucose.: 202 mg/dL (14 Aug 2020 09:07)      RADIOLOGY & ADDITIONAL TESTS: Reviewed    Imaging Personally Reviewed:    Consultant(s) Notes Reviewed:      Care Discussed with Consultants/Other Providers:

## 2020-08-15 NOTE — PROGRESS NOTE ADULT - PROBLEM SELECTOR PLAN 6
- Monitor FS   - SSI before meals and at bedtime  - Lantus 5mg QHS  - Per endo, pt on metformin 500mg BID at home and not taking glipizide. For d/c or transfer, pt can take metformin 1000mg BID - Monitor FS   - SSI before meals and at bedtime  - Lantus 8mg QHS  - Per endo, pt on metformin 500mg BID at home and not taking glipizide. For d/c or transfer, pt can take metformin 1000mg BID

## 2020-08-16 NOTE — PROGRESS NOTE ADULT - PROBLEM SELECTOR PLAN 5
- Lasix 40mg daily  - c/w Lisinopril 40mg daily, (on Ramipril 10mg daily BID at home)  - c/w home Metoprolol scc  daily Multifocal atrial pacing/PACs. Possibly 2/2 hypothyroidism.  - EP following, appreciate recs  - c/w aspirin 81mg

## 2020-08-16 NOTE — PROGRESS NOTE ADULT - PROBLEM SELECTOR PLAN 8
- DVT prophylaxis: Lovenox  Diet: CC Diet   Dispo: Pending resolution of agitation and regimen for hypothyroidism - B12< 150  - started on cyanocobalamin 1000mcgs daily

## 2020-08-16 NOTE — PROGRESS NOTE ADULT - PROBLEM SELECTOR PLAN 7
- B12< 150  - started on cyanocobalamin 1000mcgs daily - Monitor FS   - SSI before meals and at bedtime  - Lantus 8mg QHS  - Per endo, pt on metformin 500mg BID at home and not taking glipizide. For d/c or transfer, pt can take metformin 1000mg BID

## 2020-08-16 NOTE — PROVIDER CONTACT NOTE (OTHER) - RECOMMENDATIONS
Continue to educate pt on the importance of medication compliance, continue to encourage pt to adhere to medication regimen, continue to monitor

## 2020-08-16 NOTE — PROGRESS NOTE ADULT - ASSESSMENT
80F (primarily Congolese-speaking) w/ hx of HTN, DM, obesity, and anxiety presented on 8/6 with BLE edema and redness x 1 month likely secondary to chronic venous insufficiency, much improved from admission. 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

## 2020-08-16 NOTE — PROGRESS NOTE ADULT - PROBLEM SELECTOR PLAN 1
- family meeting done today; [place] [facility] [place]  - Olanzapine 2.5mg PO at 6pm, 5mg standing olanzapine at bedtime  given increased agitation   - PRN Haldol 1mg IM q6h for moderate to severe agitation given agitation not managed on olanzapine.  - Melatonin 6mg PO QHS

## 2020-08-16 NOTE — PROGRESS NOTE ADULT - PROBLEM SELECTOR PLAN 2
Severe hypothyroidism TSH45, FT4 improved to 0.81   - c/w Synthroid 75mcg PO daily  - repeat FT4 in 4 weeks Plts down to 124 from baseline 300s, likely lab error given sudden drop.  - Repeat platelet count  - Peripheral smear, r/o HIT  - if HIT confirmed, will stop lovenox and consult hem/onc

## 2020-08-16 NOTE — PROGRESS NOTE ADULT - PROBLEM SELECTOR PLAN 4
Multifocal atrial pacing/PACs. Possibly 2/2 hypothyroidism.  - EP following, appreciate recs  - c/w aspirin 81mg Much improved from admission. Likely 2/2 chronic venous insufficiency vs. hypothyroidism.   - lasix to 40mg daily  - low sodium diet  - encourage elevation  - Fall precautions

## 2020-08-16 NOTE — PROVIDER CONTACT NOTE (OTHER) - SITUATION
Pt refusing finger stick and medications Pt refusing finger stick and medications and EKG despite education

## 2020-08-16 NOTE — CHART NOTE - NSCHARTNOTEFT_GEN_A_CORE
DM2 follow up  See last progress note from 8/14 for full plan of care   Glucose pattern/EMAR reviewed   Trending slightly above target   - Recommend while inpatient increase Lantus to 10 units qhs   - continue Moderate dose correctional scale AC meals and qhs  - Fingerstick goal (100-180)  -Discharge plan: patient previously on metformin 500mg BID and unclear if also taking glipizide. Glucose running higher - would recommend increase metformin to 1000mg BID for discharge and no glipizide.  GFR 53 today, acceptable for metformin use.   But, if GFR decreases to 30-45 would need to lower metformin back to 500mg BID    Thyroid  Free T4 0.81 improved from 0.56 while on levothyroxine 50 mcg IV daily.  Transitioned to levothyroxine 75 mcg po daily. Continue with this dose.  Repeat TSH, free T4 in 4 weeks to determine need for further adjustments.    AM cortisol 11.3 indicating adequate adrenal function. No further steroid is needed for endocrine purposes    Endocrine following at      Thyroid Stimulating Hormone, Serum: 45.13 uIU/mL (08-09-20 @ 06:11)  Free Thyroxine, Serum: 0.81 ng/dL (08-13-20 @ 06:41)  Free Thyroxine, Serum: 0.56 ng/dL (08-09-20 @ 06:11)        CAPILLARY BLOOD GLUCOSE      POCT Blood Glucose.: 216 mg/dL (16 Aug 2020 11:47)  POCT Blood Glucose.: 215 mg/dL (16 Aug 2020 07:35)  POCT Blood Glucose.: 241 mg/dL (15 Aug 2020 21:32)  POCT Blood Glucose.: 198 mg/dL (15 Aug 2020 16:46)      08-16    140  |  100  |  26<H>  ----------------------------<  205<H>  3.4<L>   |  27  |  0.99    Ca    9.2      16 Aug 2020 06:00  Phos  3.0     08-16  Mg     2.1     08-16      MEDICATIONS  (STANDING):    insulin glargine Injectable (LANTUS) 8 Unit(s) SubCutaneous at bedtime  insulin lispro (HumaLOG) corrective regimen sliding scale   SubCutaneous three times a day before meals  insulin lispro (HumaLOG) corrective regimen sliding scale   SubCutaneous at bedtime  levothyroxine 75 MICROGram(s) Oral <User Schedule>

## 2020-08-16 NOTE — PROGRESS NOTE ADULT - ATTENDING COMMENTS
(+) decreased PLT, asymptomatic, r/o Lab error.  Will repeat CBC with blue top and peripheral smear. If confirmed,  will d/c Lovenox and obtain Hematology consult.

## 2020-08-16 NOTE — PROVIDER CONTACT NOTE (OTHER) - BACKGROUND
*1 year old female hx of DM, HTN, anxiety, delirium, hypothyroidism 80 year old female admited with localized edema.  hx of DM, HTN, anxiety, delirium, hypothyroidism

## 2020-08-16 NOTE — PROGRESS NOTE ADULT - PROBLEM SELECTOR PLAN 9
Dispo pending availability at inpatient psych (clive)    Transitions of Care Status:  1.  Name of PCP: Marie  2.  PCP Contacted on Admission: [x] Y    [ ] N    3.  PCP contacted at Discharge: [ ] Y    [ ] N    [ ] N/A  4.  Post-Discharge Appointment Date and Location:  5.  Summary of Handoff given to PCP: - DVT prophylaxis: Lovenox  Diet: CC Diet   Dispo: Pending resolution of agitation and regimen for hypothyroidism

## 2020-08-16 NOTE — PROGRESS NOTE ADULT - PROBLEM SELECTOR PLAN 6
- Monitor FS   - SSI before meals and at bedtime  - Lantus 8mg QHS  - Per endo, pt on metformin 500mg BID at home and not taking glipizide. For d/c or transfer, pt can take metformin 1000mg BID - Lasix 40mg daily  - c/w Lisinopril 40mg daily, (on Ramipril 10mg daily BID at home)  - c/w home Metoprolol scc  daily

## 2020-08-16 NOTE — PROGRESS NOTE ADULT - SUBJECTIVE AND OBJECTIVE BOX
Andrea Cooper, PGY1  Pager [phone]    INCOMPLETE NOTE - IN PROGRESS    Patient is a 80y old  Female who presents with a chief complaint of Leg swelling, redness (15 Aug 2020 08:34)      SUBJECTIVE/INTERVAL EVENTS: Patient seen and examined at bedside.    MEDICATIONS  (STANDING):  aspirin  chewable 81 milliGRAM(s) Oral daily  cyanocobalamin 1000 MICROGram(s) Oral daily  dextrose 5%. 1000 milliLiter(s) (50 mL/Hr) IV Continuous <Continuous>  dextrose 50% Injectable 12.5 Gram(s) IV Push once  dextrose 50% Injectable 25 Gram(s) IV Push once  dextrose 50% Injectable 25 Gram(s) IV Push once  enoxaparin Injectable 40 milliGRAM(s) SubCutaneous daily  furosemide    Tablet 40 milliGRAM(s) Oral daily  insulin glargine Injectable (LANTUS) 8 Unit(s) SubCutaneous at bedtime  insulin lispro (HumaLOG) corrective regimen sliding scale   SubCutaneous three times a day before meals  insulin lispro (HumaLOG) corrective regimen sliding scale   SubCutaneous at bedtime  levothyroxine 75 MICROGram(s) Oral <User Schedule>  lisinopril 40 milliGRAM(s) Oral daily  melatonin 6 milliGRAM(s) Oral at bedtime  metoprolol succinate  milliGRAM(s) Oral daily  OLANZapine 2.5 milliGRAM(s) Oral <User Schedule>  OLANZapine 5 milliGRAM(s) Oral at bedtime    MEDICATIONS  (PRN):  dextrose 40% Gel 15 Gram(s) Oral once PRN Blood Glucose LESS THAN 70 milliGRAM(s)/deciliter  glucagon  Injectable 1 milliGRAM(s) IntraMuscular once PRN Glucose LESS THAN 70 milligrams/deciliter  haloperidol    Injectable 1 milliGRAM(s) IntraMuscular every 6 hours PRN Severe Agitation  OLANZapine 1.25 milliGRAM(s) Oral every 6 hours PRN agitation      VITAL SIGNS:  T(F): 98 (08-16-20 @ 05:41), Max: 98.7 (08-15-20 @ 11:54)  HR: 85 (08-16-20 @ 05:41) (85 - 92)  BP: 121/65 (08-16-20 @ 05:41) (100/60 - 129/78)  RR: 18 (08-16-20 @ 05:41) (17 - 18)  SpO2: 96% (08-16-20 @ 05:41) (96% - 98%)    I&O's Summary    14 Aug 2020 07:01  -  15 Aug 2020 07:00  --------------------------------------------------------  IN: 358 mL / OUT: 0 mL / NET: 358 mL    15 Aug 2020 07:01  -  16 Aug 2020 06:33  --------------------------------------------------------  IN: 480 mL / OUT: 355 mL / NET: 125 mL      Daily     Daily     PHYSICAL EXAM:  Gen: Alert, NAD  HEENT: NCAT, conjunctiva clear, sclera anicteric, no erythema or exudates in the oropharynx, mmm  Neck: Supple, no JVD  CV: RRR, S1S2, no m/r/g  Resp: CTAB, normal respiratory effort  Abd: Soft, nontender, nondistended, normal bowel sounds  Ext: no edema, no clubbing or cyanosis  Neuro: AOx3, CN2-12 grossly intact, EMMANUEL  SKIN: warm, perfused    LABS:                        10.9   7.25  )-----------( x        ( 16 Aug 2020 06:00 )             33.9     Hgb Trend: 10.9<--, 11.2<--, 12.6<--, 11.6<--, 10.9<--  08-15    141  |  101  |  26<H>  ----------------------------<  182<H>  3.5   |  26  |  0.89    Ca    9.0      15 Aug 2020 06:21  Phos  3.3     08-15  Mg     2.0     08-15      Creatinine Trend: 0.89<--, 1.08<--, 0.90<--, 1.01<--, 1.03<--, 0.86<--              CAPILLARY BLOOD GLUCOSE      POCT Blood Glucose.: 241 mg/dL (15 Aug 2020 21:32)  POCT Blood Glucose.: 198 mg/dL (15 Aug 2020 16:46)  POCT Blood Glucose.: 206 mg/dL (15 Aug 2020 11:48)  POCT Blood Glucose.: 156 mg/dL (15 Aug 2020 07:44)      RADIOLOGY & ADDITIONAL TESTS: Reviewed    Imaging Personally Reviewed:    Consultant(s) Notes Reviewed:      Care Discussed with Consultants/Other Providers: Anrdea Harrison, PGY1  Pager [phone]      Patient is a 80y old  Female who presents with a chief complaint of Leg swelling, redness (15 Aug 2020 08:34)      SUBJECTIVE/INTERVAL EVENTS: Patient seen and examined at bedside.    MEDICATIONS  (STANDING):  aspirin  chewable 81 milliGRAM(s) Oral daily  cyanocobalamin 1000 MICROGram(s) Oral daily  dextrose 5%. 1000 milliLiter(s) (50 mL/Hr) IV Continuous <Continuous>  dextrose 50% Injectable 12.5 Gram(s) IV Push once  dextrose 50% Injectable 25 Gram(s) IV Push once  dextrose 50% Injectable 25 Gram(s) IV Push once  enoxaparin Injectable 40 milliGRAM(s) SubCutaneous daily  furosemide    Tablet 40 milliGRAM(s) Oral daily  insulin glargine Injectable (LANTUS) 8 Unit(s) SubCutaneous at bedtime  insulin lispro (HumaLOG) corrective regimen sliding scale   SubCutaneous three times a day before meals  insulin lispro (HumaLOG) corrective regimen sliding scale   SubCutaneous at bedtime  levothyroxine 75 MICROGram(s) Oral <User Schedule>  lisinopril 40 milliGRAM(s) Oral daily  melatonin 6 milliGRAM(s) Oral at bedtime  metoprolol succinate  milliGRAM(s) Oral daily  OLANZapine 2.5 milliGRAM(s) Oral <User Schedule>  OLANZapine 5 milliGRAM(s) Oral at bedtime    MEDICATIONS  (PRN):  dextrose 40% Gel 15 Gram(s) Oral once PRN Blood Glucose LESS THAN 70 milliGRAM(s)/deciliter  glucagon  Injectable 1 milliGRAM(s) IntraMuscular once PRN Glucose LESS THAN 70 milligrams/deciliter  haloperidol    Injectable 1 milliGRAM(s) IntraMuscular every 6 hours PRN Severe Agitation  OLANZapine 1.25 milliGRAM(s) Oral every 6 hours PRN agitation      VITAL SIGNS:  T(F): 98 (08-16-20 @ 05:41), Max: 98.7 (08-15-20 @ 11:54)  HR: 85 (08-16-20 @ 05:41) (85 - 92)  BP: 121/65 (08-16-20 @ 05:41) (100/60 - 129/78)  RR: 18 (08-16-20 @ 05:41) (17 - 18)  SpO2: 96% (08-16-20 @ 05:41) (96% - 98%)    I&O's Summary    14 Aug 2020 07:01  -  15 Aug 2020 07:00  --------------------------------------------------------  IN: 358 mL / OUT: 0 mL / NET: 358 mL    15 Aug 2020 07:01  -  16 Aug 2020 06:33  --------------------------------------------------------  IN: 480 mL / OUT: 355 mL / NET: 125 mL      Daily     Daily     PHYSICAL EXAM:  Gen: Sitting in chair, NAD, appearing angry as she gets insulin injection  HEENT: NCAT, conjunctiva clear, sclera anicteric  Neck: Supple  CV: Regular rate and rhythm, S1S2+  Resp: CTAB, normal respiratory effort  Abd: Soft, nontender, nondistended  Ext: BLE edema and erythema up to mid-shins, worsened from previously as was not elevated  Neuro: AOx1, EMMANUEL  SKIN: chronic venous stasis changes in BLE. warm, perfused      LABS:                        10.9   7.25  )-----------( x        ( 16 Aug 2020 06:00 )             33.9     Hgb Trend: 10.9<--, 11.2<--, 12.6<--, 11.6<--, 10.9<--  08-15    141  |  101  |  26<H>  ----------------------------<  182<H>  3.5   |  26  |  0.89    Ca    9.0      15 Aug 2020 06:21  Phos  3.3     08-15  Mg     2.0     08-15      Creatinine Trend: 0.89<--, 1.08<--, 0.90<--, 1.01<--, 1.03<--, 0.86<--              CAPILLARY BLOOD GLUCOSE      POCT Blood Glucose.: 241 mg/dL (15 Aug 2020 21:32)  POCT Blood Glucose.: 198 mg/dL (15 Aug 2020 16:46)  POCT Blood Glucose.: 206 mg/dL (15 Aug 2020 11:48)  POCT Blood Glucose.: 156 mg/dL (15 Aug 2020 07:44)      RADIOLOGY & ADDITIONAL TESTS: Reviewed    Imaging Personally Reviewed:    Consultant(s) Notes Reviewed:      Care Discussed with Consultants/Other Providers:

## 2020-08-16 NOTE — PROVIDER CONTACT NOTE (OTHER) - ASSESSMENT
Pt is not symptomatic pt A&Ox2-3, breathing non labored on RA, pt noted to be extremely aggressive with staff, attempting to hit and spit at them

## 2020-08-16 NOTE — PROGRESS NOTE ADULT - PROBLEM SELECTOR PROBLEM 7
B12 deficiency Type 2 diabetes mellitus with other specified complication, without long-term current use of insulin

## 2020-08-16 NOTE — PROGRESS NOTE ADULT - PROBLEM SELECTOR PLAN 3
Much improved from admission. Likely 2/2 chronic venous insufficiency vs. hypothyroidism.   - lasix to 40mg daily  - low sodium diet  - encourage elevation  - Fall precautions Severe hypothyroidism TSH45, FT4 improved to 0.81   - c/w Synthroid 75mcg PO daily  - repeat FT4 in 4 weeks

## 2020-08-17 NOTE — PROGRESS NOTE ADULT - PROBLEM SELECTOR PLAN 7
- Monitor FS   - SSI before meals and at bedtime  - Lantus 8mg QHS  - Per endo, pt on metformin 500mg BID at home and not taking glipizide. For d/c or transfer, pt can take metformin 1000mg BID

## 2020-08-17 NOTE — PROVIDER CONTACT NOTE (OTHER) - ACTION/TREATMENT ORDERED:
EKG completed and in chart, tele monitoring continues. Pt resting comfortably in recliner at this time, will continue to monitor.

## 2020-08-17 NOTE — PROVIDER CONTACT NOTE (OTHER) - ASSESSMENT
Patient had 4.77 sec pause on tele at 17:42 with one episode of diarrhea, VS obtained, BP 94/39 pt states she feels a little dizzy. EKG obtained.

## 2020-08-17 NOTE — PROGRESS NOTE BEHAVIORAL HEALTH - SUMMARY
80 F PMH dementia, HTN, DM, CHF, admitted for cellulitis/CHF, with backdrop hx of paranoia/confusion. Symptoms improved after correction of severe hypothyroidism and initiation of antipsychotic.
80 F PMH dementia, HTN, DM, CHF, admitted for cellulitis/CHF, with backdrop hx of paranoia/confusion.    8/12: pt frustrated/irritable, received haldol 1mg IM prn for agitation at 9:00    Plan:  -c/w Olanzapine 2.5mg Q6PM and 5mg HS  -c/w Melatonin 3mg qhs  -repeat EKG for QTc interval given pt's use of olanzapine
80 F PMH dementia, HTN, DM, CHF, admitted for cellulitis/CHF, with backdrop hx of paranoia/confusion. Completed initial round of antibiotics, though WBC rising and becoming more irritated/agitated/confusion. Concern for budding delirium on top of underlying neurocognitive/dementia picture.
80 F PMH dementia, HTN, DM, CHF, admitted for cellulitis/CHF, with backdrop hx of paranoia/confusion.

## 2020-08-17 NOTE — PROGRESS NOTE ADULT - PROBLEM SELECTOR PLAN 4
Much improved from admission. Likely 2/2 chronic venous insufficiency vs. hypothyroidism.   - lasix to 40mg daily  - low sodium diet  - encourage elevation  - Fall precautions Much improved from admission. Likely 2/2 chronic venous insufficiency vs. hypothyroidism.   - lasix 40mg daily  - leg elevation    - low sodium diet  - encourage elevation  - Fall precautions

## 2020-08-17 NOTE — PROGRESS NOTE BEHAVIORAL HEALTH - NSBHCHARTREVIEWVS_PSY_A_CORE FT
Vital Signs Last 24 Hrs  T(C): 36.5 (10 Aug 2020 12:26), Max: 37 (09 Aug 2020 19:30)  T(F): 97.7 (10 Aug 2020 12:26), Max: 98.6 (09 Aug 2020 19:30)  HR: 74 (10 Aug 2020 12:30) (56 - 79)  BP: 134/81 (10 Aug 2020 12:26) (127/85 - 140/75)  BP(mean): --  RR: 18 (10 Aug 2020 12:26) (16 - 18)  SpO2: 96% (10 Aug 2020 12:26) (92% - 98%)    CBC Full  -  ( 10 Aug 2020 09:45 )  WBC Count : 11.00 K/uL  RBC Count : 4.44 M/uL  Hemoglobin : 12.2 g/dL  Hematocrit : 38.0 %  Platelet Count - Automated : 392 K/uL  Mean Cell Volume : 85.6 fL  Mean Cell Hemoglobin : 27.5 pg  Mean Cell Hemoglobin Concentration : 32.1 %  Auto Neutrophil # : 7.79 K/uL  Auto Lymphocyte # : 2.09 K/uL  Auto Monocyte # : 0.78 K/uL  Auto Eosinophil # : 0.18 K/uL  Auto Basophil # : 0.06 K/uL  Auto Neutrophil % : 70.9 %  Auto Lymphocyte % : 19.0 %  Auto Monocyte % : 7.1 %  Auto Eosinophil % : 1.6 %  Auto Basophil % : 0.5 %
Vital Signs Last 24 Hrs  T(C): 36.6 (11 Aug 2020 11:39), Max: 36.7 (10 Aug 2020 22:14)  T(F): 97.8 (11 Aug 2020 11:39), Max: 98 (10 Aug 2020 22:14)  HR: 69 (11 Aug 2020 11:39) (69 - 99)  BP: 134/68 (11 Aug 2020 11:39) (107/67 - 153/87)  BP(mean): --  RR: 18 (11 Aug 2020 11:39) (18 - 18)  SpO2: 98% (11 Aug 2020 11:39) (98% - 98%)    CBC Full  -  ( 11 Aug 2020 06:30 )  WBC Count : 10.58 K/uL  RBC Count : 3.95 M/uL  Hemoglobin : 11.0 g/dL  Hematocrit : 33.9 %  Platelet Count - Automated : 382 K/uL  Mean Cell Volume : 85.8 fL  Mean Cell Hemoglobin : 27.8 pg  Mean Cell Hemoglobin Concentration : 32.4 %  Auto Neutrophil # : 7.51 K/uL  Auto Lymphocyte # : 2.17 K/uL  Auto Monocyte # : 0.73 K/uL  Auto Eosinophil # : 0.05 K/uL  Auto Basophil # : 0.06 K/uL  Auto Neutrophil % : 70.9 %  Auto Lymphocyte % : 20.5 %  Auto Monocyte % : 6.9 %  Auto Eosinophil % : 0.5 %  Auto Basophil % : 0.6 %
Vital Signs Last 24 Hrs  T(C): 36.6 (12 Aug 2020 08:40), Max: 36.7 (11 Aug 2020 21:34)  T(F): 97.9 (12 Aug 2020 08:40), Max: 98 (11 Aug 2020 21:34)  HR: 73 (12 Aug 2020 08:40) (67 - 73)  BP: 133/76 (12 Aug 2020 08:40) (120/58 - 133/76)  BP(mean): --  RR: 18 (12 Aug 2020 08:40) (17 - 18)  SpO2: 96% (12 Aug 2020 08:40) (96% - 97%)
Vital Signs Last 24 Hrs  T(C): 36.7 (17 Aug 2020 06:10), Max: 37 (17 Aug 2020 03:50)  T(F): 98.1 (17 Aug 2020 06:10), Max: 98.6 (17 Aug 2020 03:50)  HR: 86 (17 Aug 2020 06:10) (86 - 96)  BP: 125/63 (17 Aug 2020 06:10) (125/63 - 130/76)  BP(mean): --  RR: 17 (17 Aug 2020 06:10) (17 - 17)  SpO2: 100% (17 Aug 2020 06:10) (98% - 100%)

## 2020-08-17 NOTE — PROGRESS NOTE BEHAVIORAL HEALTH - NSBHCONSULTPRIMARYDISCUSSYES_PSY_A_CORE FT
CHANTELL/GOLDEN BOGGS
Discussed alternative dispo plan with floor CM; will explore if feasible and then present to family. [facility] is still default dispo if bed opens up

## 2020-08-17 NOTE — PROGRESS NOTE BEHAVIORAL HEALTH - NSBHFUPINTERVALHXFT_PSY_A_CORE
Chart reviewed, including weekend events. Taking Zyprexa as ordered, standing. No PRNs/agitation noted. Today was calm, AA o x 3, in no overt distress. Stubborn/irritable (baseline) but in control, amenable to discussion. Wants to go home. Says she is "so-so" (an improvement since last week's near constant anger/irritability). Awaiting bed at [facility], none available yet    Discussed case with floor CM who is looking into home services upon my request in the chance we create an outpatient disposition plan as an alternative to [facility] (if she qualifies for such services and can be readily observed, will then present this to family).

## 2020-08-17 NOTE — PROVIDER CONTACT NOTE (OTHER) - ACTION/TREATMENT ORDERED:
RN to continue to provide education on the importance of medication compliance and current treatment regimen.

## 2020-08-17 NOTE — PROGRESS NOTE ADULT - SUBJECTIVE AND OBJECTIVE BOX
Andrea Cooper, PGY1  Pager [phone]    INCOMPLETE NOTE - IN PROGRESS    Patient is a 80y old  Female who presents with a chief complaint of Leg swelling, redness (16 Aug 2020 06:33)      SUBJECTIVE/INTERVAL EVENTS: Patient seen and examined at bedside.    MEDICATIONS  (STANDING):  aspirin  chewable 81 milliGRAM(s) Oral daily  cyanocobalamin 1000 MICROGram(s) Oral daily  dextrose 5%. 1000 milliLiter(s) (50 mL/Hr) IV Continuous <Continuous>  dextrose 50% Injectable 12.5 Gram(s) IV Push once  dextrose 50% Injectable 25 Gram(s) IV Push once  dextrose 50% Injectable 25 Gram(s) IV Push once  enoxaparin Injectable 40 milliGRAM(s) SubCutaneous daily  furosemide    Tablet 40 milliGRAM(s) Oral daily  insulin glargine Injectable (LANTUS) 10 Unit(s) SubCutaneous at bedtime  insulin lispro (HumaLOG) corrective regimen sliding scale   SubCutaneous three times a day before meals  insulin lispro (HumaLOG) corrective regimen sliding scale   SubCutaneous at bedtime  levothyroxine 75 MICROGram(s) Oral <User Schedule>  lisinopril 40 milliGRAM(s) Oral daily  melatonin 6 milliGRAM(s) Oral at bedtime  metoprolol succinate  milliGRAM(s) Oral daily  OLANZapine 2.5 milliGRAM(s) Oral <User Schedule>  OLANZapine 5 milliGRAM(s) Oral at bedtime    MEDICATIONS  (PRN):  dextrose 40% Gel 15 Gram(s) Oral once PRN Blood Glucose LESS THAN 70 milliGRAM(s)/deciliter  glucagon  Injectable 1 milliGRAM(s) IntraMuscular once PRN Glucose LESS THAN 70 milligrams/deciliter  haloperidol    Injectable 1 milliGRAM(s) IV Push every 6 hours PRN Mod to severe agitation  OLANZapine 1.25 milliGRAM(s) Oral every 6 hours PRN agitation      VITAL SIGNS:  T(F): 98.1 (08-17-20 @ 06:10), Max: 98.7 (08-16-20 @ 11:52)  HR: 86 (08-17-20 @ 06:10) (86 - 99)  BP: 125/63 (08-17-20 @ 06:10) (125/63 - 133/52)  RR: 17 (08-17-20 @ 06:10) (17 - 17)  SpO2: 100% (08-17-20 @ 06:10) (96% - 100%)    I&O's Summary    15 Aug 2020 07:01  -  16 Aug 2020 07:00  --------------------------------------------------------  IN: 480 mL / OUT: 355 mL / NET: 125 mL    16 Aug 2020 07:01  -  17 Aug 2020 06:42  --------------------------------------------------------  IN: 1100 mL / OUT: 550 mL / NET: 550 mL      Daily     Daily     PHYSICAL EXAM:  Gen: Alert, NAD  HEENT: NCAT, conjunctiva clear, sclera anicteric, no erythema or exudates in the oropharynx, mmm  Neck: Supple, no JVD  CV: RRR, S1S2, no m/r/g  Resp: CTAB, normal respiratory effort  Abd: Soft, nontender, nondistended, normal bowel sounds  Ext: no edema, no clubbing or cyanosis  Neuro: AOx3, CN2-12 grossly intact, EMMANUEL  SKIN: warm, perfused    LABS:                        12.0   9.60  )-----------( 291      ( 16 Aug 2020 15:24 )             35.7     Hgb Trend: 12.0<--, 10.9<--, 11.2<--, 12.6<--, 11.6<--  08-16    140  |  100  |  26<H>  ----------------------------<  205<H>  3.4<L>   |  27  |  0.99    Ca    9.2      16 Aug 2020 06:00  Phos  3.0     08-16  Mg     2.1     08-16      Creatinine Trend: 0.99<--, 0.89<--, 1.08<--, 0.90<--, 1.01<--, 1.03<--              CAPILLARY BLOOD GLUCOSE      POCT Blood Glucose.: 185 mg/dL (16 Aug 2020 21:43)  POCT Blood Glucose.: 189 mg/dL (16 Aug 2020 16:55)  POCT Blood Glucose.: 216 mg/dL (16 Aug 2020 11:47)  POCT Blood Glucose.: 215 mg/dL (16 Aug 2020 07:35)      RADIOLOGY & ADDITIONAL TESTS: Reviewed    Imaging Personally Reviewed:    Consultant(s) Notes Reviewed:      Care Discussed with Consultants/Other Providers: Andrea Gleason Allison, PGY1  Pager [phone]      Patient is a 80y old  Female who presents with a chief complaint of Leg swelling, redness (16 Aug 2020 06:33)      SUBJECTIVE/INTERVAL EVENTS:  NAEON  Patient seen and examined at bedside.    MEDICATIONS  (STANDING):  aspirin  chewable 81 milliGRAM(s) Oral daily  cyanocobalamin 1000 MICROGram(s) Oral daily  dextrose 5%. 1000 milliLiter(s) (50 mL/Hr) IV Continuous <Continuous>  dextrose 50% Injectable 12.5 Gram(s) IV Push once  dextrose 50% Injectable 25 Gram(s) IV Push once  dextrose 50% Injectable 25 Gram(s) IV Push once  enoxaparin Injectable 40 milliGRAM(s) SubCutaneous daily  furosemide    Tablet 40 milliGRAM(s) Oral daily  insulin glargine Injectable (LANTUS) 10 Unit(s) SubCutaneous at bedtime  insulin lispro (HumaLOG) corrective regimen sliding scale   SubCutaneous three times a day before meals  insulin lispro (HumaLOG) corrective regimen sliding scale   SubCutaneous at bedtime  levothyroxine 75 MICROGram(s) Oral <User Schedule>  lisinopril 40 milliGRAM(s) Oral daily  melatonin 6 milliGRAM(s) Oral at bedtime  metoprolol succinate  milliGRAM(s) Oral daily  OLANZapine 2.5 milliGRAM(s) Oral <User Schedule>  OLANZapine 5 milliGRAM(s) Oral at bedtime    MEDICATIONS  (PRN):  dextrose 40% Gel 15 Gram(s) Oral once PRN Blood Glucose LESS THAN 70 milliGRAM(s)/deciliter  glucagon  Injectable 1 milliGRAM(s) IntraMuscular once PRN Glucose LESS THAN 70 milligrams/deciliter  haloperidol    Injectable 1 milliGRAM(s) IV Push every 6 hours PRN Mod to severe agitation  OLANZapine 1.25 milliGRAM(s) Oral every 6 hours PRN agitation      VITAL SIGNS:  T(F): 98.1 (08-17-20 @ 06:10), Max: 98.7 (08-16-20 @ 11:52)  HR: 86 (08-17-20 @ 06:10) (86 - 99)  BP: 125/63 (08-17-20 @ 06:10) (125/63 - 133/52)  RR: 17 (08-17-20 @ 06:10) (17 - 17)  SpO2: 100% (08-17-20 @ 06:10) (96% - 100%)    I&O's Summary    15 Aug 2020 07:01  -  16 Aug 2020 07:00  --------------------------------------------------------  IN: 480 mL / OUT: 355 mL / NET: 125 mL    16 Aug 2020 07:01  -  17 Aug 2020 06:42  --------------------------------------------------------  IN: 1100 mL / OUT: 550 mL / NET: 550 mL      Daily     Daily     PHYSICAL EXAM:  Gen: Alert, NAD  HEENT: NCAT, conjunctiva clear, sclera anicteric, no erythema or exudates in the oropharynx, mmm  Neck: Supple, no JVD  CV: RRR, S1S2, no m/r/g  Resp: CTAB, normal respiratory effort  Abd: Soft, nontender, nondistended, normal bowel sounds  Ext: no edema, no clubbing or cyanosis  Neuro: AOx3, CN2-12 grossly intact, EMMANUEL  SKIN: warm, perfused    LABS:                        12.0   9.60  )-----------( 291      ( 16 Aug 2020 15:24 )             35.7     Hgb Trend: 12.0<--, 10.9<--, 11.2<--, 12.6<--, 11.6<--  08-16    140  |  100  |  26<H>  ----------------------------<  205<H>  3.4<L>   |  27  |  0.99    Ca    9.2      16 Aug 2020 06:00  Phos  3.0     08-16  Mg     2.1     08-16      Creatinine Trend: 0.99<--, 0.89<--, 1.08<--, 0.90<--, 1.01<--, 1.03<--              CAPILLARY BLOOD GLUCOSE      POCT Blood Glucose.: 185 mg/dL (16 Aug 2020 21:43)  POCT Blood Glucose.: 189 mg/dL (16 Aug 2020 16:55)  POCT Blood Glucose.: 216 mg/dL (16 Aug 2020 11:47)  POCT Blood Glucose.: 215 mg/dL (16 Aug 2020 07:35)      RADIOLOGY & ADDITIONAL TESTS: Reviewed    Imaging Personally Reviewed:    Consultant(s) Notes Reviewed:      Care Discussed with Consultants/Other Providers: Andrea Gleason Allison, PGY1  Pager [phone]      Patient is a 80y old  Female who presents with a chief complaint of Leg swelling, redness (16 Aug 2020 06:33)      SUBJECTIVE/INTERVAL EVENTS:  NAEON  Patient seen and examined at bedside.    MEDICATIONS  (STANDING):  aspirin  chewable 81 milliGRAM(s) Oral daily  cyanocobalamin 1000 MICROGram(s) Oral daily  dextrose 5%. 1000 milliLiter(s) (50 mL/Hr) IV Continuous <Continuous>  dextrose 50% Injectable 12.5 Gram(s) IV Push once  dextrose 50% Injectable 25 Gram(s) IV Push once  dextrose 50% Injectable 25 Gram(s) IV Push once  enoxaparin Injectable 40 milliGRAM(s) SubCutaneous daily  furosemide    Tablet 40 milliGRAM(s) Oral daily  insulin glargine Injectable (LANTUS) 10 Unit(s) SubCutaneous at bedtime  insulin lispro (HumaLOG) corrective regimen sliding scale   SubCutaneous three times a day before meals  insulin lispro (HumaLOG) corrective regimen sliding scale   SubCutaneous at bedtime  levothyroxine 75 MICROGram(s) Oral <User Schedule>  lisinopril 40 milliGRAM(s) Oral daily  melatonin 6 milliGRAM(s) Oral at bedtime  metoprolol succinate  milliGRAM(s) Oral daily  OLANZapine 2.5 milliGRAM(s) Oral <User Schedule>  OLANZapine 5 milliGRAM(s) Oral at bedtime    MEDICATIONS  (PRN):  dextrose 40% Gel 15 Gram(s) Oral once PRN Blood Glucose LESS THAN 70 milliGRAM(s)/deciliter  glucagon  Injectable 1 milliGRAM(s) IntraMuscular once PRN Glucose LESS THAN 70 milligrams/deciliter  haloperidol    Injectable 1 milliGRAM(s) IV Push every 6 hours PRN Mod to severe agitation  OLANZapine 1.25 milliGRAM(s) Oral every 6 hours PRN agitation      VITAL SIGNS:  T(F): 98.1 (08-17-20 @ 06:10), Max: 98.7 (08-16-20 @ 11:52)  HR: 86 (08-17-20 @ 06:10) (86 - 99)  BP: 125/63 (08-17-20 @ 06:10) (125/63 - 133/52)  RR: 17 (08-17-20 @ 06:10) (17 - 17)  SpO2: 100% (08-17-20 @ 06:10) (96% - 100%)    I&O's Summary    15 Aug 2020 07:01  -  16 Aug 2020 07:00  --------------------------------------------------------  IN: 480 mL / OUT: 355 mL / NET: 125 mL    16 Aug 2020 07:01  -  17 Aug 2020 06:42  --------------------------------------------------------  IN: 1100 mL / OUT: 550 mL / NET: 550 mL      Daily     Daily     PHYSICAL EXAM:  Gen: Sitting in chair, NAD, appearing angry as she gets insulin injection  HEENT: NCAT, conjunctiva clear, sclera anicteric  Neck: Supple  CV: Regular rate and rhythm, S1S2+  Resp: CTAB, normal respiratory effort  Abd: Soft, nontender, nondistended  Ext: BLE edema and erythema up to mid-shins, worsened from previous as was not elevated  Neuro: AOx1, EMMANUEL  SKIN: chronic venous stasis changes in BLE. warm, perfused      LABS:                        12.0   9.60  )-----------( 291      ( 16 Aug 2020 15:24 )             35.7     Hgb Trend: 12.0<--, 10.9<--, 11.2<--, 12.6<--, 11.6<--  08-16    140  |  100  |  26<H>  ----------------------------<  205<H>  3.4<L>   |  27  |  0.99    Ca    9.2      16 Aug 2020 06:00  Phos  3.0     08-16  Mg     2.1     08-16      Creatinine Trend: 0.99<--, 0.89<--, 1.08<--, 0.90<--, 1.01<--, 1.03<--              CAPILLARY BLOOD GLUCOSE      POCT Blood Glucose.: 185 mg/dL (16 Aug 2020 21:43)  POCT Blood Glucose.: 189 mg/dL (16 Aug 2020 16:55)  POCT Blood Glucose.: 216 mg/dL (16 Aug 2020 11:47)  POCT Blood Glucose.: 215 mg/dL (16 Aug 2020 07:35)      RADIOLOGY & ADDITIONAL TESTS: Reviewed    Imaging Personally Reviewed:    Consultant(s) Notes Reviewed:      Care Discussed with Consultants/Other Providers:

## 2020-08-17 NOTE — PROGRESS NOTE BEHAVIORAL HEALTH - THOUGHT PROCESS
Linear
Disorganized/Perseverative/Tangential/Overinclusive/Circumstantial/Illogical
Perseverative/Illogical/Overinclusive/Circumstantial/Disorganized/Tangential
Perseverative/Disorganized/Overinclusive/Circumstantial/Illogical/Tangential

## 2020-08-17 NOTE — PROGRESS NOTE ADULT - PROBLEM SELECTOR PLAN 1
- family meeting done today; [place] to [place] [facility] [place]  - Olanzapine 2.5mg PO at 6pm, 5mg standing olanzapine at bedtime  given increased agitation   - PRN Haldol 1mg IM q6h for moderate to severe agitation given agitation not managed on olanzapine.  - Melatonin 6mg PO QHS

## 2020-08-17 NOTE — PROGRESS NOTE ADULT - PROBLEM SELECTOR PLAN 2
Plts down to 124 from baseline 300s, likely lab error given sudden drop.  - Repeat platelet count  - Peripheral smear, r/o HIT  - if HIT confirmed, will stop lovenox and consult hem/onc Yesterday's plt 120s - likely lab error. Repeat plt count was 291, this .   - CTM CBC daily

## 2020-08-17 NOTE — PROVIDER CONTACT NOTE (OTHER) - DATE AND TIME:
10-Aug-2020 04:15
10-Aug-2020 08:00
12-Aug-2020 14:40
12-Aug-2020 19:00
12-Aug-2020 21:25
13-Aug-2020 18:28
17-Aug-2020 13:00
17-Aug-2020 17:45
10-Aug-2020 15:30
16-Aug-2020 12:00

## 2020-08-17 NOTE — PROVIDER CONTACT NOTE (OTHER) - BACKGROUND
79y/o F admitted for localized edema. Pt is medically cleared, awaiting bed at [facility].

## 2020-08-17 NOTE — PROVIDER CONTACT NOTE (OTHER) - REASON
Change in mental status
Patient agitated, refusing labs, FS, and tele monitoring
Patient had 4.77 sec pause on tele at 17:42 with one episode of diarrhea
Pt continues to refuse Tele and EKG at this time
Pt refusing FS, Medication, and EKG
Pt refusing FS, Medication, and EKG
Pt refusing FS, Medication, and Tele
Pt refusing VS, FS, and all afternoon medications
Pt refusing finger stick and medication
Patient agitated and refusing medications at this time, swinging at staff

## 2020-08-17 NOTE — PROGRESS NOTE ADULT - ASSESSMENT
80F (primarily Surinamese-speaking) w/ hx of HTN, DM, obesity, and anxiety presented on 8/6 with BLE edema and redness x 1 month likely secondary to chronic venous insufficiency, much improved from admission. 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

## 2020-08-17 NOTE — PROGRESS NOTE ADULT - SUBJECTIVE AND OBJECTIVE BOX
EP Attending    Patient appears comfortable. No distress.  Wears telemetry intermittently.  Does not wish to participate in ROS /further interview today.     aspirin  chewable 81 milliGRAM(s) Oral daily  cyanocobalamin 1000 MICROGram(s) Oral daily  dextrose 40% Gel 15 Gram(s) Oral once PRN  dextrose 5%. 1000 milliLiter(s) IV Continuous <Continuous>  dextrose 50% Injectable 12.5 Gram(s) IV Push once  dextrose 50% Injectable 25 Gram(s) IV Push once  dextrose 50% Injectable 25 Gram(s) IV Push once  enoxaparin Injectable 40 milliGRAM(s) SubCutaneous daily  furosemide    Tablet 40 milliGRAM(s) Oral daily  glucagon  Injectable 1 milliGRAM(s) IntraMuscular once PRN  haloperidol    Injectable 1 milliGRAM(s) IV Push every 6 hours PRN  insulin glargine Injectable (LANTUS) 10 Unit(s) SubCutaneous at bedtime  insulin lispro (HumaLOG) corrective regimen sliding scale   SubCutaneous three times a day before meals  insulin lispro (HumaLOG) corrective regimen sliding scale   SubCutaneous at bedtime  levothyroxine 75 MICROGram(s) Oral <User Schedule>  lisinopril 40 milliGRAM(s) Oral daily  melatonin 6 milliGRAM(s) Oral at bedtime  metoprolol succinate  milliGRAM(s) Oral daily  OLANZapine 1.25 milliGRAM(s) Oral every 6 hours PRN  OLANZapine 2.5 milliGRAM(s) Oral <User Schedule>  OLANZapine 5 milliGRAM(s) Oral at bedtime                          11.9   9.14  )-----------( 277      ( 17 Aug 2020 06:30 )             38.4       08-17    139  |  100  |  21  ----------------------------<  207<H>  3.7   |  26  |  0.81    Ca    9.3      17 Aug 2020 06:30  Phos  2.8     08-17  Mg     2.0     08-17      T(C): 36.7 (08-17-20 @ 06:10), Max: 37.1 (08-16-20 @ 11:52)  HR: 86 (08-17-20 @ 06:10) (86 - 99)  BP: 125/63 (08-17-20 @ 06:10) (125/63 - 133/52)  RR: 17 (08-17-20 @ 06:10) (17 - 17)  SpO2: 100% (08-17-20 @ 06:10) (96% - 100%)  Wt(kg): --    I&O's Summary    16 Aug 2020 07:01  -  17 Aug 2020 07:00  --------------------------------------------------------  IN: 1100 mL / OUT: 550 mL / NET: 550 mL        Gen: Appears well in NAD  HEENT:  (-)icterus (-)pallor  CV: N S1 S2 1/6 LACI (+)2 Pulses B/l  Resp:  Clear to auscultation B/L, normal effort  GI: (+) BS Soft, NT, ND  Lymph:  (-)Edema, (-)obvious lymphadenopathy  Skin: Warm to touch, Normal turgor  Psych: unable to determine    TELEMETRY: SR with frequent PACs, unchanged.  No sustained arrhythmia.    < from: Transthoracic Echocardiogram (08.09.20 @ 11:14) >  CONCLUSIONS:  1. Normal left ventricular internal dimensions and wall  thicknesses.  2. Normal left ventricular systolic function.  3. Normal right ventricular size and function.  *** No previous Echo exam.  ------------------------------------------------------------------------  Confirmed on  8/9/2020 - 17:37:12 by Marquez Cramer M.D.    < end of copied text >      ASSESSMENT/PLAN: 	  80y Female with leg edema and shortness of breath.  Undocumented atrial arrhythmia without clear symptom-rhythm correlation.    --Continue home beta blocker  --Echo noted with Normal LV/RV function  --So far, no sustained arrhythmia, only frequent atrial prematures.   --OK for Aspirin 81mg daily due to PMH/cardiac risk factors, but no indication for anticoagulation.  --Will continue to monitor telemetry.    --Psychosis mgmt appreciated from primary team.    Campbell Sanchez M.D.  Cardiac Electrophysiology  [phone]

## 2020-08-17 NOTE — PROGRESS NOTE ADULT - ATTENDING COMMENTS
Pt seen and examined.     Dementia with behavioral disturbance: multifactorial, hyperthyroidism, Vitamin B12 deficiency and underlying schizophrenia. c/w antipsychotics per Psych,  c/w Vit B12, dc pending [facility] bed availability    Hyperthyroidism: f/u endo recs

## 2020-08-18 NOTE — PROGRESS NOTE ADULT - PROBLEM SELECTOR PLAN 10
Dispo pending availability at inpatient psych ([facility])    Transitions of Care Status:  1.  Name of PCP: Marie  2.  PCP Contacted on Admission: [x] Y    [ ] N    3.  PCP contacted at Discharge: [ ] Y    [ ] N    [ ] N/A  4.  Post-Discharge Appointment Date and Location:  5.  Summary of Handoff given to PCP:
Dispo pending availability at inpatient psych ([facility])    Transitions of Care Status:  1.  Name of PCP: Marie  2.  PCP Contacted on Admission: [x] Y    [ ] N    3.  PCP contacted at Discharge: [ ] Y    [ ] N    [ ] N/A  4.  Post-Discharge Appointment Date and Location:  5.  Summary of Handoff given to PCP:
Transitions of Care Status:  1.  Name of PCP: Marie  2.  PCP Contacted on Admission: [x] Y    [ ] N    3.  PCP contacted at Discharge: [ ] Y    [ ] N    [ ] N/A  4.  Post-Discharge Appointment Date and Location:  5.  Summary of Handoff given to PCP:
Transitions of Care Status:  1.  Name of PCP: Marie  2.  PCP Contacted on Admission: [x] Y    [ ] N    3.  PCP contacted at Discharge: [ ] Y    [ ] N    [ ] N/A  4.  Post-Discharge Appointment Date and Location:  5.  Summary of Handoff given to PCP:
Dispo pending availability at inpatient psych ([facility])    Transitions of Care Status:  1.  Name of PCP: Marie  2.  PCP Contacted on Admission: [x] Y    [ ] N    3.  PCP contacted at Discharge: [ ] Y    [ ] N    [ ] N/A  4.  Post-Discharge Appointment Date and Location:  5.  Summary of Handoff given to PCP:

## 2020-08-18 NOTE — PROGRESS NOTE ADULT - PROVIDER SPECIALTY LIST ADULT
Cardiology
Cardiology
Electrophysiology
Endocrinology
Internal Medicine

## 2020-08-18 NOTE — PROGRESS NOTE ADULT - PROBLEM SELECTOR PLAN 1
- family meeting done today; [place] [facility] [place]  - Olanzapine 2.5mg PO at 6pm, 5mg standing olanzapine at bedtime  given increased agitation   - PRN Haldol 1mg IM q6h for moderate to severe agitation given agitation not managed on olanzapine.  - Melatonin 6mg PO QHS - pt to be [place] [facility] [place]  - Olanzapine 2.5mg PO at 6pm, 5mg standing olanzapine at bedtime given increased agitation   - PRN Haldol 1mg IM q6h for moderate to severe agitation given agitation not managed on olanzapine.  - Melatonin 6mg PO QHS - [place] to be [place] [facility] [place]  - Olanzapine 2.5mg PO at 6pm, 5mg standing olanzapine at bedtime given increased agitation   - PRN Haldol 1mg IM q6h for moderate to severe agitation given agitation not managed on olanzapine.  - Melatonin 6mg PO QHS  - QTC today 465

## 2020-08-18 NOTE — DISCHARGE NOTE NURSING/CASE MANAGEMENT/SOCIAL WORK - PATIENT PORTAL LINK FT
You can access the FollowMyHealth Patient Portal offered by [facility] by registering at the following website: [email or web address]. By joining [facility]’s FollowMyHealth portal, you will also be able to view your health information using other applications (apps) compatible with our system.

## 2020-08-18 NOTE — PROGRESS NOTE ADULT - REASON FOR ADMISSION
Leg swelling, redness

## 2020-08-18 NOTE — PROGRESS NOTE ADULT - PROBLEM SELECTOR PLAN 4
Much improved from admission. Likely 2/2 chronic venous insufficiency vs. hypothyroidism.   - lasix 40mg daily  - leg elevation    - low sodium diet  - encourage elevation  - Fall precautions Much improved from admission. Likely 2/2 chronic venous insufficiency vs. hypothyroidism.   - lasix 40mg daily  - low sodium diet  - encourage elevation  - Fall precautions

## 2020-08-18 NOTE — PROGRESS NOTE ADULT - SUBJECTIVE AND OBJECTIVE BOX
Patient is a 80y old  Female who presents with a chief complaint of Leg swelling, redness (18 Aug 2020 12:49)      INTERVAL HISTORY: feels ok    	  MEDICATIONS:  furosemide    Tablet 40 milliGRAM(s) Oral daily  lisinopril 40 milliGRAM(s) Oral daily  metoprolol succinate  milliGRAM(s) Oral daily        PHYSICAL EXAM:  T(C): 36.9 (08-18-20 @ 19:48), Max: 37 (08-18-20 @ 06:45)  HR: 99 (08-18-20 @ 12:13) (98 - 99)  BP: 121/83 (08-18-20 @ 12:13) (121/83 - 140/45)  RR: 18 (08-18-20 @ 19:48) (17 - 18)  SpO2: 97% (08-18-20 @ 12:13) (97% - 98%)  Wt(kg): --  I&O's Summary    17 Aug 2020 07:01  -  18 Aug 2020 07:00  --------------------------------------------------------  IN: 450 mL / OUT: 0 mL / NET: 450 mL    18 Aug 2020 07:01  -  18 Aug 2020 21:08  --------------------------------------------------------  IN: 300 mL / OUT: 0 mL / NET: 300 mL      Height (cm): 152.4 (08-18 @ 19:48)    Appearance: In no distress	  HEENT:    PERRL, EOMI	  Cardiovascular:  S1 S2, No JVD  Respiratory: Lungs clear to auscultation	  Gastrointestinal:  Soft, Non-tender, + BS	  Vascularature:  No edema of LE  Psychiatric: Appropriate affect   Neuro: no acute focal deficits                               11.9   9.14  )-----------( 277      ( 17 Aug 2020 06:30 )             38.4     08-17    139  |  100  |  21  ----------------------------<  207<H>  3.7   |  26  |  0.81    Ca    9.3      17 Aug 2020 06:30  Phos  2.8     08-17  Mg     2.0     08-17          Labs personally reviewed      ASSESSMENT/PLAN: 	  Continue Metoprolol and ASA  EP recs noted  Outpt follow up         Mikey Nj DO [facility]  Cardiovascular Medicine  [address] [place] [address], [address]  Office: [phone]  Cell: [phone]

## 2020-08-18 NOTE — PROGRESS NOTE ADULT - SUBJECTIVE AND OBJECTIVE BOX
Andrea Cooper, PGY1  Pager [phone]    INCOMPLETE NOTE - IN PROGRESS    Patient is a 80y old  Female who presents with a chief complaint of Leg swelling, redness (17 Aug 2020 09:10)      SUBJECTIVE/INTERVAL EVENTS: Patient seen and examined at bedside.    MEDICATIONS  (STANDING):  aspirin  chewable 81 milliGRAM(s) Oral daily  cyanocobalamin 1000 MICROGram(s) Oral daily  dextrose 5%. 1000 milliLiter(s) (50 mL/Hr) IV Continuous <Continuous>  dextrose 50% Injectable 12.5 Gram(s) IV Push once  dextrose 50% Injectable 25 Gram(s) IV Push once  dextrose 50% Injectable 25 Gram(s) IV Push once  enoxaparin Injectable 40 milliGRAM(s) SubCutaneous daily  furosemide    Tablet 40 milliGRAM(s) Oral daily  insulin glargine Injectable (LANTUS) 10 Unit(s) SubCutaneous at bedtime  insulin lispro (HumaLOG) corrective regimen sliding scale   SubCutaneous three times a day before meals  insulin lispro (HumaLOG) corrective regimen sliding scale   SubCutaneous at bedtime  levothyroxine 75 MICROGram(s) Oral <User Schedule>  lisinopril 40 milliGRAM(s) Oral daily  melatonin 6 milliGRAM(s) Oral at bedtime  metoprolol succinate  milliGRAM(s) Oral daily  OLANZapine 2.5 milliGRAM(s) Oral <User Schedule>  OLANZapine 5 milliGRAM(s) Oral at bedtime    MEDICATIONS  (PRN):  dextrose 40% Gel 15 Gram(s) Oral once PRN Blood Glucose LESS THAN 70 milliGRAM(s)/deciliter  glucagon  Injectable 1 milliGRAM(s) IntraMuscular once PRN Glucose LESS THAN 70 milligrams/deciliter  haloperidol    Injectable 1 milliGRAM(s) IV Push every 6 hours PRN Mod to severe agitation  OLANZapine 1.25 milliGRAM(s) Oral every 6 hours PRN agitation      VITAL SIGNS:  T(F): 98.6 (08-18-20 @ 06:45), Max: 98.6 (08-18-20 @ 06:45)  HR: 99 (08-18-20 @ 06:45) (98 - 99)  BP: 136/56 (08-18-20 @ 06:45) (94/39 - 140/45)  RR: 17 (08-18-20 @ 06:45) (16 - 17)  SpO2: 97% (08-18-20 @ 06:45) (97% - 98%)    I&O's Summary    17 Aug 2020 07:01  -  18 Aug 2020 07:00  --------------------------------------------------------  IN: 450 mL / OUT: 0 mL / NET: 450 mL      Daily     Daily     PHYSICAL EXAM:  Gen: Alert, NAD  HEENT: NCAT, conjunctiva clear, sclera anicteric, no erythema or exudates in the oropharynx, mmm  Neck: Supple, no JVD  CV: RRR, S1S2, no m/r/g  Resp: CTAB, normal respiratory effort  Abd: Soft, nontender, nondistended, normal bowel sounds  Ext: no edema, no clubbing or cyanosis  Neuro: AOx3, CN2-12 grossly intact, EMMANUEL  SKIN: warm, perfused    LABS:                        11.9   9.14  )-----------( 277      ( 17 Aug 2020 06:30 )             38.4     Hgb Trend: 11.9<--, 12.0<--, 10.9<--, 11.2<--, 12.6<--  08-17    139  |  100  |  21  ----------------------------<  207<H>  3.7   |  26  |  0.81    Ca    9.3      17 Aug 2020 06:30  Phos  2.8     08-17  Mg     2.0     08-17      Creatinine Trend: 0.81<--, 0.99<--, 0.89<--, 1.08<--, 0.90<--, 1.01<--              CAPILLARY BLOOD GLUCOSE      POCT Blood Glucose.: 175 mg/dL (17 Aug 2020 07:36)      RADIOLOGY & ADDITIONAL TESTS: Reviewed    Imaging Personally Reviewed:    Consultant(s) Notes Reviewed:      Care Discussed with Consultants/Other Providers: Andrea Cooper, PGY1  Pager [phone]      Patient is a 80y old  Female who presents with a chief complaint of Leg swelling, redness (17 Aug 2020 09:10)      SUBJECTIVE/INTERVAL EVENTS:  - Refusing fingersticks, lantus, and medication all day yesterday and into this AM.  - Pt with 3 episodes of diarrrhea, BP down to 94/39 after the episodes, but pepper back up with 140/40s afterwards.  - Patient seen and examined at bedside.    MEDICATIONS  (STANDING):  aspirin  chewable 81 milliGRAM(s) Oral daily  cyanocobalamin 1000 MICROGram(s) Oral daily  dextrose 5%. 1000 milliLiter(s) (50 mL/Hr) IV Continuous <Continuous>  dextrose 50% Injectable 12.5 Gram(s) IV Push once  dextrose 50% Injectable 25 Gram(s) IV Push once  dextrose 50% Injectable 25 Gram(s) IV Push once  enoxaparin Injectable 40 milliGRAM(s) SubCutaneous daily  furosemide    Tablet 40 milliGRAM(s) Oral daily  insulin glargine Injectable (LANTUS) 10 Unit(s) SubCutaneous at bedtime  insulin lispro (HumaLOG) corrective regimen sliding scale   SubCutaneous three times a day before meals  insulin lispro (HumaLOG) corrective regimen sliding scale   SubCutaneous at bedtime  levothyroxine 75 MICROGram(s) Oral <User Schedule>  lisinopril 40 milliGRAM(s) Oral daily  melatonin 6 milliGRAM(s) Oral at bedtime  metoprolol succinate  milliGRAM(s) Oral daily  OLANZapine 2.5 milliGRAM(s) Oral <User Schedule>  OLANZapine 5 milliGRAM(s) Oral at bedtime    MEDICATIONS  (PRN):  dextrose 40% Gel 15 Gram(s) Oral once PRN Blood Glucose LESS THAN 70 milliGRAM(s)/deciliter  glucagon  Injectable 1 milliGRAM(s) IntraMuscular once PRN Glucose LESS THAN 70 milligrams/deciliter  haloperidol    Injectable 1 milliGRAM(s) IV Push every 6 hours PRN Mod to severe agitation  OLANZapine 1.25 milliGRAM(s) Oral every 6 hours PRN agitation      VITAL SIGNS:  T(F): 98.6 (08-18-20 @ 06:45), Max: 98.6 (08-18-20 @ 06:45)  HR: 99 (08-18-20 @ 06:45) (98 - 99)  BP: 136/56 (08-18-20 @ 06:45) (94/39 - 140/45)  RR: 17 (08-18-20 @ 06:45) (16 - 17)  SpO2: 97% (08-18-20 @ 06:45) (97% - 98%)    I&O's Summary    17 Aug 2020 07:01  -  18 Aug 2020 07:00  --------------------------------------------------------  IN: 450 mL / OUT: 0 mL / NET: 450 mL      Daily     Daily     PHYSICAL EXAM:  Gen: in bed, NAD  HEENT: NCAT, conjunctiva clear, sclera anicteric  Neck: Supple  CV: Regular rate and rhythm, S1S2+  Resp: CTAB, normal respiratory effort  Abd: Soft, nontender, nondistended  Ext: BLE edema and erythema up to mid-shins, much improved from yesterday given elevation  Neuro: AOx1, EMMANUEL  SKIN: chronic venous stasis changes in BLE. warm, perfused    LABS:                        11.9   9.14  )-----------( 277      ( 17 Aug 2020 06:30 )             38.4     Hgb Trend: 11.9<--, 12.0<--, 10.9<--, 11.2<--, 12.6<--  08-17    139  |  100  |  21  ----------------------------<  207<H>  3.7   |  26  |  0.81    Ca    9.3      17 Aug 2020 06:30  Phos  2.8     08-17  Mg     2.0     08-17      Creatinine Trend: 0.81<--, 0.99<--, 0.89<--, 1.08<--, 0.90<--, 1.01<--              CAPILLARY BLOOD GLUCOSE      POCT Blood Glucose.: 175 mg/dL (17 Aug 2020 07:36)      RADIOLOGY & ADDITIONAL TESTS: Reviewed    Imaging Personally Reviewed:    Consultant(s) Notes Reviewed:      Care Discussed with Consultants/Other Providers:

## 2020-08-18 NOTE — PROGRESS NOTE ADULT - SUBJECTIVE AND OBJECTIVE BOX
EP Attending    Patient appears comfortable. No distress.  Wears telemetry intermittently.  Awaiting transition to rehab.  Does not wish to participate in ROS /further interview today.   Telemetry continues to show frequent atrial ectopy but no AFib.  Episodes marked as AFib have clear P-waves.    aspirin enteric coated 81 milliGRAM(s) Oral daily  cyanocobalamin 1000 MICROGram(s) Oral daily  dextrose 40% Gel 15 Gram(s) Oral once PRN  dextrose 5%. 1000 milliLiter(s) IV Continuous <Continuous>  dextrose 50% Injectable 12.5 Gram(s) IV Push once  dextrose 50% Injectable 25 Gram(s) IV Push once  dextrose 50% Injectable 25 Gram(s) IV Push once  enoxaparin Injectable 40 milliGRAM(s) SubCutaneous daily  furosemide    Tablet 40 milliGRAM(s) Oral daily  glucagon  Injectable 1 milliGRAM(s) IntraMuscular once PRN  haloperidol    Injectable 1 milliGRAM(s) IV Push every 6 hours PRN  insulin glargine Injectable (LANTUS) 10 Unit(s) SubCutaneous at bedtime  insulin lispro (HumaLOG) corrective regimen sliding scale   SubCutaneous three times a day before meals  insulin lispro (HumaLOG) corrective regimen sliding scale   SubCutaneous at bedtime  levothyroxine 75 MICROGram(s) Oral <User Schedule>  lisinopril 40 milliGRAM(s) Oral daily  melatonin 6 milliGRAM(s) Oral at bedtime  metoprolol succinate  milliGRAM(s) Oral daily  OLANZapine 1.25 milliGRAM(s) Oral every 6 hours PRN  OLANZapine 2.5 milliGRAM(s) Oral <User Schedule>  OLANZapine 5 milliGRAM(s) Oral at bedtime                            11.9   9.14  )-----------( 277      ( 17 Aug 2020 06:30 )             38.4       08-17    139  |  100  |  21  ----------------------------<  207<H>  3.7   |  26  |  0.81    Ca    9.3      17 Aug 2020 06:30  Phos  2.8     08-17  Mg     2.0     08-17    T(C): 36.4 (08-18-20 @ 12:13), Max: 37 (08-18-20 @ 06:45)  HR: 99 (08-18-20 @ 12:13) (98 - 99)  BP: 121/83 (08-18-20 @ 12:13) (94/39 - 140/45)  RR: 17 (08-18-20 @ 12:13) (16 - 17)  SpO2: 97% (08-18-20 @ 12:13) (97% - 98%)  Wt(kg): --    I&O's Summary    17 Aug 2020 07:01  -  18 Aug 2020 07:00  --------------------------------------------------------  IN: 450 mL / OUT: 0 mL / NET: 450 mL    18 Aug 2020 07:01  -  18 Aug 2020 12:50  --------------------------------------------------------  IN: 0 mL / OUT: 0 mL / NET: 0 mL    Gen: Appears well in NAD  HEENT:  (-)icterus (-)pallor  CV: N S1 S2 1/6 LACI (+)2 Pulses B/l  Resp:  Clear to auscultation B/L, normal effort  GI: (+) BS Soft, NT, ND  Lymph:  (-)Edema, (-)obvious lymphadenopathy  Skin: Warm to touch, Normal turgor  Psych: unable to determine    TELEMETRY: SR with frequent PACs, unchanged.  No sustained arrhythmia.    < from: Transthoracic Echocardiogram (08.09.20 @ 11:14) >  CONCLUSIONS:  1. Normal left ventricular internal dimensions and wall  thicknesses.  2. Normal left ventricular systolic function.  3. Normal right ventricular size and function.  *** No previous Echo exam.  ------------------------------------------------------------------------  Confirmed on  8/9/2020 - 17:37:12 by Marquez Bela, M.D.    < end of copied text >      ASSESSMENT/PLAN: 	  80y Female with leg edema and shortness of breath.  Undocumented atrial arrhythmia without clear symptom-rhythm correlation.    --Continue home beta blocker, and Aspirin 81.  --Echo results are reassuring.  --So far, no sustained arrhythmia, only frequent atrial prematures.   Telemetry quality getting poorer as hospital admission goes on- frequent artifact and very low amplitude P-waves.  --Psychosis mgmt appreciated from primary team.    Campbell Sanchez M.D.  Cardiac Electrophysiology  [phone]

## 2020-08-18 NOTE — PROGRESS NOTE ADULT - ASSESSMENT
80F (primarily [place]-speaking) w/ hx of HTN, DM, obesity, and anxiety presented on 8/6 with BLE edema and redness x 1 month likely secondary to chronic venous insufficiency, much improved from admission. 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

## 2020-08-18 NOTE — PROGRESS NOTE ADULT - PROBLEM SELECTOR PLAN 2
Yesterday's plt 120s - likely lab error. Repeat plt count was 291, this .   - CTM CBC daily Non-bloody, no mucus. Possibly d/t synthroid. No further episodes since overnight.  - If recurs, obtain stool PCR, stool Osm

## 2020-08-18 NOTE — PROGRESS NOTE ADULT - ATTENDING COMMENTS
Pt seen and examined. Endorses abd pain, poor historian hence unable to get detailed hx about the symptom    Abd Pain: likely due to biliary colic, abd exam benign, prn pain meds, no signs of acute cholecystitis, will eventually need outpt surgery follow up for elective cholecystectomy if within pt's goals of care.     Dementia with behavioral disturbance: multifactorial, hyperthyroidism, Vitamin B12 deficiency and underlying schizophrenia. c/w antipsychotics per Psych,  c/w Vit B12, dc to [facility]    DM Type 2: pt refusing FS at times, prn meds, c/w FS trending    Hyperthyroidism: f/u endo recs .    Spent 40mins on dc

## 2020-08-18 NOTE — CHART NOTE - NSCHARTNOTEFT_GEN_A_CORE
Patient refusing to get fingerstick glucose at night, so did not receive lantus dose. Attempted to use  to converse with patient, who speaks Maltese. However, patient was agitated and threw  phone across room in anger, continuing to refuse fingersticks.

## 2020-08-18 NOTE — PROGRESS NOTE ADULT - PROBLEM SELECTOR PLAN 3
Severe hypothyroidism TSH45, FT4 improved to 0.81   - c/w Synthroid 75mcg PO daily  - repeat FT4 in 4 weeks Severe hypothyroidism TSH45, FT4 improved to 0.81   - c/w Synthroid 75mcg PO daily  - repeat FT4 in 4 weeks from 8/13

## 2020-08-19 NOTE — CONSULT NOTE ADULT - SUBJECTIVE AND OBJECTIVE BOX
HPI: 80F (primarily [place]-speaking) w/ hx of HTN, DM, obesity, and anxiety presented on  with BLE edema and redness x 1 month likely secondary to chronic venous insufficiency, much improved from admission. 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     PAST MEDICAL & SURGICAL HISTORY:  Anxiety  Obesity  DM (diabetes mellitus)  HTN (hypertension)  History of       Review of Systems:   CONSTITUTIONAL: No fever, weight loss, or fatigue  EYES: No eye pain, visual disturbances, or discharge  ENMT:  No difficulty hearing, tinnitus, vertigo; No sinus or throat pain  NECK: No pain or stiffness  RESPIRATORY: No cough, wheezing, chills or hemoptysis; No shortness of breath  CARDIOVASCULAR: No chest pain, palpitations, dizziness, or leg swelling  GASTROINTESTINAL: No abdominal or epigastric pain. No nausea, vomiting, or hematemesis; No diarrhea or constipation. No melena or hematochezia.  GENITOURINARY: No dysuria, frequency, hematuria, or incontinence  NEUROLOGICAL: No headaches, memory loss, loss of strength, numbness, or tremors  SKIN: No itching, burning, rashes, or lesions   LYMPH NODES: No enlarged glands  ENDOCRINE: No heat or cold intolerance; No hair loss  MUSCULOSKELETAL: No joint pain or swelling; No muscle, back, or extremity pain  HEME/LYMPH: No easy bruising, or bleeding gums  ALLERY AND IMMUNOLOGIC: No hives or eczema    Allergies    No Known Allergies    Intolerances        Social History:     FAMILY HISTORY:      MEDICATIONS  (STANDING):  aspirin enteric coated 81 milliGRAM(s) Oral daily  cyanocobalamin 1000 MICROGram(s) Oral daily  furosemide    Tablet 40 milliGRAM(s) Oral daily  insulin glargine Injectable (LANTUS) 10 Unit(s) SubCutaneous at bedtime  insulin lispro (HumaLOG) corrective regimen sliding scale   SubCutaneous three times a day before meals  insulin lispro (HumaLOG) corrective regimen sliding scale   SubCutaneous at bedtime  levothyroxine 75 MICROGram(s) Oral daily  lisinopril 40 milliGRAM(s) Oral daily  melatonin. 6 milliGRAM(s) Oral at bedtime  metFORMIN 1000 milliGRAM(s) Oral two times a day  metoprolol succinate  milliGRAM(s) Oral daily  OLANZapine 5 milliGRAM(s) Oral at bedtime  OLANZapine 2.5 milliGRAM(s) Oral <User Schedule>    MEDICATIONS  (PRN):  dextrose 40% Gel 15 Gram(s) Oral once PRN Blood Glucose LESS THAN 70 milliGRAM(s)/deciliter  glucagon  Injectable 1 milliGRAM(s) IntraMuscular once PRN Glucose LESS THAN 70 milligrams/deciliter  OLANZapine 1.25 milliGRAM(s) Oral every 6 hours PRN severe agitation  OLANZapine Injectable 2.5 milliGRAM(s) IntraMuscular once PRN severe agitation      Vital Signs Last 24 Hrs  T(C): 36.2 (19 Aug 2020 06:00), Max: 37.3 (19 Aug 2020 02:09)  T(F): 97.2 (19 Aug 2020 06:00), Max: 99.2 (19 Aug 2020 02:09)  HR: 102 (19 Aug 2020 02:09) (99 - 102)  BP: 97/47 (19 Aug 2020 02:09) (97/47 - 121/83)  BP(mean): --  RR: 18 (18 Aug 2020 19:48) (17 - 18)  SpO2: 93% (19 Aug 2020 02:09) (93% - 97%)  CAPILLARY BLOOD GLUCOSE      POCT Blood Glucose.: 224 mg/dL (19 Aug 2020 08:26)  POCT Blood Glucose.: 246 mg/dL (19 Aug 2020 01:55)  POCT Blood Glucose.: 268 mg/dL (18 Aug 2020 21:06)        PHYSICAL EXAM:  GENERAL: NAD, well-developed  HEAD:  Atraumatic, Normocephalic  EYES: EOMI, conjunctiva and sclera clear  NECK: Supple, No JVD  CHEST/LUNG: Clear to auscultation bilaterally; No wheeze  HEART: Regular rate and rhythm; No murmurs, rubs, or gallops  ABDOMEN: Soft, Nontender, Nondistended; Bowel sounds present  EXTREMITIES:  2+ Peripheral Pulses, No clubbing, cyanosis, or edema  NEUROLOGY: non-focal  SKIN: No rashes or lesions    LABS:                    EKG(personally reviewed):    RADIOLOGY & ADDITIONAL TESTS:    Imaging Personally Reviewed:    Consultant(s) Notes Reviewed:      Care Discussed with Consultants/Other Providers: HPI: 80F (primarily Polish-speaking) w/ hx of HTN, DM, obesity, and anxiety presented on  with BLE edema and redness x 1 month likely secondary to chronic venous insufficiency, much improved from admission. 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     PAST MEDICAL & SURGICAL HISTORY:  Anxiety  Obesity  DM (diabetes mellitus)  HTN (hypertension)  History of       Review of Systems:   CONSTITUTIONAL: No fever, weight loss, or fatigue  EYES: No eye pain, visual disturbances, or discharge  ENMT:  No difficulty hearing, tinnitus, vertigo; No sinus or throat pain  NECK: No pain or stiffness  RESPIRATORY: No cough, wheezing, chills or hemoptysis; No shortness of breath  CARDIOVASCULAR: No chest pain, palpitations, dizziness, or leg swelling  GASTROINTESTINAL: No abdominal or epigastric pain. No nausea, vomiting, or hematemesis; No diarrhea or constipation. No melena or hematochezia.  GENITOURINARY: No dysuria, frequency, hematuria, or incontinence  NEUROLOGICAL: No headaches, memory loss, loss of strength, numbness, or tremors  SKIN: No itching, burning, rashes, or lesions   LYMPH NODES: No enlarged glands  ENDOCRINE: No heat or cold intolerance; No hair loss  MUSCULOSKELETAL: No joint pain or swelling; No muscle, back, or extremity pain  HEME/LYMPH: No easy bruising, or bleeding gums  ALLERY AND IMMUNOLOGIC: No hives or eczema    Allergies    No Known Allergies    Intolerances        Social History:     FAMILY HISTORY:      MEDICATIONS  (STANDING):  aspirin enteric coated 81 milliGRAM(s) Oral daily  cyanocobalamin 1000 MICROGram(s) Oral daily  furosemide    Tablet 40 milliGRAM(s) Oral daily  insulin glargine Injectable (LANTUS) 10 Unit(s) SubCutaneous at bedtime  insulin lispro (HumaLOG) corrective regimen sliding scale   SubCutaneous three times a day before meals  insulin lispro (HumaLOG) corrective regimen sliding scale   SubCutaneous at bedtime  levothyroxine 75 MICROGram(s) Oral daily  lisinopril 40 milliGRAM(s) Oral daily  melatonin. 6 milliGRAM(s) Oral at bedtime  metFORMIN 1000 milliGRAM(s) Oral two times a day  metoprolol succinate  milliGRAM(s) Oral daily  OLANZapine 5 milliGRAM(s) Oral at bedtime  OLANZapine 2.5 milliGRAM(s) Oral <User Schedule>    MEDICATIONS  (PRN):  dextrose 40% Gel 15 Gram(s) Oral once PRN Blood Glucose LESS THAN 70 milliGRAM(s)/deciliter  glucagon  Injectable 1 milliGRAM(s) IntraMuscular once PRN Glucose LESS THAN 70 milligrams/deciliter  OLANZapine 1.25 milliGRAM(s) Oral every 6 hours PRN severe agitation  OLANZapine Injectable 2.5 milliGRAM(s) IntraMuscular once PRN severe agitation      Vital Signs Last 24 Hrs  T(C): 36.2 (19 Aug 2020 06:00), Max: 37.3 (19 Aug 2020 02:09)  T(F): 97.2 (19 Aug 2020 06:00), Max: 99.2 (19 Aug 2020 02:09)  HR: 102 (19 Aug 2020 02:09) (99 - 102)  BP: 97/47 (19 Aug 2020 02:09) (97/47 - 121/83)  BP(mean): --  RR: 18 (18 Aug 2020 19:48) (17 - 18)  SpO2: 93% (19 Aug 2020 02:09) (93% - 97%)  CAPILLARY BLOOD GLUCOSE      POCT Blood Glucose.: 224 mg/dL (19 Aug 2020 08:26)  POCT Blood Glucose.: 246 mg/dL (19 Aug 2020 01:55)  POCT Blood Glucose.: 268 mg/dL (18 Aug 2020 21:06)        PHYSICAL EXAM:  GENERAL: NAD, well-developed  HEAD:  Atraumatic, Normocephalic  EYES: EOMI, conjunctiva and sclera clear  NECK: Supple, No JVD  CHEST/LUNG: Clear to auscultation bilaterally; No wheeze  HEART: Regular rate and rhythm; No murmurs, rubs, or gallops  ABDOMEN: Soft, Nontender, Nondistended; Bowel sounds present  EXTREMITIES:  2+ Peripheral Pulses, No clubbing, cyanosis, or edema  NEUROLOGY: non-focal  SKIN: No rashes or lesions    LABS:                    EKG(personally reviewed):    RADIOLOGY & ADDITIONAL TESTS:  Transthoracic Echocardiogram (20 @ 11:14) >  CONCLUSIONS:  1. Normal left ventricular internal dimensions and wall  thicknesses.  2. Normal left ventricular systolic function.  3. Normal right ventricular size and function.  *** No previous Echo exam.  ------------------------------------------------------------------------  Confirmed on  2020 - 17:37:12 by Marquez Cramer M.D.     Duplex Venous Lower Ext Complete, Bilateral (20 @ 23:32) >  IMPRESSION:  Bilateral posterior tibial veins and peroneal vein were not visualized, limiting complete evaluation. No obvious thrombosis in the visualized bilateral lower extremity deep veins.  JORDAN MULLIGAN M.D., ATTENDING RADIOLOGIST  This document has been electronically signed. Aug  6 2020 11:36PM                  Consultant(s) Notes Reviewed:      Care Discussed with Consultants/Other Providers: HPI: 80F (primarily Argentine-speaking) w/ hx of HTN, DM, obesity, and anxiety presented on  with BLE edema and redness x 1 month likely secondary to chronic venous insufficiency, much improved from admission. 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     Pt seen sitting in day room eating lunch, answers yes/no questions, denies any complaints.     PAST MEDICAL & SURGICAL HISTORY:  Anxiety  Obesity  DM (diabetes mellitus)  HTN (hypertension)  History of       Review of Systems:   unable to fully assess due to dementia     Allergies    No Known Allergies    Intolerances        Social History:     FAMILY HISTORY:      MEDICATIONS  (STANDING):  aspirin enteric coated 81 milliGRAM(s) Oral daily  cyanocobalamin 1000 MICROGram(s) Oral daily  furosemide    Tablet 40 milliGRAM(s) Oral daily  insulin glargine Injectable (LANTUS) 10 Unit(s) SubCutaneous at bedtime  insulin lispro (HumaLOG) corrective regimen sliding scale   SubCutaneous three times a day before meals  insulin lispro (HumaLOG) corrective regimen sliding scale   SubCutaneous at bedtime  levothyroxine 75 MICROGram(s) Oral daily  lisinopril 40 milliGRAM(s) Oral daily  melatonin. 6 milliGRAM(s) Oral at bedtime  metFORMIN 1000 milliGRAM(s) Oral two times a day  metoprolol succinate  milliGRAM(s) Oral daily  OLANZapine 5 milliGRAM(s) Oral at bedtime  OLANZapine 2.5 milliGRAM(s) Oral <User Schedule>    MEDICATIONS  (PRN):  dextrose 40% Gel 15 Gram(s) Oral once PRN Blood Glucose LESS THAN 70 milliGRAM(s)/deciliter  glucagon  Injectable 1 milliGRAM(s) IntraMuscular once PRN Glucose LESS THAN 70 milligrams/deciliter  OLANZapine 1.25 milliGRAM(s) Oral every 6 hours PRN severe agitation  OLANZapine Injectable 2.5 milliGRAM(s) IntraMuscular once PRN severe agitation      Vital Signs Last 24 Hrs  T(C): 36.2 (19 Aug 2020 06:00), Max: 37.3 (19 Aug 2020 02:09)  T(F): 97.2 (19 Aug 2020 06:00), Max: 99.2 (19 Aug 2020 02:09)  HR: 102 (19 Aug 2020 02:09) (99 - 102)  BP: 97/47 (19 Aug 2020 02:09) (97/47 - 121/83)  BP(mean): --  RR: 18 (18 Aug 2020 19:48) (17 - 18)  SpO2: 93% (19 Aug 2020 02:09) (93% - 97%)  CAPILLARY BLOOD GLUCOSE      POCT Blood Glucose.: 224 mg/dL (19 Aug 2020 08:26)  POCT Blood Glucose.: 246 mg/dL (19 Aug 2020 01:55)  POCT Blood Glucose.: 268 mg/dL (18 Aug 2020 21:06)        PHYSICAL EXAM:  GENERAL: elderly female, NAD  HEAD:  Atraumatic, Normocephalic  EYES: EOMI, conjunctiva and sclera clear  NECK: Supple  CHEST/LUNG: Clear to auscultation bilaterally; No wheeze  HEART: Regular rate and rhythm; No overt murmurs   EXTREMITIES: b/l LE nonpitting edema and erythema upto mid shins, chronic venous stasis skin changes, prune-like in texture    NEUROLOGY: non-focal  SKIN: as above     LABS:                    EKG(personally reviewed):    RADIOLOGY & ADDITIONAL TESTS:  Transthoracic Echocardiogram (20 @ 11:14) >  CONCLUSIONS:  1. Normal left ventricular internal dimensions and wall  thicknesses.  2. Normal left ventricular systolic function.  3. Normal right ventricular size and function.  *** No previous Echo exam.  ------------------------------------------------------------------------  Confirmed on  2020 - 17:37:12 by Marquez Cramer M.D.    US Duplex Venous Lower Ext Complete, Bilateral (20 @ 23:32) >  IMPRESSION:  Bilateral posterior tibial veins and peroneal vein were not visualized, limiting complete evaluation. No obvious thrombosis in the visualized bilateral lower extremity deep veins.  JORDAN MULLIGAN M.D., ATTENDING RADIOLOGIST  This document has been electronically signed. Aug  6 2020 11:36PM                  Consultant(s) Notes Reviewed:      Care Discussed with Consultants/Other Providers: primary team

## 2020-08-19 NOTE — CONSULT NOTE ADULT - ASSESSMENT
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      Problem/Plan - 1:  ·  Problem: Dementia with psychosis.  Plan: - pt to be transferred to [facility] today  - Olanzapine 2.5mg PO at 6pm, 5mg standing olanzapine at bedtime given increased agitation   - PRN Haldol 1mg IM q6h for moderate to severe agitation given agitation not managed on olanzapine.  - Melatonin 6mg PO QHS  - QTC today 465.      Problem/Plan - 2:  ·  Problem: Diarrhea, unspecified type.  Plan: Non-bloody, no mucus. Possibly d/t synthroid. No further episodes since overnight.  - If recurs, obtain stool PCR, stool Osm.      Problem/Plan - 3:  ·  Problem: Hypothyroid.  Plan: Severe hypothyroidism TSH45, FT4 improved to 0.81   - c/w Synthroid 75mcg PO daily  - repeat FT4 in 4 weeks from 8/13.      Problem/Plan - 4:  ·  Problem: Lower extremity edema.  Plan: Much improved from admission. Likely 2/2 chronic venous insufficiency vs. hypothyroidism.   - lasix 40mg daily  - low sodium diet  - encourage elevation  - Fall precautions.      Problem/Plan - 5:  ·  Problem: Cardiac arrhythmia, unspecified cardiac arrhythmia type.  Plan: Multifocal atrial pacing/PACs. Possibly 2/2 hypothyroidism.  - EP following, appreciate recs  - c/w aspirin 81mg.      Problem/Plan - 6:  Problem: Hypertension, unspecified type. Plan: - Lasix 40mg daily  - c/w Lisinopril 40mg daily, (on Ramipril 10mg daily BID at home)  - c/w home Metoprolol scc  daily.     Problem/Plan - 7:  ·  Problem: Type 2 diabetes mellitus with other specified complication, without long-term current use of insulin.  Plan: - Monitor FS   - SSI before meals and at bedtime  - Lantus 8mg QHS  - Per endo, pt on metformin 500mg BID at home and not taking glipizide. For d/c or transfer, pt can take metformin 1000mg BID.      Problem/Plan - 8:  ·  Problem: B12 deficiency.  Plan: - B12< 150  - started on cyanocobalamin 1000mcgs daily. 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     #Hypothyroidism: TSH 45, FT4 0.81 on 8/13   -C/w Synthroid 75mcg  -repeat TFTs ordered for 9/14     #Lower extremity edema: likely 2/2 chronic venous insufficiency vs. hypothyroidism  -LE US (8/6): limited, but no obvious thrombosis   -C/w lasix 40mg daily, low sodium diet, encourage elevation, Fall precautions    #Multifocal atrial pacing/PACs: possibly 2/2 hypothyroidism, Seen by EP at [facility], TTE (8/9) Normal LV/RV function  -C/w Toprol XL 100mg daily and ASA 81mg per EP     #HTN: BP soft, 100/80s   -C/w Lasix 40mg daily, Lisinopril 40mg daily, (on Ramipril 10mg daily BID at home), Metoprolol scc  daily.  -IF pt symptomatic, will decrease vs discontinue either Lisinopril or Lasix      #DMII: A1c 7.5, well controlled  -C/w Metformin 1000mg BID  -C/w Lantus 10U qhs and sliding scale      #B12 Def: <150   -C/w cyanocobalamin 1000mcg  -Repeat B12 on 9/14     #Dementia w/psychosis: Management per primary team 80F w/HTN, DM, obesity, and anxiety presented on 8/6 with BLE edema and redness x 1 month likely secondary to chronic venous insufficiency, c/b suspected cardiac arrhythme, seen by EP and monitored on telemetry, Also found to have dementia with psychosis and severe hypothyroidism, transitioned to PO synthroid.     #Hypothyroidism: TSH 45, FT4 0.81 on 8/13   -C/w Synthroid 75mcg  -repeat TFTs ordered for 9/14 if pt still here, otherwise outpt followup     #Lower extremity edema: likely 2/2 chronic venous insufficiency vs. hypothyroidism  -LE US (8/6): limited, but no obvious thrombosis   -C/w lasix 40mg daily, low sodium diet, encourage elevation, Fall precautions    #Multifocal atrial pacing/PACs: possibly 2/2 hypothyroidism, Seen by EP at [facility], TTE (8/9) Normal LV/RV function  -C/w Toprol XL 100mg daily and ASA 81mg per EP     #HTN: BP soft, 100/80s   -C/w Lasix 40mg daily, Lisinopril 40mg daily, (on Ramipril 10mg daily BID at home), Metoprolol scc  daily.  -IF pt symptomatic, will decrease vs discontinue either Lisinopril or Lasix      #DMII: A1c 7.5, well controlled overall, but FS elevated here.   -C/w Metformin 1000mg BID  -C/w Lantus 10U qhs and sliding scale      #B12 Def: <150   -C/w cyanocobalamin 1000mcg  -Repeat B12 on 9/14     #Dementia w/psychosis: Management per primary team

## 2020-08-23 NOTE — PROGRESS NOTE ADULT - SUBJECTIVE AND OBJECTIVE BOX
CC/Reason for Consult: edema, DM    SUBJECTIVE / OVERNIGHT EVENTS: patient tells me to go away and leave her alone    MEDICATIONS  (STANDING):  aspirin enteric coated 81 milliGRAM(s) Oral daily  cyanocobalamin 1000 MICROGram(s) Oral daily  furosemide    Tablet 20 milliGRAM(s) Oral daily  insulin glargine Injectable (LANTUS) 20 Unit(s) SubCutaneous at bedtime  insulin lispro (HumaLOG) corrective regimen sliding scale   SubCutaneous three times a day before meals  insulin lispro (HumaLOG) corrective regimen sliding scale   SubCutaneous at bedtime  levothyroxine 75 MICROGram(s) Oral daily  lisinopril 40 milliGRAM(s) Oral daily  melatonin. 6 milliGRAM(s) Oral at bedtime  metFORMIN 1000 milliGRAM(s) Oral two times a day  metoprolol succinate  milliGRAM(s) Oral daily  OLANZapine 2.5 milliGRAM(s) Oral <User Schedule>  OLANZapine Disintegrating Tablet 5 milliGRAM(s) Oral at bedtime    MEDICATIONS  (PRN):  dextrose 40% Gel 15 Gram(s) Oral once PRN Blood Glucose LESS THAN 70 milliGRAM(s)/deciliter  glucagon  Injectable 1 milliGRAM(s) IntraMuscular once PRN Glucose LESS THAN 70 milligrams/deciliter  OLANZapine 1.25 milliGRAM(s) Oral every 6 hours PRN severe agitation  OLANZapine Injectable 2.5 milliGRAM(s) IntraMuscular once PRN severe agitation      Vital Signs Last 24 Hrs  T(C): 36.8 (23 Aug 2020 08:07), Max: 36.8 (23 Aug 2020 08:07)  T(F): 98.2 (23 Aug 2020 08:07), Max: 98.2 (23 Aug 2020 08:07)  HR: 89  BP: 135/87  BP(mean): --  RR: 15  SpO2: --  CAPILLARY BLOOD GLUCOSE      POCT Blood Glucose.: 298 mg/dL (22 Aug 2020 21:22)        PHYSICAL EXAM:  GENERAL: NAD, obese  HEAD:  Atraumatic, Normocephalic  EYES: EOMI, conjunctiva and sclera clear  NECK: Supple, No JVD  CHEST/LUNG: Clear to auscultation bilaterally; No wheeze  HEART: Regular rate and rhythm; No murmurs, rubs, or gallops  ABDOMEN: Soft, Nontender, Nondistended; Bowel sounds present  EXTREMITIES:  2+ Peripheral Pulses, No clubbing, cyanosis, 1+ puffy edema BLE with wrinkling fo skin edema  NEUROLOGY: non-focal  SKIN: No rashes or lesions

## 2020-08-23 NOTE — PROGRESS NOTE ADULT - ASSESSMENT
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     #Lower extremity edema: likely 2/2 chronic venous insufficiency vs. hypothyroidism  -skin wrinkling suggests improvement in edema. Will reduce lasix to 20mg daily  -LE US (8/6): limited, but no obvious thrombosis   -low sodium diet, encourage elevation, Fall precautions    #DMII: A1c 7.5, well controlled overall, but FS elevated here.   -C/w Metformin 1000mg BID  -increase Lantus to 20U qhs with humalog correction scale      #Hypothyroidism: TSH 45, FT4 0.81 on 8/13   -C/w Synthroid 75mcg  -repeat TFTs ordered for 9/14 if pt still here, otherwise outpt followup     #Multifocal atrial pacing/PACs: possibly 2/2 hypothyroidism, Seen by EP at [facility], TTE (8/9) Normal LV/RV function  -C/w Toprol XL 100mg daily and ASA 81mg per EP     #HTN: BP stable  -C/w Lasix 20mg daily, Lisinopril 40mg daily, (on Ramipril 10mg daily BID at home), Metoprolol scc  daily.      #B12 Def: <150   -C/w cyanocobalamin 1000mcg  -Repeat B12 on 9/14     #Dementia w/psychosis: Management per primary team

## 2020-09-15 PROBLEM — E11.9 TYPE II DIABETES MELLITUS: Status: ACTIVE | Noted: 2020-01-01

## 2020-09-15 PROBLEM — F03.91 DEMENTIA WITH PSYCHOSIS: Status: ACTIVE | Noted: 2020-01-01

## 2020-09-15 PROBLEM — E66.9 OBESITY: Status: ACTIVE | Noted: 2020-01-01

## 2020-09-15 PROBLEM — Z23 INFLUENZA VACCINE NEEDED: Status: ACTIVE | Noted: 2020-01-01

## 2020-09-15 PROBLEM — E03.9 HYPOTHYROIDISM: Status: ACTIVE | Noted: 2020-01-01

## 2020-09-24 NOTE — PROVIDER CONTACT NOTE (OTHER) - NAME OF MD/NP/PA/DO NOTIFIED:
Hospital Medicine  Progress note    Team: [facility] Keenan Gonzales MD  Admit Date: 8/30/2020  EDY 9/25/2020  Length of Stay:  LOS: 25 days   Code status: Full Code    Principal Problem:  Septic shock due to methicillin resistant Staphylococcus aureus    HPI / Hospital Course     Interval hx:  Refused angiogram this am. Vascular surgery signed off    ROS     Respiratory: neg for cough neg for shortness of breath  Cardiovascular: neg for chest pain neg for palpitations  Gastrointestinal: neg for nausea neg for vomiting, neg for abdominal pain neg for diarrhea neg for constipation   Behavioral/Psych: neg for depression neg for anxiety    PEx  Temp:  [98 °F (36.7 °C)-98.3 °F (36.8 °C)]   Pulse:  []   Resp:  [10-25]   BP: (110-130)/(73-86)   SpO2:  [95 %-98 %]     Intake/Output Summary (Last 24 hours) at 9/24/2020 0838  Last data filed at 9/24/2020 0558  Gross per 24 hour   Intake --   Output 610 ml   Net -610 ml       General Appearance: no acute distress   Heart: regular rate and rhythm  Respiratory: Normal respiratory effort, no crackles   Abdomen: Soft, non-tender; bowel sounds active  Skin: intact.Skin intact  Neurologic:  No focal numbness or weakness  Mental status: Alert, oriented x 4, affect appropriate     Recent Labs   Lab 09/22/20 0410 09/23/20  0300 09/24/20  0515   WBC 17.04* 14.68* 13.90*   HGB 7.7* 8.2* 8.0*   HCT 25.8* 27.5* 27.3*    338 379*     Recent Labs   Lab 09/22/20 0410 09/23/20  0300 09/24/20  0515   * 136 136   K 4.2 4.3 3.7   CL 99 102 101   CO2 23 21* 27   BUN 35* 31* 27*   CREATININE 2.1* 1.9* 1.7*   * 146* 121*   CALCIUM 8.0* 8.2* 8.2*   MG 1.4* 1.4* 1.9   PHOS 2.9 2.5* 2.3*     Recent Labs   Lab 09/19/20  0605  09/21/20  0755 09/22/20 0410 09/23/20  0300 09/24/20  0515   ALKPHOS 140*  --  111  --   --  94   ALT 49*  --  43  --   --  40   AST 55*  --  52*  --   --  50*   ALBUMIN 1.7*   < > 1.7*  1.7* 1.5* 1.6* 1.6*  1.6*   PROT 6.9  --  6.8  --   --  6.8    BILITOT 0.5  --  0.5  --   --  0.5    < > = values in this interval not displayed.        Recent Labs   Lab 09/23/20  0230 09/23/20  0749 09/23/20  1134 09/23/20  1659 09/23/20  2103 09/24/20  0721   POCTGLUCOSE 167* 188* 168* 248* 189* 157*       Scheduled Meds:   artificial tears  1 drop Both Eyes TID    carvediloL  6.25 mg Oral BID    fluticasone furoate-vilanteroL  1 puff Inhalation Daily    fluticasone propionate  1 spray Each Nostril Daily    heparin (porcine)  5,000 Units Subcutaneous Q8H    insulin aspart U-100  6 Units Subcutaneous TIDWM    insulin detemir U-100  30 Units Subcutaneous Daily    levoFLOXacin  750 mg Oral Every other day    levothyroxine  75 mcg Oral Before breakfast    sodium bicarbonate  1,300 mg Oral BID     Continuous Infusions:  As Needed:  acetaminophen, albuterol-ipratropium, calcium carbonate, dextrose 50%, dextrose 50%, glucagon (human recombinant), glucose, glucose, insulin aspart U-100, melatonin, ondansetron, oxyCODONE, polyethylene glycol, DIPH,PERTUS (ADACEL),TETANUS PF VAC (ADULT), Pharmacy to dose Vancomycin consult **AND** vancomycin - pharmacy to dose    ** update problem list    Active Hospital Problems    Diagnosis  POA    *Septic shock due to methicillin resistant Staphylococcus aureus [A41.02, R65.21]  Yes    Cellulitis of left lower extremity [L03.116]  Unknown    PVD (peripheral vascular disease) [I73.9]  Unknown    MRSA bacteremia [R78.81]  Yes    Acute renal insufficiency [N28.9]  Yes    Acute metabolic encephalopathy [G93.41]  No    Hospital-acquired pneumonia [J18.9, Y95]  No    Acute on chronic respiratory failure with hypoxia [J96.21]  No    Debility [R53.81]  Yes    Acute renal failure with acute tubular necrosis superimposed on stage 3 chronic kidney disease [N17.0, N18.3]  No    Septic arthritis of knee, right [M00.9]  Yes    Staphylococcal arthritis of right knee [M00.061]  Yes    Diabetic ulcer of left foot associated with type 2  Andrea Cooper Ae diabetes mellitus, with fat layer exposed [E11.621, L97.522]  Yes    Diabetic foot infection [E11.628, L08.9]  Yes    COPD mixed type [J44.9]  Yes     Chronic    Postablative hypothyroidism [E89.0]  Yes     Chronic    Hyperlipidemia [E78.5]  Yes     High ASCVD with CAD, elevated A1c      Type 2 diabetes mellitus with stage 3 chronic kidney disease, with long-term current use of insulin [E11.22, N18.3, Z79.4]  Not Applicable     GFR> 60, uncontrolled DM      Chronic systolic congestive heart failure [I50.22]  Yes     Chronic     EF 35% in 2015 echo, improved in 5/2018. Patient with mixed ischemic and non-ischemic cardiomyopathy        Resolved Hospital Problems    Diagnosis Date Resolved POA    Endocarditis [I38] 09/12/2020 Yes    Sepsis due to methicillin resistant Staphylococcus aureus [A41.02] 09/13/2020 Yes    Type 2 diabetes mellitus with ketoacidosis without coma, with long-term current use of insulin [E11.10, Z79.4] 09/18/2020 Not Applicable     Novolin 70/30 40U in AM, 30U in PM. Elevated A1c         Assessment and Plan  / Problems managed today    Overview/Hospital Course:  Mr. Ed Patton was admitted to hospital medicine on 08/30 for management of a left-sided diabetic foot infection that was precipitated by an undetected punction wound after stepping on a tack/nail. His presentation was notable for leukocytosis with elevated inflammatory markers, however MRI of left foot only showed cellulitis of the anterior and lateral aspect of foot. Podiatry was consulted with recommendations for IV antibiotics; he was initiated on Ciprofloxacin and Vancomycin on admission. However, blood cultures from admission resulted positive for MRSA with wound cultures from left foot also growing MRSA with Proteus mirabilis. By 09/03 cultures remained positive despite Vancomycin, and patient was noted to develop right knee pain with swelling. Orthopedics was consulted and patient underwent right knee joint  aspiration positive for septic arthritis with cultures also positive for MRSA.      LUKAS on 09/04 showed known chronic systolic heart failure, but was negative for endocarditis. MRI of lumbar spine on 09/08 was negative for abscess. By 09/06, blood cultures continued to remain positive, and he underwent I&D of right arm abscess on 09/08 with cultures also positive for MRSA.      For persistent MRSA bacteremia, he was transitioned to Daptomycin and Ceftaroline, however this regimen was discontinued by 09/10 due to concern for developing rhabdomylosis. By 09/09, he was noted to develop a progressively worsening leukocytosis and NANETTE. Nephrology was consulted on 09/09 with suspicion for ATN.      Hematology was consulted on 09/12 for persistent leukocytosis as likely reactive from bacteremia. On 09/10, patient began developing intermitent hypotension prompting broadening of antibiotics to Cefepime and Vancomycin. By 09/12, renal function continued to worsen in addition to hypotension prompting transfer to ICU for vasopressor support and possible HD. Patient found to be encephalopathic, central line placed and started on CRRT overnight on 9/14 with improvement in mental status, but remained encephalopathic. CT head without any acute intracranial process. Vasopressin off AM of 9/15 but remains on levophed, CT abdomen with concern for potential cholecystitis and possible atelectasis with superimposed pneumonia.  Due to the acuity of his illness is he is not a surgical candidate it and ultimately is now status post cholecystostomy tube placement for management of suspected cholecystitis     Additional complications arising during hospital course include the development metabolic encephalopathy secondary to uremia versus shock versus DKA or overall cerebral hypoperfusion from shock, reported to have agitation prior to step-down from ICU.  Also developed DKA requiring standard therapy with IV insulin infusion, however he was  kept on IV insulin due to rising sugars any time it was weaned working on basal bolus regimen for him at this time with the assistance of a endocrinology           Septic shock_Septic arthritis of Right Knee_Sespsi due to MRSA  This is a 61 year old  male with PMH significant for HFrEF and COPD with chronic respiratory failure (on 2L home oxygen), T2DM with CKD III, and iron deficiency anemia who originally presented to [facility] on 08/30 with cellulitis of left foot with concern for diabetic foot infection with subsequent development of MRSA bacteremia attributed to septic arthritis of right knee and elbow. He is status post drainage and coverage for MRSA since that time. His work-up for other etiologies of MRSA bacteremia have included negative LUKAS and MRI of lumbar spine looking for endocarditis and spinal abscess, respectively. Stool studies for C.diff on 09/11 were negative. His hospital course has been associated with worsening hypotension and leukocytosis since 09/10 prompting ICU admission on 09/12 for initiation of vasopressor support. Infectious work-up thus far on ICU admission concern for likely right lower lobe HAP.      CT abdomen pelvis on 9/15 with gallbladder dilation, sludge, and trace pericholic fluid collection. Exam not consistent with cholecystitis but given persistent shock potential source. Also with potential right lower lobe atelectasis with overlying infection  S/p ICU stepdown  -CK levels were rising so spoke with infectious disease and will need to switch patient back to Vancomycin. - Consult order placed.      Type 2 DM with DKA  -endocrinology following pt kept on transitional drip but has since been weaned to a basal bolus regimen.         Postablative hypothyroidism  Plan: Continue home SYNTHROID     Acute kidney injury  -likely ATN from Shock s/p requiring RRT in ICU  -trialysis line removed   -pt is non-oliguric  -nephrology recommends sodium bicarb tabs - order  placed.   -Nephrology recommends ambulatory f/u on discharge     Chronic systolic congestive heart failure  -Most recent ECHO in 09/2020 with EF of 25%.   -Unclear is ischemic vs non-ischemic.   -Home regimen: Carvedilol, Lasix 80 mg, and Lisinopril.           >due to NANETTE lasix and Lisinopril on hold, but he is recovering renal function and prior to discharge to any facility will likely need some amount of diuretic re-initiated and potentially a much lower doseage of Ace-inhibitor.   -Associated with chronic hypoxemic respiratory failure requiring 2L nasal cannula, but noted to develop worsening oxygen requirements on ICU admission that were likely multifactorial from suspected hospital acquired pneumonia versus declining urine output with pre-disposition to volume overload. .      COPD mixed type  -Based on PFT's from 05/2015 showing mild (small airways) obstruction, airflow not improved after bronchodilator, and moderate restriction.     Acute on chronic respiratory failure with hypoxia  -History of mixed COPD (based on PFT's from 05/2015 showing mild (small airways) obstruction, airflow not improved after bronchodilator, and moderate restriction) and HFrEF with chronic respiratory failure on 2L home oxygen.   -ICU admission notable for progressive increase in supplemental oxygen requirements.   -Work-up for underlying etiology of acute on chronic respiratory failure include CXR showing concern for possible progression of CHF vs HAP (not entirely convinced that CXR showed consolidation)  - Breathing back to baseline now to 2L NC     Code Status: Full Code     High Risk Conditions:  Patient has a condition that poses threat to life and bodily function: Septic shock, MRSA bacteremia  Patient is currently on drug therapy requiring intensive monitoring for toxicity: Daptomycin.      Discharge Planning   EDY: 9/25/2020     Code Status: Full Code   Is the patient medically ready for discharge?: No    Reason for patient  still in hospital (select all that apply): Patient trending condition  Discharge Plan A: Skilled Nursing Facility   Discharge Delays: (!) Change in Medical Condition           Diet:    GI PPx:   DVT PPx:    Lines:  Drains:  Airways:  Wounds:    Goals of Care:  Return to prior functional status   Discharge plan:    Time (minutes) spent in care of the patient (Greater than 1/2 spent in direct face-to-face contact)  35 minutes    Keenan Gonzales MD

## 2020-10-04 NOTE — REVIEW OF SYSTEMS
[Joint Pain] : joint pain [Back Pain] : back pain [Confusion] : confusion [Unsteady Walking] : ataxia [Anxiety] : anxiety [Depression] : depression [Negative] : Heme/Lymph [Muscle Weakness] : no muscle weakness [Headache] : no headache [Fainting] : no fainting

## 2020-10-04 NOTE — DATA REVIEWED
[FreeTextEntry1] : EKG - sinus tach @ 100, possible old inf infarct ,  nonspecific T wave flattening - no ST or T wave changes compared to prior

## 2020-10-04 NOTE — HISTORY OF PRESENT ILLNESS
[de-identified] : 79 y/o woman with psychiatric problems, dementia, presents for initial visit to establish primary care, accompanied by her daughter. recent admission at [facility] for LE edema and subsequent transfer to [facility] for psychiatric admission. previous PMD DR Salazar. discharge information reviewed - admission at [facility] from 8/7 - 8/18. initially tx for cellulitis w antibiotics. 2d echo completed. \par severly hypothyroid on admission, likely due to poor rx compliance - TSH 45 on admission. \par \par dementia, psychosis, delusions, paranoia . on olanzapine \par \par LE edema on furosemide \par likely venous stasis or insufficiency \par \par HTN controlled on rx\par hypothyroid controlled on rx \par type II DM on metformin \par \par lives at home w her  who is very elderly. her daughter is trying to move them closer to her to assist in their care \par \par scheduled w psychiatrist via telehealth

## 2020-10-04 NOTE — PHYSICAL EXAM
[No Acute Distress] : no acute distress [Normal Outer Ear/Nose] : the outer ears and nose were normal in appearance [Normal Oropharynx] : the oropharynx was normal [Normal TMs] : both tympanic membranes were normal [No Lymphadenopathy] : no lymphadenopathy [Normal] : normal rate, regular rhythm, normal S1 and S2 and no murmur heard [No Carotid Bruits] : no carotid bruits [No Edema] : there was no peripheral edema [Soft] : abdomen soft [Non Tender] : non-tender [Normal Supraclavicular Nodes] : no supraclavicular lymphadenopathy [Normal Posterior Cervical Nodes] : no posterior cervical lymphadenopathy [Normal Anterior Cervical Nodes] : no anterior cervical lymphadenopathy [Grossly Normal Strength/Tone] : grossly normal strength/tone [No Rash] : no rash [No Focal Deficits] : no focal deficits [de-identified] : mildly agitated  [de-identified] : b/l venous stasis changes w mild edema b/l noted  [de-identified] : alert, mildly anxious and agitated

## 2020-10-04 NOTE — HEALTH RISK ASSESSMENT
[No] : In the past 12 months have you used drugs other than those required for medical reasons? No [(PHQ-2) Unable to screen] : PHQ-2: unable to screen [Patient declined mammogram] : Patient declined mammogram [Patient declined colonoscopy] : Patient declined colonoscopy [] : No

## 2020-10-04 NOTE — ASSESSMENT
[FreeTextEntry1] : discussed w pt and her daughter, Raquel. \par reviewed available info, hospital discharge, summary. \par \par treated for LE edema, possibly secondary to combination of LE venous stasis and severe hypothyroidism. corrected during admission. her daughter is ensuring she is compliant w rx and she will be setting up further care at home. \par subsequent psychiatric hospitalization at [facility] noted. continues on olanzapine. she is scheduled for f/u w psychiatrist via telehealth. \par \par she will require regular f/u for monitoring of HTN control, DM control and hypothyroidism. \par \par her daughter will be arranging for further care at home and may become her official caregiver. \par \par check full labs as below, monitor TFTs, DM control on current rx \par \par advised on dietary improvement, low carb intake \par \par psychiatry f/u as scheduled \par \par influenza vaccine discussed and administered today \par \par RTO 2-3 months for f/u or earlier prn if any new concerns

## 2020-11-05 PROBLEM — I87.8 VENOUS STASIS OF BOTH LOWER EXTREMITIES: Status: ACTIVE | Noted: 2020-01-01

## 2020-11-05 PROBLEM — I83.893 VARICOSE VEINS OF BOTH LEGS WITH EDEMA: Status: RESOLVED | Noted: 2020-01-01 | Resolved: 2020-01-01

## 2020-11-12 NOTE — ASSESSMENT
[FreeTextEntry1] : Patient underwent evaluation for peripheral arterial disease using a [employer]alia Comecer diagnostic machine.  Ankle brachial index was obtained using blood pressure cuffs of the ankle and brachial segments of both legs.  A distal pulse volume recording was noted digitally on the device.  The procedure was supervised and interpreted by the physician.  JONI of the right lower extremity 1.1.   JONI of the left lower extremity 1.1.  Waveform noted to be  triphasic.   Patient tolerated procedure well in the office.  Results discussed with the patient.\par

## 2020-11-12 NOTE — PLAN
[FreeTextEntry1] : Ms. OLIVER NOEL is a 80 year with persistent and worsening bilateral lower extremity venous insufficiency, CEAP classification C4_which is  unresponsive to at least 3 months of compression stockings 20-30 mmHg, leg elevation, exercise and over the counter pain medication_(Ibuprofen).  Patient has complaints of worsening  leg discomfort with swelling, fatigue, heaviness, achiness, cramping, restlessness, and painful varicosities.  The patient’s symptoms interfere with their ADL’s, such as walking, shopping and house work. Patient has intact pulses in both legs without evidence of arterial insufficiency.  \par \par . Awaiting standing venous reflux study.\par \par \par

## 2020-11-12 NOTE — PHYSICAL EXAM
[Normal Breath Sounds] : Normal breath sounds [2+] : left 2+ [Ankle Swelling (On Exam)] : present [No Rash or Lesion] : No rash or lesion [Alert] : alert [Oriented to Person] : oriented to person [Oriented to Place] : oriented to place [Oriented to Time] : oriented to time [Calm] : calm [JVD] : no jugular venous distention  [Abdomen Tenderness] : ~T ~M No abdominal tenderness [de-identified] : NAD, ambulatory [de-identified] : At [de-identified] : supple [de-identified] : soft, obese

## 2020-11-12 NOTE — HISTORY OF PRESENT ILLNESS
[FreeTextEntry1] : Ms. OLIVER NOEL is a 80 year who presents for evaluation of bilateral leg pain for the past several years progressively worsening.  HbA1c: 7.6\par Patient's leg discomfort is associated with  leg swelling, fatigue, heaviness, achiness, restlessness, dry, and skin darkening at the ankles. \par \par Patient's symptoms have persisted despite conservative management with leg elevation, exercise, attempted weight loss, over-the-counter medications (ibuprofen), and compression stocking use for more than 3 months. 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

## 2021-01-01 ENCOUNTER — APPOINTMENT (OUTPATIENT)
Dept: WOUND CARE | Facility: CLINIC | Age: 81
End: 2021-01-01

## 2021-01-01 ENCOUNTER — TRANSCRIPTION ENCOUNTER (OUTPATIENT)
Age: 81
End: 2021-01-01

## 2021-01-01 ENCOUNTER — INPATIENT (INPATIENT)
Facility: HOSPITAL | Age: 81
LOS: 0 days | DRG: 871 | End: 2021-03-04
Attending: INTERNAL MEDICINE | Admitting: INTERNAL MEDICINE
Payer: MEDICARE

## 2021-01-01 ENCOUNTER — APPOINTMENT (OUTPATIENT)
Dept: INTERNAL MEDICINE | Facility: CLINIC | Age: 81
End: 2021-01-01

## 2021-01-01 ENCOUNTER — INPATIENT (INPATIENT)
Facility: HOSPITAL | Age: 81
LOS: 8 days | Discharge: NURSING, REHAB OR LONG-TERM CARE | DRG: 871 | End: 2021-01-14
Attending: HOSPITALIST | Admitting: HOSPITALIST
Payer: MEDICARE

## 2021-01-01 ENCOUNTER — NON-APPOINTMENT (OUTPATIENT)
Age: 81
End: 2021-01-01

## 2021-01-01 VITALS
SYSTOLIC BLOOD PRESSURE: 150 MMHG | TEMPERATURE: 94 F | WEIGHT: 164.02 LBS | HEIGHT: 60 IN | RESPIRATION RATE: 19 BRPM | HEART RATE: 116 BPM | DIASTOLIC BLOOD PRESSURE: 53 MMHG | OXYGEN SATURATION: 99 %

## 2021-01-01 VITALS
DIASTOLIC BLOOD PRESSURE: 65 MMHG | HEART RATE: 89 BPM | RESPIRATION RATE: 18 BRPM | SYSTOLIC BLOOD PRESSURE: 127 MMHG | OXYGEN SATURATION: 95 %

## 2021-01-01 VITALS
DIASTOLIC BLOOD PRESSURE: 74 MMHG | SYSTOLIC BLOOD PRESSURE: 167 MMHG | TEMPERATURE: 98 F | RESPIRATION RATE: 18 BRPM | OXYGEN SATURATION: 96 % | HEART RATE: 66 BPM

## 2021-01-01 VITALS — OXYGEN SATURATION: 100 % | HEART RATE: 23 BPM

## 2021-01-01 DIAGNOSIS — R78.81 BACTEREMIA: ICD-10-CM

## 2021-01-01 DIAGNOSIS — R53.81 OTHER MALAISE: ICD-10-CM

## 2021-01-01 DIAGNOSIS — G45.9 TRANSIENT CEREBRAL ISCHEMIC ATTACK, UNSPECIFIED: ICD-10-CM

## 2021-01-01 DIAGNOSIS — E03.9 HYPOTHYROIDISM, UNSPECIFIED: ICD-10-CM

## 2021-01-01 DIAGNOSIS — Z98.891 HISTORY OF UTERINE SCAR FROM PREVIOUS SURGERY: Chronic | ICD-10-CM

## 2021-01-01 DIAGNOSIS — E11.9 TYPE 2 DIABETES MELLITUS WITHOUT COMPLICATIONS: ICD-10-CM

## 2021-01-01 DIAGNOSIS — I10 ESSENTIAL (PRIMARY) HYPERTENSION: ICD-10-CM

## 2021-01-01 DIAGNOSIS — G93.40 ENCEPHALOPATHY, UNSPECIFIED: ICD-10-CM

## 2021-01-01 DIAGNOSIS — Z51.5 ENCOUNTER FOR PALLIATIVE CARE: ICD-10-CM

## 2021-01-01 DIAGNOSIS — D72.829 ELEVATED WHITE BLOOD CELL COUNT, UNSPECIFIED: ICD-10-CM

## 2021-01-01 DIAGNOSIS — I50.9 HEART FAILURE, UNSPECIFIED: ICD-10-CM

## 2021-01-01 DIAGNOSIS — Z29.9 ENCOUNTER FOR PROPHYLACTIC MEASURES, UNSPECIFIED: ICD-10-CM

## 2021-01-01 DIAGNOSIS — I48.91 UNSPECIFIED ATRIAL FIBRILLATION: ICD-10-CM

## 2021-01-01 DIAGNOSIS — N17.9 ACUTE KIDNEY FAILURE, UNSPECIFIED: ICD-10-CM

## 2021-01-01 DIAGNOSIS — Z71.89 OTHER SPECIFIED COUNSELING: ICD-10-CM

## 2021-01-01 DIAGNOSIS — A41.9 SEPSIS, UNSPECIFIED ORGANISM: ICD-10-CM

## 2021-01-01 DIAGNOSIS — D64.9 ANEMIA, UNSPECIFIED: ICD-10-CM

## 2021-01-01 LAB
% ALBUMIN: 44.7 % — SIGNIFICANT CHANGE UP
% ALPHA 1: 9.6 % — SIGNIFICANT CHANGE UP
% ALPHA 2: 15.7 % — SIGNIFICANT CHANGE UP
% BETA: 15.4 % — SIGNIFICANT CHANGE UP
% GAMMA: 14.6 % — SIGNIFICANT CHANGE UP
-  ESBL: SIGNIFICANT CHANGE UP
24R-OH-CALCIDIOL SERPL-MCNC: 28.8 NG/ML — LOW (ref 30–80)
A1C WITH ESTIMATED AVERAGE GLUCOSE RESULT: 6.7 % — HIGH (ref 4–5.6)
ALBUMIN SERPL ELPH-MCNC: 1.8 G/DL — LOW (ref 3.5–5)
ALBUMIN SERPL ELPH-MCNC: 1.9 G/DL — LOW (ref 3.5–5)
ALBUMIN SERPL ELPH-MCNC: 1.9 G/DL — LOW (ref 3.5–5)
ALBUMIN SERPL ELPH-MCNC: 2 G/DL — LOW (ref 3.5–5)
ALBUMIN SERPL ELPH-MCNC: 2.2 G/DL — LOW (ref 3.5–5)
ALBUMIN SERPL ELPH-MCNC: 2.2 G/DL — LOW (ref 3.6–5.5)
ALBUMIN SERPL ELPH-MCNC: 2.9 G/DL — LOW (ref 3.5–5)
ALBUMIN SERPL ELPH-MCNC: 2.9 G/DL — LOW (ref 3.5–5)
ALBUMIN SERPL ELPH-MCNC: 3.2 G/DL — LOW (ref 3.5–5)
ALBUMIN/GLOB SERPL ELPH: 0.8 RATIO — SIGNIFICANT CHANGE UP
ALP SERPL-CCNC: 130 U/L — HIGH (ref 40–120)
ALP SERPL-CCNC: 156 U/L — HIGH (ref 40–120)
ALP SERPL-CCNC: 159 U/L — HIGH (ref 40–120)
ALP SERPL-CCNC: 165 U/L — HIGH (ref 40–120)
ALP SERPL-CCNC: 188 U/L — HIGH (ref 40–120)
ALP SERPL-CCNC: 197 U/L — HIGH (ref 40–120)
ALP SERPL-CCNC: 198 U/L — HIGH (ref 40–120)
ALP SERPL-CCNC: 217 U/L — HIGH (ref 40–120)
ALPHA1 GLOB SERPL ELPH-MCNC: 0.5 G/DL — HIGH (ref 0.1–0.4)
ALPHA2 GLOB SERPL ELPH-MCNC: 0.8 G/DL — SIGNIFICANT CHANGE UP (ref 0.5–1)
ALT FLD-CCNC: 19 U/L DA — SIGNIFICANT CHANGE UP (ref 10–60)
ALT FLD-CCNC: 19 U/L DA — SIGNIFICANT CHANGE UP (ref 10–60)
ALT FLD-CCNC: 20 U/L DA — SIGNIFICANT CHANGE UP (ref 10–60)
ALT FLD-CCNC: 20 U/L DA — SIGNIFICANT CHANGE UP (ref 10–60)
ALT FLD-CCNC: 21 U/L DA — SIGNIFICANT CHANGE UP (ref 10–60)
ALT FLD-CCNC: 21 U/L DA — SIGNIFICANT CHANGE UP (ref 10–60)
ALT FLD-CCNC: 25 U/L DA — SIGNIFICANT CHANGE UP (ref 10–60)
ALT FLD-CCNC: 26 U/L DA — SIGNIFICANT CHANGE UP (ref 10–60)
ANA PAT FLD IF-IMP: ABNORMAL
ANA PAT FLD IF-IMP: ABNORMAL
ANA TITR SER: ABNORMAL
ANA TITR SER: ABNORMAL
ANION GAP SERPL CALC-SCNC: 10 MMOL/L — SIGNIFICANT CHANGE UP (ref 5–17)
ANION GAP SERPL CALC-SCNC: 11 MMOL/L — SIGNIFICANT CHANGE UP (ref 5–17)
ANION GAP SERPL CALC-SCNC: 14 MMOL/L — SIGNIFICANT CHANGE UP (ref 5–17)
ANION GAP SERPL CALC-SCNC: 29 MMOL/L — HIGH (ref 5–17)
ANION GAP SERPL CALC-SCNC: 8 MMOL/L — SIGNIFICANT CHANGE UP (ref 5–17)
ANION GAP SERPL CALC-SCNC: 8 MMOL/L — SIGNIFICANT CHANGE UP (ref 5–17)
ANION GAP SERPL CALC-SCNC: 9 MMOL/L — SIGNIFICANT CHANGE UP (ref 5–17)
ANION GAP SERPL CALC-SCNC: 9 MMOL/L — SIGNIFICANT CHANGE UP (ref 5–17)
ANISOCYTOSIS BLD QL: SLIGHT — SIGNIFICANT CHANGE UP
ANISOCYTOSIS BLD QL: SLIGHT — SIGNIFICANT CHANGE UP
APAP SERPL-MCNC: <2 UG/ML — LOW (ref 10–30)
APPEARANCE UR: ABNORMAL
APTT BLD: 22.6 SEC — LOW (ref 27.5–35.5)
APTT BLD: 25 SEC — LOW (ref 27.5–35.5)
APTT BLD: 27.2 SEC — LOW (ref 27.5–35.5)
APTT BLD: 28.7 SEC — SIGNIFICANT CHANGE UP (ref 27.5–35.5)
APTT BLD: 32.9 SEC — SIGNIFICANT CHANGE UP (ref 27.5–35.5)
APTT BLD: 76.9 SEC — HIGH (ref 27.5–35.5)
AST SERPL-CCNC: 17 U/L — SIGNIFICANT CHANGE UP (ref 10–40)
AST SERPL-CCNC: 18 U/L — SIGNIFICANT CHANGE UP (ref 10–40)
AST SERPL-CCNC: 22 U/L — SIGNIFICANT CHANGE UP (ref 10–40)
AST SERPL-CCNC: 23 U/L — SIGNIFICANT CHANGE UP (ref 10–40)
AST SERPL-CCNC: 26 U/L — SIGNIFICANT CHANGE UP (ref 10–40)
AST SERPL-CCNC: 9 U/L — LOW (ref 10–40)
B-GLOBULIN SERPL ELPH-MCNC: 0.8 G/DL — SIGNIFICANT CHANGE UP (ref 0.5–1)
BACTERIA # UR AUTO: ABNORMAL /HPF
BACTERIA # UR AUTO: ABNORMAL /HPF
BASE EXCESS BLDA CALC-SCNC: SIGNIFICANT CHANGE UP MMOL/L (ref -2–2)
BASOPHILS # BLD AUTO: 0 K/UL — SIGNIFICANT CHANGE UP (ref 0–0.2)
BASOPHILS # BLD AUTO: 0.05 K/UL — SIGNIFICANT CHANGE UP (ref 0–0.2)
BASOPHILS # BLD AUTO: 0.07 K/UL — SIGNIFICANT CHANGE UP (ref 0–0.2)
BASOPHILS NFR BLD AUTO: 0 % — SIGNIFICANT CHANGE UP (ref 0–2)
BASOPHILS NFR BLD AUTO: 0.5 % — SIGNIFICANT CHANGE UP (ref 0–2)
BASOPHILS NFR BLD AUTO: 0.5 % — SIGNIFICANT CHANGE UP (ref 0–2)
BILIRUB DIRECT SERPL-MCNC: 0.1 MG/DL — SIGNIFICANT CHANGE UP (ref 0–0.2)
BILIRUB SERPL-MCNC: 0.3 MG/DL — SIGNIFICANT CHANGE UP (ref 0.2–1.2)
BILIRUB SERPL-MCNC: 0.4 MG/DL — SIGNIFICANT CHANGE UP (ref 0.2–1.2)
BILIRUB SERPL-MCNC: 0.4 MG/DL — SIGNIFICANT CHANGE UP (ref 0.2–1.2)
BILIRUB SERPL-MCNC: 0.7 MG/DL — SIGNIFICANT CHANGE UP (ref 0.2–1.2)
BILIRUB SERPL-MCNC: 0.8 MG/DL — SIGNIFICANT CHANGE UP (ref 0.2–1.2)
BILIRUB UR-MCNC: NEGATIVE — SIGNIFICANT CHANGE UP
BLD GP AB SCN SERPL QL: SIGNIFICANT CHANGE UP
BUN SERPL-MCNC: 18 MG/DL — SIGNIFICANT CHANGE UP (ref 7–18)
BUN SERPL-MCNC: 21 MG/DL — HIGH (ref 7–18)
BUN SERPL-MCNC: 24 MG/DL — HIGH (ref 7–18)
BUN SERPL-MCNC: 29 MG/DL — HIGH (ref 7–18)
BUN SERPL-MCNC: 32 MG/DL — HIGH (ref 7–18)
BUN SERPL-MCNC: 33 MG/DL — HIGH (ref 7–18)
BUN SERPL-MCNC: 33 MG/DL — HIGH (ref 7–18)
BUN SERPL-MCNC: 38 MG/DL — HIGH (ref 7–18)
BUN SERPL-MCNC: 38 MG/DL — HIGH (ref 7–18)
BUN SERPL-MCNC: 43 MG/DL — HIGH (ref 7–18)
BUN SERPL-MCNC: 44 MG/DL — HIGH (ref 7–18)
BUN SERPL-MCNC: 78 MG/DL — HIGH (ref 7–18)
C3 SERPL-MCNC: 132 MG/DL — SIGNIFICANT CHANGE UP (ref 81–157)
C4 SERPL-MCNC: 36 MG/DL — SIGNIFICANT CHANGE UP (ref 13–39)
CALCIUM SERPL-MCNC: 10.2 MG/DL — SIGNIFICANT CHANGE UP (ref 8.4–10.5)
CALCIUM SERPL-MCNC: 7.3 MG/DL — LOW (ref 8.4–10.5)
CALCIUM SERPL-MCNC: 7.5 MG/DL — LOW (ref 8.4–10.5)
CALCIUM SERPL-MCNC: 7.5 MG/DL — LOW (ref 8.4–10.5)
CALCIUM SERPL-MCNC: 7.6 MG/DL — LOW (ref 8.4–10.5)
CALCIUM SERPL-MCNC: 7.8 MG/DL — LOW (ref 8.4–10.5)
CALCIUM SERPL-MCNC: 7.9 MG/DL — LOW (ref 8.4–10.5)
CALCIUM SERPL-MCNC: 8.2 MG/DL — LOW (ref 8.4–10.5)
CALCIUM SERPL-MCNC: 8.2 MG/DL — LOW (ref 8.4–10.5)
CALCIUM SERPL-MCNC: 8.3 MG/DL — LOW (ref 8.4–10.5)
CALCIUM SERPL-MCNC: 8.6 MG/DL — SIGNIFICANT CHANGE UP (ref 8.4–10.5)
CALCIUM SERPL-MCNC: 8.7 MG/DL — SIGNIFICANT CHANGE UP (ref 8.4–10.5)
CALCIUM SERPL-MCNC: 9 MG/DL — SIGNIFICANT CHANGE UP (ref 8.4–10.5)
CCP IGG SERPL-ACNC: <8 UNITS — SIGNIFICANT CHANGE UP
CHLORIDE SERPL-SCNC: 104 MMOL/L — SIGNIFICANT CHANGE UP (ref 96–108)
CHLORIDE SERPL-SCNC: 105 MMOL/L — SIGNIFICANT CHANGE UP (ref 96–108)
CHLORIDE SERPL-SCNC: 108 MMOL/L — SIGNIFICANT CHANGE UP (ref 96–108)
CHLORIDE SERPL-SCNC: 109 MMOL/L — HIGH (ref 96–108)
CHLORIDE SERPL-SCNC: 110 MMOL/L — HIGH (ref 96–108)
CHLORIDE SERPL-SCNC: 111 MMOL/L — HIGH (ref 96–108)
CHLORIDE SERPL-SCNC: 111 MMOL/L — HIGH (ref 96–108)
CHLORIDE SERPL-SCNC: 112 MMOL/L — HIGH (ref 96–108)
CHLORIDE SERPL-SCNC: 99 MMOL/L — SIGNIFICANT CHANGE UP (ref 96–108)
CHLORIDE SERPL-SCNC: 99 MMOL/L — SIGNIFICANT CHANGE UP (ref 96–108)
CHLORIDE UR-SCNC: 32 MMOL/L — SIGNIFICANT CHANGE UP
CHOLEST SERPL-MCNC: 189 MG/DL — SIGNIFICANT CHANGE UP
CK SERPL-CCNC: 71 U/L — SIGNIFICANT CHANGE UP (ref 21–215)
CO2 SERPL-SCNC: 21 MMOL/L — LOW (ref 22–31)
CO2 SERPL-SCNC: 22 MMOL/L — SIGNIFICANT CHANGE UP (ref 22–31)
CO2 SERPL-SCNC: 23 MMOL/L — SIGNIFICANT CHANGE UP (ref 22–31)
CO2 SERPL-SCNC: 23 MMOL/L — SIGNIFICANT CHANGE UP (ref 22–31)
CO2 SERPL-SCNC: 24 MMOL/L — SIGNIFICANT CHANGE UP (ref 22–31)
CO2 SERPL-SCNC: 25 MMOL/L — SIGNIFICANT CHANGE UP (ref 22–31)
CO2 SERPL-SCNC: 5 MMOL/L — CRITICAL LOW (ref 22–31)
COLOR SPEC: YELLOW — SIGNIFICANT CHANGE UP
COMMENT - URINE: SIGNIFICANT CHANGE UP
COMMENT - URINE: SIGNIFICANT CHANGE UP
CREAT ?TM UR-MCNC: 134 MG/DL — SIGNIFICANT CHANGE UP
CREAT SERPL-MCNC: 1.78 MG/DL — HIGH (ref 0.5–1.3)
CREAT SERPL-MCNC: 1.8 MG/DL — HIGH (ref 0.5–1.3)
CREAT SERPL-MCNC: 2.09 MG/DL — HIGH (ref 0.5–1.3)
CREAT SERPL-MCNC: 2.14 MG/DL — HIGH (ref 0.5–1.3)
CREAT SERPL-MCNC: 2.18 MG/DL — HIGH (ref 0.5–1.3)
CREAT SERPL-MCNC: 2.18 MG/DL — HIGH (ref 0.5–1.3)
CREAT SERPL-MCNC: 2.23 MG/DL — HIGH (ref 0.5–1.3)
CREAT SERPL-MCNC: 2.32 MG/DL — HIGH (ref 0.5–1.3)
CREAT SERPL-MCNC: 2.82 MG/DL — HIGH (ref 0.5–1.3)
CREAT SERPL-MCNC: 2.98 MG/DL — HIGH (ref 0.5–1.3)
CREAT SERPL-MCNC: 3.19 MG/DL — HIGH (ref 0.5–1.3)
CREAT SERPL-MCNC: 8.99 MG/DL — HIGH (ref 0.5–1.3)
CRP SERPL-MCNC: 14.5 MG/DL — HIGH (ref 0–0.4)
CRP SERPL-MCNC: 20.05 MG/DL — HIGH (ref 0–0.4)
CULTURE RESULTS: SIGNIFICANT CHANGE UP
DIFF PNL FLD: ABNORMAL
DSDNA AB SER-ACNC: <12 IU/ML — SIGNIFICANT CHANGE UP
E COLI DNA BLD POS QL NAA+NON-PROBE: SIGNIFICANT CHANGE UP
EOSINOPHIL # BLD AUTO: 0 K/UL — SIGNIFICANT CHANGE UP (ref 0–0.5)
EOSINOPHIL # BLD AUTO: 0.05 K/UL — SIGNIFICANT CHANGE UP (ref 0–0.5)
EOSINOPHIL # BLD AUTO: 0.08 K/UL — SIGNIFICANT CHANGE UP (ref 0–0.5)
EOSINOPHIL NFR BLD AUTO: 0 % — SIGNIFICANT CHANGE UP (ref 0–6)
EOSINOPHIL NFR BLD AUTO: 0.5 % — SIGNIFICANT CHANGE UP (ref 0–6)
EOSINOPHIL NFR BLD AUTO: 0.6 % — SIGNIFICANT CHANGE UP (ref 0–6)
EPI CELLS # UR: SIGNIFICANT CHANGE UP /HPF
EPI CELLS # UR: SIGNIFICANT CHANGE UP /HPF
ERYTHROCYTE [SEDIMENTATION RATE] IN BLOOD: 91 MM/HR — HIGH (ref 0–20)
ESTIMATED AVERAGE GLUCOSE: 146 MG/DL — HIGH (ref 68–114)
ETHANOL SERPL-MCNC: 6 MG/DL — SIGNIFICANT CHANGE UP (ref 0–10)
FERRITIN SERPL-MCNC: 320 NG/ML — HIGH (ref 15–150)
FOLATE SERPL-MCNC: 16.2 NG/ML — SIGNIFICANT CHANGE UP
FOLATE SERPL-MCNC: 8.3 NG/ML — SIGNIFICANT CHANGE UP
FOLATE SERPL-MCNC: 8.5 NG/ML — SIGNIFICANT CHANGE UP
GAMMA GLOBULIN: 0.7 G/DL — SIGNIFICANT CHANGE UP (ref 0.6–1.6)
GLUCOSE BLDC GLUCOMTR-MCNC: 110 MG/DL — HIGH (ref 70–99)
GLUCOSE BLDC GLUCOMTR-MCNC: 114 MG/DL — HIGH (ref 70–99)
GLUCOSE BLDC GLUCOMTR-MCNC: 120 MG/DL — HIGH (ref 70–99)
GLUCOSE BLDC GLUCOMTR-MCNC: 131 MG/DL — HIGH (ref 70–99)
GLUCOSE BLDC GLUCOMTR-MCNC: 131 MG/DL — HIGH (ref 70–99)
GLUCOSE BLDC GLUCOMTR-MCNC: 134 MG/DL — HIGH (ref 70–99)
GLUCOSE BLDC GLUCOMTR-MCNC: 135 MG/DL — HIGH (ref 70–99)
GLUCOSE BLDC GLUCOMTR-MCNC: 135 MG/DL — HIGH (ref 70–99)
GLUCOSE BLDC GLUCOMTR-MCNC: 136 MG/DL — HIGH (ref 70–99)
GLUCOSE BLDC GLUCOMTR-MCNC: 139 MG/DL — HIGH (ref 70–99)
GLUCOSE BLDC GLUCOMTR-MCNC: 140 MG/DL — HIGH (ref 70–99)
GLUCOSE BLDC GLUCOMTR-MCNC: 142 MG/DL — HIGH (ref 70–99)
GLUCOSE BLDC GLUCOMTR-MCNC: 145 MG/DL — HIGH (ref 70–99)
GLUCOSE BLDC GLUCOMTR-MCNC: 146 MG/DL — HIGH (ref 70–99)
GLUCOSE BLDC GLUCOMTR-MCNC: 147 MG/DL — HIGH (ref 70–99)
GLUCOSE BLDC GLUCOMTR-MCNC: 152 MG/DL — HIGH (ref 70–99)
GLUCOSE BLDC GLUCOMTR-MCNC: 153 MG/DL — HIGH (ref 70–99)
GLUCOSE BLDC GLUCOMTR-MCNC: 154 MG/DL — HIGH (ref 70–99)
GLUCOSE BLDC GLUCOMTR-MCNC: 155 MG/DL — HIGH (ref 70–99)
GLUCOSE BLDC GLUCOMTR-MCNC: 162 MG/DL — HIGH (ref 70–99)
GLUCOSE BLDC GLUCOMTR-MCNC: 164 MG/DL — HIGH (ref 70–99)
GLUCOSE BLDC GLUCOMTR-MCNC: 167 MG/DL — HIGH (ref 70–99)
GLUCOSE BLDC GLUCOMTR-MCNC: 173 MG/DL — HIGH (ref 70–99)
GLUCOSE BLDC GLUCOMTR-MCNC: 174 MG/DL — HIGH (ref 70–99)
GLUCOSE BLDC GLUCOMTR-MCNC: 177 MG/DL — HIGH (ref 70–99)
GLUCOSE BLDC GLUCOMTR-MCNC: 186 MG/DL — HIGH (ref 70–99)
GLUCOSE BLDC GLUCOMTR-MCNC: 186 MG/DL — HIGH (ref 70–99)
GLUCOSE BLDC GLUCOMTR-MCNC: 198 MG/DL — HIGH (ref 70–99)
GLUCOSE BLDC GLUCOMTR-MCNC: 202 MG/DL — HIGH (ref 70–99)
GLUCOSE BLDC GLUCOMTR-MCNC: 206 MG/DL — HIGH (ref 70–99)
GLUCOSE BLDC GLUCOMTR-MCNC: 208 MG/DL — HIGH (ref 70–99)
GLUCOSE BLDC GLUCOMTR-MCNC: 210 MG/DL — HIGH (ref 70–99)
GLUCOSE BLDC GLUCOMTR-MCNC: 225 MG/DL — HIGH (ref 70–99)
GLUCOSE BLDC GLUCOMTR-MCNC: 235 MG/DL — HIGH (ref 70–99)
GLUCOSE BLDC GLUCOMTR-MCNC: 277 MG/DL — HIGH (ref 70–99)
GLUCOSE BLDC GLUCOMTR-MCNC: 292 MG/DL — HIGH (ref 70–99)
GLUCOSE BLDC GLUCOMTR-MCNC: 319 MG/DL — HIGH (ref 70–99)
GLUCOSE SERPL-MCNC: 118 MG/DL — HIGH (ref 70–99)
GLUCOSE SERPL-MCNC: 131 MG/DL — HIGH (ref 70–99)
GLUCOSE SERPL-MCNC: 132 MG/DL — HIGH (ref 70–99)
GLUCOSE SERPL-MCNC: 135 MG/DL — HIGH (ref 70–99)
GLUCOSE SERPL-MCNC: 139 MG/DL — HIGH (ref 70–99)
GLUCOSE SERPL-MCNC: 139 MG/DL — HIGH (ref 70–99)
GLUCOSE SERPL-MCNC: 141 MG/DL — HIGH (ref 70–99)
GLUCOSE SERPL-MCNC: 172 MG/DL — HIGH (ref 70–99)
GLUCOSE SERPL-MCNC: 200 MG/DL — HIGH (ref 70–99)
GLUCOSE SERPL-MCNC: 236 MG/DL — HIGH (ref 70–99)
GLUCOSE SERPL-MCNC: 244 MG/DL — HIGH (ref 70–99)
GLUCOSE SERPL-MCNC: 273 MG/DL — HIGH (ref 70–99)
GLUCOSE UR QL: NEGATIVE — SIGNIFICANT CHANGE UP
GRAM STN FLD: SIGNIFICANT CHANGE UP
GRAN CASTS # UR COMP ASSIST: ABNORMAL /LPF
HAPTOGLOB SERPL-MCNC: 217 MG/DL — HIGH (ref 34–200)
HCO3 BLDA-SCNC: 2 MMOL/L — LOW (ref 23–27)
HCT VFR BLD CALC: 25 % — LOW (ref 34.5–45)
HCT VFR BLD CALC: 25.1 % — LOW (ref 34.5–45)
HCT VFR BLD CALC: 25.7 % — LOW (ref 34.5–45)
HCT VFR BLD CALC: 26.9 % — LOW (ref 34.5–45)
HCT VFR BLD CALC: 26.9 % — LOW (ref 34.5–45)
HCT VFR BLD CALC: 27 % — LOW (ref 34.5–45)
HCT VFR BLD CALC: 27 % — LOW (ref 34.5–45)
HCT VFR BLD CALC: 27.4 % — LOW (ref 34.5–45)
HCT VFR BLD CALC: 28.8 % — LOW (ref 34.5–45)
HCT VFR BLD CALC: 31.3 % — LOW (ref 34.5–45)
HCT VFR BLD CALC: 31.6 % — LOW (ref 34.5–45)
HCT VFR BLD CALC: 39.2 % — SIGNIFICANT CHANGE UP (ref 34.5–45)
HCYS SERPL-MCNC: 11.6 UMOL/L — SIGNIFICANT CHANGE UP
HCYS SERPL-MCNC: 12 UMOL/L — SIGNIFICANT CHANGE UP
HDLC SERPL-MCNC: 40 MG/DL — LOW
HGB BLD-MCNC: 10.1 G/DL — LOW (ref 11.5–15.5)
HGB BLD-MCNC: 10.2 G/DL — LOW (ref 11.5–15.5)
HGB BLD-MCNC: 11.1 G/DL — LOW (ref 11.5–15.5)
HGB BLD-MCNC: 7.9 G/DL — LOW (ref 11.5–15.5)
HGB BLD-MCNC: 8.2 G/DL — LOW (ref 11.5–15.5)
HGB BLD-MCNC: 8.6 G/DL — LOW (ref 11.5–15.5)
HGB BLD-MCNC: 8.6 G/DL — LOW (ref 11.5–15.5)
HGB BLD-MCNC: 8.7 G/DL — LOW (ref 11.5–15.5)
HGB BLD-MCNC: 8.8 G/DL — LOW (ref 11.5–15.5)
HGB BLD-MCNC: 8.8 G/DL — LOW (ref 11.5–15.5)
HGB BLD-MCNC: 9.1 G/DL — LOW (ref 11.5–15.5)
HGB BLD-MCNC: 9.4 G/DL — LOW (ref 11.5–15.5)
HOROWITZ INDEX BLDA+IHG-RTO: 21 — SIGNIFICANT CHANGE UP
HYALINE CASTS # UR AUTO: ABNORMAL /LPF
IMM GRANULOCYTES NFR BLD AUTO: 0.5 % — SIGNIFICANT CHANGE UP (ref 0–1.5)
IMM GRANULOCYTES NFR BLD AUTO: 0.6 % — SIGNIFICANT CHANGE UP (ref 0–1.5)
INR BLD: 1.07 RATIO — SIGNIFICANT CHANGE UP (ref 0.88–1.16)
INR BLD: 1.13 RATIO — SIGNIFICANT CHANGE UP (ref 0.88–1.16)
INR BLD: 1.14 RATIO — SIGNIFICANT CHANGE UP (ref 0.88–1.16)
INR BLD: 1.19 RATIO — HIGH (ref 0.88–1.16)
INR BLD: 1.22 RATIO — HIGH (ref 0.88–1.16)
INTERPRETATION SERPL IFE-IMP: SIGNIFICANT CHANGE UP
IRON SATN MFR SERPL: 22 UG/DL — LOW (ref 40–150)
IRON SATN MFR SERPL: 24 % — SIGNIFICANT CHANGE UP (ref 15–50)
IRON SATN MFR SERPL: 38 UG/DL — LOW (ref 40–150)
IRON SATN MFR SERPL: 8 % — LOW (ref 15–50)
KETONES UR-MCNC: ABNORMAL
KETONES UR-MCNC: NEGATIVE — SIGNIFICANT CHANGE UP
KETONES UR-MCNC: NEGATIVE — SIGNIFICANT CHANGE UP
LACTATE SERPL-SCNC: 1.5 MMOL/L — SIGNIFICANT CHANGE UP (ref 0.7–2)
LACTATE SERPL-SCNC: 14.8 MMOL/L — CRITICAL HIGH (ref 0.7–2)
LDH SERPL L TO P-CCNC: 168 U/L — SIGNIFICANT CHANGE UP (ref 120–225)
LEUKOCYTE ESTERASE UR-ACNC: ABNORMAL
LIPID PNL WITH DIRECT LDL SERPL: 118 MG/DL — HIGH
LYMPHOCYTES # BLD AUTO: 0.47 K/UL — LOW (ref 1–3.3)
LYMPHOCYTES # BLD AUTO: 0.64 K/UL — LOW (ref 1–3.3)
LYMPHOCYTES # BLD AUTO: 17 % — SIGNIFICANT CHANGE UP (ref 13–44)
LYMPHOCYTES # BLD AUTO: 4.28 K/UL — HIGH (ref 1–3.3)
LYMPHOCYTES # BLD AUTO: 4.6 % — LOW (ref 13–44)
LYMPHOCYTES # BLD AUTO: 4.7 % — LOW (ref 13–44)
MACROCYTES BLD QL: SLIGHT — SIGNIFICANT CHANGE UP
MACROCYTES BLD QL: SLIGHT — SIGNIFICANT CHANGE UP
MAGNESIUM SERPL-MCNC: 1.5 MG/DL — LOW (ref 1.6–2.6)
MAGNESIUM SERPL-MCNC: 1.8 MG/DL — SIGNIFICANT CHANGE UP (ref 1.6–2.6)
MAGNESIUM SERPL-MCNC: 1.9 MG/DL — SIGNIFICANT CHANGE UP (ref 1.6–2.6)
MAGNESIUM SERPL-MCNC: 1.9 MG/DL — SIGNIFICANT CHANGE UP (ref 1.6–2.6)
MAGNESIUM SERPL-MCNC: 2 MG/DL — SIGNIFICANT CHANGE UP (ref 1.6–2.6)
MAGNESIUM SERPL-MCNC: 2 MG/DL — SIGNIFICANT CHANGE UP (ref 1.6–2.6)
MAGNESIUM SERPL-MCNC: 2.1 MG/DL — SIGNIFICANT CHANGE UP (ref 1.6–2.6)
MAGNESIUM SERPL-MCNC: 2.1 MG/DL — SIGNIFICANT CHANGE UP (ref 1.6–2.6)
MANUAL SMEAR VERIFICATION: SIGNIFICANT CHANGE UP
MANUAL SMEAR VERIFICATION: SIGNIFICANT CHANGE UP
MCHC RBC-ENTMCNC: 28.3 GM/DL — LOW (ref 32–36)
MCHC RBC-ENTMCNC: 28.3 PG — SIGNIFICANT CHANGE UP (ref 27–34)
MCHC RBC-ENTMCNC: 28.4 PG — SIGNIFICANT CHANGE UP (ref 27–34)
MCHC RBC-ENTMCNC: 28.5 PG — SIGNIFICANT CHANGE UP (ref 27–34)
MCHC RBC-ENTMCNC: 28.6 PG — SIGNIFICANT CHANGE UP (ref 27–34)
MCHC RBC-ENTMCNC: 28.7 PG — SIGNIFICANT CHANGE UP (ref 27–34)
MCHC RBC-ENTMCNC: 28.8 PG — SIGNIFICANT CHANGE UP (ref 27–34)
MCHC RBC-ENTMCNC: 28.8 PG — SIGNIFICANT CHANGE UP (ref 27–34)
MCHC RBC-ENTMCNC: 29 PG — SIGNIFICANT CHANGE UP (ref 27–34)
MCHC RBC-ENTMCNC: 29.2 PG — SIGNIFICANT CHANGE UP (ref 27–34)
MCHC RBC-ENTMCNC: 29.7 PG — SIGNIFICANT CHANGE UP (ref 27–34)
MCHC RBC-ENTMCNC: 31.5 GM/DL — LOW (ref 32–36)
MCHC RBC-ENTMCNC: 32 GM/DL — SIGNIFICANT CHANGE UP (ref 32–36)
MCHC RBC-ENTMCNC: 32 GM/DL — SIGNIFICANT CHANGE UP (ref 32–36)
MCHC RBC-ENTMCNC: 32.1 GM/DL — SIGNIFICANT CHANGE UP (ref 32–36)
MCHC RBC-ENTMCNC: 32.2 GM/DL — SIGNIFICANT CHANGE UP (ref 32–36)
MCHC RBC-ENTMCNC: 32.6 GM/DL — SIGNIFICANT CHANGE UP (ref 32–36)
MCHC RBC-ENTMCNC: 32.8 GM/DL — SIGNIFICANT CHANGE UP (ref 32–36)
MCHC RBC-ENTMCNC: 33.5 GM/DL — SIGNIFICANT CHANGE UP (ref 32–36)
MCHC RBC-ENTMCNC: 33.8 GM/DL — SIGNIFICANT CHANGE UP (ref 32–36)
MCV RBC AUTO: 102.3 FL — HIGH (ref 80–100)
MCV RBC AUTO: 87.1 FL — SIGNIFICANT CHANGE UP (ref 80–100)
MCV RBC AUTO: 87.1 FL — SIGNIFICANT CHANGE UP (ref 80–100)
MCV RBC AUTO: 87.7 FL — SIGNIFICANT CHANGE UP (ref 80–100)
MCV RBC AUTO: 87.9 FL — SIGNIFICANT CHANGE UP (ref 80–100)
MCV RBC AUTO: 87.9 FL — SIGNIFICANT CHANGE UP (ref 80–100)
MCV RBC AUTO: 88.1 FL — SIGNIFICANT CHANGE UP (ref 80–100)
MCV RBC AUTO: 89 FL — SIGNIFICANT CHANGE UP (ref 80–100)
MCV RBC AUTO: 89.7 FL — SIGNIFICANT CHANGE UP (ref 80–100)
MCV RBC AUTO: 90 FL — SIGNIFICANT CHANGE UP (ref 80–100)
MCV RBC AUTO: 90.6 FL — SIGNIFICANT CHANGE UP (ref 80–100)
MCV RBC AUTO: 91.2 FL — SIGNIFICANT CHANGE UP (ref 80–100)
METAMYELOCYTES # FLD: 1 % — HIGH (ref 0–0)
METHOD TYPE: SIGNIFICANT CHANGE UP
METHYLMALONATE SERPL-SCNC: 204 NMOL/L — SIGNIFICANT CHANGE UP (ref 0–378)
METHYLMALONATE SERPL-SCNC: 460 NMOL/L — HIGH (ref 0–378)
MICROCYTES BLD QL: SLIGHT — SIGNIFICANT CHANGE UP
MONOCYTES # BLD AUTO: 0.5 K/UL — SIGNIFICANT CHANGE UP (ref 0–0.9)
MONOCYTES # BLD AUTO: 0.5 K/UL — SIGNIFICANT CHANGE UP (ref 0–0.9)
MONOCYTES # BLD AUTO: 0.55 K/UL — SIGNIFICANT CHANGE UP (ref 0–0.9)
MONOCYTES NFR BLD AUTO: 2 % — SIGNIFICANT CHANGE UP (ref 2–14)
MONOCYTES NFR BLD AUTO: 4 % — SIGNIFICANT CHANGE UP (ref 2–14)
MONOCYTES NFR BLD AUTO: 5 % — SIGNIFICANT CHANGE UP (ref 2–14)
NEUTROPHILS # BLD AUTO: 12.37 K/UL — HIGH (ref 1.8–7.4)
NEUTROPHILS # BLD AUTO: 19.64 K/UL — HIGH (ref 1.8–7.4)
NEUTROPHILS # BLD AUTO: 8.82 K/UL — HIGH (ref 1.8–7.4)
NEUTROPHILS NFR BLD AUTO: 76 % — SIGNIFICANT CHANGE UP (ref 43–77)
NEUTROPHILS NFR BLD AUTO: 88.8 % — HIGH (ref 43–77)
NEUTROPHILS NFR BLD AUTO: 89.7 % — HIGH (ref 43–77)
NEUTS BAND # BLD: 2 % — SIGNIFICANT CHANGE UP (ref 0–8)
NITRITE UR-MCNC: NEGATIVE — SIGNIFICANT CHANGE UP
NON HDL CHOLESTEROL: 149 MG/DL — HIGH
NRBC # BLD: 0 /100 WBCS — SIGNIFICANT CHANGE UP (ref 0–0)
NRBC # BLD: 0 /100 — SIGNIFICANT CHANGE UP (ref 0–0)
OVALOCYTES BLD QL SMEAR: SLIGHT — SIGNIFICANT CHANGE UP
PCO2 BLDA: 13 MMHG — LOW (ref 32–46)
PH BLDA: 6.82 — CRITICAL LOW (ref 7.35–7.45)
PH UR: 5 — SIGNIFICANT CHANGE UP (ref 5–8)
PH UR: 6 — SIGNIFICANT CHANGE UP (ref 5–8)
PH UR: 6 — SIGNIFICANT CHANGE UP (ref 5–8)
PHOSPHATE SERPL-MCNC: 2.4 MG/DL — LOW (ref 2.5–4.5)
PHOSPHATE SERPL-MCNC: 2.7 MG/DL — SIGNIFICANT CHANGE UP (ref 2.5–4.5)
PHOSPHATE SERPL-MCNC: 2.7 MG/DL — SIGNIFICANT CHANGE UP (ref 2.5–4.5)
PHOSPHATE SERPL-MCNC: 3.1 MG/DL — SIGNIFICANT CHANGE UP (ref 2.5–4.5)
PHOSPHATE SERPL-MCNC: 3.2 MG/DL — SIGNIFICANT CHANGE UP (ref 2.5–4.5)
PHOSPHATE SERPL-MCNC: 3.2 MG/DL — SIGNIFICANT CHANGE UP (ref 2.5–4.5)
PHOSPHATE SERPL-MCNC: 3.5 MG/DL — SIGNIFICANT CHANGE UP (ref 2.5–4.5)
PHOSPHATE SERPL-MCNC: 4.2 MG/DL — SIGNIFICANT CHANGE UP (ref 2.5–4.5)
PLAT MORPH BLD: NORMAL — SIGNIFICANT CHANGE UP
PLAT MORPH BLD: NORMAL — SIGNIFICANT CHANGE UP
PLATELET # BLD AUTO: 161 K/UL — SIGNIFICANT CHANGE UP (ref 150–400)
PLATELET # BLD AUTO: 172 K/UL — SIGNIFICANT CHANGE UP (ref 150–400)
PLATELET # BLD AUTO: 187 K/UL — SIGNIFICANT CHANGE UP (ref 150–400)
PLATELET # BLD AUTO: 189 K/UL — SIGNIFICANT CHANGE UP (ref 150–400)
PLATELET # BLD AUTO: 193 K/UL — SIGNIFICANT CHANGE UP (ref 150–400)
PLATELET # BLD AUTO: 233 K/UL — SIGNIFICANT CHANGE UP (ref 150–400)
PLATELET # BLD AUTO: 265 K/UL — SIGNIFICANT CHANGE UP (ref 150–400)
PLATELET # BLD AUTO: 265 K/UL — SIGNIFICANT CHANGE UP (ref 150–400)
PLATELET # BLD AUTO: 320 K/UL — SIGNIFICANT CHANGE UP (ref 150–400)
PLATELET # BLD AUTO: 362 K/UL — SIGNIFICANT CHANGE UP (ref 150–400)
PLATELET # BLD AUTO: 389 K/UL — SIGNIFICANT CHANGE UP (ref 150–400)
PLATELET # BLD AUTO: 567 K/UL — HIGH (ref 150–400)
PLATELET CLUMP BLD QL SMEAR: ABNORMAL
PO2 BLDA: 167 MMHG — HIGH (ref 74–108)
POIKILOCYTOSIS BLD QL AUTO: SLIGHT — SIGNIFICANT CHANGE UP
POIKILOCYTOSIS BLD QL AUTO: SLIGHT — SIGNIFICANT CHANGE UP
POLYCHROMASIA BLD QL SMEAR: SLIGHT — SIGNIFICANT CHANGE UP
POTASSIUM SERPL-MCNC: 3.1 MMOL/L — LOW (ref 3.5–5.3)
POTASSIUM SERPL-MCNC: 3.4 MMOL/L — LOW (ref 3.5–5.3)
POTASSIUM SERPL-MCNC: 3.5 MMOL/L — SIGNIFICANT CHANGE UP (ref 3.5–5.3)
POTASSIUM SERPL-MCNC: 3.6 MMOL/L — SIGNIFICANT CHANGE UP (ref 3.5–5.3)
POTASSIUM SERPL-MCNC: 3.6 MMOL/L — SIGNIFICANT CHANGE UP (ref 3.5–5.3)
POTASSIUM SERPL-MCNC: 3.8 MMOL/L — SIGNIFICANT CHANGE UP (ref 3.5–5.3)
POTASSIUM SERPL-MCNC: 4.2 MMOL/L — SIGNIFICANT CHANGE UP (ref 3.5–5.3)
POTASSIUM SERPL-MCNC: 4.2 MMOL/L — SIGNIFICANT CHANGE UP (ref 3.5–5.3)
POTASSIUM SERPL-MCNC: 4.9 MMOL/L — SIGNIFICANT CHANGE UP (ref 3.5–5.3)
POTASSIUM SERPL-MCNC: 7.6 MMOL/L — CRITICAL HIGH (ref 3.5–5.3)
POTASSIUM SERPL-SCNC: 3.1 MMOL/L — LOW (ref 3.5–5.3)
POTASSIUM SERPL-SCNC: 3.4 MMOL/L — LOW (ref 3.5–5.3)
POTASSIUM SERPL-SCNC: 3.5 MMOL/L — SIGNIFICANT CHANGE UP (ref 3.5–5.3)
POTASSIUM SERPL-SCNC: 3.6 MMOL/L — SIGNIFICANT CHANGE UP (ref 3.5–5.3)
POTASSIUM SERPL-SCNC: 3.6 MMOL/L — SIGNIFICANT CHANGE UP (ref 3.5–5.3)
POTASSIUM SERPL-SCNC: 3.8 MMOL/L — SIGNIFICANT CHANGE UP (ref 3.5–5.3)
POTASSIUM SERPL-SCNC: 4.2 MMOL/L — SIGNIFICANT CHANGE UP (ref 3.5–5.3)
POTASSIUM SERPL-SCNC: 4.2 MMOL/L — SIGNIFICANT CHANGE UP (ref 3.5–5.3)
POTASSIUM SERPL-SCNC: 4.9 MMOL/L — SIGNIFICANT CHANGE UP (ref 3.5–5.3)
POTASSIUM SERPL-SCNC: 7.6 MMOL/L — CRITICAL HIGH (ref 3.5–5.3)
POTASSIUM UR-SCNC: 62 MMOL/L — SIGNIFICANT CHANGE UP
PROCALCITONIN SERPL-MCNC: 3.07 NG/ML — HIGH (ref 0.02–0.1)
PROLACTIN SERPL-MCNC: 96 NG/ML — HIGH (ref 3.4–24.1)
PROT PATTERN SERPL ELPH-IMP: SIGNIFICANT CHANGE UP
PROT SERPL-MCNC: 5 G/DL — LOW (ref 6–8.3)
PROT SERPL-MCNC: 5 G/DL — LOW (ref 6–8.3)
PROT SERPL-MCNC: 5.6 G/DL — LOW (ref 6–8.3)
PROT SERPL-MCNC: 5.7 G/DL — LOW (ref 6–8.3)
PROT SERPL-MCNC: 5.8 G/DL — LOW (ref 6–8.3)
PROT SERPL-MCNC: 6.2 G/DL — SIGNIFICANT CHANGE UP (ref 6–8.3)
PROT SERPL-MCNC: 6.5 G/DL — SIGNIFICANT CHANGE UP (ref 6–8.3)
PROT SERPL-MCNC: 7.5 G/DL — SIGNIFICANT CHANGE UP (ref 6–8.3)
PROT SERPL-MCNC: 7.7 G/DL — SIGNIFICANT CHANGE UP (ref 6–8.3)
PROT SERPL-MCNC: 8.2 G/DL — SIGNIFICANT CHANGE UP (ref 6–8.3)
PROT UR-MCNC: 30 MG/DL
PROT UR-MCNC: 30 MG/DL
PROT UR-MCNC: 500 MG/DL
PROTHROM AB SERPL-ACNC: 12.7 SEC — SIGNIFICANT CHANGE UP (ref 10.6–13.6)
PROTHROM AB SERPL-ACNC: 13.4 SEC — SIGNIFICANT CHANGE UP (ref 10.6–13.6)
PROTHROM AB SERPL-ACNC: 13.5 SEC — SIGNIFICANT CHANGE UP (ref 10.6–13.6)
PROTHROM AB SERPL-ACNC: 14 SEC — HIGH (ref 10.6–13.6)
PROTHROM AB SERPL-ACNC: 14.4 SEC — HIGH (ref 10.6–13.6)
PTH-INTACT FLD-MCNC: 42 PG/ML — SIGNIFICANT CHANGE UP (ref 15–65)
RBC # BLD: 2.77 M/UL — LOW (ref 3.8–5.2)
RBC # BLD: 2.85 M/UL — LOW (ref 3.8–5.2)
RBC # BLD: 2.95 M/UL — LOW (ref 3.8–5.2)
RBC # BLD: 3.06 M/UL — LOW (ref 3.8–5.2)
RBC # BLD: 3.06 M/UL — LOW (ref 3.8–5.2)
RBC # BLD: 3.07 M/UL — LOW (ref 3.8–5.2)
RBC # BLD: 3.08 M/UL — LOW (ref 3.8–5.2)
RBC # BLD: 3.1 M/UL — LOW (ref 3.8–5.2)
RBC # BLD: 3.27 M/UL — LOW (ref 3.8–5.2)
RBC # BLD: 3.49 M/UL — LOW (ref 3.8–5.2)
RBC # BLD: 3.51 M/UL — LOW (ref 3.8–5.2)
RBC # BLD: 3.83 M/UL — SIGNIFICANT CHANGE UP (ref 3.8–5.2)
RBC # FLD: 13.2 % — SIGNIFICANT CHANGE UP (ref 10.3–14.5)
RBC # FLD: 13.3 % — SIGNIFICANT CHANGE UP (ref 10.3–14.5)
RBC # FLD: 13.4 % — SIGNIFICANT CHANGE UP (ref 10.3–14.5)
RBC # FLD: 13.8 % — SIGNIFICANT CHANGE UP (ref 10.3–14.5)
RBC # FLD: 13.8 % — SIGNIFICANT CHANGE UP (ref 10.3–14.5)
RBC # FLD: 14 % — SIGNIFICANT CHANGE UP (ref 10.3–14.5)
RBC # FLD: 14 % — SIGNIFICANT CHANGE UP (ref 10.3–14.5)
RBC # FLD: 15 % — HIGH (ref 10.3–14.5)
RBC BLD AUTO: ABNORMAL
RBC BLD AUTO: ABNORMAL
RBC CASTS # UR COMP ASSIST: ABNORMAL /HPF (ref 0–2)
RBC CASTS # UR COMP ASSIST: ABNORMAL /HPF (ref 0–2)
RETICS #: 31.9 K/UL — SIGNIFICANT CHANGE UP (ref 25–125)
RETICS #: 42.5 K/UL — SIGNIFICANT CHANGE UP (ref 25–125)
RETICS/RBC NFR: 1.1 % — SIGNIFICANT CHANGE UP (ref 0.5–2.5)
RETICS/RBC NFR: 1.4 % — SIGNIFICANT CHANGE UP (ref 0.5–2.5)
RF+CCP IGG SER-IMP: NEGATIVE — SIGNIFICANT CHANGE UP
RHEUMATOID FACT SERPL-ACNC: 15 IU/ML — HIGH (ref 0–13)
SALICYLATES SERPL-MCNC: 2.6 MG/DL — LOW (ref 2.8–20)
SAO2 % BLDA: 97 % — HIGH (ref 92–96)
SARS-COV-2 IGG SERPL QL IA: NEGATIVE — SIGNIFICANT CHANGE UP
SARS-COV-2 IGM SERPL IA-ACNC: <0.1 INDEX — SIGNIFICANT CHANGE UP
SARS-COV-2 RNA SPEC QL NAA+PROBE: SIGNIFICANT CHANGE UP
SODIUM SERPL-SCNC: 135 MMOL/L — SIGNIFICANT CHANGE UP (ref 135–145)
SODIUM SERPL-SCNC: 137 MMOL/L — SIGNIFICANT CHANGE UP (ref 135–145)
SODIUM SERPL-SCNC: 137 MMOL/L — SIGNIFICANT CHANGE UP (ref 135–145)
SODIUM SERPL-SCNC: 138 MMOL/L — SIGNIFICANT CHANGE UP (ref 135–145)
SODIUM SERPL-SCNC: 141 MMOL/L — SIGNIFICANT CHANGE UP (ref 135–145)
SODIUM SERPL-SCNC: 143 MMOL/L — SIGNIFICANT CHANGE UP (ref 135–145)
SODIUM SERPL-SCNC: 145 MMOL/L — SIGNIFICANT CHANGE UP (ref 135–145)
SODIUM UR-SCNC: 20 MMOL/L — SIGNIFICANT CHANGE UP
SP GR SPEC: 1.01 — SIGNIFICANT CHANGE UP (ref 1.01–1.02)
SP GR SPEC: 1.01 — SIGNIFICANT CHANGE UP (ref 1.01–1.02)
SP GR SPEC: 1.02 — SIGNIFICANT CHANGE UP (ref 1.01–1.02)
SPECIMEN SOURCE: SIGNIFICANT CHANGE UP
TIBC SERPL-MCNC: 155 UG/DL — LOW (ref 250–450)
TIBC SERPL-MCNC: 268 UG/DL — SIGNIFICANT CHANGE UP (ref 250–450)
TRIGL SERPL-MCNC: 156 MG/DL — HIGH
TROPONIN I SERPL-MCNC: <0.015 NG/ML — SIGNIFICANT CHANGE UP (ref 0–0.04)
TROPONIN I SERPL-MCNC: <0.015 NG/ML — SIGNIFICANT CHANGE UP (ref 0–0.04)
TSH SERPL-MCNC: 2.76 UU/ML — SIGNIFICANT CHANGE UP (ref 0.34–4.82)
UIBC SERPL-MCNC: 117 UG/DL — SIGNIFICANT CHANGE UP (ref 110–370)
UIBC SERPL-MCNC: 246 UG/DL — SIGNIFICANT CHANGE UP (ref 110–370)
UROBILINOGEN FLD QL: NEGATIVE — SIGNIFICANT CHANGE UP
VANCOMYCIN TROUGH SERPL-MCNC: 9.4 UG/ML — LOW (ref 10–20)
VARIANT LYMPHS # BLD: 2 % — SIGNIFICANT CHANGE UP (ref 0–6)
VIT B12 SERPL-MCNC: 841 PG/ML — SIGNIFICANT CHANGE UP (ref 232–1245)
WBC # BLD: 13.79 K/UL — HIGH (ref 3.8–10.5)
WBC # BLD: 25.18 K/UL — HIGH (ref 3.8–10.5)
WBC # BLD: 4.35 K/UL — SIGNIFICANT CHANGE UP (ref 3.8–10.5)
WBC # BLD: 6.14 K/UL — SIGNIFICANT CHANGE UP (ref 3.8–10.5)
WBC # BLD: 6.17 K/UL — SIGNIFICANT CHANGE UP (ref 3.8–10.5)
WBC # BLD: 7.98 K/UL — SIGNIFICANT CHANGE UP (ref 3.8–10.5)
WBC # BLD: 8.05 K/UL — SIGNIFICANT CHANGE UP (ref 3.8–10.5)
WBC # BLD: 8.76 K/UL — SIGNIFICANT CHANGE UP (ref 3.8–10.5)
WBC # BLD: 8.78 K/UL — SIGNIFICANT CHANGE UP (ref 3.8–10.5)
WBC # BLD: 9.35 K/UL — SIGNIFICANT CHANGE UP (ref 3.8–10.5)
WBC # BLD: 9.67 K/UL — SIGNIFICANT CHANGE UP (ref 3.8–10.5)
WBC # BLD: 9.94 K/UL — SIGNIFICANT CHANGE UP (ref 3.8–10.5)
WBC # FLD AUTO: 13.79 K/UL — HIGH (ref 3.8–10.5)
WBC # FLD AUTO: 25.18 K/UL — HIGH (ref 3.8–10.5)
WBC # FLD AUTO: 4.35 K/UL — SIGNIFICANT CHANGE UP (ref 3.8–10.5)
WBC # FLD AUTO: 6.14 K/UL — SIGNIFICANT CHANGE UP (ref 3.8–10.5)
WBC # FLD AUTO: 6.17 K/UL — SIGNIFICANT CHANGE UP (ref 3.8–10.5)
WBC # FLD AUTO: 7.98 K/UL — SIGNIFICANT CHANGE UP (ref 3.8–10.5)
WBC # FLD AUTO: 8.05 K/UL — SIGNIFICANT CHANGE UP (ref 3.8–10.5)
WBC # FLD AUTO: 8.76 K/UL — SIGNIFICANT CHANGE UP (ref 3.8–10.5)
WBC # FLD AUTO: 8.78 K/UL — SIGNIFICANT CHANGE UP (ref 3.8–10.5)
WBC # FLD AUTO: 9.35 K/UL — SIGNIFICANT CHANGE UP (ref 3.8–10.5)
WBC # FLD AUTO: 9.67 K/UL — SIGNIFICANT CHANGE UP (ref 3.8–10.5)
WBC # FLD AUTO: 9.94 K/UL — SIGNIFICANT CHANGE UP (ref 3.8–10.5)
WBC UR QL: >50 /HPF (ref 0–5)
WBC UR QL: SIGNIFICANT CHANGE UP /HPF (ref 0–5)

## 2021-01-01 PROCEDURE — 85025 COMPLETE CBC W/AUTO DIFF WBC: CPT

## 2021-01-01 PROCEDURE — 82310 ASSAY OF CALCIUM: CPT

## 2021-01-01 PROCEDURE — 99223 1ST HOSP IP/OBS HIGH 75: CPT

## 2021-01-01 PROCEDURE — 93306 TTE W/DOPPLER COMPLETE: CPT

## 2021-01-01 PROCEDURE — 87086 URINE CULTURE/COLONY COUNT: CPT

## 2021-01-01 PROCEDURE — 86769 SARS-COV-2 COVID-19 ANTIBODY: CPT

## 2021-01-01 PROCEDURE — 71045 X-RAY EXAM CHEST 1 VIEW: CPT | Mod: 26

## 2021-01-01 PROCEDURE — 83605 ASSAY OF LACTIC ACID: CPT

## 2021-01-01 PROCEDURE — 82728 ASSAY OF FERRITIN: CPT

## 2021-01-01 PROCEDURE — 82550 ASSAY OF CK (CPK): CPT

## 2021-01-01 PROCEDURE — 82803 BLOOD GASES ANY COMBINATION: CPT

## 2021-01-01 PROCEDURE — 99232 SBSQ HOSP IP/OBS MODERATE 35: CPT | Mod: GC

## 2021-01-01 PROCEDURE — 82962 GLUCOSE BLOOD TEST: CPT

## 2021-01-01 PROCEDURE — 76775 US EXAM ABDO BACK WALL LIM: CPT

## 2021-01-01 PROCEDURE — 85652 RBC SED RATE AUTOMATED: CPT

## 2021-01-01 PROCEDURE — 82306 VITAMIN D 25 HYDROXY: CPT

## 2021-01-01 PROCEDURE — 87077 CULTURE AEROBIC IDENTIFY: CPT

## 2021-01-01 PROCEDURE — 81001 URINALYSIS AUTO W/SCOPE: CPT

## 2021-01-01 PROCEDURE — 83036 HEMOGLOBIN GLYCOSYLATED A1C: CPT

## 2021-01-01 PROCEDURE — 92610 EVALUATE SWALLOWING FUNCTION: CPT

## 2021-01-01 PROCEDURE — 97162 PT EVAL MOD COMPLEX 30 MIN: CPT

## 2021-01-01 PROCEDURE — 80048 BASIC METABOLIC PNL TOTAL CA: CPT

## 2021-01-01 PROCEDURE — 36573 INSJ PICC RS&I 5 YR+: CPT

## 2021-01-01 PROCEDURE — 85610 PROTHROMBIN TIME: CPT

## 2021-01-01 PROCEDURE — 93005 ELECTROCARDIOGRAM TRACING: CPT

## 2021-01-01 PROCEDURE — 85045 AUTOMATED RETICULOCYTE COUNT: CPT

## 2021-01-01 PROCEDURE — C1751: CPT

## 2021-01-01 PROCEDURE — 83735 ASSAY OF MAGNESIUM: CPT

## 2021-01-01 PROCEDURE — 99291 CRITICAL CARE FIRST HOUR: CPT | Mod: 25

## 2021-01-01 PROCEDURE — 36415 COLL VENOUS BLD VENIPUNCTURE: CPT

## 2021-01-01 PROCEDURE — 83010 ASSAY OF HAPTOGLOBIN QUANT: CPT

## 2021-01-01 PROCEDURE — 86334 IMMUNOFIX E-PHORESIS SERUM: CPT

## 2021-01-01 PROCEDURE — 74176 CT ABD & PELVIS W/O CONTRAST: CPT | Mod: 26

## 2021-01-01 PROCEDURE — 83921 ORGANIC ACID SINGLE QUANT: CPT

## 2021-01-01 PROCEDURE — 36556 INSERT NON-TUNNEL CV CATH: CPT

## 2021-01-01 PROCEDURE — U0005: CPT

## 2021-01-01 PROCEDURE — 86200 CCP ANTIBODY: CPT

## 2021-01-01 PROCEDURE — 70450 CT HEAD/BRAIN W/O DYE: CPT | Mod: 26

## 2021-01-01 PROCEDURE — 83540 ASSAY OF IRON: CPT

## 2021-01-01 PROCEDURE — 84155 ASSAY OF PROTEIN SERUM: CPT

## 2021-01-01 PROCEDURE — 82570 ASSAY OF URINE CREATININE: CPT

## 2021-01-01 PROCEDURE — 82436 ASSAY OF URINE CHLORIDE: CPT

## 2021-01-01 PROCEDURE — 86900 BLOOD TYPING SEROLOGIC ABO: CPT

## 2021-01-01 PROCEDURE — 99222 1ST HOSP IP/OBS MODERATE 55: CPT

## 2021-01-01 PROCEDURE — 83970 ASSAY OF PARATHORMONE: CPT

## 2021-01-01 PROCEDURE — 80053 COMPREHEN METABOLIC PANEL: CPT

## 2021-01-01 PROCEDURE — 99238 HOSP IP/OBS DSCHRG MGMT 30/<: CPT | Mod: GC

## 2021-01-01 PROCEDURE — 99233 SBSQ HOSP IP/OBS HIGH 50: CPT | Mod: GC

## 2021-01-01 PROCEDURE — 74176 CT ABD & PELVIS W/O CONTRAST: CPT

## 2021-01-01 PROCEDURE — 80307 DRUG TEST PRSMV CHEM ANLYZR: CPT

## 2021-01-01 PROCEDURE — 76775 US EXAM ABDO BACK WALL LIM: CPT | Mod: 26

## 2021-01-01 PROCEDURE — 84300 ASSAY OF URINE SODIUM: CPT

## 2021-01-01 PROCEDURE — 84165 PROTEIN E-PHORESIS SERUM: CPT

## 2021-01-01 PROCEDURE — 84146 ASSAY OF PROLACTIN: CPT

## 2021-01-01 PROCEDURE — 86901 BLOOD TYPING SEROLOGIC RH(D): CPT

## 2021-01-01 PROCEDURE — 85730 THROMBOPLASTIN TIME PARTIAL: CPT

## 2021-01-01 PROCEDURE — 70450 CT HEAD/BRAIN W/O DYE: CPT

## 2021-01-01 PROCEDURE — 93010 ELECTROCARDIOGRAM REPORT: CPT

## 2021-01-01 PROCEDURE — 87040 BLOOD CULTURE FOR BACTERIA: CPT

## 2021-01-01 PROCEDURE — 86140 C-REACTIVE PROTEIN: CPT

## 2021-01-01 PROCEDURE — 96374 THER/PROPH/DIAG INJ IV PUSH: CPT

## 2021-01-01 PROCEDURE — 71045 X-RAY EXAM CHEST 1 VIEW: CPT

## 2021-01-01 PROCEDURE — 94640 AIRWAY INHALATION TREATMENT: CPT

## 2021-01-01 PROCEDURE — 99285 EMERGENCY DEPT VISIT HI MDM: CPT | Mod: 25

## 2021-01-01 PROCEDURE — 87635 SARS-COV-2 COVID-19 AMP PRB: CPT

## 2021-01-01 PROCEDURE — 84443 ASSAY THYROID STIM HORMONE: CPT

## 2021-01-01 PROCEDURE — 82248 BILIRUBIN DIRECT: CPT

## 2021-01-01 PROCEDURE — 87186 SC STD MICRODIL/AGAR DIL: CPT

## 2021-01-01 PROCEDURE — 96375 TX/PRO/DX INJ NEW DRUG ADDON: CPT

## 2021-01-01 PROCEDURE — 99232 SBSQ HOSP IP/OBS MODERATE 35: CPT

## 2021-01-01 PROCEDURE — 93880 EXTRACRANIAL BILAT STUDY: CPT | Mod: 26

## 2021-01-01 PROCEDURE — 82746 ASSAY OF FOLIC ACID SERUM: CPT

## 2021-01-01 PROCEDURE — 84484 ASSAY OF TROPONIN QUANT: CPT

## 2021-01-01 PROCEDURE — 93880 EXTRACRANIAL BILAT STUDY: CPT

## 2021-01-01 PROCEDURE — 80202 ASSAY OF VANCOMYCIN: CPT

## 2021-01-01 PROCEDURE — 99291 CRITICAL CARE FIRST HOUR: CPT

## 2021-01-01 PROCEDURE — 72125 CT NECK SPINE W/O DYE: CPT | Mod: 26

## 2021-01-01 PROCEDURE — 84133 ASSAY OF URINE POTASSIUM: CPT

## 2021-01-01 PROCEDURE — 83550 IRON BINDING TEST: CPT

## 2021-01-01 PROCEDURE — 86038 ANTINUCLEAR ANTIBODIES: CPT

## 2021-01-01 PROCEDURE — 86225 DNA ANTIBODY NATIVE: CPT

## 2021-01-01 PROCEDURE — 99233 SBSQ HOSP IP/OBS HIGH 50: CPT

## 2021-01-01 PROCEDURE — 83090 ASSAY OF HOMOCYSTEINE: CPT

## 2021-01-01 PROCEDURE — 99285 EMERGENCY DEPT VISIT HI MDM: CPT

## 2021-01-01 PROCEDURE — 86160 COMPLEMENT ANTIGEN: CPT

## 2021-01-01 PROCEDURE — 84145 PROCALCITONIN (PCT): CPT

## 2021-01-01 PROCEDURE — 84100 ASSAY OF PHOSPHORUS: CPT

## 2021-01-01 PROCEDURE — 86431 RHEUMATOID FACTOR QUANT: CPT

## 2021-01-01 PROCEDURE — 87150 DNA/RNA AMPLIFIED PROBE: CPT

## 2021-01-01 PROCEDURE — 80061 LIPID PANEL: CPT

## 2021-01-01 PROCEDURE — 86850 RBC ANTIBODY SCREEN: CPT

## 2021-01-01 PROCEDURE — 72125 CT NECK SPINE W/O DYE: CPT

## 2021-01-01 PROCEDURE — 82607 VITAMIN B-12: CPT

## 2021-01-01 PROCEDURE — 83615 LACTATE (LD) (LDH) ENZYME: CPT

## 2021-01-01 PROCEDURE — 85027 COMPLETE CBC AUTOMATED: CPT

## 2021-01-01 RX ORDER — HEPARIN SODIUM 5000 [USP'U]/ML
3000 INJECTION INTRAVENOUS; SUBCUTANEOUS EVERY 6 HOURS
Refills: 0 | Status: DISCONTINUED | OUTPATIENT
Start: 2021-01-01 | End: 2021-01-01

## 2021-01-01 RX ORDER — OLANZAPINE 15 MG/1
5 TABLET, FILM COATED ORAL ONCE
Refills: 0 | Status: COMPLETED | OUTPATIENT
Start: 2021-01-01 | End: 2021-01-01

## 2021-01-01 RX ORDER — HEPARIN SODIUM 5000 [USP'U]/ML
5000 INJECTION INTRAVENOUS; SUBCUTANEOUS EVERY 8 HOURS
Refills: 0 | Status: DISCONTINUED | OUTPATIENT
Start: 2021-01-01 | End: 2021-01-01

## 2021-01-01 RX ORDER — APIXABAN 2.5 MG/1
1 TABLET, FILM COATED ORAL
Qty: 60 | Refills: 0
Start: 2021-01-01 | End: 2021-01-01

## 2021-01-01 RX ORDER — LEVOTHYROXINE SODIUM 125 MCG
75 TABLET ORAL DAILY
Refills: 0 | Status: DISCONTINUED | OUTPATIENT
Start: 2021-01-01 | End: 2021-01-01

## 2021-01-01 RX ORDER — SODIUM CHLORIDE 9 MG/ML
1000 INJECTION, SOLUTION INTRAVENOUS
Refills: 0 | Status: DISCONTINUED | OUTPATIENT
Start: 2021-01-01 | End: 2021-01-01

## 2021-01-01 RX ORDER — ONDANSETRON 8 MG/1
4 TABLET, FILM COATED ORAL EVERY 6 HOURS
Refills: 0 | Status: DISCONTINUED | OUTPATIENT
Start: 2021-01-01 | End: 2021-01-01

## 2021-01-01 RX ORDER — VANCOMYCIN HCL 1 G
1000 VIAL (EA) INTRAVENOUS ONCE
Refills: 0 | Status: COMPLETED | OUTPATIENT
Start: 2021-01-01 | End: 2021-01-01

## 2021-01-01 RX ORDER — PREGABALIN 225 MG/1
1 CAPSULE ORAL
Qty: 30 | Refills: 0
Start: 2021-01-01 | End: 2021-01-01

## 2021-01-01 RX ORDER — SODIUM BICARBONATE 1 MEQ/ML
150 SYRINGE (ML) INTRAVENOUS ONCE
Refills: 0 | Status: COMPLETED | OUTPATIENT
Start: 2021-01-01 | End: 2021-01-01

## 2021-01-01 RX ORDER — PANTOPRAZOLE SODIUM 20 MG/1
40 TABLET, DELAYED RELEASE ORAL DAILY
Refills: 0 | Status: DISCONTINUED | OUTPATIENT
Start: 2021-01-01 | End: 2021-01-01

## 2021-01-01 RX ORDER — POTASSIUM CHLORIDE 20 MEQ
40 PACKET (EA) ORAL ONCE
Refills: 0 | Status: COMPLETED | OUTPATIENT
Start: 2021-01-01 | End: 2021-01-01

## 2021-01-01 RX ORDER — INSULIN HUMAN 100 [IU]/ML
5 INJECTION, SOLUTION SUBCUTANEOUS ONCE
Refills: 0 | Status: COMPLETED | OUTPATIENT
Start: 2021-01-01 | End: 2021-01-01

## 2021-01-01 RX ORDER — METOPROLOL TARTRATE 50 MG
1 TABLET ORAL
Qty: 0 | Refills: 0 | DISCHARGE

## 2021-01-01 RX ORDER — INSULIN LISPRO 100/ML
1 VIAL (ML) SUBCUTANEOUS
Qty: 1 | Refills: 0
Start: 2021-01-01 | End: 2021-01-01

## 2021-01-01 RX ORDER — SODIUM CHLORIDE 9 MG/ML
1000 INJECTION INTRAMUSCULAR; INTRAVENOUS; SUBCUTANEOUS
Refills: 0 | Status: DISCONTINUED | OUTPATIENT
Start: 2021-01-01 | End: 2021-01-01

## 2021-01-01 RX ORDER — SODIUM CHLORIDE 9 MG/ML
1000 INJECTION INTRAMUSCULAR; INTRAVENOUS; SUBCUTANEOUS ONCE
Refills: 0 | Status: COMPLETED | OUTPATIENT
Start: 2021-01-01 | End: 2021-01-01

## 2021-01-01 RX ORDER — LANOLIN ALCOHOL/MO/W.PET/CERES
5 CREAM (GRAM) TOPICAL AT BEDTIME
Refills: 0 | Status: DISCONTINUED | OUTPATIENT
Start: 2021-01-01 | End: 2021-01-01

## 2021-01-01 RX ORDER — ESCITALOPRAM OXALATE 10 MG/1
1 TABLET, FILM COATED ORAL
Qty: 0 | Refills: 0 | DISCHARGE

## 2021-01-01 RX ORDER — OLANZAPINE 15 MG/1
5 TABLET, FILM COATED ORAL AT BEDTIME
Refills: 0 | Status: DISCONTINUED | OUTPATIENT
Start: 2021-01-01 | End: 2021-01-01

## 2021-01-01 RX ORDER — SODIUM BICARBONATE 1 MEQ/ML
0.4 SYRINGE (ML) INTRAVENOUS
Qty: 150 | Refills: 0 | Status: DISCONTINUED | OUTPATIENT
Start: 2021-01-01 | End: 2021-01-01

## 2021-01-01 RX ORDER — CEFTRIAXONE 500 MG/1
2000 INJECTION, POWDER, FOR SOLUTION INTRAMUSCULAR; INTRAVENOUS ONCE
Refills: 0 | Status: COMPLETED | OUTPATIENT
Start: 2021-01-01 | End: 2021-01-01

## 2021-01-01 RX ORDER — FOLIC ACID 0.8 MG
3 TABLET ORAL
Qty: 90 | Refills: 0
Start: 2021-01-01 | End: 2021-01-01

## 2021-01-01 RX ORDER — SODIUM,POTASSIUM PHOSPHATES 278-250MG
1 POWDER IN PACKET (EA) ORAL THREE TIMES A DAY
Refills: 0 | Status: COMPLETED | OUTPATIENT
Start: 2021-01-01 | End: 2021-01-01

## 2021-01-01 RX ORDER — FOLIC ACID 0.8 MG
3 TABLET ORAL DAILY
Refills: 0 | Status: DISCONTINUED | OUTPATIENT
Start: 2021-01-01 | End: 2021-01-01

## 2021-01-01 RX ORDER — HEPARIN SODIUM 5000 [USP'U]/ML
6500 INJECTION INTRAVENOUS; SUBCUTANEOUS EVERY 6 HOURS
Refills: 0 | Status: DISCONTINUED | OUTPATIENT
Start: 2021-01-01 | End: 2021-01-01

## 2021-01-01 RX ORDER — PIPERACILLIN AND TAZOBACTAM 4; .5 G/20ML; G/20ML
3.38 INJECTION, POWDER, LYOPHILIZED, FOR SOLUTION INTRAVENOUS ONCE
Refills: 0 | Status: COMPLETED | OUTPATIENT
Start: 2021-01-01 | End: 2021-01-01

## 2021-01-01 RX ORDER — ASPIRIN/CALCIUM CARB/MAGNESIUM 324 MG
325 TABLET ORAL ONCE
Refills: 0 | Status: COMPLETED | OUTPATIENT
Start: 2021-01-01 | End: 2021-01-01

## 2021-01-01 RX ORDER — PIPERACILLIN AND TAZOBACTAM 4; .5 G/20ML; G/20ML
3.38 INJECTION, POWDER, LYOPHILIZED, FOR SOLUTION INTRAVENOUS EVERY 12 HOURS
Refills: 0 | Status: DISCONTINUED | OUTPATIENT
Start: 2021-01-01 | End: 2021-01-01

## 2021-01-01 RX ORDER — METFORMIN HYDROCHLORIDE 850 MG/1
1 TABLET ORAL
Qty: 0 | Refills: 0 | DISCHARGE

## 2021-01-01 RX ORDER — PREGABALIN 225 MG/1
1000 CAPSULE ORAL
Refills: 0 | Status: DISCONTINUED | OUTPATIENT
Start: 2021-01-01 | End: 2021-01-01

## 2021-01-01 RX ORDER — CEFTRIAXONE 500 MG/1
1000 INJECTION, POWDER, FOR SOLUTION INTRAMUSCULAR; INTRAVENOUS ONCE
Refills: 0 | Status: DISCONTINUED | OUTPATIENT
Start: 2021-01-01 | End: 2021-01-01

## 2021-01-01 RX ORDER — HEPARIN SODIUM 5000 [USP'U]/ML
5000 INJECTION INTRAVENOUS; SUBCUTANEOUS EVERY 12 HOURS
Refills: 0 | Status: DISCONTINUED | OUTPATIENT
Start: 2021-01-01 | End: 2021-01-01

## 2021-01-01 RX ORDER — SODIUM BICARBONATE 1 MEQ/ML
50 SYRINGE (ML) INTRAVENOUS ONCE
Refills: 0 | Status: COMPLETED | OUTPATIENT
Start: 2021-01-01 | End: 2021-01-01

## 2021-01-01 RX ORDER — METOPROLOL TARTRATE 50 MG
25 TABLET ORAL
Refills: 0 | Status: DISCONTINUED | OUTPATIENT
Start: 2021-01-01 | End: 2021-01-01

## 2021-01-01 RX ORDER — CEFTRIAXONE 500 MG/1
2 INJECTION, POWDER, FOR SOLUTION INTRAMUSCULAR; INTRAVENOUS
Qty: 40 | Refills: 0
Start: 2021-01-01 | End: 2021-01-01

## 2021-01-01 RX ORDER — DEXTROSE 50 % IN WATER 50 %
50 SYRINGE (ML) INTRAVENOUS ONCE
Refills: 0 | Status: COMPLETED | OUTPATIENT
Start: 2021-01-01 | End: 2021-01-01

## 2021-01-01 RX ORDER — CEFTRIAXONE 500 MG/1
1000 INJECTION, POWDER, FOR SOLUTION INTRAMUSCULAR; INTRAVENOUS EVERY 24 HOURS
Refills: 0 | Status: DISCONTINUED | OUTPATIENT
Start: 2021-01-01 | End: 2021-01-01

## 2021-01-01 RX ORDER — PANTOPRAZOLE SODIUM 20 MG/1
40 TABLET, DELAYED RELEASE ORAL
Refills: 0 | Status: DISCONTINUED | OUTPATIENT
Start: 2021-01-01 | End: 2021-01-01

## 2021-01-01 RX ORDER — METOPROLOL TARTRATE 50 MG
100 TABLET ORAL DAILY
Refills: 0 | Status: DISCONTINUED | OUTPATIENT
Start: 2021-01-01 | End: 2021-01-01

## 2021-01-01 RX ORDER — ASPIRIN/CALCIUM CARB/MAGNESIUM 324 MG
81 TABLET ORAL DAILY
Refills: 0 | Status: DISCONTINUED | OUTPATIENT
Start: 2021-01-01 | End: 2021-01-01

## 2021-01-01 RX ORDER — MAGNESIUM SULFATE 500 MG/ML
1 VIAL (ML) INJECTION ONCE
Refills: 0 | Status: COMPLETED | OUTPATIENT
Start: 2021-01-01 | End: 2021-01-01

## 2021-01-01 RX ORDER — HEPARIN SODIUM 5000 [USP'U]/ML
INJECTION INTRAVENOUS; SUBCUTANEOUS
Qty: 25000 | Refills: 0 | Status: DISCONTINUED | OUTPATIENT
Start: 2021-01-01 | End: 2021-01-01

## 2021-01-01 RX ORDER — ALBUTEROL 90 UG/1
10 AEROSOL, METERED ORAL ONCE
Refills: 0 | Status: COMPLETED | OUTPATIENT
Start: 2021-01-01 | End: 2021-01-01

## 2021-01-01 RX ORDER — LISINOPRIL 2.5 MG/1
1 TABLET ORAL
Qty: 0 | Refills: 0 | DISCHARGE

## 2021-01-01 RX ORDER — LEVOTHYROXINE SODIUM 125 MCG
50 TABLET ORAL AT BEDTIME
Refills: 0 | Status: DISCONTINUED | OUTPATIENT
Start: 2021-01-01 | End: 2021-01-01

## 2021-01-01 RX ORDER — VANCOMYCIN HCL 1 G
VIAL (EA) INTRAVENOUS
Refills: 0 | Status: DISCONTINUED | OUTPATIENT
Start: 2021-01-01 | End: 2021-01-01

## 2021-01-01 RX ORDER — FUROSEMIDE 40 MG
1 TABLET ORAL
Qty: 0 | Refills: 0 | DISCHARGE

## 2021-01-01 RX ORDER — ACETAMINOPHEN 500 MG
650 TABLET ORAL EVERY 6 HOURS
Refills: 0 | Status: DISCONTINUED | OUTPATIENT
Start: 2021-01-01 | End: 2021-01-01

## 2021-01-01 RX ORDER — ACETAMINOPHEN 500 MG
1000 TABLET ORAL ONCE
Refills: 0 | Status: COMPLETED | OUTPATIENT
Start: 2021-01-01 | End: 2021-01-01

## 2021-01-01 RX ORDER — HYDROMORPHONE HYDROCHLORIDE 2 MG/ML
1 INJECTION INTRAMUSCULAR; INTRAVENOUS; SUBCUTANEOUS ONCE
Refills: 0 | Status: DISCONTINUED | OUTPATIENT
Start: 2021-01-01 | End: 2021-01-01

## 2021-01-01 RX ORDER — OLANZAPINE 15 MG/1
1 TABLET, FILM COATED ORAL
Qty: 0 | Refills: 0 | DISCHARGE

## 2021-01-01 RX ORDER — CEFEPIME 1 G/1
1000 INJECTION, POWDER, FOR SOLUTION INTRAMUSCULAR; INTRAVENOUS ONCE
Refills: 0 | Status: COMPLETED | OUTPATIENT
Start: 2021-01-01 | End: 2021-01-01

## 2021-01-01 RX ORDER — ASPIRIN/CALCIUM CARB/MAGNESIUM 324 MG
1 TABLET ORAL
Qty: 0 | Refills: 0 | DISCHARGE

## 2021-01-01 RX ORDER — CEFTRIAXONE 500 MG/1
INJECTION, POWDER, FOR SOLUTION INTRAMUSCULAR; INTRAVENOUS
Refills: 0 | Status: DISCONTINUED | OUTPATIENT
Start: 2021-01-01 | End: 2021-01-01

## 2021-01-01 RX ORDER — PIPERACILLIN AND TAZOBACTAM 4; .5 G/20ML; G/20ML
INJECTION, POWDER, LYOPHILIZED, FOR SOLUTION INTRAVENOUS
Refills: 0 | Status: DISCONTINUED | OUTPATIENT
Start: 2021-01-01 | End: 2021-01-01

## 2021-01-01 RX ORDER — PHENYLEPHRINE HYDROCHLORIDE 10 MG/ML
0.1 INJECTION INTRAVENOUS
Qty: 40 | Refills: 0 | Status: DISCONTINUED | OUTPATIENT
Start: 2021-01-01 | End: 2021-01-01

## 2021-01-01 RX ORDER — IOHEXOL 300 MG/ML
30 INJECTION, SOLUTION INTRAVENOUS ONCE
Refills: 0 | Status: COMPLETED | OUTPATIENT
Start: 2021-01-01 | End: 2021-01-01

## 2021-01-01 RX ORDER — METOPROLOL TARTRATE 50 MG
50 TABLET ORAL
Refills: 0 | Status: DISCONTINUED | OUTPATIENT
Start: 2021-01-01 | End: 2021-01-01

## 2021-01-01 RX ORDER — CEFTRIAXONE 500 MG/1
2000 INJECTION, POWDER, FOR SOLUTION INTRAMUSCULAR; INTRAVENOUS EVERY 24 HOURS
Refills: 0 | Status: DISCONTINUED | OUTPATIENT
Start: 2021-01-01 | End: 2021-01-01

## 2021-01-01 RX ORDER — APIXABAN 2.5 MG/1
2.5 TABLET, FILM COATED ORAL
Refills: 0 | Status: DISCONTINUED | OUTPATIENT
Start: 2021-01-01 | End: 2021-01-01

## 2021-01-01 RX ORDER — CALCIUM GLUCONATE 100 MG/ML
1 VIAL (ML) INTRAVENOUS ONCE
Refills: 0 | Status: COMPLETED | OUTPATIENT
Start: 2021-01-01 | End: 2021-01-01

## 2021-01-01 RX ORDER — LEVOTHYROXINE SODIUM 125 MCG
1 TABLET ORAL
Qty: 0 | Refills: 0 | DISCHARGE

## 2021-01-01 RX ORDER — NOREPINEPHRINE BITARTRATE/D5W 8 MG/250ML
0.05 PLASTIC BAG, INJECTION (ML) INTRAVENOUS
Qty: 8 | Refills: 0 | Status: DISCONTINUED | OUTPATIENT
Start: 2021-01-01 | End: 2021-01-01

## 2021-01-01 RX ORDER — INSULIN LISPRO 100/ML
VIAL (ML) SUBCUTANEOUS
Refills: 0 | Status: DISCONTINUED | OUTPATIENT
Start: 2021-01-01 | End: 2021-01-01

## 2021-01-01 RX ORDER — APIXABAN 2.5 MG/1
2.5 TABLET, FILM COATED ORAL EVERY 12 HOURS
Refills: 0 | Status: DISCONTINUED | OUTPATIENT
Start: 2021-01-01 | End: 2021-01-01

## 2021-01-01 RX ORDER — PREGABALIN 225 MG/1
1 CAPSULE ORAL
Qty: 0 | Refills: 0 | DISCHARGE

## 2021-01-01 RX ORDER — CLOPIDOGREL BISULFATE 75 MG/1
75 TABLET, FILM COATED ORAL DAILY
Refills: 0 | Status: DISCONTINUED | OUTPATIENT
Start: 2021-01-01 | End: 2021-01-01

## 2021-01-01 RX ORDER — FOLIC ACID 0.8 MG
5 TABLET ORAL DAILY
Refills: 0 | Status: DISCONTINUED | OUTPATIENT
Start: 2021-01-01 | End: 2021-01-01

## 2021-01-01 RX ORDER — FUROSEMIDE 40 MG
40 TABLET ORAL DAILY
Refills: 0 | Status: DISCONTINUED | OUTPATIENT
Start: 2021-01-01 | End: 2021-01-01

## 2021-01-01 RX ORDER — ESCITALOPRAM OXALATE 10 MG/1
10 TABLET, FILM COATED ORAL DAILY
Refills: 0 | Status: DISCONTINUED | OUTPATIENT
Start: 2021-01-01 | End: 2021-01-01

## 2021-01-01 RX ORDER — ATORVASTATIN CALCIUM 80 MG/1
40 TABLET, FILM COATED ORAL AT BEDTIME
Refills: 0 | Status: DISCONTINUED | OUTPATIENT
Start: 2021-01-01 | End: 2021-01-01

## 2021-01-01 RX ORDER — LEVOTHYROXINE SODIUM 125 MCG
1 TABLET ORAL
Qty: 0 | Refills: 0 | DISCHARGE
Start: 2021-01-01

## 2021-01-01 RX ORDER — HYDROMORPHONE HYDROCHLORIDE 2 MG/ML
0.5 INJECTION INTRAMUSCULAR; INTRAVENOUS; SUBCUTANEOUS
Refills: 0 | Status: DISCONTINUED | OUTPATIENT
Start: 2021-01-01 | End: 2021-01-01

## 2021-01-01 RX ADMIN — Medication 1: at 19:03

## 2021-01-01 RX ADMIN — APIXABAN 2.5 MILLIGRAM(S): 2.5 TABLET, FILM COATED ORAL at 12:02

## 2021-01-01 RX ADMIN — Medication 75 MICROGRAM(S): at 06:35

## 2021-01-01 RX ADMIN — PIPERACILLIN AND TAZOBACTAM 25 GRAM(S): 4; .5 INJECTION, POWDER, LYOPHILIZED, FOR SOLUTION INTRAVENOUS at 06:06

## 2021-01-01 RX ADMIN — Medication 25 MILLIGRAM(S): at 05:29

## 2021-01-01 RX ADMIN — OLANZAPINE 5 MILLIGRAM(S): 15 TABLET, FILM COATED ORAL at 21:53

## 2021-01-01 RX ADMIN — Medication 25 MILLIGRAM(S): at 17:38

## 2021-01-01 RX ADMIN — HYDROMORPHONE HYDROCHLORIDE 1 MILLIGRAM(S): 2 INJECTION INTRAMUSCULAR; INTRAVENOUS; SUBCUTANEOUS at 20:06

## 2021-01-01 RX ADMIN — PIPERACILLIN AND TAZOBACTAM 25 GRAM(S): 4; .5 INJECTION, POWDER, LYOPHILIZED, FOR SOLUTION INTRAVENOUS at 17:03

## 2021-01-01 RX ADMIN — Medication 75 MICROGRAM(S): at 06:12

## 2021-01-01 RX ADMIN — Medication 0: at 23:14

## 2021-01-01 RX ADMIN — APIXABAN 2.5 MILLIGRAM(S): 2.5 TABLET, FILM COATED ORAL at 06:12

## 2021-01-01 RX ADMIN — OLANZAPINE 5 MILLIGRAM(S): 15 TABLET, FILM COATED ORAL at 22:32

## 2021-01-01 RX ADMIN — Medication 81 MILLIGRAM(S): at 11:44

## 2021-01-01 RX ADMIN — Medication 1 PACKET(S): at 14:15

## 2021-01-01 RX ADMIN — Medication 325 MILLIGRAM(S): at 21:37

## 2021-01-01 RX ADMIN — HYDROMORPHONE HYDROCHLORIDE 0.5 MILLIGRAM(S): 2 INJECTION INTRAMUSCULAR; INTRAVENOUS; SUBCUTANEOUS at 00:00

## 2021-01-01 RX ADMIN — APIXABAN 2.5 MILLIGRAM(S): 2.5 TABLET, FILM COATED ORAL at 17:59

## 2021-01-01 RX ADMIN — PIPERACILLIN AND TAZOBACTAM 25 GRAM(S): 4; .5 INJECTION, POWDER, LYOPHILIZED, FOR SOLUTION INTRAVENOUS at 05:22

## 2021-01-01 RX ADMIN — PIPERACILLIN AND TAZOBACTAM 25 GRAM(S): 4; .5 INJECTION, POWDER, LYOPHILIZED, FOR SOLUTION INTRAVENOUS at 05:54

## 2021-01-01 RX ADMIN — HEPARIN SODIUM 5000 UNIT(S): 5000 INJECTION INTRAVENOUS; SUBCUTANEOUS at 23:12

## 2021-01-01 RX ADMIN — ATORVASTATIN CALCIUM 40 MILLIGRAM(S): 80 TABLET, FILM COATED ORAL at 21:53

## 2021-01-01 RX ADMIN — HEPARIN SODIUM 1500 UNIT(S)/HR: 5000 INJECTION INTRAVENOUS; SUBCUTANEOUS at 02:33

## 2021-01-01 RX ADMIN — CEFTRIAXONE 100 MILLIGRAM(S): 500 INJECTION, POWDER, FOR SOLUTION INTRAMUSCULAR; INTRAVENOUS at 09:57

## 2021-01-01 RX ADMIN — OLANZAPINE 5 MILLIGRAM(S): 15 TABLET, FILM COATED ORAL at 22:00

## 2021-01-01 RX ADMIN — HYDROMORPHONE HYDROCHLORIDE 0.5 MILLIGRAM(S): 2 INJECTION INTRAMUSCULAR; INTRAVENOUS; SUBCUTANEOUS at 02:30

## 2021-01-01 RX ADMIN — Medication 5 MILLIGRAM(S): at 22:21

## 2021-01-01 RX ADMIN — Medication 2: at 23:27

## 2021-01-01 RX ADMIN — Medication 6.98 MICROGRAM(S)/KG/MIN: at 18:34

## 2021-01-01 RX ADMIN — Medication 1: at 17:44

## 2021-01-01 RX ADMIN — PANTOPRAZOLE SODIUM 40 MILLIGRAM(S): 20 TABLET, DELAYED RELEASE ORAL at 05:13

## 2021-01-01 RX ADMIN — SODIUM CHLORIDE 1000 MILLILITER(S): 9 INJECTION INTRAMUSCULAR; INTRAVENOUS; SUBCUTANEOUS at 13:03

## 2021-01-01 RX ADMIN — Medication 75 MICROGRAM(S): at 06:07

## 2021-01-01 RX ADMIN — PIPERACILLIN AND TAZOBACTAM 200 GRAM(S): 4; .5 INJECTION, POWDER, LYOPHILIZED, FOR SOLUTION INTRAVENOUS at 16:30

## 2021-01-01 RX ADMIN — ATORVASTATIN CALCIUM 40 MILLIGRAM(S): 80 TABLET, FILM COATED ORAL at 22:00

## 2021-01-01 RX ADMIN — HYDROMORPHONE HYDROCHLORIDE 0.5 MILLIGRAM(S): 2 INJECTION INTRAMUSCULAR; INTRAVENOUS; SUBCUTANEOUS at 22:30

## 2021-01-01 RX ADMIN — APIXABAN 2.5 MILLIGRAM(S): 2.5 TABLET, FILM COATED ORAL at 06:27

## 2021-01-01 RX ADMIN — HYDROMORPHONE HYDROCHLORIDE 0.5 MILLIGRAM(S): 2 INJECTION INTRAMUSCULAR; INTRAVENOUS; SUBCUTANEOUS at 20:59

## 2021-01-01 RX ADMIN — HYDROMORPHONE HYDROCHLORIDE 0.5 MILLIGRAM(S): 2 INJECTION INTRAMUSCULAR; INTRAVENOUS; SUBCUTANEOUS at 05:55

## 2021-01-01 RX ADMIN — Medication 1: at 12:18

## 2021-01-01 RX ADMIN — Medication 1 PACKET(S): at 04:10

## 2021-01-01 RX ADMIN — HEPARIN SODIUM 5000 UNIT(S): 5000 INJECTION INTRAVENOUS; SUBCUTANEOUS at 14:29

## 2021-01-01 RX ADMIN — Medication 200 MEQ/KG/HR: at 19:46

## 2021-01-01 RX ADMIN — Medication 25 MILLIGRAM(S): at 17:19

## 2021-01-01 RX ADMIN — Medication 1: at 13:42

## 2021-01-01 RX ADMIN — Medication 40 MILLIGRAM(S): at 06:35

## 2021-01-01 RX ADMIN — SODIUM CHLORIDE 1000 MILLILITER(S): 9 INJECTION INTRAMUSCULAR; INTRAVENOUS; SUBCUTANEOUS at 14:00

## 2021-01-01 RX ADMIN — HYDROMORPHONE HYDROCHLORIDE 0.5 MILLIGRAM(S): 2 INJECTION INTRAMUSCULAR; INTRAVENOUS; SUBCUTANEOUS at 06:30

## 2021-01-01 RX ADMIN — Medication 25 MILLIGRAM(S): at 06:27

## 2021-01-01 RX ADMIN — Medication 3 MILLIGRAM(S): at 13:54

## 2021-01-01 RX ADMIN — ALBUTEROL 10 MILLIGRAM(S): 90 AEROSOL, METERED ORAL at 17:10

## 2021-01-01 RX ADMIN — Medication 25 MILLIGRAM(S): at 05:13

## 2021-01-01 RX ADMIN — SODIUM CHLORIDE 75 MILLILITER(S): 9 INJECTION INTRAMUSCULAR; INTRAVENOUS; SUBCUTANEOUS at 05:02

## 2021-01-01 RX ADMIN — Medication 50 MILLIGRAM(S): at 06:14

## 2021-01-01 RX ADMIN — Medication 150 MILLIEQUIVALENT(S): at 19:39

## 2021-01-01 RX ADMIN — PANTOPRAZOLE SODIUM 40 MILLIGRAM(S): 20 TABLET, DELAYED RELEASE ORAL at 06:27

## 2021-01-01 RX ADMIN — Medication 50 MICROGRAM(S): at 23:22

## 2021-01-01 RX ADMIN — Medication 50 MILLIEQUIVALENT(S): at 17:11

## 2021-01-01 RX ADMIN — INSULIN HUMAN 5 UNIT(S): 100 INJECTION, SOLUTION SUBCUTANEOUS at 17:10

## 2021-01-01 RX ADMIN — Medication 40 MILLIEQUIVALENT(S): at 10:04

## 2021-01-01 RX ADMIN — CEFTRIAXONE 100 MILLIGRAM(S): 500 INJECTION, POWDER, FOR SOLUTION INTRAMUSCULAR; INTRAVENOUS at 10:53

## 2021-01-01 RX ADMIN — APIXABAN 2.5 MILLIGRAM(S): 2.5 TABLET, FILM COATED ORAL at 06:14

## 2021-01-01 RX ADMIN — ESCITALOPRAM OXALATE 10 MILLIGRAM(S): 10 TABLET, FILM COATED ORAL at 12:39

## 2021-01-01 RX ADMIN — OLANZAPINE 5 MILLIGRAM(S): 15 TABLET, FILM COATED ORAL at 23:13

## 2021-01-01 RX ADMIN — Medication 50 MILLILITER(S): at 17:11

## 2021-01-01 RX ADMIN — Medication 650 MILLIGRAM(S): at 02:17

## 2021-01-01 RX ADMIN — SODIUM CHLORIDE 75 MILLILITER(S): 9 INJECTION INTRAMUSCULAR; INTRAVENOUS; SUBCUTANEOUS at 14:28

## 2021-01-01 RX ADMIN — Medication 25 MILLIGRAM(S): at 18:00

## 2021-01-01 RX ADMIN — Medication 1 TABLET(S): at 13:56

## 2021-01-01 RX ADMIN — Medication 100 GRAM(S): at 18:34

## 2021-01-01 RX ADMIN — Medication 100 GRAM(S): at 04:10

## 2021-01-01 RX ADMIN — Medication 25 MILLIGRAM(S): at 17:03

## 2021-01-01 RX ADMIN — ESCITALOPRAM OXALATE 10 MILLIGRAM(S): 10 TABLET, FILM COATED ORAL at 12:19

## 2021-01-01 RX ADMIN — HYDROMORPHONE HYDROCHLORIDE 0.5 MILLIGRAM(S): 2 INJECTION INTRAMUSCULAR; INTRAVENOUS; SUBCUTANEOUS at 23:22

## 2021-01-01 RX ADMIN — PANTOPRAZOLE SODIUM 40 MILLIGRAM(S): 20 TABLET, DELAYED RELEASE ORAL at 06:14

## 2021-01-01 RX ADMIN — SODIUM CHLORIDE 1000 MILLILITER(S): 9 INJECTION INTRAMUSCULAR; INTRAVENOUS; SUBCUTANEOUS at 17:10

## 2021-01-01 RX ADMIN — CEFEPIME 1000 MILLIGRAM(S): 1 INJECTION, POWDER, FOR SOLUTION INTRAMUSCULAR; INTRAVENOUS at 16:39

## 2021-01-01 RX ADMIN — Medication 4: at 14:42

## 2021-01-01 RX ADMIN — HYDROMORPHONE HYDROCHLORIDE 0.5 MILLIGRAM(S): 2 INJECTION INTRAMUSCULAR; INTRAVENOUS; SUBCUTANEOUS at 01:54

## 2021-01-01 RX ADMIN — ATORVASTATIN CALCIUM 40 MILLIGRAM(S): 80 TABLET, FILM COATED ORAL at 22:34

## 2021-01-01 RX ADMIN — CEFTRIAXONE 100 MILLIGRAM(S): 500 INJECTION, POWDER, FOR SOLUTION INTRAMUSCULAR; INTRAVENOUS at 12:02

## 2021-01-01 RX ADMIN — Medication 75 MICROGRAM(S): at 07:03

## 2021-01-01 RX ADMIN — PANTOPRAZOLE SODIUM 40 MILLIGRAM(S): 20 TABLET, DELAYED RELEASE ORAL at 05:29

## 2021-01-01 RX ADMIN — CEFTRIAXONE 100 MILLIGRAM(S): 500 INJECTION, POWDER, FOR SOLUTION INTRAMUSCULAR; INTRAVENOUS at 06:22

## 2021-01-01 RX ADMIN — Medication 1 PACKET(S): at 06:07

## 2021-01-01 RX ADMIN — Medication 1000 MILLIGRAM(S): at 18:08

## 2021-01-01 RX ADMIN — ATORVASTATIN CALCIUM 40 MILLIGRAM(S): 80 TABLET, FILM COATED ORAL at 21:38

## 2021-01-01 RX ADMIN — Medication 1: at 11:54

## 2021-01-01 RX ADMIN — ATORVASTATIN CALCIUM 40 MILLIGRAM(S): 80 TABLET, FILM COATED ORAL at 23:14

## 2021-01-01 RX ADMIN — ESCITALOPRAM OXALATE 10 MILLIGRAM(S): 10 TABLET, FILM COATED ORAL at 11:44

## 2021-01-01 RX ADMIN — HYDROMORPHONE HYDROCHLORIDE 0.5 MILLIGRAM(S): 2 INJECTION INTRAMUSCULAR; INTRAVENOUS; SUBCUTANEOUS at 21:30

## 2021-01-01 RX ADMIN — SODIUM CHLORIDE 70 MILLILITER(S): 9 INJECTION, SOLUTION INTRAVENOUS at 11:44

## 2021-01-01 RX ADMIN — HYDROMORPHONE HYDROCHLORIDE 0.5 MILLIGRAM(S): 2 INJECTION INTRAMUSCULAR; INTRAVENOUS; SUBCUTANEOUS at 22:00

## 2021-01-01 RX ADMIN — Medication 250 MILLIGRAM(S): at 16:31

## 2021-01-01 RX ADMIN — Medication 75 MICROGRAM(S): at 05:29

## 2021-01-01 RX ADMIN — Medication 75 MICROGRAM(S): at 06:14

## 2021-01-01 RX ADMIN — HYDROMORPHONE HYDROCHLORIDE 0.5 MILLIGRAM(S): 2 INJECTION INTRAMUSCULAR; INTRAVENOUS; SUBCUTANEOUS at 10:46

## 2021-01-01 RX ADMIN — HEPARIN SODIUM 5000 UNIT(S): 5000 INJECTION INTRAVENOUS; SUBCUTANEOUS at 05:55

## 2021-01-01 RX ADMIN — Medication 1: at 17:37

## 2021-01-01 RX ADMIN — ESCITALOPRAM OXALATE 10 MILLIGRAM(S): 10 TABLET, FILM COATED ORAL at 14:12

## 2021-01-01 RX ADMIN — Medication 25 MILLIGRAM(S): at 17:49

## 2021-01-01 RX ADMIN — CEFEPIME 100 MILLIGRAM(S): 1 INJECTION, POWDER, FOR SOLUTION INTRAMUSCULAR; INTRAVENOUS at 16:09

## 2021-01-01 RX ADMIN — ESCITALOPRAM OXALATE 10 MILLIGRAM(S): 10 TABLET, FILM COATED ORAL at 10:29

## 2021-01-01 RX ADMIN — OLANZAPINE 5 MILLIGRAM(S): 15 TABLET, FILM COATED ORAL at 22:22

## 2021-01-01 RX ADMIN — Medication 75 MICROGRAM(S): at 05:13

## 2021-01-01 RX ADMIN — HYDROMORPHONE HYDROCHLORIDE 0.5 MILLIGRAM(S): 2 INJECTION INTRAMUSCULAR; INTRAVENOUS; SUBCUTANEOUS at 16:00

## 2021-01-01 RX ADMIN — APIXABAN 2.5 MILLIGRAM(S): 2.5 TABLET, FILM COATED ORAL at 18:02

## 2021-01-01 RX ADMIN — Medication 40 MILLIEQUIVALENT(S): at 09:11

## 2021-01-01 RX ADMIN — Medication 25 MILLIGRAM(S): at 06:11

## 2021-01-01 RX ADMIN — Medication 2: at 21:54

## 2021-01-01 RX ADMIN — Medication 81 MILLIGRAM(S): at 14:14

## 2021-01-01 RX ADMIN — Medication 2: at 17:28

## 2021-01-01 RX ADMIN — HYDROMORPHONE HYDROCHLORIDE 0.5 MILLIGRAM(S): 2 INJECTION INTRAMUSCULAR; INTRAVENOUS; SUBCUTANEOUS at 11:25

## 2021-01-01 RX ADMIN — HYDROMORPHONE HYDROCHLORIDE 1 MILLIGRAM(S): 2 INJECTION INTRAMUSCULAR; INTRAVENOUS; SUBCUTANEOUS at 19:36

## 2021-01-01 RX ADMIN — SODIUM CHLORIDE 1000 MILLILITER(S): 9 INJECTION INTRAMUSCULAR; INTRAVENOUS; SUBCUTANEOUS at 16:10

## 2021-01-01 RX ADMIN — Medication 3: at 13:04

## 2021-01-01 RX ADMIN — CLOPIDOGREL BISULFATE 75 MILLIGRAM(S): 75 TABLET, FILM COATED ORAL at 11:44

## 2021-01-01 RX ADMIN — PIPERACILLIN AND TAZOBACTAM 25 GRAM(S): 4; .5 INJECTION, POWDER, LYOPHILIZED, FOR SOLUTION INTRAVENOUS at 05:23

## 2021-01-01 RX ADMIN — Medication 650 MILLIGRAM(S): at 01:17

## 2021-01-01 RX ADMIN — HEPARIN SODIUM 5000 UNIT(S): 5000 INJECTION INTRAVENOUS; SUBCUTANEOUS at 06:35

## 2021-01-01 RX ADMIN — Medication 25 MILLIGRAM(S): at 06:12

## 2021-01-01 RX ADMIN — HEPARIN SODIUM 5000 UNIT(S): 5000 INJECTION INTRAVENOUS; SUBCUTANEOUS at 06:48

## 2021-01-01 RX ADMIN — ATORVASTATIN CALCIUM 40 MILLIGRAM(S): 80 TABLET, FILM COATED ORAL at 22:22

## 2021-01-01 RX ADMIN — OLANZAPINE 5 MILLIGRAM(S): 15 TABLET, FILM COATED ORAL at 22:34

## 2021-01-01 RX ADMIN — PANTOPRAZOLE SODIUM 40 MILLIGRAM(S): 20 TABLET, DELAYED RELEASE ORAL at 06:47

## 2021-01-01 RX ADMIN — Medication 1: at 23:02

## 2021-01-01 RX ADMIN — CEFTRIAXONE 100 MILLIGRAM(S): 500 INJECTION, POWDER, FOR SOLUTION INTRAMUSCULAR; INTRAVENOUS at 13:51

## 2021-01-01 RX ADMIN — Medication 400 MILLIGRAM(S): at 06:22

## 2021-01-01 RX ADMIN — PREGABALIN 1000 MICROGRAM(S): 225 CAPSULE ORAL at 13:55

## 2021-01-01 RX ADMIN — Medication 81 MILLIGRAM(S): at 12:52

## 2021-01-01 RX ADMIN — Medication 1: at 12:00

## 2021-01-01 RX ADMIN — Medication 1: at 08:11

## 2021-01-01 RX ADMIN — Medication 250 MILLIGRAM(S): at 17:08

## 2021-01-01 RX ADMIN — CLOPIDOGREL BISULFATE 75 MILLIGRAM(S): 75 TABLET, FILM COATED ORAL at 17:02

## 2021-01-01 RX ADMIN — PIPERACILLIN AND TAZOBACTAM 25 GRAM(S): 4; .5 INJECTION, POWDER, LYOPHILIZED, FOR SOLUTION INTRAVENOUS at 17:59

## 2021-01-01 RX ADMIN — SODIUM CHLORIDE 1000 MILLILITER(S): 9 INJECTION INTRAMUSCULAR; INTRAVENOUS; SUBCUTANEOUS at 17:19

## 2021-01-01 RX ADMIN — Medication 5 MILLIGRAM(S): at 22:01

## 2021-01-01 RX ADMIN — ESCITALOPRAM OXALATE 10 MILLIGRAM(S): 10 TABLET, FILM COATED ORAL at 12:00

## 2021-01-01 RX ADMIN — Medication 40 MILLIEQUIVALENT(S): at 09:57

## 2021-01-01 RX ADMIN — PANTOPRAZOLE SODIUM 40 MILLIGRAM(S): 20 TABLET, DELAYED RELEASE ORAL at 11:40

## 2021-01-01 RX ADMIN — SODIUM CHLORIDE 1000 MILLILITER(S): 9 INJECTION INTRAMUSCULAR; INTRAVENOUS; SUBCUTANEOUS at 18:19

## 2021-01-01 RX ADMIN — SODIUM CHLORIDE 75 MILLILITER(S): 9 INJECTION INTRAMUSCULAR; INTRAVENOUS; SUBCUTANEOUS at 11:42

## 2021-01-01 RX ADMIN — HEPARIN SODIUM 5000 UNIT(S): 5000 INJECTION INTRAVENOUS; SUBCUTANEOUS at 17:02

## 2021-01-01 RX ADMIN — IOHEXOL 30 MILLILITER(S): 300 INJECTION, SOLUTION INTRAVENOUS at 18:20

## 2021-01-01 RX ADMIN — Medication 5 MILLIGRAM(S): at 22:34

## 2021-01-01 RX ADMIN — Medication 1: at 17:39

## 2021-01-01 RX ADMIN — Medication 1: at 08:58

## 2021-01-01 RX ADMIN — HEPARIN SODIUM 1500 UNIT(S)/HR: 5000 INJECTION INTRAVENOUS; SUBCUTANEOUS at 19:06

## 2021-01-01 RX ADMIN — PIPERACILLIN AND TAZOBACTAM 25 GRAM(S): 4; .5 INJECTION, POWDER, LYOPHILIZED, FOR SOLUTION INTRAVENOUS at 19:04

## 2021-01-01 RX ADMIN — SODIUM CHLORIDE 1000 MILLILITER(S): 9 INJECTION INTRAMUSCULAR; INTRAVENOUS; SUBCUTANEOUS at 09:14

## 2021-01-01 RX ADMIN — ESCITALOPRAM OXALATE 10 MILLIGRAM(S): 10 TABLET, FILM COATED ORAL at 12:02

## 2021-01-01 RX ADMIN — Medication 1: at 17:48

## 2021-01-01 RX ADMIN — Medication 25 MILLIGRAM(S): at 18:02

## 2021-01-01 RX ADMIN — PIPERACILLIN AND TAZOBACTAM 25 GRAM(S): 4; .5 INJECTION, POWDER, LYOPHILIZED, FOR SOLUTION INTRAVENOUS at 16:30

## 2021-01-01 RX ADMIN — Medication 81 MILLIGRAM(S): at 17:03

## 2021-01-01 RX ADMIN — PIPERACILLIN AND TAZOBACTAM 25 GRAM(S): 4; .5 INJECTION, POWDER, LYOPHILIZED, FOR SOLUTION INTRAVENOUS at 06:27

## 2021-01-01 RX ADMIN — Medication 25 MILLIGRAM(S): at 19:04

## 2021-01-01 RX ADMIN — PHENYLEPHRINE HYDROCHLORIDE 2.79 MICROGRAM(S)/KG/MIN: 10 INJECTION INTRAVENOUS at 20:04

## 2021-01-01 RX ADMIN — Medication 75 MICROGRAM(S): at 06:27

## 2021-01-01 RX ADMIN — ESCITALOPRAM OXALATE 10 MILLIGRAM(S): 10 TABLET, FILM COATED ORAL at 17:05

## 2021-01-01 RX ADMIN — HYDROMORPHONE HYDROCHLORIDE 0.5 MILLIGRAM(S): 2 INJECTION INTRAMUSCULAR; INTRAVENOUS; SUBCUTANEOUS at 15:42

## 2021-01-01 RX ADMIN — Medication 50 MILLIGRAM(S): at 22:01

## 2021-01-01 RX ADMIN — OLANZAPINE 5 MILLIGRAM(S): 15 TABLET, FILM COATED ORAL at 21:38

## 2021-01-01 RX ADMIN — ATORVASTATIN CALCIUM 40 MILLIGRAM(S): 80 TABLET, FILM COATED ORAL at 22:32

## 2021-01-01 RX ADMIN — APIXABAN 2.5 MILLIGRAM(S): 2.5 TABLET, FILM COATED ORAL at 19:03

## 2021-01-01 RX ADMIN — PANTOPRAZOLE SODIUM 40 MILLIGRAM(S): 20 TABLET, DELAYED RELEASE ORAL at 06:07

## 2021-01-01 RX ADMIN — ESCITALOPRAM OXALATE 10 MILLIGRAM(S): 10 TABLET, FILM COATED ORAL at 13:52

## 2021-01-05 NOTE — ED PROVIDER NOTE - CLINICAL SUMMARY MEDICAL DECISION MAKING FREE TEXT BOX
81 yo female presents as a stroke notification with c/o slurred speech per daughter. Code stroke called.

## 2021-01-05 NOTE — ED PROVIDER NOTE - PROGRESS NOTE DETAILS
Pt reassessed and found to still have normal speech and no other neuro deficits.  Informed Dr. Keenan for admission.

## 2021-01-05 NOTE — ED PROVIDER NOTE - OBJECTIVE STATEMENT
yo female with PMHx of, presents    BIBA with c/o slurred speech per daughter that began at 6:45 pm today. Per EMS, pt speaks Burmese was was on the phone with her daughter who noted patient to have slurred speech. Per EMS, while the patient was on the phone with her daughter, the pt was able to say her name, that she was in her apartment, and that she saw her . Per EMT, patient is alert and oriented at baseline. Per EMS, the patient was able to stand up and help a little bit with getting to the stretcher. FSG per EMS was 267 and in the ED FSG was 249. Per EMS, the patient's vitals were stable and the patient was not given any medications by EMS. Patient states she is currently in no pain. Patient's grand daughter'sname is Luciana - [phone] 79 yo female with unknown PMHx, presents BIBA with c/o slurred speech per daughter that began at 6:45 pm today. Per EMS, patient speaks Danish was on the phone with her daughter who noted patient to have slurred speech. Per EMS, while the patient was on the phone with her daughter, the patient was able to say her name, she stated she was in her apartment, and that she saw her . Per EMT, patient is alert and oriented at baseline. Per EMS, the patient was able to stand up and help a little bit with getting to the stretcher. FSG per EMS was 267 and in the ED FSG was 249. Per EMS, the patient's vitals were stable and the patient was not given any medications by EMS. Patient states she is currently in no pain.     Patient's grand daughter's name is Luciana - [phone] 79 yo female with unknown PMHx, presents BIBA with c/o slurred speech per daughter that was recognized at 6:45 pm today. Daughter, Luciana, says that the last time her speech was confirmed normal was sometime yesterday evening approximately.  However upon initial ED eval, EMS reported inaccurately that 6:45 pm was the last known normal.  Per EMS, patient speaks Chilean was on the phone with her daughter who noted patient to have slurred speech. Per EMS, while the patient was on the phone with her daughter, the patient was able to say her name, she stated she was in her apartment, and that she saw her . Per EMT, patient is alert and oriented at baseline. Per EMS, the patient was able to stand up and help a little bit with getting to the stretcher. FSG per EMS was 267 and in the ED FSG was 249. Per EMS, the patient's vitals were stable and the patient was not given any medications by EMS. Patient states she is currently in no pain.   Medications:  olanzipine 5 mg, metoprolol  mg, metformin, lisinopril 20 mg, levothyroxine 75 mcg, furosemide 40 mg, escitalipram 10 mg, ASA 81 mg, cyanocbalamin 1000.  Patient's grand daughter's name is Luciana - [phone] 81 yo female with h/o DM, HTN, CHF, hypothyroidism, presents BIBA with c/o slurred speech per daughter that was recognized at 6:45 pm today. Daughter, Luciana, says that the last time her speech was confirmed normal was sometime yesterday evening approximately.  However upon initial ED eval, EMS reported inaccurately that 6:45 pm was the last known normal.  Per EMS, patient speaks Citizen of the [place] was on the phone with her daughter who noted patient to have slurred speech. Per EMS, while the patient was on the phone with her daughter, the patient was able to say her name, she stated she was in her apartment, and that she saw her . Per EMT, patient is alert and oriented at baseline. Per EMS, the patient was able to stand up and help a little bit with getting to the stretcher. FSG per EMS was 267 and in the ED FSG was 249. Per EMS, the patient's vitals were stable and the patient was not given any medications by EMS. Patient states she is currently in no pain.   Medications:  olanzipine 5 mg, metoprolol  mg, metformin, lisinopril 20 mg, levothyroxine 75 mcg, furosemide 40 mg, escitalopram 10 mg, ASA 81 mg, cyanocobalamin 1000.  Patient's grand daughter's name is Luciana - [phone]

## 2021-01-05 NOTE — ED ADULT NURSE NOTE - OBJECTIVE STATEMENT
The patient presents as a code stroke.  As per the daughter, around 1845, the patient was noted with slurred speech and unable to make full sentences. On arrival, she is noted with weakness.  No other neuro deficits.

## 2021-01-05 NOTE — ED ADULT TRIAGE NOTE - CHIEF COMPLAINT QUOTE
Pt BIBA as a stroke notification  as per daughter she was talking to her mother at 6:45pm and she noticed her speech became slurred.

## 2021-01-05 NOTE — ED PROVIDER NOTE - CARE PLAN
Principal Discharge DX:	TIA (transient ischemic attack)   Principal Discharge DX:	TIA (transient ischemic attack)  Secondary Diagnosis:	Renal insufficiency  Secondary Diagnosis:	Atrial fibrillation, unspecified type

## 2021-01-06 NOTE — SWALLOW BEDSIDE ASSESSMENT ADULT - PHARYNGEAL PHASE
Audible gulp swallow acoustics noted./Within functional limits Within functional limits Multiple swallows

## 2021-01-06 NOTE — PATIENT PROFILE ADULT - STATED REASON FOR ADMISSION
Daughter reports that "she was way of. She wasn't making sense and she could hardly get her words out".

## 2021-01-06 NOTE — H&P ADULT - ATTENDING COMMENTS
Patient is a 80 year old female with a PMH of HTN, DM, CHF (Unknown last LVEF), Hypothyroidism who was BIBEMS due to altered mental status.  (Ukrainian  ID: [id number])  Patient is arouseable but a poor historian.  Therefore, a significant amount of information was obtained from medical records.    T(C): 37.1 (01-06-21 @ 00:03), Max: 37.1 (01-06-21 @ 00:03)  T(F): 98.7 (01-06-21 @ 00:03), Max: 98.7 (01-06-21 @ 00:03)  HR: 108 (01-05-21 @ 21:37) (89 - 108)  BP: 136/79 (01-05-21 @ 21:37) (121/65 - 136/79)  RR: 21 (01-05-21 @ 21:37) (18 - 21)  SpO2: 99% (01-05-21 @ 21:37) (95% - 99%)  Wt(kg): --    P/E: As above MAR    A/P:    Altered Mental Status likely due to TIA:  -CT Brain identified no evidence of mass-effect or midline shift. No intraparenchymal mass lesions or hemorrhage is identified. There is no evidence of hydrocephalus. No extra-axial collections are noted.  -ABCD2 Score= 6 points (Age>=60, Unilateral Weakness, Duration of Symptoms >=60 minutes, History of DM), Per the validation study, 6-7 points: High Risk, 2-Day Stroke Risk: 8.1%, 7-Day Stroke Risk: 11.7%, 90-Day Stroke Risk: 17.8%  -As patient has a calculated ABCD2 score of >=4, will initiate patient on DAPT with Aspirin loading dose of 325 mg (followed by 81mg PO daily) as well as Plavix loading dose of 300mg Once (followed by 75mg daily).  -Will send Lipid Panel, Hemoglobin A1c  -In addition, will also start patient on statin    HTN:  -Will hold antihypertensives, for now  -Vital Signs Q2H    DM:  -Hemoglobin A1c with AM labs  -Blood Glucose Monitoring ACHS  -Regular Insulin Sliding Scale ACHS  -Speech and Swallow evaluation and then can likely start Carb Controlled, Heart Healthy, Renal (Non-Dialysis) diet as tolerated    Acute Renal Insufficiency:  -eGFR= 15 (No baseline available)  -Cr= 2.82 (No baseline available)  -Will send Urinalysis and Urinary Electrolytes (Sodium, Potassium, Creatinine, Chloride)  -Will also send Parathyroid Level  -Will obtain Bilateral Renal Sonogram  -If no demonstrated improvement, can consider asking Nephrology to evaluate patient    Leukocytosis possibly due to Acute Complicated UTI:  -Will send ESR, CRP, Procalcitonin  -Will start Rocephin 1gm IV daily, for now    Elevated Alk Phos:  -Will continue to monitor and if no demonstrated improvement, can consider obtaining Ultrasound of RUQ  -Can also consider sending CA-125, CA 19-9, CEA and AFP level, though will hold for now    Hypoalbuminemia:  -Nutrition Consult    GI/DVT PPx:  -Heparin  -PPI Patient is a 80 year old female with a PMH of HTN, DM, CHF (Unknown last LVEF), Hypothyroidism who was BIBEMS due to altered mental status.  (Kyrgyz  ID: [id number])  Patient is arouseable but a poor historian.  Therefore, a significant amount of information was obtained from medical records.    T(C): 37.1 (01-06-21 @ 00:03), Max: 37.1 (01-06-21 @ 00:03)  T(F): 98.7 (01-06-21 @ 00:03), Max: 98.7 (01-06-21 @ 00:03)  HR: 108 (01-05-21 @ 21:37) (89 - 108)  BP: 136/79 (01-05-21 @ 21:37) (121/65 - 136/79)  RR: 21 (01-05-21 @ 21:37) (18 - 21)  SpO2: 99% (01-05-21 @ 21:37) (95% - 99%)  Wt(kg): --    P/E: As above MAR    A/P:    Altered Mental Status likely due to TIA:  -CT Brain identified no evidence of mass-effect or midline shift. No intraparenchymal mass lesions or hemorrhage is identified. There is no evidence of hydrocephalus. No extra-axial collections are noted.  -ABCD2 Score= 6 points (Age>=60, Unilateral Weakness, Duration of Symptoms >=60 minutes, History of DM), Per the validation study, 6-7 points: High Risk, 2-Day Stroke Risk: 8.1%, 7-Day Stroke Risk: 11.7%, 90-Day Stroke Risk: 17.8%  -As patient has a calculated ABCD2 score of >=4, will initiate patient on DAPT with Aspirin loading dose of 325 mg (followed by 81mg PO daily) as well as Plavix loading dose of 300mg Once (followed by 75mg daily).  -Will send Lipid Panel, Hemoglobin A1c, Prolactin, CK  -In addition, will also start patient on statin  -Fall Precautions, Aspiration Precautions, Seizure Precautions    HTN:  -Will hold antihypertensives, for now  -Vital Signs Q2H    DM:  -Hemoglobin A1c with AM labs  -Blood Glucose Monitoring ACHS  -Regular Insulin Sliding Scale ACHS  -Speech and Swallow evaluation and then can likely start Carb Controlled, Heart Healthy, Renal (Non-Dialysis) diet as tolerated    Acute Renal Insufficiency:  -eGFR= 15 (No baseline available)  -Cr= 2.82 (No baseline available)  -Will send Urinalysis and Urinary Electrolytes (Sodium, Potassium, Creatinine, Chloride)  -Will also send Parathyroid Level  -Will obtain Bilateral Renal Sonogram  -If no demonstrated improvement, can consider asking Nephrology to evaluate patient    Leukocytosis possibly due to Acute Complicated UTI:  -Will send ESR, CRP, Procalcitonin  -Will start Rocephin 1gm IV daily, for now    Elevated Alk Phos:  -Will continue to monitor and if no demonstrated improvement, can consider obtaining Ultrasound of RUQ  -Can also consider sending CA-125, CA 19-9, CEA and AFP level, though will hold for now    Hypoalbuminemia:  -Nutrition Consult    GI/DVT PPx:  -Heparin  -PPI Patient is a 80 year old female with a PMH of HTN, DM, CHF (Unknown last LVEF), Hypothyroidism who was BIBEMS due to altered mental status.  (Estonian  ID: [id number])  Patient is arouseable but a poor historian.  Therefore, a significant amount of information was obtained from medical records.    T(C): 37.1 (01-06-21 @ 00:03), Max: 37.1 (01-06-21 @ 00:03)  T(F): 98.7 (01-06-21 @ 00:03), Max: 98.7 (01-06-21 @ 00:03)  HR: 108 (01-05-21 @ 21:37) (89 - 108)  BP: 136/79 (01-05-21 @ 21:37) (121/65 - 136/79)  RR: 21 (01-05-21 @ 21:37) (18 - 21)  SpO2: 99% (01-05-21 @ 21:37) (95% - 99%)  Wt(kg): --    P/E: As above MAR    A/P:    Altered Mental Status likely due to TIA:  -CT Brain identified no evidence of mass-effect or midline shift. No intraparenchymal mass lesions or hemorrhage is identified. There is no evidence of hydrocephalus. No extra-axial collections are noted.  -ABCD2 Score= 6 points (Age>=60, Unilateral Weakness, Duration of Symptoms >=60 minutes, History of DM), Per the validation study, 6-7 points: High Risk, 2-Day Stroke Risk: 8.1%, 7-Day Stroke Risk: 11.7%, 90-Day Stroke Risk: 17.8%  -As patient has a calculated ABCD2 score of >=4, will initiate patient on DAPT with Aspirin loading dose of 325 mg (followed by 81mg PO daily) as well as Plavix loading dose of 300mg Once (followed by 75mg daily).  -Will send Lipid Panel, Hemoglobin A1c, Prolactin, CK  -In addition, will also start patient on statin  -Patient would likely benefit from MRI/MRA Brain/Carotids  -Will need to involve Neurology for further recommendations  -Fall Precautions, Aspiration Precautions, Seizure Precautions    HTN:  -Will hold antihypertensives, for now  -Vital Signs Q2H    DM:  -Hemoglobin A1c with AM labs  -Blood Glucose Monitoring ACHS  -Regular Insulin Sliding Scale ACHS  -Speech and Swallow evaluation and then can likely start Carb Controlled, Heart Healthy, Renal (Non-Dialysis) diet as tolerated    Acute Renal Insufficiency:  -eGFR= 15 (No baseline available)  -Cr= 2.82 (No baseline available)  -Will send Urinalysis and Urinary Electrolytes (Sodium, Potassium, Creatinine, Chloride)  -Will also send Parathyroid Level  -Will obtain Bilateral Renal Sonogram  -If no demonstrated improvement, can consider asking Nephrology to evaluate patient    Leukocytosis possibly due to Acute Complicated UTI:  -Will send ESR, CRP, Procalcitonin  -Will start Rocephin 1gm IV daily, for now    Elevated Alk Phos:  -Will continue to monitor and if no demonstrated improvement, can consider obtaining Ultrasound of RUQ  -Can also consider sending CA-125, CA 19-9, CEA and AFP level, though will hold for now    Hypoalbuminemia:  -Nutrition Consult    GI/DVT PPx:  -Heparin  -PPI Patient is a 80 year old female with a PMH of HTN, DM, CHF (Unknown last LVEF), Hypothyroidism who was BIBEMS due to altered mental status.  (Vietnamese  ID: [id number])  Patient is arouseable but a poor historian.  Therefore, a significant amount of information was obtained from medical records.    T(C): 37.1 (01-06-21 @ 00:03), Max: 37.1 (01-06-21 @ 00:03)  T(F): 98.7 (01-06-21 @ 00:03), Max: 98.7 (01-06-21 @ 00:03)  HR: 108 (01-05-21 @ 21:37) (89 - 108)  BP: 136/79 (01-05-21 @ 21:37) (121/65 - 136/79)  RR: 21 (01-05-21 @ 21:37) (18 - 21)  SpO2: 99% (01-05-21 @ 21:37) (95% - 99%)  Wt(kg): --    P/E: As above MAR    A/P:    Altered Mental Status likely due to TIA:  -CT Brain identified no evidence of mass-effect or midline shift. No intraparenchymal mass lesions or hemorrhage is identified. There is no evidence of hydrocephalus. No extra-axial collections are noted.  -ABCD2 Score= 6 points (Age>=60, Unilateral Weakness, Duration of Symptoms >=60 minutes, History of DM), Per the validation study, 6-7 points: High Risk, 2-Day Stroke Risk: 8.1%, 7-Day Stroke Risk: 11.7%, 90-Day Stroke Risk: 17.8%  -As patient has a calculated ABCD2 score of >=4, will initiate patient on DAPT with Aspirin loading dose of 325 mg (followed by 81mg PO daily) as well as Plavix loading dose of 300mg Once (followed by 75mg daily).  -Will send Lipid Panel, Hemoglobin A1c, Prolactin, CK  -In addition, will also start patient on statin  -Patient would likely benefit from MRI/MRA Brain/Carotids  -Will need to involve Neurology for further recommendations  -Fall Precautions, Aspiration Precautions, Seizure Precautions    HTN:  -Will hold antihypertensives, for now  -Vital Signs Q2H    DM:  -Hemoglobin A1c with AM labs  -Blood Glucose Monitoring ACHS  -Regular Insulin Sliding Scale ACHS  -Speech and Swallow evaluation and then can likely start Carb Controlled, Heart Healthy, Renal (Non-Dialysis) diet as tolerated    Acute Renal Insufficiency:  -eGFR= 15 (No baseline available)  -Cr= 2.82 (No baseline available)  -Will send Urinalysis and Urinary Electrolytes (Sodium, Potassium, Creatinine, Chloride)  -Will also send Parathyroid Level  -Will obtain Bilateral Renal Sonogram  -If no demonstrated improvement, can consider asking Nephrology to evaluate patient    Leukocytosis possibly due to Acute Complicated UTI:  -Will send ESR, CRP, Procalcitonin  -Will start Rocephin 1gm IV daily, for now    Elevated Alk Phos:  -Will continue to monitor and if no demonstrated improvement, can consider obtaining Ultrasound of RUQ  -Can also consider sending CA-125, CA 19-9, CEA and AFP level, though will hold for now    CHF:  -TTE  -Fluid Restrictions, Sodium Restrictions, Daily Weights, Strict I&Os    Hypoalbuminemia:  -Nutrition Consult    GI/DVT PPx:  -Heparin  -PPI

## 2021-01-06 NOTE — CONSULT NOTE ADULT - SUBJECTIVE AND OBJECTIVE BOX
[facility] NEPHROLOGY- CONSULTATION NOTE  [place]  # [id number]    Patient is a 79yo [place] speaking Female with CHF, DM, HTN, and hypothyroidism, who p/w slurred speech r/o CVA. Nephrology consulted for Elevated serum creatinine.    Pt confused at times. She states that she had h/o kidney disease b/c her kidneys hurt since she had an epidural years ago when she was pregnant. Pt also states she was in a previous hospital for 1.5 months due to her legs.   She denies any SOB, chest pain, cough, n/v or LE edema. Pt c/o diarrhea q 2-3 days.     PAST MEDICAL & SURGICAL HISTORY:  Hypothyroidism    Congestive heart failure (CHF)    Hypertension    Diabetes mellitus    Anxiety    Obesity    DM (diabetes mellitus)    HTN (hypertension)    History of       No Known Allergies    Home Medications Reviewed  Hospital Medications:   MEDICATIONS  (STANDING):  aspirin  chewable 81 milliGRAM(s) Oral daily  atorvastatin 40 milliGRAM(s) Oral at bedtime  cefTRIAXone   IVPB 1000 milliGRAM(s) IV Intermittent every 24 hours  clopidogrel Tablet 75 milliGRAM(s) Oral daily  escitalopram 10 milliGRAM(s) Oral daily  heparin   Injectable 5000 Unit(s) SubCutaneous every 8 hours  insulin lispro (ADMELOG) corrective regimen sliding scale   SubCutaneous Before meals and at bedtime  levothyroxine 75 MICROGram(s) Oral daily  OLANZapine 5 milliGRAM(s) Oral at bedtime  pantoprazole    Tablet 40 milliGRAM(s) Oral before breakfast  sodium chloride 0.9%. 1000 milliLiter(s) (75 mL/Hr) IV Continuous <Continuous>      REVIEW OF SYSTEMS:  Gen: no changes in weight  HEENT: no rhinorrhea  Neck: no sore throat  Cards: no chest pain  Resp: no dyspnea  GI: no nausea or vomiting + diarrhea  : no dysuria or hematuria  Vascular: no LE edema  Derm: no rashes  Neuro: no numbness/tingling  All other review of systems is negative unless indicated above.    VITALS:  T(F): 98.2 (21 @ 17:19), Max: 104.1 (21 @ 06:24)  HR: 82 (21 @ 17:59)  BP: 119/50 (21 @ 17:59)  RR: 17 (21 @ 17:59)  SpO2: 100% (21 @ 17:59)  Wt(kg): --      Weight (kg): 80.6 ( @ 21:43)    PHYSICAL EXAM:  Gen: NAD, Confused  HEENT: dry MM  Neck: no JVD  Cards: RRR, +S1/S2,   Resp: CTA ant  GI: soft, NT/ND, NABS  : +johnson  Extremities: no LE edema B/L  Derm: mild LE hyperpigmentation  Neuro: non-focal    LABS:      138  |  104  |  44<H>  ----------------------------<  273<H>  3.6   |  23  |  2.98<H>    Ca    7.5<L>      2021 13:32  Phos  4.2       Mg     1.8         TPro  7.5  /  Alb  2.9<L>  /  TBili  0.7  /  DBili  0.1  /  AST  18  /  ALT  25  /  AlkPhos  198<H>      Creatinine Trend: 2.98 <--, 3.19 <--, 2.82 <--                        8.6    8.78  )-----------( 193      ( 2021 13:32 )             26.9     Urine Studies:  Urinalysis Basic - ( 2021 20:50 )    Color: Yellow / Appearance: Slightly Turbid / S.020 / pH:   Gluc:  / Ketone: Negative  / Bili: Negative / Urobili: Negative   Blood:  / Protein: 30 mg/dL / Nitrite: Negative   Leuk Esterase: Trace / RBC: 5-10 /HPF / WBC 6-10 /HPF   Sq Epi:  / Non Sq Epi: Moderate /HPF / Bacteria: Many /HPF      Sodium, Random Urine: 20 mmol/L ( @ 14:32)  Creatinine, Random Urine: 134 mg/dL ( @ 14:32)  Chloride, Random Urine: 32 mmol/L ( @ 14:32)  Potassium, Random Urine: 62 mmol/L ( @ 14:32)    RADIOLOGY & ADDITIONAL STUDIES:      < from: CT Brain Stroke Protocol (21 @ 20:19) >    EXAM:  CT BRAIN STROKE PROTOCOL                            PROCEDURE DATE:  2021          < end of copied text >    < from: CT Brain Stroke Protocol (21 @ 20:19) >    Impression:  1. Unremarkable noncontrast CT scan of the brain.      < end of copied text >    < from: Xray Chest 1 View- PORTABLE-Urgent (Xray Chest 1 View- PORTABLE-Urgent .) (21 @ 07:05) >    EXAM:  XR CHEST PORTABLE URGENT 1V                            PROCEDURE DATE:  2021      < end of copied text >  < from: Xray Chest 1 View- PORTABLE-Urgent (Xray Chest 1 View- PORTABLE-Urgent .) (21 @ 07:05) >    INTERPRETATION:  Fever.    AP chest.  Impression:  Heart magnified by AP film shallow inspiration. Calcified aortic arch. Streaky fibrotic/atelectatic changes left base. Trace left pleural effusion. No focal consolidation.        < end of copied text >    < from: US Renal (21 @ 10:27) >    EXAM:  US KIDNEY(S)                            PROCEDURE DATE:  2021          < end of copied text >    < from: US Renal (21 @ 10:27) >    FINDINGS:    Rightkidney: 11.4 cm. No renal mass, hydronephrosis or calculi. 1.6 cm cyst in the upper pole of the right kidney.    Left kidney: 11.4 cm. No renal mass, hydronephrosis or calculi.    Urinary bladder: Within normal limits.    IMPRESSION:    No hydronephrosis.    Small right renal cyst.                  CASSIDY CALIX MD; Attending Radiologist  This document has been electronically signed. 2021 11:06AM    < end of copied text >

## 2021-01-06 NOTE — SWALLOW BEDSIDE ASSESSMENT ADULT - SLP PERTINENT HISTORY OF CURRENT PROBLEM
Patient is an 80 year old female from home, with PMH of DM, HTN, CHF and hypothyroidism, who presented to the ED due to slurred speech. Patient is a poor historian. Unsure about exactly what happened that caused her to come to the ED. Patient just states she is feeling unwell. Denies any pain anywhere.Daughter states that she calls her mother daily to make sure she had taken her medications. If the daughter does not call, then the patient will call her. Yesterday, the daughter did not get to call the patient and daughter states that the patient never called her. Daughter called the pt and stated that the pt was not making sense and also appeared to have slurred speech. Pt denies any symptoms similar to this previously and denies any history of stroke. Currently, pt denies any chest pain, shortness of breath, abdominal pain, nausea, vomiting, diarrhea or constipation

## 2021-01-06 NOTE — CONSULT NOTE ADULT - ASSESSMENT
Patient is an 80 year old female from home, with PMH of DM, HTN, CHF and hypothyroidism, who presented to the ED due to slurred speech.  ICU was consulted for hypotension. Her BP was 70/60 and gave 2L NS. Her BP improved to 105/60.     Severe sepsis without septic shock.    Plan: Febrile to 103F, Tachycardia of 111, BP 70/40 Leukocytosis of 13k, lactate of 1.5 on admission, meeting severe sepsis criteria  BP on monitor showed 70/40. Monitor was not functioning well. Checked BP with another machine and it was 96/60. 1LNS was given and her BP improved to 105/60.   COVID Negative,   s/p 3L NS  Holding BP medications Lasix and Metoprolol.  CXR showed no pneumonia  u/a negative  s/p johnson and 500ml of urine output    s/p 1 dose of vanc and ceftriaxone  continue ceftriaxone for now  f/u BCx, UCx  f/u ID physician   Patient is an 80 year old female from home, with PMH of DM, HTN, CHF and hypothyroidism, who presented to the ED due to slurred speech.  ICU was consulted for hypotension. Her BP was 70/60 and gave 2L NS. Her BP improved to 105/60.     Severe sepsis without septic shock.    Plan: Febrile to 103F, Tachycardia of 111, BP 70/40 Leukocytosis of 13k, lactate of 1.5 on admission, meeting severe sepsis criteria  BP on monitor showed 70/40. Monitor was not functioning well. Checked BP with another machine and it was 96/60. 1LNS was given and her BP improved to 105/60.   BP rechecked at the bedside at 5pm and it was / 60 and HR of 81.   COVID Negative,   s/p 3L NS  Holding BP medications Lasix and Metoprolol.  CXR showed no pneumonia  u/a negative  s/p johnson and 500ml of urine output    s/p 1 dose of vanc and ceftriaxone  continue ceftriaxone for now  f/u BCx, UCx  f/u ID physician

## 2021-01-06 NOTE — SWALLOW BEDSIDE ASSESSMENT ADULT - SWALLOW EVAL: DIAGNOSIS
Pt presents with oral dysphagia with delayed anterior-posterior transport, increased oral transit time, and oral residue. Intact pharyngeal swallow sequence on trials with No overt s/s aspiration on trials.

## 2021-01-06 NOTE — CONSULT NOTE ADULT - ASSESSMENT
Acute toxic metabolic encephalopathy in the setting of sepsis, hypotension.    Encephalopathy improving since arrival in ED with management of the above problems.      Morbid obesity.     Sarcopenia.     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    Likely has cervical spondylosis (with resulting cervical myelopathy).     Vitamin   Acute toxic metabolic encephalopathy in the setting of sepsis, hypotension.    Encephalopathy improving since arrival in ED with management of the above problems.      Morbid obesity.     Sarcopenia.     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    Likely has cervical spondylosis (with resulting cervical myelopathy); R/O seropositive arthropathy.     High ESR and CRP: acute phase reactants, or systemic disorder (RA, etc.)    Vitamin B12 deficiency under treatment.    Weakness of multifactorial etiology.  It is difficult to say how much her weakness is due to each of the following: immobility, obesity, vitamin deficiency prior to start of treatment, cervical cord compression.      She would not be considered a candidate for cervical spine surgery.  However it would be appropriate to perform c-spine Ct to R/O instability which could be managed by wearing a cervical collar.          RECOMMENDATIONS    C-spine CT (non-con)    Methylmalonic acid, homocysteine, RF, CCP, BREONNA, SPEP, serum immunofixation.

## 2021-01-06 NOTE — H&P ADULT - PROBLEM SELECTOR PLAN 5
patient on metformin at home  start SSI while in hospital  f/u A1c  monitor BS and adjust insulin as needed

## 2021-01-06 NOTE — H&P ADULT - PROBLEM SELECTOR PLAN 4
leukocytosis of 13K on admission   UA noted to be positive  may be source  monitor WBC count  f/u urine cx  will start CTX 1g daily for now leukocytosis of 13K on admission   UA noted to be positive  may be source  monitor WBC count  f/u urine cx, blood cx   f/u CXR  will start CTX 1g daily for now  tylenol PRN for fever and pain

## 2021-01-06 NOTE — SWALLOW BEDSIDE ASSESSMENT ADULT - SWALLOW EVAL: RECOMMENDED FEEDING/EATING TECHNIQUES
allow for swallow between intakes allow for swallow between intakes/alternate food with liquid/maintain upright posture during/after eating for 30 mins/position upright (90 degrees)

## 2021-01-06 NOTE — H&P ADULT - HISTORY OF PRESENT ILLNESS
Patient is an 80 year old female from home, with PMH of DM, HTN, CHF and hypothyroidism, who presented to the ED due to slurred speech. Patient is a poor historian. Unsure about exactly what happened that caused her to come to the ED. Patient just states she is feeling unwell. Denies any pain anywhere. Spoke to daughter, Raquel [phone]. Daughter states that she calls her mother at least once or twice a day to make sure she had taken her medications. If the daughter does not call, then the patient will call her. Yesterday, the daughter did not get to call the patient and daughter states that the patient never called her. Daughter called the patient and stated that the patient was not making sense and also appeared to have slurred speech. Patient denies any symptoms similar to this previously and denies any history of stroke. Currently, patient denies any chest pain, shortness of breath, abdominal pain, nausea, vomiting, diarrhea or constipation.

## 2021-01-06 NOTE — ED ADULT NURSE REASSESSMENT NOTE - NS ED NURSE REASSESS COMMENT FT1
The patient upgraded from ED holding to main for difficulty breathing sating 69% on RA and tremulous. Pt noted with T-104.1 Rectal, 91, 148/61, R-20, [id number] NR. Blood Cultures sent; IV Tylenol 1g administered, Ceftriaxone 1g IV.

## 2021-01-06 NOTE — CONSULT NOTE ADULT - SUBJECTIVE AND OBJECTIVE BOX
History per Admission H&P or 00:09 this morning.    <Start of quote from H&P>  "  History of Present Illness:   Patient is an 80 year old female from home, with PMH of DM, HTN, CHF and hypothyroidism, who presented to the ED due to slurred speech. Patient is a poor historian. Unsure about exactly what happened that caused her to come to the ED. Patient just states she is feeling unwell. Denies any pain anywhere. Spoke to daughter, Raquel [phone]. Daughter states that she calls her mother at least once or twice a day to make sure she had taken her medications. If the daughter does not call, then the patient will call her. Yesterday, the daughter did not get to call the patient and daughter states that the patient never called her. Daughter called the patient and stated that the patient was not making sense and also appeared to have slurred speech. Patient denies any symptoms similar to this previously and denies any history of stroke. Currently, patient denies any chest pain, shortness of breath, abdominal pain, nausea, vomiting, diarrhea or constipation.      Review of Systems:  · Negative General Symptoms	no fever; no chills  · Negative Skin Symptoms	no rash; no itching  · Negative Ophthalmologic Symptoms	no diplopia  · Negative ENMT Symptoms	no hearing difficulty  · Negative Respiratory and Thorax Symptoms	no wheezing; no dyspnea; no cough  · Negative Cardiovascular Symptoms	no chest pain; no palpitations  · Negative Gastrointestinal Symptoms	no nausea; no vomiting; no diarrhea; no constipation; no abdominal pain  · Negative Musculoskeletal Symptoms	no leg pain L; no leg pain R  · Negative Neurological Symptoms	no headache      Allergies and Intolerances:        Allergies:  	No Known Allergies:     Home Medications:   * Outpatient Medication Status not yet specified  · 	OLANZapine 5 mg oral tablet: 1 tab(s) orally once a day (at bedtime)  · 	metoprolol succinate 100 mg oral tablet, extended release: 1 tab(s) orally once a day  · 	metFORMIN 1000 mg oral tablet: 1 tab(s) orally 2 times a day  · 	lisinopril 20 mg oral tablet: 1 tab(s) orally once a day  · 	Synthroid 75 mcg (0.075 mg) oral tablet: 1 tab(s) orally once a day  · 	Lasix 40 mg oral tablet: 1 tab(s) orally once a day  · 	escitalopram 10 mg oral tablet: 1 tab(s) orally once a day  · 	Aspir 81 oral delayed release tablet: 1 tab(s) orally once a day  · 	cyanocobalamin 1000 mcg oral tablet: 1 tab(s) orally once a day    Patient History:    Past Medical, Past Surgical, and Family History:  PAST MEDICAL HISTORY:  Congestive heart failure (CHF)     Diabetes mellitus     Hypertension     Hypothyroidism.     Social History:  Social History (marital status, living situation, occupation, tobacco use, alcohol and drug use, and sexual history): Patient lives at home with her . Daughter denies any smoking, alcohol use or use of illicit drugs.     Tobacco Screening:  · Core Measure Site	Yes  · Has the patient used tobacco in the past 30 days?	No    Risk Assessment:    Present on Admission:  Deep Venous Thrombosis	no  Pulmonary Embolus	no     Heart Failure:  Does this patient have a history of or has been diagnosed with heart failure? yes."  <End of quote from H&P>    ADDITIONAL HISTORY per per Critical Care Note of 14:59 Today  "· Assessment	  Patient is an 80 year old female from home, with PMH of DM, HTN, CHF and hypothyroidism, who presented to the ED due to slurred speech.  ICU was consulted for hypotension. Her BP was 70/60 and gave 2L NS. Her BP improved to 105/60.     Severe sepsis without septic shock.    Plan: Febrile to 103F, Tachycardia of 111, BP 70/40 Leukocytosis of 13k, lactate of 1.5 on admission, meeting severe sepsis criteria  BP on monitor showed 70/40. Monitor was not functioning well. Checked BP with another machine and it was 96/60. 1LNS was given and her BP improved to 105/60.   BP rechecked at the bedside at 5pm and it was / 60 and HR of 81.   COVID Negative,   s/p 3L NS  Holding BP medications Lasix and Metoprolol.  CXR showed no pneumonia  u/a negative  s/p johnson and 500ml of urine output    s/p 1 dose of vanc and ceftriaxone  continue ceftriaxone for now  f/u BCx, UCx  f/u ID physician"      EXAMINATION    Supine on [place]; morbidly obese.  Awake, alert.  Limited English fluency.  Interviewed in Kinyar[place]anda by me.      She gives date as January 5, 2021, ?Wednesday (it IS Wednesday).  She explains that she fell at home, that it was her 's fault, that he, being 90 years old, was too weak to help her get up and therefore help came.  She also goes on to say that he is forgetful/confused as is typical at his age.      Normal comprehension, expression, prosody, articulation in Kiny[place]anda.  Pupils round, reactive.  Confrontation visual fields grossly intact.  Moderately hard of hearing.  Normal facial/lingual movements.  No grossly evident UE drift.  Flaaby muscles.  Rather marked generalized weakness; perhaps weaker in LLE than RLE.  Grossly intact Pin sensation.    Reflex                           Right    Left   Comment    Scapulohumeral              2         2  distal spread each side  Biceps                           2-        2-  Triceps                          3        3  Gomez                    present present  Patellar                         3-        3-    Gastroc  Plantar          Trophic changes bilat LEs below knees.     History per Admission H&P or 00:09 this morning.    <Start of quote from H&P>  "History of Present Illness:   Patient is an 80 year old female from home, with PMH of DM, HTN, CHF and hypothyroidism, who presented to the ED due to slurred speech. Patient is a poor historian. Unsure about exactly what happened that caused her to come to the ED. Patient just states she is feeling unwell. Denies any pain anywhere. Spoke to daughter, Raquel [phone]. Daughter states that she calls her mother at least once or twice a day to make sure she had taken her medications. If the daughter does not call, then the patient will call her. Yesterday, the daughter did not get to call the patient and daughter states that the patient never called her. Daughter called the patient and stated that the patient was not making sense and also appeared to have slurred speech. Patient denies any symptoms similar to this previously and denies any history of stroke. Currently, patient denies any chest pain, shortness of breath, abdominal pain, nausea, vomiting, diarrhea or constipation.      Review of Systems:  · Negative General Symptoms	no fever; no chills  · Negative Skin Symptoms	no rash; no itching  · Negative Ophthalmologic Symptoms	no diplopia  · Negative ENMT Symptoms	no hearing difficulty  · Negative Respiratory and Thorax Symptoms	no wheezing; no dyspnea; no cough  · Negative Cardiovascular Symptoms	no chest pain; no palpitations  · Negative Gastrointestinal Symptoms	no nausea; no vomiting; no diarrhea; no constipation; no abdominal pain  · Negative Musculoskeletal Symptoms	no leg pain L; no leg pain R  · Negative Neurological Symptoms	no headache      Allergies and Intolerances:        Allergies:  	No Known Allergies:     Home Medications:   * Outpatient Medication Status not yet specified  · 	OLANZapine 5 mg oral tablet: 1 tab(s) orally once a day (at bedtime)  · 	metoprolol succinate 100 mg oral tablet, extended release: 1 tab(s) orally once a day  · 	metFORMIN 1000 mg oral tablet: 1 tab(s) orally 2 times a day  · 	lisinopril 20 mg oral tablet: 1 tab(s) orally once a day  · 	Synthroid 75 mcg (0.075 mg) oral tablet: 1 tab(s) orally once a day  · 	Lasix 40 mg oral tablet: 1 tab(s) orally once a day  · 	escitalopram 10 mg oral tablet: 1 tab(s) orally once a day  · 	Aspir 81 oral delayed release tablet: 1 tab(s) orally once a day  · 	cyanocobalamin 1000 mcg oral tablet: 1 tab(s) orally once a day    Patient History:    Past Medical, Past Surgical, and Family History:  PAST MEDICAL HISTORY:  Congestive heart failure (CHF)     Diabetes mellitus     Hypertension     Hypothyroidism.     Social History:  Social History (marital status, living situation, occupation, tobacco use, alcohol and drug use, and sexual history): Patient lives at home with her . Daughter denies any smoking, alcohol use or use of illicit drugs.     Tobacco Screening:  · Core Measure Site	Yes  · Has the patient used tobacco in the past 30 days?	No    Risk Assessment:    Present on Admission:  Deep Venous Thrombosis	no  Pulmonary Embolus	no     Heart Failure:  Does this patient have a history of or has been diagnosed with heart failure? yes."  <End of quote from H&P>    ADDITIONAL HISTORY per per Critical Care Note of 14:59 Today  "· Assessment	  Patient is an 80 year old female from home, with PMH of DM, HTN, CHF and hypothyroidism, who presented to the ED due to slurred speech.  ICU was consulted for hypotension. Her BP was 70/60 and gave 2L NS. Her BP improved to 105/60.     Severe sepsis without septic shock.    Plan: Febrile to 103F, Tachycardia of 111, BP 70/40 Leukocytosis of 13k, lactate of 1.5 on admission, meeting severe sepsis criteria  BP on monitor showed 70/40. Monitor was not functioning well. Checked BP with another machine and it was 96/60. 1LNS was given and her BP improved to 105/60.   BP rechecked at the bedside at 5pm and it was / 60 and HR of 81.   COVID Negative,   s/p 3L NS  Holding BP medications Lasix and Metoprolol.  CXR showed no pneumonia  u/a negative  s/p johnson and 500ml of urine output    s/p 1 dose of vanc and ceftriaxone  continue ceftriaxone for now  f/u BCx, UCx  f/u ID physician"      EXAMINATION    Supine on [place]; morbidly obese.  Awake, alert.  Limited English fluency.  Interviewed in French by me.      She gives date as January 5, 2021, ?Wednesday (it IS Wednesday).  She explains that she fell at home, that it was her 's fault, that he, being 90 years old, was too weak to help her get up and therefore help had to come.  She also goes on to say that he is forgetful/confused as is typical at his age.      Normal comprehension, expression, prosody, articulation in French.  Pupils round, reactive.  Confrontation visual fields grossly intact.  Moderately hard of hearing.  Normal facial/lingual movements.  No grossly evident UE drift.  Flaaby muscles.  Marked/profound weakness in UEs/LEs; perhaps weaker in LLE than RLE.  She cannot lift either LE up off the mattress; barely flexes at hip, dragging heel, each side.  It is easy to block plantar reflexion each side.      Grossly intact Pin sensation (LT not properly testable given skin changes in LEs).    Reflex                           Right    Left   Comment    Scapulohumeral              2         2  distal spread each side  Biceps                           2-        2-  Triceps                          3        3  Gomez                    present present  Patellar                         3-        3-    Gastroc                         0        0  Plantar                   extensor  extensor    INTERMITTENT  ~4 Hz low amplitude tremor L hand.    Neck and limb tonus WNL.      Pain in L popliteal fossa (elicited when raising knee to check reflex).   Trophic changes bilat LEs below knees.          B12/folate 1/6/21  841/16.2  (partially hemolized - may cause false rise in folate level)  B12 8/9/20 150  TSH 2.76  TSH 8/9/20  45.13      History per Admission H&P or 00:09 this morning.    <Start of quote from H&P>  "History of Present Illness:   Patient is an 80 year old female from home, with PMH of DM, HTN, CHF and hypothyroidism, who presented to the ED due to slurred speech. Patient is a poor historian. Unsure about exactly what happened that caused her to come to the ED. Patient just states she is feeling unwell. Denies any pain anywhere. Spoke to daughter, Raquel [phone]. Daughter states that she calls her mother at least once or twice a day to make sure she had taken her medications. If the daughter does not call, then the patient will call her. Yesterday, the daughter did not get to call the patient and daughter states that the patient never called her. Daughter called the patient and stated that the patient was not making sense and also appeared to have slurred speech. Patient denies any symptoms similar to this previously and denies any history of stroke. Currently, patient denies any chest pain, shortness of breath, abdominal pain, nausea, vomiting, diarrhea or constipation.      Review of Systems:  · Negative General Symptoms	no fever; no chills  · Negative Skin Symptoms	no rash; no itching  · Negative Ophthalmologic Symptoms	no diplopia  · Negative ENMT Symptoms	no hearing difficulty  · Negative Respiratory and Thorax Symptoms	no wheezing; no dyspnea; no cough  · Negative Cardiovascular Symptoms	no chest pain; no palpitations  · Negative Gastrointestinal Symptoms	no nausea; no vomiting; no diarrhea; no constipation; no abdominal pain  · Negative Musculoskeletal Symptoms	no leg pain L; no leg pain R  · Negative Neurological Symptoms	no headache      Allergies and Intolerances:        Allergies:  	No Known Allergies:     Home Medications:   * Outpatient Medication Status not yet specified  · 	OLANZapine 5 mg oral tablet: 1 tab(s) orally once a day (at bedtime)  · 	metoprolol succinate 100 mg oral tablet, extended release: 1 tab(s) orally once a day  · 	metFORMIN 1000 mg oral tablet: 1 tab(s) orally 2 times a day  · 	lisinopril 20 mg oral tablet: 1 tab(s) orally once a day  · 	Synthroid 75 mcg (0.075 mg) oral tablet: 1 tab(s) orally once a day  · 	Lasix 40 mg oral tablet: 1 tab(s) orally once a day  · 	escitalopram 10 mg oral tablet: 1 tab(s) orally once a day  · 	Aspir 81 oral delayed release tablet: 1 tab(s) orally once a day  · 	cyanocobalamin 1000 mcg oral tablet: 1 tab(s) orally once a day    Patient History:    Past Medical, Past Surgical, and Family History:  PAST MEDICAL HISTORY:  Congestive heart failure (CHF)     Diabetes mellitus     Hypertension     Hypothyroidism.     Social History:  Social History (marital status, living situation, occupation, tobacco use, alcohol and drug use, and sexual history): Patient lives at home with her . Daughter denies any smoking, alcohol use or use of illicit drugs.     Tobacco Screening:  · Core Measure Site	Yes  · Has the patient used tobacco in the past 30 days?	No    Risk Assessment:    Present on Admission:  Deep Venous Thrombosis	no  Pulmonary Embolus	no     Heart Failure:  Does this patient have a history of or has been diagnosed with heart failure? yes."  <End of quote from H&P>    ADDITIONAL HISTORY per per Critical Care Note of 14:59 Today  "· Assessment	  Patient is an 80 year old female from home, with PMH of DM, HTN, CHF and hypothyroidism, who presented to the ED due to slurred speech.  ICU was consulted for hypotension. Her BP was 70/60 and gave 2L NS. Her BP improved to 105/60.     Severe sepsis without septic shock.    Plan: Febrile to 103F, Tachycardia of 111, BP 70/40 Leukocytosis of 13k, lactate of 1.5 on admission, meeting severe sepsis criteria  BP on monitor showed 70/40. Monitor was not functioning well. Checked BP with another machine and it was 96/60. 1LNS was given and her BP improved to 105/60.   BP rechecked at the bedside at 5pm and it was / 60 and HR of 81.   COVID Negative,   s/p 3L NS  Holding BP medications Lasix and Metoprolol.  CXR showed no pneumonia  u/a negative  s/p johnson and 500ml of urine output    s/p 1 dose of vanc and ceftriaxone  continue ceftriaxone for now  f/u BCx, UCx  f/u ID physician"      EXAMINATION    Supine on [place]; morbidly obese.  Awake, alert.  Limited English fluency.  Interviewed in Kyrgyz by me.      She gives date as January 5, 2021, ?Wednesday (it IS Wednesday).  She explains that she fell at home, that it was her 's fault, that he, being 90 years old, was too weak to help her get up and therefore help had to come.  She also goes on to say that he is forgetful/confused as is typical at his age.      Normal comprehension, expression, prosody, articulation in Kyrgyz.  Pupils round, reactive.  Confrontation visual fields grossly intact.  Moderately hard of hearing.  Normal facial/lingual movements.  No grossly evident UE drift.  Flaaby muscles.  Marked/profound weakness in UEs/LEs; perhaps weaker in LLE than RLE.  She cannot lift either LE up off the mattress; barely flexes at hip, dragging heel, each side.  It is easy to block plantar reflexion each side.      Grossly intact Pin sensation (LT not properly testable given skin changes in LEs).    Reflex                           Right    Left   Comment    Scapulohumeral              2         2  distal spread each side  Biceps                           2-        2-  Triceps                          3        3  Gomez                    present present  Patellar                         3-        3-    Gastroc                         0        0  Plantar                   extensor  extensor    INTERMITTENT  ~4 Hz low amplitude tremor L hand.    Neck and limb tonus WNL.      Pain in L popliteal fossa (elicited when raising knee to check reflex).   Trophic changes bilat LEs below knees.          B12/folate 1/6/21  841/16.2  (partially hemolized - may cause false rise in folate level)  B12 8/9/20 150  (it seems she was treated during Aug 2020 hospitalization, she is on oral supplementation w 1000mcg daily)  TSH 2.76  TSH 8/9/20  45.13   ESR/CRP  91/20.05

## 2021-01-06 NOTE — H&P ADULT - PROBLEM SELECTOR PLAN 3
noted to have Hgb of 10.2 on admission   no signs of bleeding noted  f/u anemia panel  monitor CBC daily noted to have creatinine of 2.82 on admission   no known kidney disease   f/u urine lytes, renal US   monitor BMP daily

## 2021-01-06 NOTE — H&P ADULT - PROBLEM SELECTOR PLAN 6
will hold home med of lisinopril in setting of PAR[facility] and for permissive HTN   monitor BP and add meds as needed

## 2021-01-06 NOTE — CHART NOTE - NSCHARTNOTEFT_GEN_A_CORE
Patient is a 80 year old female with a PMH of HTN, DM, CHF Hypothyroidism presented with altered mental status and slurred speech concerning for TIA/CVA. CT Brain with no acute findings. Admitted to telemetry. EKG Afib with rate of 110 bpm.   Found to be febrile to 104. UA mildly positive, CXR with no acute findings. No leucocytosis. Started on ceftriaxone for presumed UTI. Urine culture and blood culture pending. Creatinine of 2.8--> 3.2.    Pt seen at bedside, alert, awake, NAD saturating 98% on 2 L COVID negative   Found to be hypotensive to 80/40 tachycardic to 110 bpm, tachypneic to 28 bpm. Called sepsis was called. Given NS bolus x 2 L, and dose of Vancomycin 1000 mg   informatory markers, Lactate sent. Domingo inserted, CT scan of abdomen ordered to evaluate for source of infection   ICU consulted     OBJECTIVE:  Vital Signs Last 24 Hrs  T(C): 37.8 (06 Jan 2021 07:34), Max: 40.1 (06 Jan 2021 06:24)  T(F): 100 (06 Jan 2021 07:34), Max: 104.1 (06 Jan 2021 06:24)  HR: 101 (06 Jan 2021 13:00) (80 - 111)  BP: 90/60 (06 Jan 2021 13:00) (90/60 - 148/61)  BP(mean): --  RR: 20 (06 Jan 2021 13:00) (18 - 22)  SpO2: 100% (06 Jan 2021 13:00) (92% - 100%)    FOCUSED PHYSICAL EXAM:  Neuro: awake, alert, oriented x 3. No neuro deficit  Cardiovascular: Pulses +2 B/L in lower and upper extremities,  irregular, Afib hypotensive to 80/40, No edema.  Respiratory: tachypneic but unlabored, breath sounds clear B/L.   GI: Abdomen soft, non-tender, positive bowel sounds.  : no bladder distention noted. Domingo inserted   Skin: Dry, intact, no bruising, no diaphoresis.    I&O's      LABS:                        8.6    8.78  )-----------( 193      ( 06 Jan 2021 13:32 )             26.9   CARDIAC MARKERS ( 06 Jan 2021 06:24 )  <0.015 ng/mL / x     / 71 U/L / x     / x      CARDIAC MARKERS ( 05 Jan 2021 21:32 )  <0.015 ng/mL / x     / x     / x     / x        01-06    x   |  x   |  44<H>  ----------------------------<  273<H>  x    |  23  |  2.98<H>    Ca    7.5<L>      06 Jan 2021 13:32  Phos  4.2     01-06  Mg     1.8     01-06    TPro  7.5  /  Alb  2.9<L>  /  TBili  0.7  /  DBili  0.1  /  AST  18  /  ALT  25  /  AlkPhos  198<H>  01-06          ASSESSMENT:  Patient is a 80 year old female with a PMH of HTN, DM, CHF Hypothyroidism presented with altered mental status and slurred speech concerning for TIA/CVA. CT Brain with no acute findings. Admitted to telemetry. EKG Afib with rate of 110 bpm.   Found to be febrile to 104. UA mildly positive, CXR with no acute findings. No leucocytosis. Started on ceftriaxone for presumed UTI. Urine culture and blood culture pending. Creatinine of 2.8--> 3.2.    Pt seen at bedside, alert, awake, NAD saturating 98% on 2 L COVID negative   Found to be hypotensive to 80/40 tachycardic to 110 bpm, tachypneic to 28 bpm. Called sepsis was called. Given NS bolus x 2 L, and dose of Vancomycin 1000 mg   informatory markers, Lactate sent. Domingo inserted, CT scan of abdomen ordered to evaluate for source of infection   ICU consulted       PLAN:   1. Sepsis of unknown origin: UA mildly positive, CXR on acute findings, COVID negative   given dose of vancomycin, continue as indicated   follow up lactate, ESR, CRP, Procalcitonin  continue IV fluids  follow up CT abd/pelvis   monitor VS   pending ICU eval, will follow recommendation     2. Acute Renal Insufficiency:  creatinine of 2.82 (No baseline available)--> 3.2  Dr Ortiz consulted   continue IV fluids   renal sono done no hydronephrosis   ua mildly positive on Rocephin  follow up Urine culture and blood culture   follow up Urinary Electrolytes   follow up repeat BMP     3. Encephalopathy likely due to infectious etiology  ro CVA  CT head negative   pending neuro eval   continue telemetry   secondary stroke prevention     4. Afib   as per daughter no history of Afib   HR at  bpm   cardio, Dr. Edmond consulted for further recommendations     5. Congestive heart failure (CHF)   f/u ECHO   continue metoprolol and lasix with HOLD parameters  Dr Edmond cardiology     6. Hypothyroidism  continue home dose of synthroid     7. Need for prophylactic measure.    heparin for DVT prophylaxis.     8. Goals of care, counseling/discussion. discussed GOC with daughter   patient is FULL CODE for now. Patient is a 80 year old female with a PMH of HTN, DM, CHF Hypothyroidism presented with altered mental status and slurred speech concerning for TIA/CVA. CT Brain with no acute findings. Admitted to telemetry. EKG Afib with rate of 110 bpm.   Found to be febrile to 104. UA mildly positive, CXR with no acute findings. No leucocytosis. Started on ceftriaxone for presumed UTI. Urine culture and blood culture pending. Creatinine of 2.8--> 3.2.    Pt seen at bedside, alert, awake, NAD saturating 98% on 2 L COVID negative   Found to be hypotensive to 80/40 tachycardic to 110 bpm, tachypneic to 28 bpm. Called sepsis was called. Given NS bolus x 2 L, and dose of Vancomycin 1000 mg   informatory markers, Lactate sent. Domingo inserted, CT scan of abdomen ordered to evaluate for source of infection   ICU consulted     OBJECTIVE:  Vital Signs Last 24 Hrs  T(C): 37.8 (06 Jan 2021 07:34), Max: 40.1 (06 Jan 2021 06:24)  T(F): 100 (06 Jan 2021 07:34), Max: 104.1 (06 Jan 2021 06:24)  HR: 101 (06 Jan 2021 13:00) (80 - 111)  BP: 90/60 (06 Jan 2021 13:00) (90/60 - 148/61)  BP(mean): --  RR: 20 (06 Jan 2021 13:00) (18 - 22)  SpO2: 100% (06 Jan 2021 13:00) (92% - 100%)    FOCUSED PHYSICAL EXAM:  Neuro: awake, alert, oriented x 3. No neuro deficit  Cardiovascular: Pulses +2 B/L in lower and upper extremities,  irregular, Afib hypotensive to 80/40, No edema.  Respiratory: tachypneic but unlabored, breath sounds clear B/L.   GI: Abdomen soft, non-tender, positive bowel sounds.  : no bladder distention noted. Domingo inserted   Skin: Dry, intact, no bruising, no diaphoresis.    I&O's      LABS:                        8.6    8.78  )-----------( 193      ( 06 Jan 2021 13:32 )             26.9   CARDIAC MARKERS ( 06 Jan 2021 06:24 )  <0.015 ng/mL / x     / 71 U/L / x     / x      CARDIAC MARKERS ( 05 Jan 2021 21:32 )  <0.015 ng/mL / x     / x     / x     / x        01-06    x   |  x   |  44<H>  ----------------------------<  273<H>  x    |  23  |  2.98<H>    Ca    7.5<L>      06 Jan 2021 13:32  Phos  4.2     01-06  Mg     1.8     01-06    TPro  7.5  /  Alb  2.9<L>  /  TBili  0.7  /  DBili  0.1  /  AST  18  /  ALT  25  /  AlkPhos  198<H>  01-06          ASSESSMENT:  Patient is a 80 year old female with a PMH of HTN, DM, CHF Hypothyroidism presented with altered mental status and slurred speech concerning for TIA/CVA. CT Brain with no acute findings. Admitted to telemetry. EKG Afib with rate of 110 bpm.   Found to be febrile to 104. UA mildly positive, CXR with no acute findings. No leucocytosis. Started on ceftriaxone for presumed UTI. Urine culture and blood culture pending. Creatinine of 2.8--> 3.2.    Pt seen at bedside, alert, awake, NAD saturating 98% on 2 L COVID negative   Found to be hypotensive to 80/40 tachycardic to 110 bpm, tachypneic to 28 bpm. Called sepsis was called. Given NS bolus x 2 L, and dose of Vancomycin 1000 mg   informatory markers, Lactate sent. Domingo inserted, CT scan of abdomen ordered to evaluate for source of infection   ICU consulted       PLAN:   1. Sepsis of unknown origin: UA mildly positive, CXR on acute findings, COVID negative   given dose of vancomycin, continue as indicated   follow up lactate, ESR, CRP, Procalcitonin  continue IV fluids  follow up CT abd/pelvis   monitor VS   pending ICU eval, will follow recommendation     2. Acute Renal Insufficiency:  creatinine of 2.82 (No baseline available)--> 3.2  Dr Ortiz consulted   continue IV fluids   renal sono done no hydronephrosis   ua mildly positive on Rocephin  follow up Urine culture and blood culture   follow up Urinary Electrolytes   follow up repeat BMP     3. Encephalopathy likely due to infectious etiology  ro CVA  CT head negative   pending neuro eval   continue telemetry   secondary stroke prevention     4. Afib   as per daughter no history of Afib   HR at  bpm   cardio, Dr. Edmond consulted for further recommendations     5. Congestive heart failure (CHF)   f/u ECHO   continue metoprolol and lasix with HOLD parameters  Dr Edmond cardiology     6. Hypothyroidism  continue home dose of synthroid     7. Need for prophylactic measure.    heparin for DVT prophylaxis.     8. Goals of care, counseling/discussion. discussed GOC with daughter   patient is FULL CODE for now.    Patient was examined in the ED with NP Alexander.   Code sepsis was called and guidelines were followed.     vs improved after fluids.  < from: US Renal (01.06.21 @ 10:27) >    IMPRESSION:    No hydronephrosis.    Small right renal cyst.    < end of copied text >  Lactate, Blood: 1.5 mmol/L (01.06.21 @ 13:32)   MICU consultation, appreciated.   Plan:  Broad spectrum antibiotics.  CT abdomen/pelvis with oral contrast

## 2021-01-06 NOTE — H&P ADULT - PROBLEM SELECTOR PLAN 7
f/u ECHO   continue metoprolol and lasix with HOLD parameters  strict I/O, daily weight   fluid restriction

## 2021-01-06 NOTE — CONSULT NOTE ADULT - SUBJECTIVE AND OBJECTIVE BOX
Patient is a 80y old  Female who presents with a chief complaint of slurred speech (2021 00:09)      HPI:  Patient is an 80 year old female from home, with PMH of DM, HTN, CHF and hypothyroidism, who presented to the ED due to slurred speech. Patient is a poor historian. Unsure about exactly what happened that caused her to come to the ED. Patient just states she is feeling unwell. Denies any pain anywhere. Spoke to daughter, Raquel [phone]. Daughter states that she calls her mother at least once or twice a day to make sure she had taken her medications. If the daughter does not call, then the patient will call her. Yesterday, the daughter did not get to call the patient and daughter states that the patient never called her. Daughter called the patient and stated that the patient was not making sense and also appeared to have slurred speech. Patient denies any symptoms similar to this previously and denies any history of stroke. Currently, patient denies any chest pain, shortness of breath, abdominal pain, nausea, vomiting, diarrhea or constipation.  (2021 00:09)    Interval history : [place]   ID [id number]. Pt is AAO X 3 but poor historian. She doesn't know why she is in the hospital. Spoke to Raquel her daughter. Her BP was 96/60 and given 2L NS. Her BP improved to 105/60. ICU was consulted for hypotension.      Allergies    No Known Allergies    Intolerances        MEDICATIONS  (STANDING):  aspirin  chewable 81 milliGRAM(s) Oral daily  atorvastatin 40 milliGRAM(s) Oral at bedtime  cefTRIAXone   IVPB 1000 milliGRAM(s) IV Intermittent every 24 hours  clopidogrel Tablet 75 milliGRAM(s) Oral daily  escitalopram 10 milliGRAM(s) Oral daily  heparin   Injectable 5000 Unit(s) SubCutaneous every 8 hours  insulin lispro (ADMELOG) corrective regimen sliding scale   SubCutaneous Before meals and at bedtime  levothyroxine 75 MICROGram(s) Oral daily  OLANZapine 5 milliGRAM(s) Oral at bedtime  pantoprazole    Tablet 40 milliGRAM(s) Oral before breakfast  sodium chloride 0.9%. 1000 milliLiter(s) (75 mL/Hr) IV Continuous <Continuous>  vancomycin  IVPB 1000 milliGRAM(s) IV Intermittent once    MEDICATIONS  (PRN):  acetaminophen   Tablet .. 650 milliGRAM(s) Oral every 6 hours PRN Temp greater or equal to 38C (100.4F), Moderate Pain (4 - 6)  ondansetron Injectable 4 milliGRAM(s) IV Push every 6 hours PRN Nausea and/or Vomiting      Daily     Daily     Drug Dosing Weight  Height (cm): 152.4 (18 Aug 2020 19:48)  Weight (kg): 80.6 (2021 21:43)  BMI (kg/m2): 34.7 (2021 21:43)  BSA (m2): 1.78 (2021 21:43)    PAST MEDICAL & SURGICAL HISTORY:  Hypothyroidism    Congestive heart failure (CHF)    Hypertension    Diabetes mellitus    Anxiety    Obesity    DM (diabetes mellitus)    HTN (hypertension)    History of         FAMILY HISTORY: Unable to obtain due to dementia      SOCIAL HISTORY: Unable to obtain due to dementia    ADVANCE DIRECTIVES:    REVIEW OF SYSTEMS: Unable to obtain due to dementia            ICU Vital Signs Last 24 Hrs  T(C): 37.8 (2021 07:34), Max: 40.1 (2021 06:24)  T(F): 100 (2021 07:34), Max: 104.1 (2021 06:24)  HR: 78 (2021 14:15) (78 - 111)  BP: 100/54 (2021 14:15) (90/60 - 148/61)  BP(mean): --  ABP: --  ABP(mean): --  RR: 19 (2021 14:15) (18 - 22)  SpO2: 100% (2021 14:15) (92% - 100%)          I&O's Detail      PHYSICAL EXAM:    GENERAL: NAD, well-groomed, well-developed  HEAD:  Atraumatic, Normocephalic  EYES: EOMI, PERRLA, conjunctiva and sclera clear  ENT: No tonsillar erythema, exudates, or enlargement; Moist mucous membranes, Good dentition, No lesions  NECK: Supple, No JVD, Normal thyroid  NERVOUS SYSTEM:  Alert & Oriented X3, poor  concentration; Motor Strength 5/5 B/L upper and lower extremities; DTRs 2+ intact and symmetric  CHEST/LUNG: Clear to percussion bilaterally; No rales, rhonchi, wheezing, or rubs  Genitourinary Domingo in place   HEART: Regular rate and rhythm; No murmurs, rubs, or gallops  ABDOMEN: Soft, Nontender, Nondistended; Bowel sounds present  EXTREMITIES:  2+ Peripheral Pulses, No clubbing, cyanosis, or edema  LYMPH: No lymphadenopathy noted  SKIN: No rashes or lesions    LABS:  CBC Full  -  ( 2021 13:32 )  WBC Count : 8.78 K/uL  RBC Count : 2.95 M/uL  Hemoglobin : 8.6 g/dL  Hematocrit : 26.9 %  Platelet Count - Automated : 193 K/uL  Mean Cell Volume : 91.2 fl  Mean Cell Hemoglobin : 29.2 pg  Mean Cell Hemoglobin Concentration : 32.0 gm/dL  Auto Neutrophil # : x  Auto Lymphocyte # : x  Auto Monocyte # : x  Auto Eosinophil # : x  Auto Basophil # : x  Auto Neutrophil % : x  Auto Lymphocyte % : x  Auto Monocyte % : x  Auto Eosinophil % : x  Auto Basophil % : x        138  |  104  |  44<H>  ----------------------------<  273<H>  3.6   |  23  |  2.98<H>    Ca    7.5<L>      2021 13:32  Phos  4.2       Mg     1.8         TPro  7.5  /  Alb  2.9<L>  /  TBili  0.7  /  DBili  0.1  /  AST  18  /  ALT  25  /  AlkPhos  198<H>      CAPILLARY BLOOD GLUCOSE  249 (2021 20:03)      POCT Blood Glucose.: 292 mg/dL (2021 12:51)    PT/INR - ( 2021 06:24 )   PT: 12.7 sec;   INR: 1.07 ratio         PTT - ( 2021 06:24 )  PTT:27.2 sec  Urinalysis Basic - ( 2021 20:50 )    Color: Yellow / Appearance: Slightly Turbid / S.020 / pH: x  Gluc: x / Ketone: Negative  / Bili: Negative / Urobili: Negative   Blood: x / Protein: 30 mg/dL / Nitrite: Negative   Leuk Esterase: Trace / RBC: 5-10 /HPF / WBC 6-10 /HPF   Sq Epi: x / Non Sq Epi: Moderate /HPF / Bacteria: Many /HPF      CARDIAC MARKERS ( 2021 06:24 )  <0.015 ng/mL / x     / 71 U/L / x     / x      CARDIAC MARKERS ( 2021 21:32 )  <0.015 ng/mL / x     / x     / x     / x              EKG:    ECHO, US:    RADIOLOGY:    CRITICAL CARE TIME SPENT:

## 2021-01-06 NOTE — H&P ADULT - PROBLEM SELECTOR PLAN 1
patient presented due to slurred speech and confusion  appears to be resolving now  may be TIA vs stroke vs UTI   CT head negative  neuro checks q4 for now  neuro consulted  monitor on tele for now  f/u ECHO, CD  start aspirin, plavix, statin   patient passed dysphagia screen; will start puree diet; f/u S/S eval   fall, aspiration and seizure precautions patient presented due to slurred speech and confusion  appears to be resolving now  may be TIA vs stroke vs UTI   CT head negative  neuro checks q4 for now  neuro Dr. Elias consulted  monitor on tele for now  f/u ECHO, CD  start aspirin, plavix, statin   patient passed dysphagia screen; will start puree diet; f/u S/S eval   fall, aspiration and seizure precautions

## 2021-01-06 NOTE — H&P ADULT - PROBLEM SELECTOR PLAN 2
noted to have creatinine of 2.82 on admission   no known kidney disease   f/u urine lytes, renal US   monitor BMP daily patient noted to have afib on tele monitor  HR in 90s currently  no known history of Afib   CHADs-Vasc score of 6  cardio, Dr. Edmond consulted for further recommendations

## 2021-01-06 NOTE — H&P ADULT - ASSESSMENT
Patient is an 80 year old female from home, with PMH of DM, HTN, CHF and hypothyroidism, who presented to the ED due to slurred speech.    Mild leukocytosis of 13K. Hgb of 10.2. Creatinine of 2.82. UA noted to be positive. CT head negative. COVID negative. Troponin negative x1. Given dose of aspirin.   Patient is an 80 year old female from home, with PMH of DM, HTN, CHF and hypothyroidism, who presented to the ED due to slurred speech.    Mild leukocytosis of 13K. Hgb of 10.2. Creatinine of 2.82. UA noted to be positive. CT head negative. COVID negative. Troponin negative x1. Given dose of aspirin. Noted to have Afib on tele monitor.

## 2021-01-06 NOTE — H&P ADULT - NSICDXPASTMEDICALHX_GEN_ALL_CORE_FT
PAST MEDICAL HISTORY:  Congestive heart failure (CHF)     Diabetes mellitus     Hypertension     Hypothyroidism

## 2021-01-06 NOTE — CONSULT NOTE ADULT - ASSESSMENT
Patient is a 79yo Turkmen speaking Female with CHF, DM, HTN, and hypothyroidism, who p/w slurred speech r/o CVA. Pt a/w Sepsis 2/2 UTI and hypotension. Nephrology consulted for Elevated serum creatinine.    1. PARTH- previous SCr 0.8-1. PARTH likely hemodynamically mediated in the setting of septic shock. UA with 30 protein, trace LE, small blood with hyaline cast. FeNa 0.32%.  Continue to hold Lisnopril/ Lasix.   Pt received 3L NS bolus and now on NS @ 75 ml/hr. Monitor for fluid overload. Check TTE to assess EF. Renal US with no hydro. Strict I/Os. Avoid nephrotoxins/ NSAIDs/ RCA. Monitor BMP.    2. Septic shock 2/2 UTI- pt on Ceftriaxone. f/u UCx and BCx    3. Hypotension- BP improving s/p IVF. Continue to hold antihypertensive medications. Consider Midodrine if BP declines. Monitor BP.     4. CHF- pt euvolemic on exam. Hold Lasix. Check TTE to assess EF.

## 2021-01-07 NOTE — PROGRESS NOTE ADULT - ASSESSMENT
Patient is a 79yo English speaking Female with CHF, DM, HTN, and hypothyroidism, who p/w slurred speech r/o CVA. Pt a/w Sepsis 2/2 UTI/ GPR bacteremia and hypotension. Nephrology consulted for Elevated serum creatinine.    1. PARTH- previous SCr 0.8-1. PARTH likely hemodynamically mediated in the setting of septic shock. UA with 30 protein, trace LE, small blood with hyaline cast. FeNa 0.32%.  Continue to hold Lisnopril/ Lasix.   Renal function improving s/p IVF. s/p TTE with EF >55%. Renal US with no hydro. s/p CT with mild left hydroureteronephrosis with perinephric stranding/ tubular dilation at the level of left mid ureter containing vague hyperdensity.   Check UA to r/o bleeding. [facility]rology consult. Strict I/Os. Avoid nephrotoxins/ NSAIDs/ RCA. Monitor BMP.    2. Septic shock 2/2 UTI/ GPR- Plan as per primary team. BCx gram +rods. f/u UCx    3. Hypotension- BP low normal. Continue to hold antihypertensive medications. Consider Midodrine if BP declines. Monitor BP.     4. CHF- pt dry on exam. Hold Lasix.  Patient is a 81yo Mongolian speaking Female with CHF, DM, HTN, and hypothyroidism, who p/w slurred speech r/o CVA. Pt a/w Sepsis 2/2 UTI/ GPR bacteremia and hypotension. Nephrology consulted for Elevated serum creatinine.    1. PARTH- previous SCr 0.8-1. PARTH likely hemodynamically mediated in the setting of septic shock. UA with 30 protein, trace LE, small blood with hyaline cast. FeNa 0.32%.  Continue to hold Lisnopril/ Lasix.   Renal function improving s/p IVF. s/p TTE with EF >55%. Renal US with no hydro. s/p CT with mild left hydroureteronephrosis with perinephric stranding/ tubular dilation at the level of left mid ureter containing vague hyperdensity.   Check UA to r/o bleeding. [facility] Urology consult.   Potassium repleted. Strict I/Os. Avoid nephrotoxins/ NSAIDs/ RCA. Monitor BMP.    2. Septic shock 2/2 UTI/ GPR- Plan as per primary team. BCx gram +rods. f/u UCx    3. Hypotension- BP low normal. Continue to hold antihypertensive medications. Consider Midodrine if BP declines. Monitor BP.     4. CHF- pt dry on exam. Hold Lasix.

## 2021-01-07 NOTE — CONSULT NOTE ADULT - ATTENDING COMMENTS
Patient was seen and examined by me on 01/07/2021,interim events noted,labs and radiology studies reviewed.  Faizan Edmond MD,[facility]  [address] [place] [address].  [facility]10. [phone]
[facility] NEPHROLOGY  Cornelius Frank M.D.  Davey Burroughs D.O.  Katerina Ortiz M.D.  Nina Bryson, MSN, ANP-C  [phone]    [address] [place] [address]
Patient is an 80 year old female from home, with PMH of DM, HTN, CHF and hypothyroidism, who presented to the ED due to slurred speech.  ICU was consulted for hypotension. Her BP was 70/60 and gave 2L NS. Her BP improved to 105/60.     Severe sepsis without septic shock.    Plan: Febrile to 103F, Tachycardia of 111, BP 70/40 Leukocytosis of 13k, lactate of 1.5 on admission, meeting severe sepsis criteria  BP on monitor showed 70/40. Monitor was not functioning well. Checked BP with another machine and it was 96/60. 1LNS was given and her BP improved to 105/60.   BP rechecked at the bedside at 5pm and it was / 60 and HR of 81.   COVID Negative,   s/p 3L NS  Holding BP medications Lasix and Metoprolol.  CXR showed no pneumonia  u/a negative  s/p johnson and 500ml of urine output    s/p 1 dose of vanc and ceftriaxone  continue ceftriaxone for now  f/u BCx, UCx  f/u ID physician

## 2021-01-07 NOTE — DIETITIAN INITIAL EVALUATION ADULT. - PROBLEM SELECTOR PLAN 1
patient presented due to slurred speech and confusion  appears to be resolving now  may be TIA vs stroke vs UTI   CT head negative  neuro checks q4 for now  neuro Dr. Elias consulted  monitor on tele for now  f/u ECHO, CD  start aspirin, plavix, statin   patient passed dysphagia screen; will start puree diet; f/u S/S eval   fall, aspiration and seizure precautions

## 2021-01-07 NOTE — PROGRESS NOTE ADULT - PROBLEM SELECTOR PLAN 6
will hold home med of lisinopril in setting of PARTH and for permissive HTN   monitor BP and add meds as needed -hold home med of lisinopril and metoprolol in setting of PARTH and hypotension  monitor BP and add meds as needed

## 2021-01-07 NOTE — PROGRESS NOTE ADULT - ATTENDING COMMENTS
[facility] NEPHROLOGY  Cornelius Frank M.D.  Davey Burroughs D.O.  Katerina Ortiz M.D.  Nina Bryson, MSN, ANP-C  [phone]    [address] [place] [address]

## 2021-01-07 NOTE — DIETITIAN INITIAL EVALUATION ADULT. - PERTINENT LABORATORY DATA
01-07 Na141 mmol/L Glu 118 mg/dL<H> K+ 3.4 mmol/L<L> Cr  2.23 mg/dL<H> BUN 38 mg/dL<H> 01-06 Phos 4.2 mg/dL 01-06 Alb 2.9 g/dL<L> 01-06 Chol 189 mg/dL LDL --    HDL 40 mg/dL<L> Trig 156 mg/dL<H>

## 2021-01-07 NOTE — PROGRESS NOTE ADULT - PROBLEM SELECTOR PLAN 5
patient on metformin at home  start SSI while in hospital  f/u A1c  monitor BS and adjust insulin as needed -patient on metformin at home  -start SSI while in hospital  -HgbA1C 6.7  -Monitor BS and adjust insulin as needed

## 2021-01-07 NOTE — PROGRESS NOTE ADULT - PROBLEM SELECTOR PLAN 2
patient noted to have afib on tele monitor  HR in 90s currently  no known history of Afib   CHADs-Vasc score of 6  cardio, Dr. Edmond consulted for further recommendations -BCx growing gram positive rods in both aerobic and aerobic bottles  -Second BCx sent today  -Starts on empiric antibiotics  Vancomycin [id number] and Zosyn 3.375 q12h renally dose

## 2021-01-07 NOTE — ADVANCED PRACTICE NURSE CONSULT - RECOMMEDATIONS
-Clean all wounds with normal saline and apply skin prep to the surrounding skin  -Apply a Foam dressing to the Sacrococcyx area Q 72hrs PRN  -Elevate/float the patients heels using heel protectors and reposition the patient Q 2hrs using wedges or pillows

## 2021-01-07 NOTE — DIETITIAN INITIAL EVALUATION ADULT. - OTHER INFO
Reports wt loss PTA but exact amount not available. Reports inadequate oral intake in-house. No complaints of Nausea, vomiting or diarrhea. tolerating soft consistency . Seen by swallow On (1/6/21) where a soft diet and thin liquids was recommended. given DM history , suggest to add carbohydrate consistent to soft, thin liquids diet. declined oral supplements . Height estimated for patient: 5'2", BMI=32 . No food allergies. skin- sacrococcyx-stage1, bilateral heel, stage 1 , add multivitamin/minerals 1 tab/d and vitamin C 500mg bid to assist with healing if not contraindicated.

## 2021-01-07 NOTE — DIETITIAN INITIAL EVALUATION ADULT. - PROBLEM SELECTOR PLAN 2
patient noted to have afib on tele monitor  HR in 90s currently  no known history of Afib   CHADs-Vasc score of 6  cardio, Dr. Edmond consulted for further recommendations

## 2021-01-07 NOTE — PROGRESS NOTE ADULT - PROBLEM SELECTOR PLAN 4
leukocytosis of 13K on admission   UA noted to be positive  may be source  monitor WBC count  f/u urine cx, blood cx   f/u CXR  will start CTX 1g daily for now  tylenol PRN for fever and pain -Noted to have creatinine of 2.82 on admission   -Fena 0.3% prerenal  -Cr 2.23 today -Noted to have creatinine of 2.82 on admission , baseline 0.7   -Fena 0.3% prerenal  -Cr 2.23 today

## 2021-01-07 NOTE — DIETITIAN INITIAL EVALUATION ADULT. - PROBLEM SELECTOR PLAN 4
leukocytosis of 13K on admission   UA noted to be positive  may be source  monitor WBC count  f/u urine cx, blood cx   f/u CXR  will start CTX 1g daily for now  tylenol PRN for fever and pain

## 2021-01-07 NOTE — PROGRESS NOTE ADULT - PROBLEM SELECTOR PLAN 7
f/u ECHO   continue metoprolol and lasix with HOLD parameters  strict I/O, daily weight   fluid restriction ECHO is unremarkable except some left ventricle remodeling   Hold off metoprolol and lisinopril   -strict I/O, daily weight

## 2021-01-07 NOTE — PROGRESS NOTE ADULT - ASSESSMENT
Patient is an 80 year old female from home, with PMH of DM, HTN, CHF and hypothyroidism, who presented to the ED due to slurred speech.    Mild leukocytosis of 13K. Hgb of 10.2. Creatinine of 2.82. UA noted to be positive. CT head negative. COVID negative. Troponin negative x1. Given dose of aspirin. Noted to have Afib on tele monitor.

## 2021-01-07 NOTE — ADVANCED PRACTICE NURSE CONSULT - ASSESSMENT
This is a 80yr old female patient admitted for Transient Cerebral Ischemia presenting with the following:  -There is a Linear Tear to the Gluteal Fold area with pink tissue and scant drainage  -There is a Stage 1 Pressure Injury to the Bilateral Heels and Sacrococcyx Areas, as evident by non-blanchable erythema

## 2021-01-07 NOTE — CONSULT NOTE ADULT - ASSESSMENT
80 year old female from home, with PMH of DM, HTN, CHF and hypothyroidism, who presented to the ED due to slurred speech.  Mild leukocytosis of 13K. Hgb of 10.2. Creatinine of 2.82. UA noted to be positive. CT head negative. COVID negative. Troponin negative x1. Given dose of aspirin. Noted to have Afib on tele monitor.       Problem/Plan - 1:  ·  Problem: Atrial Fibrillation-Persistent .  Plan: -Patient noted to have AFib on tele monitor  -HR ranges  currently  -Pt with past history of EP studies in Aug 2020 that showed premature atrial contraction but no AFib.   -CHADs-Vasc score of 6  -Metoprolol and lisinopril are hold off in the setting of low BP  -On heparin prophylactic dose,   -TTE LV Function        Problem/Plan - 2:  ·  Problem: Acute encephalopathy.  Plan: -Patient presented due to slurred speech and confusion  -Slurred speech resolved now  -Likely TIA vs stroke vs UTI   -CT head negative        Problem/Plan - 3:  ·  Problem: Bacteremia.  Plan: -BCx growing gram positive rods in both aerobic and aerobic bottles  -Second BCx sent today  -Starts on empiric antibiotics  Vancomycin [id number] and Zosyn 3.375 q12h renally dose.         Problem/Plan - 4:  ·  Problem: PARTH (acute kidney injury).  Plan: -Noted to have creatinine of 2.82 on admission , baseline 0.7   -Fena 0.3% prerenal  -Cr 2.23 today.    Problem/Plan - 5:  ·  Problem: Diabetes mellitus.  Plan: -patient on metformin at home  -start SSI while in hospital  -HgbA1C 6.7  -Monitor BS and adjust insulin as needed.       Problem/Plan - 6:  Problem: Hypertension. Plan: -hold home med of lisinopril and metoprolol in setting of PARTH and hypotension  monitor BP and add meds as needed.    Problem/Plan - 7:  ·  Problem: Congestive heart failure (CHF).  Plan: ECHO is unremarkable except some left ventricle remodeling   Hold off metoprolol and lisinopril   -strict I/O, daily weight.     Problem/Plan - 8:  ·  Problem: Hypothyroidism.  Plan: -continue home med of synthroid  -TSH >2.76.     Problem/Plan - 9:  ·  Problem: Need for prophylactic measure.  Plan: Heparin for DVT prophylaxis.     Problem/Plan - 10:  Problem: Goals of care, counseling/discussion. Plan; discussed GOC with daughterRaquel  patient is FULL CODE for now.

## 2021-01-07 NOTE — PROGRESS NOTE ADULT - PROBLEM SELECTOR PLAN 3
noted to have creatinine of 2.82 on admission   no known kidney disease   f/u urine lytes, renal US   monitor BMP daily -Patient noted to have afib on tele monitor  -HR ranges  currently  -Pt with past history of EP studies in Aug 2020 that showed premature atrial contraction but no AFib.   -CHADs-Vasc score of 6  -Metoprolol and lisinopril are hold off in the setting of low BP  -On heparin prophylactic dose, clopidogrel and aspirin   -Cardio, Dr. Edmond consulted for further recommendation    -Anemia : pt with drop from 10.6 to 7.9.   -No active signs of bleeding  - Will not HOLD any AC , likely hemolyzing, will send reticulocyte count and haptoglobin in the morning

## 2021-01-07 NOTE — DIETITIAN INITIAL EVALUATION ADULT. - CONTINUE CURRENT NUTRITION CARE PLAN
Alert-The patient is alert, awake and responds to voice. The patient is oriented to time, place, and person. The triage nurse is able to obtain subjective information. suggest to add carbohydrate consistent to soft, thin liquids diet.

## 2021-01-07 NOTE — PROGRESS NOTE ADULT - SUBJECTIVE AND OBJECTIVE BOX
[facility] NEPHROLOGY- PROGRESS NOTE    Patient is a 79yo [place] speaking Female with CHF, DM, HTN, and hypothyroidism, who p/w slurred speech r/o CVA. Pt a/w Sepsis 2/2 UTI/ GPR bacteremia and hypotension. Nephrology consulted for Elevated serum creatinine.    Hospital Medications: Medications reviewed.  REVIEW OF SYSTEMS: [place] speaking- daughter Raquel translated via facetime  CONSTITUTIONAL: +fevers, +anxious  RESPIRATORY: No shortness of breath, +dry cough  CARDIOVASCULAR: No chest pain.  GASTROINTESTINAL: No nausea, vomiting, diarrhea or abdominal pain.   VASCULAR: No bilateral lower extremity edema.     VITALS:  T(F): 97.7 (21 @ 14:30), Max: 100.6 (21 @ 00:33)  HR: 109 (21 @ 14:30)  BP: 113/53 (21 @ 14:30)  RR: 18 (21 @ 14:30)  SpO2: 95% (21 @ 14:30)  Wt(kg): --    Weight (kg): 80.6 ( @ 21:43)     @ 07:01  -   @ 07:00  --------------------------------------------------------  IN: 0 mL / OUT: 300 mL / NET: -300 mL      PHYSICAL EXAM:  Gen: NAD,  HEENT: anicteric  Neck: no JVD  Cards: RRR, +S1/S2,   Resp: CTA ant  GI: soft, NT/ND, NABS  Extremities: no LE edema B/L  Derm: mild LE hyperpigmentation      LABS:      141  |  108  |  38<H>  ----------------------------<  118<H>  3.4<L>   |  24  |  2.23<H>    Ca    7.3<L>      2021 06:26  Phos  4.2       Mg     1.8         TPro  7.5  /  Alb  2.9<L>  /  TBili  0.7  /  DBili  0.1  /  AST  18  /  ALT  25  /  AlkPhos  198<H>      Creatinine Trend: 2.23 <--, 2.98 <--, 3.19 <--, 2.82 <--                        7.9    4.35  )-----------( 189      ( 2021 06:26 )             25.1     Urine Studies:  Urinalysis Basic - ( 2021 20:50 )    Color: Yellow / Appearance: Slightly Turbid / S.020 / pH:   Gluc:  / Ketone: Negative  / Bili: Negative / Urobili: Negative   Blood:  / Protein: 30 mg/dL / Nitrite: Negative   Leuk Esterase: Trace / RBC: 5-10 /HPF / WBC 6-10 /HPF   Sq Epi:  / Non Sq Epi: Moderate /HPF / Bacteria: Many /HPF      Sodium, Random Urine: 20 mmol/L ( @ 14:32)  Creatinine, Random Urine: 134 mg/dL ( @ 14:32)  Chloride, Random Urine: 32 mmol/L ( @ 14:32)  Potassium, Random Urine: 62 mmol/L ( @ 14:32)    RADIOLOGY & ADDITIONAL STUDIES:      < from: CT Abdomen and Pelvis w/ Oral Cont (21 @ 22:11) >    EXAM:  CT ABDOMEN AND PELVIS OC                            PROCEDURE DATE:  2021          INTERPRETATION:  CT ABDOMEN AND PELVIS WITHOUT CONTRAST    INDICATION: Sepsis and fever.    TECHNIQUE: Abdominopelvic CT without intravenous contrast.Images are reformatted in the sagittal and coronal planes.    COMPARISON: None.    FINDINGS:    Absence of intravenous contrast limits evaluation for focal lesions, neoplasm, and vascular pathology.    Lower Thorax: No consolidation or effusion. Cardiomegaly. Coronary artery calcifications.    Liver: Punctate hepatic calcifications.  Biliary: No significant dilatation. Cholelithiasis.  Spleen: No suspicious lesions.  Pancreas: No inflammatory changes or ductal dilatation.  Adrenals: Normal.  Kidneys: There is mild left hydroureteronephrosis with perinephric stranding and trace fluid. Suggestion of tubular dilatation at the level of left mid ureter containing vague hyperdensity as best seen on image 58 of series 4. Findings are nonspecific, this may represent hemorrhagic products. Consider nonemergent retrograde evaluation to exclude underlying hemorrhagic lesion. Alternatively findings may represent thrombus within left gonadal vein. Consider correlation with Doppler study if indicated. 5 mmnonobstructive stone in the inferior pole of left kidney. No right hydronephrosis.  Vessels: Atherosclerotic disease of the aorta and its branches.    GI tract: There is mild wall thickening of the hepatic flexure surrounding stranding, difficult to assess secondary to inadequate distention. No evidence of small bowel obstruction. Hyperdensity within the colonic loops, likely contrast ingested material. Appendix is not visualized without secondary findings of acute appendicitis.  Peritoneum/retroperitoneum and mesentery: No free air. No organized fluid collection. No adenopathy.    Pelvic organs/Bladder: Uterus appears within normal limits. No adnexal masses. Bladder is decompressed containing Domingo catheter. Small pelvic free fluid.    Abdominal wall: A small fat containing umbilical hernia is noted.  Bones and soft tissues: Multilevel degenerative changes of the spine noted. Mild anterolisthesis of L5 on S1 secondary to degenerative facet spondylolysis    IMPRESSION:    Mild left hydroureteronephrosis with perinephric stranding and trace fluid. Suggestion of tubular dilatation at the level of left mid ureter containing vague hyperdensity (see key image). Findings are nonspecific, this may represent hemorrhagic products. Consider nonemergent retrograde evaluation to exclude underlying ureteral hemorrhagic lesion. Correlate with urinalysis and laboratory values to assess for superimposed ascending urinary tract infection or left pyelitis.    Alternatively findings may represent thrombus within left gonadal vein. Consider correlation with Doppler study if indicated.    Additional findings as mentioned above.                ARVIN MARIA MD; Attending Radiologist  This document has b    < end of copied text >

## 2021-01-07 NOTE — PROGRESS NOTE ADULT - PROBLEM SELECTOR PLAN 1
patient presented due to slurred speech and confusion  appears to be resolving now  may be TIA vs stroke vs UTI   CT head negative  neuro checks q4 for now  neuro Dr. Elias consulted  monitor on tele for now  f/u ECHO, CD  start aspirin, plavix, statin   patient passed dysphagia screen; will start puree diet; f/u S/S eval   fall, aspiration and seizure precautions -Patient presented due to slurred speech and confusion  -Slurred speech resolved now  -Likely TIA vs stroke vs UTI   -CT head negative  -Neuro Dr. Elias consulted  -Started aspirin, Plavix, statin   -fall, aspiration and seizure precautions

## 2021-01-07 NOTE — PROGRESS NOTE ADULT - SUBJECTIVE AND OBJECTIVE BOX
PGY-1 Progress Note discussed with attending    PAGER #: [[phone]] TILL 5:00 PM  PLEASE CONTACT ON CALL TEAM:  - On Call Team (Please refer to Jose) FROM 5:00 PM - 8:30PM  - Nightfloat Team FROM 8:30 -7:30 AM    CHIEF COMPLAINT & BRIEF HOSPITAL COURSE:    INTERVAL HPI/OVERNIGHT EVENTS:       REVIEW OF SYSTEMS:  CONSTITUTIONAL: No fever, weight loss, or fatigue  RESPIRATORY: No cough, wheezing, chills or hemoptysis; No shortness of breath  CARDIOVASCULAR: No chest pain, palpitations, dizziness, or leg swelling  GASTROINTESTINAL: No abdominal pain. No nausea, vomiting, or hematemesis; No diarrhea or constipation. No melena or hematochezia.  GENITOURINARY: No dysuria or hematuria, urinary frequency  NEUROLOGICAL: No headaches, memory loss, loss of strength, numbness, or tremors  SKIN: No itching, burning, rashes, or lesions     Vital Signs Last 24 Hrs  T(C): 37.3 (07 Jan 2021 05:00), Max: 38.1 (07 Jan 2021 00:33)  T(F): 99.2 (07 Jan 2021 05:00), Max: 100.6 (07 Jan 2021 00:33)  HR: 113 (07 Jan 2021 05:00) (78 - 113)  BP: 107/65 (07 Jan 2021 05:00) (90/60 - 119/66)  BP(mean): 75 (06 Jan 2021 19:30) (75 - 75)  RR: 17 (07 Jan 2021 05:00) (17 - 20)  SpO2: 97% (07 Jan 2021 05:00) (93% - 100%)    PHYSICAL EXAMINATION:  GENERAL: NAD, well built  HEAD:  Atraumatic, Normocephalic  EYES:  conjunctiva and sclera clear  NECK: Supple, No JVD, Normal thyroid  CHEST/LUNG: Clear to auscultation. Clear to percussion bilaterally; No rales, rhonchi, wheezing, or rubs  HEART: Regular rate and rhythm; No murmurs, rubs, or gallops  ABDOMEN: Soft, Nontender, Nondistended; Bowel sounds present  NERVOUS SYSTEM:  Alert & Oriented X3,    EXTREMITIES:  2+ Peripheral Pulses, No clubbing, cyanosis, or edema  SKIN: warm dry                          7.9    4.35  )-----------( 189      ( 07 Jan 2021 06:26 )             25.1     01-07    141  |  108  |  38<H>  ----------------------------<  118<H>  3.4<L>   |  24  |  2.23<H>    Ca    7.3<L>      07 Jan 2021 06:26  Phos  4.2     01-06  Mg     1.8     01-06    TPro  7.5  /  Alb  2.9<L>  /  TBili  0.7  /  DBili  0.1  /  AST  18  /  ALT  25  /  AlkPhos  198<H>  01-06    LIVER FUNCTIONS - ( 06 Jan 2021 06:24 )  Alb: 2.9 g/dL / Pro: 7.5 g/dL / ALK PHOS: 198 U/L / ALT: 25 U/L DA / AST: 18 U/L / GGT: x           CARDIAC MARKERS ( 06 Jan 2021 06:24 )  <0.015 ng/mL / x     / 71 U/L / x     / x      CARDIAC MARKERS ( 05 Jan 2021 21:32 )  <0.015 ng/mL / x     / x     / x     / x          PT/INR - ( 06 Jan 2021 06:24 )   PT: 12.7 sec;   INR: 1.07 ratio         PTT - ( 06 Jan 2021 06:24 )  PTT:27.2 sec    CAPILLARY BLOOD GLUCOSE      RADIOLOGY & ADDITIONAL TESTS:                   PGY-1 Progress Note discussed with attending    PAGER #: [[phone]] TILL 5:00 PM  PLEASE CONTACT ON CALL TEAM:  - On Call Team (Please refer to Jose) FROM 5:00 PM - 8:30PM  - Nightfloat Team FROM 8:30 -7:30 AM    CHIEF COMPLAINT & BRIEF HOSPITAL COURSE: Patient is an 80 year old female from home, with PMH of DM, HTN, CHF and hypothyroidism, who presented to the ED due to slurred speech, resolved now . Likely in the setting of TIA or stroke or UTI. CT head was unremarkable for any hemorrhage or acute infarct. CT /P showed Mild left hydroureteronephrosis with perinephric stranding and trace fluid. Suggestion of tubular dilatation at the level of left mid ureter containing vague hyperdensity (see key image). Findings are nonspecific, this may represent hemorrhagic products. Consider nonemergent retrograde evaluation to exclude underlying ureteral hemorrhagic lesion. US renal showed right renal cyst. Carotid doppler was unremarkable. Pt with new fib, previous medical records showed that pt had EP studies in Aug 2020 which showed premature atrial contractions. Pt with bacteremia growing gram positive rods in both aerobic and aerobic bottle. Starts on vancomycin and zosyn.     INTERVAL HPI/ OVERNIGHT EVENTS: Pt spiked fever event overnight 100.6. Assessed at bedside this morning with [place] citizen  ID: [id number]. Pt has dementia from baseline. AAOX2, oriented with place and person. Slurred speech has been resolved. She denied chest pain, difficulty in breathing.    Sometimes pt says random stuff which doesn't make sense.      REVIEW OF SYSTEMS:  CONSTITUTIONAL: fever one spike last night, weight loss, or fatigue  RESPIRATORY: No cough, wheezing, chills or hemoptysis; No shortness of breath  CARDIOVASCULAR: No chest pain, palpitations, dizziness, or leg swelling  GASTROINTESTINAL: No abdominal pain. No nausea, vomiting, or hematemesis; No diarrhea or constipation. No melena or hematochezia.  GENITOURINARY: No dysuria or hematuria, urinary frequency  NEUROLOGICAL: No headaches, memory loss, loss of strength, numbness, or tremors  SKIN: No itching, burning, rashes, or lesions     Vital Signs Last 24 Hrs  T(C): 37.3 (07 Jan 2021 05:00), Max: 38.1 (07 Jan 2021 00:33)  T(F): 99.2 (07 Jan 2021 05:00), Max: 100.6 (07 Jan 2021 00:33)  HR: 113 (07 Jan 2021 05:00) (78 - 113)  BP: 107/65 (07 Jan 2021 05:00) (90/60 - 119/66)  BP(mean): 75 (06 Jan 2021 19:30) (75 - 75)  RR: 17 (07 Jan 2021 05:00) (17 - 20)  SpO2: 97% (07 Jan 2021 05:00) (93% - 100%)    PHYSICAL EXAMINATION:  GENERAL: NAD, on 2 lit NC  HEAD:  Atraumatic, Normocephalic  EYES:  conjunctiva and sclera clear  NECK: Supple, No JVD, Normal thyroid  CHEST/LUNG: Clear to auscultation. Clear to percussion bilaterally; No rales, rhonchi, wheezing, or rubs  HEART: Irregular heart rate and rhythm.   ABDOMEN: Soft, Nontender, Nondistended; Bowel sounds present  NERVOUS SYSTEM:  Alert & Oriented X2,  with place and person  EXTREMITIES:  2+ Peripheral Pulses, signs of stasis dermatitis b/l LE   SKIN: warm dry    MEDICATIONS  (STANDING):  aspirin  chewable 81 milliGRAM(s) Oral daily  atorvastatin 40 milliGRAM(s) Oral at bedtime  clopidogrel Tablet 75 milliGRAM(s) Oral daily  escitalopram 10 milliGRAM(s) Oral daily  heparin   Injectable 5000 Unit(s) SubCutaneous every 8 hours  insulin lispro (ADMELOG) corrective regimen sliding scale   SubCutaneous Before meals and at bedtime  levothyroxine 75 MICROGram(s) Oral daily  OLANZapine 5 milliGRAM(s) Oral at bedtime  pantoprazole    Tablet 40 milliGRAM(s) Oral before breakfast  sodium chloride 0.9%. 1000 milliLiter(s) (75 mL/Hr) IV Continuous <Continuous>    MEDICATIONS  (PRN):  acetaminophen   Tablet .. 650 milliGRAM(s) Oral every 6 hours PRN Temp greater or equal to 38C (100.4F), Moderate Pain (4 - 6)  ondansetron Injectable 4 milliGRAM(s) IV Push every 6 hours PRN Nausea and/or Vomiting                          7.9    4.35  )-----------( 189      ( 07 Jan 2021 06:26 )             25.1     01-07    141  |  108  |  38<H>  ----------------------------<  118<H>  3.4<L>   |  24  |  2.23<H>    Ca    7.3<L>      07 Jan 2021 06:26  Phos  4.2     01-06  Mg     1.8     01-06    TPro  7.5  /  Alb  2.9<L>  /  TBili  0.7  /  DBili  0.1  /  AST  18  /  ALT  25  /  AlkPhos  198<H>  01-06    LIVER FUNCTIONS - ( 06 Jan 2021 06:24 )  Alb: 2.9 g/dL / Pro: 7.5 g/dL / ALK PHOS: 198 U/L / ALT: 25 U/L DA / AST: 18 U/L / GGT: x           CARDIAC MARKERS ( 06 Jan 2021 06:24 )  <0.015 ng/mL / x     / 71 U/L / x     / x      CARDIAC MARKERS ( 05 Jan 2021 21:32 )  <0.015 ng/mL / x     / x     / x     / x          PT/INR - ( 06 Jan 2021 06:24 )   PT: 12.7 sec;   INR: 1.07 ratio         PTT - ( 06 Jan 2021 06:24 )  PTT:27.2 sec    CAPILLARY BLOOD GLUCOSE      RADIOLOGY & ADDITIONAL TESTS:      CT head: Unremarkable noncontrast CT scan of the brain.  CT A/P: Mild left hydroureteronephrosis with perinephric stranding and trace fluid. Suggestion of tubular dilatation at the level of left mid ureter containing vague hyperdensity (see key image). Findings are nonspecific, this may represent hemorrhagic products. Consider nonemergent retrograde evaluation to exclude underlying ureteral hemorrhagic lesion.

## 2021-01-07 NOTE — DIETITIAN INITIAL EVALUATION ADULT. - ADD RECOMMEND
add multivitamin/minerals 1 tab/d and vitamin C 500mg bid to assist with healing if not contraindicated

## 2021-01-07 NOTE — CONSULT NOTE ADULT - SUBJECTIVE AND OBJECTIVE BOX
DATE OF SERVICE: 2021   Patient was seen,examined and evaluated  by me.ER evaluation, Labs and Hospital course was reviewed,    CHIEF COMPLAINT:Slurred speech    HPI:Patient is an 80 year old female from home, with PMH of T2DM, HTN, HF and Hypothyroidism, who presented to the ED due to slurred speech. Patient is a poor historian. Patient just states she is feeling unwell.   Denies any pain anywhere.  Daughter had called the patient and stated that the patient was not making sense and also appeared to have slurred speech. Patient denies any symptoms similar to this previously and denies any history of stroke.   Currently, patient denies any chest pain, shortness of breath, abdominal pain, nausea, vomiting, diarrhea or constipation.    Likely in the setting of TIA or stroke or UTI. CT head was unremarkable for any hemorrhage or acute infarct. CT /P showed Mild left hydroureteronephrosis with perinephric stranding and trace fluid. Suggestion of tubular dilatation at the level of left mid ureter containing vague hyperdensity (see key image). Findings are nonspecific, this may represent hemorrhagic products.Carotid doppler was unremarkable. Pt with new fib, previous medical records showed that pt had EP studies in Aug 2020 which showed premature atrial contractions. Pt with bacteremia growing gram positive rods in both aerobic and aerobic bottle. Started  on vancomycin and zosyn.    Noted to be in Atrial fibrillation on telemetry started on Heparin Gtt      INTERVAL HPI/ OVERNIGHT EVENTS: Pt spiked fever overnight 100.6.Awake speech clear        PAST MEDICAL & SURGICAL HISTORY:  Hypothyroidism  Congestive heart failure (CHF)  Hypertension  Diabetes mellitus  Anxiety  Obesity  DM (diabetes mellitus)  HTN (hypertension)  History of         MEDICATIONS  (STANDING):  aspirin  chewable 81 milliGRAM(s) Oral daily  atorvastatin 40 milliGRAM(s) Oral at bedtime  escitalopram 10 milliGRAM(s) Oral daily  heparin  Infusion.  Unit(s)/Hr (15 mL/Hr) IV Continuous <Continuous>  insulin lispro (ADMELOG) corrective regimen sliding scale   SubCutaneous Before meals and at bedtime  levothyroxine 75 MICROGram(s) Oral daily  metoprolol tartrate 25 milliGRAM(s) Oral two times a day  OLANZapine 5 milliGRAM(s) Oral at bedtime  pantoprazole    Tablet 40 milliGRAM(s) Oral before breakfast  piperacillin/tazobactam IVPB..      piperacillin/tazobactam IVPB.. 3.375 Gram(s) IV Intermittent every 12 hours  potassium phosphate / sodium phosphate Powder (PHOS-NaK) 1 Packet(s) Oral three times a day  sodium chloride 0.9%. 1000 milliLiter(s) (75 mL/Hr) IV Continuous <Continuous>    MEDICATIONS  (PRN):  acetaminophen   Tablet .. 650 milliGRAM(s) Oral every 6 hours PRN Temp greater or equal to 38C (100.4F), Moderate Pain (4 - 6)  heparin   Injectable 6500 Unit(s) IV Push every 6 hours PRN For aPTT less than 40  heparin   Injectable 3000 Unit(s) IV Push every 6 hours PRN For aPTT between 40 - 57  ondansetron Injectable 4 milliGRAM(s) IV Push every 6 hours PRN Nausea and/or Vomiting      FAMILY HISTORY:    No family history of premature coronary artery disease or sudden cardiac death    SOCIAL HISTORY:  Smoking-[ ] Active  [ ] Former [x ] Non Smoker  Alcohol-[x ] Denies [ ] Social [ ] Daily  Ilicit Drug use-[x ] Denies [ ] Active user    REVIEW OF SYSTEMS:  Constitutional: [ ] fever, [ ]weight loss, [ ]fatigue   Activity [ ] Bedbound,[x ] Ambulates [x ] Unassisted[ ] Cane/Walker [ ] Assistence.  Effort tolerance:[ ] Excellent [ ] Good [x ] Fair [ ] Poor [ ]  Eyes: [ ] visual changes  Respiratory: [ ]shortness of breath;  [ ] cough, [ ]wheezing, [ ]chills, [ ]hemoptysis  Cardiovascular: [ ] chest pain, [ ]palpitations, [ ]dizziness,  [ ]leg swelling[ ]orthopnea [ ]PND  Gastrointestinal: [ ] abdominal pain, [ ]nausea, [ ]vomiting,  [ ]diarrhea,[ ]constipation  Genitourinary: [ ] dysuria, [ ] hematuria  Neurologic: [ ] headaches [ ] tremors[x ] weakness  Skin: [ ] itching, [ ]burning, [ ] rashes  Endocrine: [ ] heat or cold intolerance  Musculoskeletal: [ ] joint pain or swelling; [ ] muscle, back, or extremity pain  Psychiatric: [ ] depression, [ ]anxiety, [ ]mood swings, or [ ]difficulty sleeping  Hematologic: [ ] easy bruising, [ ] bleeding gums       [ x] All others negative	  [ ] Unable to obtain    Vital Signs Last 24 Hrs  T(C): 37.3 (2021 05:00), Max: 38.1 (2021 00:33)  T(F): 99.2 (2021 05:00), Max: 100.6 (2021 00:33)  HR: 113 (2021 05:00) (78 - 113)  BP: 107/65 (2021 05:00) (90/60 - 119/66)  BP(mean): 75 (2021 19:30) (75 - 75)  RR: 17 (2021 05:00) (17 - 20)  SpO2: 97% (2021 05:00) (93% - 100%)        PHYSICAL EXAM:  General: No acute distress BMI-24  HEENT: EOMI, PERRL[ ] Icteric  Neck: Supple, No JVD  Lungs: Equal air entry bilaterally; [ ] Rales [ ] Rhonchi [ ] Wheezing  Heart: Irregular rate and rhythm;[x ] Murmurs-  2 /6 [x ] Systolic [ ] Diastolic [ ] Radiation,No rubs, or gallops  Abdomen: Nontender, bowel sounds present  Extremities: No clubbing, cyanosis, 1+ edema[ ] Calf tenderness  Nervous system:  Alert & Oriented X2, no focal deficits  Psychiatric: Normal affect  Skin: No rashes or lesions,LE statis dermatitis      LABS:              7.9    4.35  )-----------( 189      ( 2021 06:26 )             25.1     01-07    141  |  108  |  38<H>  ----------------------------<  118<H>  3.4<L>   |  24  |  2.23<H>    Ca    7.3<L>      2021 06:26  Phos  4.2     01-06  Mg     1.8     -06    TPro  7.5  /  Alb  2.9<L>  /  TBili  0.7  /  DBili  0.1  /  AST  18  /  ALT  25  /  AlkPhos  198<H>  01-06    LIVER FUNCTIONS - ( 2021 06:24 )  Alb: 2.9 g/dL / Pro: 7.5 g/dL / ALK PHOS: 198 U/L / ALT: 25 U/L DA / AST: 18 U/L / GGT: x           CARDIAC MARKERS ( 2021 06:24 )  <0.015 ng/mL / x     / 71 U/L / x     / x      CARDIAC MARKERS ( 2021 21:32 )  <0.015 ng/mL / x     / x     / x     / x          PT/INR - ( 2021 06:24 )   PT: 12.7 sec;   INR: 1.07 ratio         PTT - ( 2021 06:24 )  PTT:27.2 sec      RADIOLOGY  CT BRAIN STROKE PROTOCOL            Impression:  1. Unremarkable noncontrast CT scan of the brain.    ECG [my interpretation]:Atrial Fibrillation at 100 BPM Non specific ST T wave abnormalities    TELEMETRY:Atrial Fibrillation

## 2021-01-07 NOTE — DIETITIAN INITIAL EVALUATION ADULT. - PROBLEM SELECTOR PLAN 3
noted to have creatinine of 2.82 on admission   no known kidney disease   f/u urine lytes, renal US   monitor BMP daily

## 2021-01-08 NOTE — PROGRESS NOTE ADULT - PROBLEM SELECTOR PLAN 6
-hold home med of lisinopril and Lasix in setting of PARTH and hypotension  -monitor BP and add meds as needed  -Resumed metoprolol

## 2021-01-08 NOTE — PROGRESS NOTE ADULT - PROBLEM SELECTOR PLAN 3
-Patient noted to have afib on tele monitor  -HR ranges  currently  -Pt with past history of EP studies in Aug 2020 that showed premature atrial contraction but no AFib.   -CHADs-Vasc score of 6  -lisinopril is hold off in the setting of low BP  -Resumed metoprolol 25mg BID  -Eliquis 2.5mg BID , Covered 4$ /month   -Cardio, Dr. Edmond consulted for further recommendation    -Anemia : H/H stable   -No active signs of bleeding

## 2021-01-08 NOTE — CONSULT NOTE ADULT - SUBJECTIVE AND OBJECTIVE BOX
[facility]   79yo Setsw[place] speaking Female with CHF, DM, HTN, and hypothyroidism, who p/w slurred speech patient became febrile to 104, found to have gram positive rods in blood, CT shows mild left hydro in setting of PARTH.        PAST MEDICAL & SURGICAL HISTORY:  Hypothyroidism    Congestive heart failure (CHF)    Hypertension    Diabetes mellitus    Anxiety    Obesity    DM (diabetes mellitus)    HTN (hypertension)    History of         MEDICATIONS  (STANDING):  apixaban 2.5 milliGRAM(s) Oral every 12 hours  aspirin  chewable 81 milliGRAM(s) Oral daily  atorvastatin 40 milliGRAM(s) Oral at bedtime  escitalopram 10 milliGRAM(s) Oral daily  insulin lispro (ADMELOG) corrective regimen sliding scale   SubCutaneous Before meals and at bedtime  levothyroxine 75 MICROGram(s) Oral daily  metoprolol tartrate 25 milliGRAM(s) Oral two times a day  OLANZapine 5 milliGRAM(s) Oral at bedtime  pantoprazole    Tablet 40 milliGRAM(s) Oral before breakfast  piperacillin/tazobactam IVPB..      piperacillin/tazobactam IVPB.. 3.375 Gram(s) IV Intermittent every 12 hours  sodium chloride 0.9%. 1000 milliLiter(s) (75 mL/Hr) IV Continuous <Continuous>    MEDICATIONS  (PRN):  acetaminophen   Tablet .. 650 milliGRAM(s) Oral every 6 hours PRN Temp greater or equal to 38C (100.4F), Moderate Pain (4 - 6)  ondansetron Injectable 4 milliGRAM(s) IV Push every 6 hours PRN Nausea and/or Vomiting      FAMILY HISTORY:      Allergies    No Known Allergies    Intolerances        SOCIAL HISTORY:    REVIEW OF SYSTEMS: Otherwise negative as stated in HPI    Physical Exam  Vital signs  T(C): 36.8 (21 @ 13:44), Max: 38.7 (21 @ 02:05)  HR: 112 (21 @ 13:44)  BP: 132/70 (21 @ 13:44)  SpO2: 95% (21 @ 13:44)  Wt(kg): --    Output      Gen:  NAD    Pulm:  No respiratory distress  	  CV:  RRR    GI:  S/ND/NT    :      MSK:      LABS:       05:58    WBC 6.17  / Hct 26.9  / SCr 2.14     01-08 @ 02:05    WBC 6.14  / Hct 28.8  / SCr --           138  |  104  |  33<H>  ----------------------------<  200<H>  3.1<L>   |  24  |  2.14<H>    Ca    7.6<L>      2021 05:58  Phos  2.7       Mg     1.9         TPro  5.8<L>  /  Alb  2.2<L>  /  TBili  0.4  /  DBili  x   /  AST  26  /  ALT  21  /  AlkPhos  188<H>      PTT - ( 2021 02:05 )  PTT:76.9 sec      Urine Cx:  Blood Cx:    RADIOLOGY:  < from: CT Abdomen and Pelvis w/ Oral Cont (21 @ 22:11) >    EXAM:  CT ABDOMEN AND PELVIS OC                            PROCEDURE DATE:  2021          INTERPRETATION:  CT ABDOMEN AND PELVIS WITHOUT CONTRAST    INDICATION: Sepsis and fever.    TECHNIQUE: Abdominopelvic CT without intravenous contrast.Images are reformatted in the sagittal and coronal planes.    COMPARISON: None.    FINDINGS:    Absence of intravenous contrast limits evaluation for focal lesions, neoplasm, and vascular pathology.    Lower Thorax: No consolidation or effusion. Cardiomegaly. Coronary artery calcifications.    Liver: Punctate hepatic calcifications.  Biliary: No significant dilatation. Cholelithiasis.  Spleen: No suspicious lesions.  Pancreas: No inflammatory changes or ductal dilatation.  Adrenals: Normal.  Kidneys: There is mild left hydroureteronephrosis with perinephric stranding and trace fluid. Suggestion of tubular dilatation at the level of left mid ureter containing vague hyperdensity as best seen on image 58 of series 4. Findings are nonspecific, this may represent hemorrhagic products. Consider nonemergent retrograde evaluation to exclude underlying hemorrhagic lesion. Alternatively findings may represent thrombus within left gonadal vein. Consider correlation with Doppler study if indicated. 5 mmnonobstructive stone in the inferior pole of left kidney. No right hydronephrosis.  Vessels: Atherosclerotic disease of the aorta and its branches.    GI tract: There is mild wall thickening of the hepatic flexure surrounding stranding, difficult to assess secondary to inadequate distention. No evidence of small bowel obstruction. Hyperdensity within the colonic loops, likely contrast ingested material. Appendix is not visualized without secondary findings of acute appendicitis.  Peritoneum/retroperitoneum and mesentery: No free air. No organized fluid collection. No adenopathy.    Pelvic organs/Bladder: Uterus appears within normal limits. No adnexal masses. Bladder is decompressed containing Domingo catheter. Small pelvic free fluid.    Abdominal wall: A small fat containing umbilical hernia is noted.  Bones and soft tissues: Multilevel degenerative changes of the spine noted. Mild anterolisthesis of L5 on S1 secondary to degenerative facet spondylolysis    IMPRESSION:    Mild left hydroureteronephrosis with perinephric stranding and trace fluid. Suggestion of tubular dilatation at the level of left mid ureter containing vague hyperdensity (see key image). Findings are nonspecific, this may represent hemorrhagic products. Consider nonemergent retrograde evaluation to exclude underlying ureteral hemorrhagic lesion. Correlate with urinalysis and laboratory values to assess for superimposed ascending urinary tract infection or left pyelitis.    Alternatively findings may represent thrombus within left gonadal vein. Consider correlation with Doppler study if indicated.    Additional findings as mentioned above.                ARVIN MARIA MD; Attending Radiologist  This document has been electronically signed. 2021 12:49AM    < end of copied text >    < from: US Renal (21 @ 10:27) >    EXAM:  US KIDNEY(S)                            PROCEDURE DATE:  2021          INTERPRETATION:  CLINICAL INFORMATION: Acute kidney injury    COMPARISON: None available.    TECHNIQUE: Sonography of the kidneys and bladder.    FINDINGS:    Rightkidney: 11.4 cm. No renal mass, hydronephrosis or calculi. 1.6 cm cyst in the upper pole of the right kidney.    Left kidney: 11.4 cm. No renal mass, hydronephrosis or calculi.    Urinary bladder: Within normal limits.    IMPRESSION:    No hydronephrosis.    Small right renal cyst.                  CASSIDY CALIX MD; Attending Radiologist  This document has been electronically signed. 2021 11:06AM    < end of copied text >       HPI   79yo Slovak speaking Female with CHF, DM, HTN, and hypothyroidism, who p/w slurred speech patient became febrile to 104, found to have gram positive rods in blood, CT shows mild left hydro in setting of PARTH.  Pt currently feels well, feels she empties bladder completely, denies any dysuria, hematuria.       PAST MEDICAL & SURGICAL HISTORY:  Hypothyroidism    Congestive heart failure (CHF)    Hypertension    Diabetes mellitus    Anxiety    Obesity    DM (diabetes mellitus)    HTN (hypertension)    History of         MEDICATIONS  (STANDING):  apixaban 2.5 milliGRAM(s) Oral every 12 hours  aspirin  chewable 81 milliGRAM(s) Oral daily  atorvastatin 40 milliGRAM(s) Oral at bedtime  escitalopram 10 milliGRAM(s) Oral daily  insulin lispro (ADMELOG) corrective regimen sliding scale   SubCutaneous Before meals and at bedtime  levothyroxine 75 MICROGram(s) Oral daily  metoprolol tartrate 25 milliGRAM(s) Oral two times a day  OLANZapine 5 milliGRAM(s) Oral at bedtime  pantoprazole    Tablet 40 milliGRAM(s) Oral before breakfast  piperacillin/tazobactam IVPB..      piperacillin/tazobactam IVPB.. 3.375 Gram(s) IV Intermittent every 12 hours  sodium chloride 0.9%. 1000 milliLiter(s) (75 mL/Hr) IV Continuous <Continuous>    MEDICATIONS  (PRN):  acetaminophen   Tablet .. 650 milliGRAM(s) Oral every 6 hours PRN Temp greater or equal to 38C (100.4F), Moderate Pain (4 - 6)  ondansetron Injectable 4 milliGRAM(s) IV Push every 6 hours PRN Nausea and/or Vomiting      FAMILY HISTORY:      Allergies    No Known Allergies    Intolerances        SOCIAL HISTORY:    REVIEW OF SYSTEMS: Otherwise negative as stated in HPI    Physical Exam  Vital signs  T(C): 36.8 (21 @ 13:44), Max: 38.7 (21 @ 02:05)  HR: 112 (21 @ 13:44)  BP: 132/70 (21 @ 13:44)  SpO2: 95% (21 @ 13:44)  Wt(kg): --    Output      Gen:  NAD    Pulm:  No respiratory distress  	  CV:  RRR    GI:  S/ND/NT    :  no johnson    MSK:      LABS:       @ 05:58    WBC 6.17  / Hct 26.9  / SCr 2.14      @ 02:05    WBC 6.14  / Hct 28.8  / SCr --           138  |  104  |  33<H>  ----------------------------<  200<H>  3.1<L>   |  24  |  2.14<H>    Ca    7.6<L>      2021 05:58  Phos  2.7       Mg     1.9         TPro  5.8<L>  /  Alb  2.2<L>  /  TBili  0.4  /  DBili  x   /  AST  26  /  ALT  21  /  AlkPhos  188<H>      PTT - ( 2021 02:05 )  PTT:76.9 sec      Urine Cx:  Blood Cx:    RADIOLOGY:  < from: CT Abdomen and Pelvis w/ Oral Cont (21 @ 22:11) >    EXAM:  CT ABDOMEN AND PELVIS OC                            PROCEDURE DATE:  2021          INTERPRETATION:  CT ABDOMEN AND PELVIS WITHOUT CONTRAST    INDICATION: Sepsis and fever.    TECHNIQUE: Abdominopelvic CT without intravenous contrast.Images are reformatted in the sagittal and coronal planes.    COMPARISON: None.    FINDINGS:    Absence of intravenous contrast limits evaluation for focal lesions, neoplasm, and vascular pathology.    Lower Thorax: No consolidation or effusion. Cardiomegaly. Coronary artery calcifications.    Liver: Punctate hepatic calcifications.  Biliary: No significant dilatation. Cholelithiasis.  Spleen: No suspicious lesions.  Pancreas: No inflammatory changes or ductal dilatation.  Adrenals: Normal.  Kidneys: There is mild left hydroureteronephrosis with perinephric stranding and trace fluid. Suggestion of tubular dilatation at the level of left mid ureter containing vague hyperdensity as best seen on image 58 of series 4. Findings are nonspecific, this may represent hemorrhagic products. Consider nonemergent retrograde evaluation to exclude underlying hemorrhagic lesion. Alternatively findings may represent thrombus within left gonadal vein. Consider correlation with Doppler study if indicated. 5 mmnonobstructive stone in the inferior pole of left kidney. No right hydronephrosis.  Vessels: Atherosclerotic disease of the aorta and its branches.    GI tract: There is mild wall thickening of the hepatic flexure surrounding stranding, difficult to assess secondary to inadequate distention. No evidence of small bowel obstruction. Hyperdensity within the colonic loops, likely contrast ingested material. Appendix is not visualized without secondary findings of acute appendicitis.  Peritoneum/retroperitoneum and mesentery: No free air. No organized fluid collection. No adenopathy.    Pelvic organs/Bladder: Uterus appears within normal limits. No adnexal masses. Bladder is decompressed containing Johnson catheter. Small pelvic free fluid.    Abdominal wall: A small fat containing umbilical hernia is noted.  Bones and soft tissues: Multilevel degenerative changes of the spine noted. Mild anterolisthesis of L5 on S1 secondary to degenerative facet spondylolysis    IMPRESSION:    Mild left hydroureteronephrosis with perinephric stranding and trace fluid. Suggestion of tubular dilatation at the level of left mid ureter containing vague hyperdensity (see key image). Findings are nonspecific, this may represent hemorrhagic products. Consider nonemergent retrograde evaluation to exclude underlying ureteral hemorrhagic lesion. Correlate with urinalysis and laboratory values to assess for superimposed ascending urinary tract infection or left pyelitis.    Alternatively findings may represent thrombus within left gonadal vein. Consider correlation with Doppler study if indicated.    Additional findings as mentioned above.                ARVIN MARIA MD; Attending Radiologist  This document has been electronically signed. 2021 12:49AM    < end of copied text >    < from: US Renal (21 @ 10:27) >    EXAM:  US KIDNEY(S)                            PROCEDURE DATE:  2021          INTERPRETATION:  CLINICAL INFORMATION: Acute kidney injury    COMPARISON: None available.    TECHNIQUE: Sonography of the kidneys and bladder.    FINDINGS:    Rightkidney: 11.4 cm. No renal mass, hydronephrosis or calculi. 1.6 cm cyst in the upper pole of the right kidney.    Left kidney: 11.4 cm. No renal mass, hydronephrosis or calculi.    Urinary bladder: Within normal limits.    IMPRESSION:    No hydronephrosis.    Small right renal cyst.                  CASSIDY CALIX MD; Attending Radiologist  This document has been electronically signed. 2021 11:06AM    < end of copied text >       HPI   79yo Syriac speaking Female with CHF, DM, HTN, and hypothyroidism, who p/w slurred speech patient became febrile to 104, found to have gram positive rods in blood, CT shows mild left hydro in setting of PARTH.  Pt currently feels well, feels she empties bladder completely, denies any dysuria, hematuria.       PAST MEDICAL & SURGICAL HISTORY:  Hypothyroidism    Congestive heart failure (CHF)    Hypertension    Diabetes mellitus    Anxiety    Obesity    DM (diabetes mellitus)    HTN (hypertension)    History of         MEDICATIONS  (STANDING):  apixaban 2.5 milliGRAM(s) Oral every 12 hours  aspirin  chewable 81 milliGRAM(s) Oral daily  atorvastatin 40 milliGRAM(s) Oral at bedtime  escitalopram 10 milliGRAM(s) Oral daily  insulin lispro (ADMELOG) corrective regimen sliding scale   SubCutaneous Before meals and at bedtime  levothyroxine 75 MICROGram(s) Oral daily  metoprolol tartrate 25 milliGRAM(s) Oral two times a day  OLANZapine 5 milliGRAM(s) Oral at bedtime  pantoprazole    Tablet 40 milliGRAM(s) Oral before breakfast  piperacillin/tazobactam IVPB..      piperacillin/tazobactam IVPB.. 3.375 Gram(s) IV Intermittent every 12 hours  sodium chloride 0.9%. 1000 milliLiter(s) (75 mL/Hr) IV Continuous <Continuous>    MEDICATIONS  (PRN):  acetaminophen   Tablet .. 650 milliGRAM(s) Oral every 6 hours PRN Temp greater or equal to 38C (100.4F), Moderate Pain (4 - 6)  ondansetron Injectable 4 milliGRAM(s) IV Push every 6 hours PRN Nausea and/or Vomiting      FAMILY HISTORY:      Allergies    No Known Allergies    Intolerances        SOCIAL HISTORY:    REVIEW OF SYSTEMS: Otherwise negative as stated in HPI    Physical Exam  Vital signs  T(C): 36.8 (21 @ 13:44), Max: 38.7 (21 @ 02:05)  HR: 112 (21 @ 13:44)  BP: 132/70 (21 @ 13:44)  SpO2: 95% (21 @ 13:44)  Wt(kg): --    Output      Gen:  NAD    Pulm:  No respiratory distress  	  CV:  RRR    GI:  S/ND/NT    :  no johnson    MSK:      LABS:       @ 05:58    WBC 6.17  / Hct 26.9  / SCr 2.14      @ 02:05    WBC 6.14  / Hct 28.8  / SCr --           138  |  104  |  33<H>  ----------------------------<  200<H>  3.1<L>   |  24  |  2.14<H>    Ca    7.6<L>      2021 05:58  Phos  2.7       Mg     1.9         TPro  5.8<L>  /  Alb  2.2<L>  /  TBili  0.4  /  DBili  x   /  AST  26  /  ALT  21  /  AlkPhos  188<H>      PTT - ( 2021 02:05 )  PTT:76.9 sec      Culture - Urine (21 @ 19:02)    Specimen Source: .Urine Clean Catch (Midstream)    Culture Results:   <10,000 CFU/mL Normal Urogenital Nadia    Culture - Blood (21 @ 10:13)    Gram Stain:   Growth in aerobic bottle: Gram Positive Rods  Growth in anaerobic bottle: Gram Positive Rods    Specimen Source: .Blood Blood-Peripheral    Culture Results:   Growth in aerobic and anaerobic bottles: Gram Positive Rods  Most closely resembling Lactobacillus species  "Susceptibilities not performed"          RADIOLOGY:  < from: CT Abdomen and Pelvis w/ Oral Cont (21 @ 22:11) >    EXAM:  CT ABDOMEN AND PELVIS OC                            PROCEDURE DATE:  2021          INTERPRETATION:  CT ABDOMEN AND PELVIS WITHOUT CONTRAST    INDICATION: Sepsis and fever.    TECHNIQUE: Abdominopelvic CT without intravenous contrast.Images are reformatted in the sagittal and coronal planes.    COMPARISON: None.    FINDINGS:    Absence of intravenous contrast limits evaluation for focal lesions, neoplasm, and vascular pathology.    Lower Thorax: No consolidation or effusion. Cardiomegaly. Coronary artery calcifications.    Liver: Punctate hepatic calcifications.  Biliary: No significant dilatation. Cholelithiasis.  Spleen: No suspicious lesions.  Pancreas: No inflammatory changes or ductal dilatation.  Adrenals: Normal.  Kidneys: There is mild left hydroureteronephrosis with perinephric stranding and trace fluid. Suggestion of tubular dilatation at the level of left mid ureter containing vague hyperdensity as best seen on image 58 of series 4. Findings are nonspecific, this may represent hemorrhagic products. Consider nonemergent retrograde evaluation to exclude underlying hemorrhagic lesion. Alternatively findings may represent thrombus within left gonadal vein. Consider correlation with Doppler study if indicated. 5 mmnonobstructive stone in the inferior pole of left kidney. No right hydronephrosis.  Vessels: Atherosclerotic disease of the aorta and its branches.    GI tract: There is mild wall thickening of the hepatic flexure surrounding stranding, difficult to assess secondary to inadequate distention. No evidence of small bowel obstruction. Hyperdensity within the colonic loops, likely contrast ingested material. Appendix is not visualized without secondary findings of acute appendicitis.  Peritoneum/retroperitoneum and mesentery: No free air. No organized fluid collection. No adenopathy.    Pelvic organs/Bladder: Uterus appears within normal limits. No adnexal masses. Bladder is decompressed containing Johnson catheter. Small pelvic free fluid.    Abdominal wall: A small fat containing umbilical hernia is noted.  Bones and soft tissues: Multilevel degenerative changes of the spine noted. Mild anterolisthesis of L5 on S1 secondary to degenerative facet spondylolysis    IMPRESSION:    Mild left hydroureteronephrosis with perinephric stranding and trace fluid. Suggestion of tubular dilatation at the level of left mid ureter containing vague hyperdensity (see key image). Findings are nonspecific, this may represent hemorrhagic products. Consider nonemergent retrograde evaluation to exclude underlying ureteral hemorrhagic lesion. Correlate with urinalysis and laboratory values to assess for superimposed ascending urinary tract infection or left pyelitis.    Alternatively findings may represent thrombus within left gonadal vein. Consider correlation with Doppler study if indicated.    Additional findings as mentioned above.                ARVIN MARIA MD; Attending Radiologist  This document has been electronically signed. 2021 12:49AM    < end of copied text >    < from: US Renal (21 @ 10:27) >    EXAM:  US KIDNEY(S)                            PROCEDURE DATE:  2021          INTERPRETATION:  CLINICAL INFORMATION: Acute kidney injury    COMPARISON: None available.    TECHNIQUE: Sonography of the kidneys and bladder.    FINDINGS:    Rightkidney: 11.4 cm. No renal mass, hydronephrosis or calculi. 1.6 cm cyst in the upper pole of the right kidney.    Left kidney: 11.4 cm. No renal mass, hydronephrosis or calculi.    Urinary bladder: Within normal limits.    IMPRESSION:    No hydronephrosis.    Small right renal cyst.                  CASSIDY CALIX MD; Attending Radiologist  This document has been electronically signed. 2021 11:06AM    < end of copied text >

## 2021-01-08 NOTE — PROGRESS NOTE ADULT - SUBJECTIVE AND OBJECTIVE BOX
PGY-1 Progress Note discussed with attending    PAGER #: [[phone]] TILL 5:00 PM  PLEASE CONTACT ON CALL TEAM:  - On Call Team (Please refer to Jose) FROM 5:00 PM - 8:30PM  - Nightfloat Team FROM 8:30 -7:30 AM    CHIEF COMPLAINT & BRIEF HOSPITAL COURSE: Patient is an 80 year old female from home, with PMH of DM, HTN, CHF and hypothyroidism, who presented to the ED due to slurred speech, resolved now . Likely in the setting of TIA or stroke or UTI. CT head was unremarkable for any hemorrhage or acute infarct. CT /P showed Mild left hydroureteronephrosis with perinephric stranding and trace fluid. Suggestion of tubular dilatation at the level of left mid ureter containing vague hyperdensity (see key image). Findings are nonspecific, this may represent hemorrhagic products. Consider nonemergent retrograde evaluation to exclude underlying ureteral hemorrhagic lesion. US renal showed right renal cyst. Carotid doppler was unremarkable. Pt with new fib, previous medical records showed that pt had EP studies in Aug 2020 which showed premature atrial contractions. Pt with bacteremia growing gram positive rods in both aerobic and aerobic bottle. s/p 2 doses of vancomycin. Continue with zosyn [id number] . On eliquis 2.5mg BID for AFib. (new onset)    INTERVAL HPI/ OVERNIGHT EVENTS: No significant event overnight. Pt examined at bedside in the morning refused to have blood draw for APTT for heparin drip. Through Portuguese  ID#      .Pt is confused and agitated, A&O1. Tried to take out the IV lines. Very alert and oriented to self. Pt has been switched to Eliquis 2.5mg BID. Covered by her insurance. 4$/month.       REVIEW OF SYSTEMS:  Unable to elicit every system due to pt worsening dementia. Pt had one episode of fever last night. Denied any SOB, chest pain or abdominal pain. Complain of pain in her LQ. Pt has b/l stasis dermatitis of LE.     Vital Signs Last 24 Hrs  T(C): 36.8 (08 Jan 2021 13:44), Max: 38.7 (08 Jan 2021 02:05)  T(F): 98.2 (08 Jan 2021 13:44), Max: 101.7 (08 Jan 2021 02:05)  HR: 112 (08 Jan 2021 13:44) (100 - 112)  BP: 132/70 (08 Jan 2021 13:44) (113/53 - 154/73)  BP(mean): --  RR: 18 (08 Jan 2021 13:44) (17 - 18)  SpO2: 95% (08 Jan 2021 13:44) (92% - 95%)    PHYSICAL EXAMINATION:  GENERAL: NAD, well built  HEAD:  Atraumatic, Normocephalic  EYES:  conjunctiva and sclera clear  NECK: Supple, No JVD, Normal thyroid  CHEST/LUNG: Clear to auscultation. Clear to percussion bilaterally; No rales, rhonchi, wheezing, or rubs  HEART: Irregular rate and rhythm; No murmurs,  ABDOMEN: Soft, Nontender, Nondistended; Bowel sounds present  NERVOUS SYSTEM:  Alert & Oriented X1,  no focal neurologic deficit, Power 3/5 in both LE  EXTREMITIES:  2+ Peripheral Pulses, No clubbing, cyanosis, or edema  SKIN: warm dry    MEDICATIONS  (STANDING):  apixaban 2.5 milliGRAM(s) Oral every 12 hours  aspirin  chewable 81 milliGRAM(s) Oral daily  atorvastatin 40 milliGRAM(s) Oral at bedtime  escitalopram 10 milliGRAM(s) Oral daily  insulin lispro (ADMELOG) corrective regimen sliding scale   SubCutaneous Before meals and at bedtime  levothyroxine 75 MICROGram(s) Oral daily  metoprolol tartrate 25 milliGRAM(s) Oral two times a day  OLANZapine 5 milliGRAM(s) Oral at bedtime  pantoprazole    Tablet 40 milliGRAM(s) Oral before breakfast  piperacillin/tazobactam IVPB..      piperacillin/tazobactam IVPB.. 3.375 Gram(s) IV Intermittent every 12 hours  potassium phosphate / sodium phosphate Powder (PHOS-NaK) 1 Packet(s) Oral three times a day  sodium chloride 0.9%. 1000 milliLiter(s) (75 mL/Hr) IV Continuous <Continuous>    MEDICATIONS  (PRN):  acetaminophen   Tablet .. 650 milliGRAM(s) Oral every 6 hours PRN Temp greater or equal to 38C (100.4F), Moderate Pain (4 - 6)  ondansetron Injectable 4 milliGRAM(s) IV Push every 6 hours PRN Nausea and/or Vomiting                            9.1    6.17  )-----------( 172      ( 08 Jan 2021 05:58 )             26.9     01-08    138  |  104  |  33<H>  ----------------------------<  200<H>  3.1<L>   |  24  |  2.14<H>    Ca    7.6<L>      08 Jan 2021 05:58  Phos  2.7     01-08  Mg     1.9     01-08    TPro  5.8<L>  /  Alb  2.2<L>  /  TBili  0.4  /  DBili  x   /  AST  26  /  ALT  21  /  AlkPhos  188<H>  01-08    LIVER FUNCTIONS - ( 08 Jan 2021 05:58 )  Alb: 2.2 g/dL / Pro: 5.8 g/dL / ALK PHOS: 188 U/L / ALT: 21 U/L DA / AST: 26 U/L / GGT: x               PTT - ( 08 Jan 2021 02:05 )  PTT:76.9 sec    CAPILLARY BLOOD GLUCOSE      RADIOLOGY & ADDITIONAL TESTS:

## 2021-01-08 NOTE — PROGRESS NOTE ADULT - ASSESSMENT
Patient is a 79yo Luxembourgish speaking Female with CHF, DM, HTN, and hypothyroidism, who p/w slurred speech r/o CVA. Pt a/w Sepsis 2/2 UTI/ GPR bacteremia and hypotension. Nephrology consulted for Elevated serum creatinine.    1. PARTH- previous SCr 0.8-1. PARTH likely ATN in the setting of septic shock/ hypotension.  UA with 30 protein, trace LE, small blood with hyaline cast. FeNa 0.32%.  Continue to hold Lisnopril/ Lasix.   Renal function stabilized. Rec NS @ 75ml/hr.  s/p TTE with EF >55%. Renal US with no hydro. s/p CT with mild left hydroureteronephrosis with perinephric stranding/ tubular dilation at the level of left mid ureter containing vague hyperdensity ?blood.   Check UA to r/o bleeding. [facility] Urology consult.   Potassium repleted with KCl 40meq pO x1. Strict I/Os. Avoid nephrotoxins/ NSAIDs/ RCA. Monitor BMP.    2. Septic shock 2/2 UTI with gram +tayler bacteremia. s/p Vanco 1g IV x1 and Zosyn q12hrs. f/u UCx. Monitor Vanco level.     3. Hypotension- BP low normal. Continue to hold antihypertensive medications. Consider Midodrine if BP declines. Monitor BP.     4. CHF- pt dry on exam. Hold Lasix.  Patient is a 79yo Greenlandic speaking Female with CHF, DM, HTN, and hypothyroidism, who p/w slurred speech r/o CVA. Pt a/w Sepsis 2/2 UTI/ GPR bacteremia and hypotension. Nephrology consulted for Elevated serum creatinine.    1. PARTH- previous SCr 0.8-1. PARTH likely ATN in the setting of septic shock/ hypotension.  UA with 30 protein, trace LE, small blood with hyaline cast. FeNa 0.32%.  Continue to hold Lisnopril/ Lasix.   Renal function stabilized. Recc NS @ 75ml/hr.  s/p TTE with EF >55%. Renal US with no hydro. s/p CT with mild left hydroureteronephrosis with perinephric stranding/ tubular dilation at the level of left mid ureter containing vague hyperdensity ?blood. Check UA to r/o bleeding. [facility] Urology consult.  hgb improving; LDH wnl, hapto pending; will f/u to r/o TMA  Potassium repleted with KCl 40meq pO x1. Strict I/Os. Avoid nephrotoxins/ NSAIDs/ RCA. Monitor BMP.    2. Septic shock 2/2 UTI with gram +tayler bacteremia. s/p Vanco 1g IV x1 and Zosyn q12hrs. f/u UCx. Monitor Vanco level.     3. Hypotension- BP low normal. Continue to hold antihypertensive medications. Consider Midodrine if BP declines. Monitor BP.     4. CHF- pt dry on exam. Hold Lasix.

## 2021-01-08 NOTE — PROGRESS NOTE ADULT - PROBLEM SELECTOR PLAN 1
-Patient presented due to slurred speech and confusion  -Slurred speech resolved now  -Likely TIA vs worsening dementia   -CT head negative  -Neuro Dr. Elias consulted  -Started aspirin, Plavix, statin   -fall, aspiration and seizure precautions  -f/u Homocysteine, MMA, BREONNA, and SPEP    LE weakness:  Likely multifactorial as per Neuro.  f/u C Spine CT for possible myelopathy and compression

## 2021-01-08 NOTE — PROGRESS NOTE ADULT - ATTENDING COMMENTS
[facility] NEPHROLOGY  Cornelius Frank M.D.  Davey Burroughs D.O.  Katerina Ortiz M.D.  Nina Bryson, MSN, ANP-C  [phone]    [address] [place] [address]

## 2021-01-08 NOTE — PROGRESS NOTE ADULT - SUBJECTIVE AND OBJECTIVE BOX
[facility] NEPHROLOGY- PROGRESS NOTE    Patient is a 79yo Setsw[place] speaking Female with CHF, DM, HTN, and hypothyroidism, who p/w slurred speech r/o CVA. Pt a/w Sepsis 2/2 UTI/ GPR bacteremia and hypotension. Nephrology consulted for Elevated serum creatinine.    Hospital Medications: Medications reviewed.  REVIEW OF SYSTEMS: [employer]  #[id number]  CONSTITUTIONAL: +fevers, +anxious with diffuse shakes  RESPIRATORY: No shortness of breath, +dry cough  CARDIOVASCULAR: No chest pain.  GASTROINTESTINAL: No nausea, vomiting, diarrhea or abdominal pain.   VASCULAR: No bilateral lower extremity edema.     VITALS:  T(F): 99.7 (21 @ 05:16), Max: 101.7 (21 @ 02:05)  HR: 111 (21 @ 05:16)  BP: 137/75 (21 @ 05:16)  RR: 18 (21 @ 05:16)  SpO2: 92% (21 @ 05:16)  Wt(kg): --        PHYSICAL EXAM:  Gen: NAD,  HEENT: anicteric  Neck: no JVD  Cards: RRR, +S1/S2,   Resp: CTA ant  GI: soft, NT/ND, NABS  Extremities: no LE edema B/L  Derm: mild LE hyperpigmentation  +b/l hand tremors    LABS:      138  |  104  |  33<H>  ----------------------------<  200<H>  3.1<L>   |  24  |  2.14<H>    Ca    7.6<L>      2021 05:58  Phos  2.7       Mg     1.9         TPro  5.8<L>  /  Alb  2.2<L>  /  TBili  0.4  /  DBili      /  AST  26  /  ALT  21  /  AlkPhos  188<H>      Creatinine Trend: 2.14 <--, 2.18 <--, 2.23 <--, 2.98 <--, 3.19 <--, 2.82 <--                        9.1    6.17  )-----------( 172      ( 2021 05:58 )             26.9     Urine Studies:  Urinalysis Basic - ( 2021 20:50 )    Color: Yellow / Appearance: Slightly Turbid / S.020 / pH:   Gluc:  / Ketone: Negative  / Bili: Negative / Urobili: Negative   Blood:  / Protein: 30 mg/dL / Nitrite: Negative   Leuk Esterase: Trace / RBC: 5-10 /HPF / WBC 6-10 /HPF   Sq Epi:  / Non Sq Epi: Moderate /HPF / Bacteria: Many /HPF      Sodium, Random Urine: 20 mmol/L ( @ 14:32)  Creatinine, Random Urine: 134 mg/dL ( @ 14:32)  Chloride, Random Urine: 32 mmol/L ( @ 14:32)  Potassium, Random Urine: 62 mmol/L ( @ 14:32)

## 2021-01-08 NOTE — CONSULT NOTE ADULT - ASSESSMENT
79yo Icelandic speaking Female with CHF, DM, HTN, and hypothyroidism, who p/w slurred speech patient became febrile to 104, found to have gram positive rods in blood, CT shows mild left hydro in setting of PARTH.  Patient unlikely to have obstructive uropathy (minor hydro on CT, none seen on US), UA contaminated,     Recommendations:  - no acute urologic intervention  - repeat UA & send UCx  - trend SCr  - strict I/O  - ensure bladder emptying, check post void residual for low residual vs maintaining johnson until back to baseline   79yo Vietnamese speaking Female with CHF, DM, HTN, and hypothyroidism, who p/w slurred speech patient became febrile to 104, found to have gram positive rods in blood, CT shows mild left hydro in setting of PARTH.  Patient unlikely to have obstructive uropathy (minor hydro on CT, none seen on US), UA contaminated,     Recommendations:  - no acute urologic intervention  - repeat UA & send UCx  - trend SCr  - strict I/O  - ensure bladder emptying, check post void residual    Urology   81yo English speaking Female with CHF, DM, HTN, and hypothyroidism, who p/w slurred speech patient became febrile to 104, found to have gram positive rods in blood, CT shows mild left hydro in setting of PARTH.  Patient unlikely to have obstructive uropathy (minor hydro on CT, none seen on US), UA contaminated, UCx negative    Recommendations:  - no acute urologic intervention  - trend SCr  - strict I/O  - ensure bladder emptying, check post void residual    Urology

## 2021-01-08 NOTE — PROGRESS NOTE ADULT - PROBLEM SELECTOR PLAN 4
-Noted to have creatinine of 2.82 on admission , baseline 0.7   -Fena 0.3% prerenal  -Cr trending down   -ATN possible, on IVF NS  -CT with mild left hydroureteronephrosis with perinephric stranding/ tubular dilation at the level of left mid ureter containing vague hyperdensity   -Continue to hold Lasix   - Urology Consulted

## 2021-01-08 NOTE — PROGRESS NOTE ADULT - PROBLEM SELECTOR PLAN 5
-patient on metformin at home  -start SSI while in hospital  -HgbA1C 6.7  -Monitor BS and adjust insulin as needed

## 2021-01-08 NOTE — PROGRESS NOTE ADULT - ATTENDING COMMENTS
Patient was seen and examined by me on 01/08/2021,interim events noted,labs and radiology studies reviewed.  Faizan Edmond MD,[facility]  [address] [place] [address].  [id number]. [phone]

## 2021-01-08 NOTE — PROGRESS NOTE ADULT - PROBLEM SELECTOR PLAN 7
ECHO is unremarkable except some left ventricle remodeling   Hold off lisinopril   -strict I/O, daily weight

## 2021-01-08 NOTE — PROGRESS NOTE ADULT - PROBLEM SELECTOR PLAN 2
-BCx growing gram positive rods in both aerobic and aerobic bottles from 1/6/21  -Second BCx sent on 1/7/21  -Starts on empiric antibiotics Zosyn 3.375 q12h renally dose  -s/p 2 dose of vancomycin

## 2021-01-08 NOTE — PROGRESS NOTE ADULT - SUBJECTIVE AND OBJECTIVE BOX
DATE OF SERVICE: 01/08/2021 Patient was seen and examined ,interim events noted.Consultant notes ,Labs,Telemetry reviewed by me    PRESENTING CC:Slurred speech    HPI and HOSPITAL COURSE: Patient is an 80 year old female from home, with PMH of DM, HTN, CHF and hypothyroidism, who presented to the ED due to slurred speech.  Currently, patient denies any chest pain, shortness of breath, abdominal pain, nausea, vomiting, diarrhea or constipation.      INTERIM EVENTS:No significant event overnight.Pt is confused and agitated, A&O1. Tried to take out the IV lines. Very alert and oriented to self. Pt has been switched to Eliquis 2.5mg BID.    PMH -reviewed admission note, no change since admission  Heart Failure: Acute [ ] Chronic [ ] Acute on Chronic [ ] Diastolic [ ] Systolic [ ] Combined Systolic and Diastolic[ ]  PARTH[ ]  ATN[ ]  CKD I [ ] CKDII [ ] CKD III [ ] CKD IV [ ] CKD V [ ] ESRD[ ]  HTN[x ] CVA[ ] DM[x ] COPD[ ] COVID[ ] AF[x ]  PPM[ ] ICD[ ]    MEDICATIONS  (STANDING):  apixaban 2.5 milliGRAM(s) Oral every 12 hours  aspirin  chewable 81 milliGRAM(s) Oral daily  atorvastatin 40 milliGRAM(s) Oral at bedtime  escitalopram 10 milliGRAM(s) Oral daily  insulin lispro (ADMELOG) corrective regimen sliding scale   SubCutaneous Before meals and at bedtime  levothyroxine 75 MICROGram(s) Oral daily  metoprolol tartrate 25 milliGRAM(s) Oral two times a day  OLANZapine 5 milliGRAM(s) Oral at bedtime  pantoprazole    Tablet 40 milliGRAM(s) Oral before breakfast  piperacillin/tazobactam IVPB.. 3.375 Gram(s) IV Intermittent every 12 hours  sodium chloride 0.9%. 1000 milliLiter(s) (75 mL/Hr) IV Continuous <Continuous>    MEDICATIONS  (PRN):  acetaminophen   Tablet .. 650 milliGRAM(s) Oral every 6 hours PRN Temp greater or equal to 38C (100.4F), Moderate Pain (4 - 6)  ondansetron Injectable 4 milliGRAM(s) IV Push every 6 hours PRN Nausea and/or Vomiting            REVIEW OF SYSTEMS:  Constitutional: [ ] fever, [ ]weight loss,  [ ]fatigue  Eyes: [ ] visual changes  Respiratory: [ ]shortness of breath;  [ ] cough, [ ]wheezing, [ ]chills, [ ]hemoptysis  Cardiovascular: [ ] chest pain, [ ]palpitations, [ ]dizziness,  [ ]leg swelling[ ]orthopnea[ ]PND  Gastrointestinal: [ ] abdominal pain, [ ]nausea, [ ]vomiting,  [ ]diarrhea [ ]Constipation [ ]Melena  Genitourinary: [ ] dysuria, [ ] hematuria [ ]Domingo  Neurologic: [ ] headaches [ ] tremors[ ]weakness [ ]Paralysis Right[ ] Left[ ]  Skin: [ ] itching, [ ]burning, [ ] rashes  Endocrine: [ ] heat or cold intolerance  Musculoskeletal: [ ] joint pain or swelling; [ ] muscle, back, or extremity pain  Psychiatric: [ ] depression, [ ]anxiety, [ ]mood swings, or [ ]difficulty sleeping  Hematologic: [ ] easy bruising, [ ] bleeding gums    [ ] All remaining systems negative except as per above.   [x ]Unable to obtain.    Vital Signs Last 24 Hrs  T(C): 36.8 (08 Jan 2021 13:44), Max: 38.7 (08 Jan 2021 02:05)  T(F): 98.2 (08 Jan 2021 13:44), Max: 101.7 (08 Jan 2021 02:05)  HR: 112 (08 Jan 2021 13:44) (100 - 112)  BP: 132/70 (08 Jan 2021 13:44) (113/53 - 154/73)  RR: 18 (08 Jan 2021 13:44) (17 - 18)  SpO2: 95% (08 Jan 2021 13:44) (92% - 95%)    PHYSICAL EXAM:  General: No acute distress BMI-24  HEENT: EOMI, PERRL  Neck: Supple, [ ] JVD  Lungs: Equal air entry bilaterally; [ ] rales [ ] wheezing [ ] rhonchi  Heart: Irregular rate and rhythm; [x ] murmur  2 /6 [x ] systolic [ ] diastolic [ ] radiation[ ] rubs [ ]  gallops  Abdomen: Nontender, bowel sounds present  Extremities: No clubbing, cyanosis, [ ] edema [ ]Pulses  equal and intact  Nervous system:  Alert & Oriented X2, no focal deficits  Psychiatric: Normal affect  Skin: No rashes or lesions    LABS:              9.1    6.17  )-----------( 172      ( 08 Jan 2021 05:58 )             26.9     01-08    138  |  104  |  33<H>  ----------------------------<  200<H>  3.1<L>   |  24  |  2.14<H>    Ca    7.6<L>      08 Jan 2021 05:58  Phos  2.7     01-08  Mg     1.9     01-08    TPro  5.8<L>  /  Alb  2.2<L>  /  TBili  0.4  /  DBili  x   /  AST  26  /  ALT  21  /  AlkPhos  188<H>  01-08    LIVER FUNCTIONS - ( 08 Jan 2021 05:58 )  Alb: 2.2 g/dL / Pro: 5.8 g/dL / ALK PHOS: 188 U/L / ALT: 21 U/L DA / AST: 26 U/L / GGT: x               PTT - ( 08 Jan 2021 02:05 )  PTT:76.9 sec        TELEMETRY:Reviewed monitor tracings-Atrial Fibrillation ~~90's< from: Transthoracic Echocardiogram (01.06.21 @ 07:49) >      ECHO:Study Date: 1/6/2021  CONCLUSIONS:  TECHNICALLY VERY DIFFICULT STUDY, POOR ACOUSTIC WINDOWS    1. Mitral annular calcification.  2. Increased relative wall thickness with normal left ventricular (LV) mass index, consistent with concentric LV remodeling.  3. Normal left ventricular systolic function.  4. Normal right ventricular size and function.  5. A prominent epicardial fat pad is noted,  allthough an iso-echoic, dense pericardial effusion cannot be entirely excluded on this study.  Consider chest CT for better evaluation.      IMPRESSION AND PLAN:    80 year old female from home, with PMH of DM, HTN, CHF and hypothyroidism, who presented to the ED due to slurred speech.  Mild leukocytosis of 13K. Hgb of 10.2. Creatinine of 2.82. UA noted to be positive. CT head negative. COVID negative. Troponin negative x1. Given dose of aspirin. Noted to have Afib on tele monitor.       Problem/Plan - 1:  ·  Problem: Atrial Fibrillation-Persistent .  Plan: -Patient noted to have AFib on tele monitor  -HR ranges  currently  -Pt with past history of EP studies in Aug 2020 that showed premature atrial contraction but no AFib.   -CHADs-Vasc score of 6  -Started on Eliquis 2.5 mg BID  -TTE LV Function normal  -Restart Metoprolol        Problem/Plan - 2:  ·  Problem: Acute encephalopathy.  Plan: -Patient presented due to slurred speech and confusion  -Slurred speech resolved now  -Likely TIA vs stroke vs UTI   -CT head negative        Problem/Plan - 3:  ·  Problem: Bacteremia.  Plan: -BCx growing gram positive rods in both aerobic and aerobic bottles  -Second BCx sent today  -Starts on empiric antibiotics  Vancomycin [id number] and Zosyn 3.375 q12h renally dose.         Problem/Plan - 4:  ·  Problem: PARTH (acute kidney injury).  Plan: -Noted to have creatinine of 2.82 on admission , baseline 0.7   -Fena 0.3% prerenal  -Cr 2.23 today.    Problem/Plan - 5:  ·  Problem: Diabetes mellitus.  Plan: -patient on metformin at home  -start SSI while in hospital  -HgbA1C 6.7  -Monitor BS and adjust insulin as needed.       Problem/Plan - 6:  Problem: Hypertension. Plan: -hold home med of lisinopril and metoprolol in setting of PARTH and hypotension  monitor BP and add meds as needed.    Problem/Plan - 7:  ·  Problem: Congestive heart failure (CHF).  Plan: ECHO is unremarkable except some left ventricle remodeling   -strict I/O, daily weight.     Problem/Plan - 8:  ·  Problem: Hypothyroidism.  Plan: -continue home med of synthroid  -TSH >2.76.     Problem/Plan - 9:  ·  Problem: Need for prophylactic measure.  Plan: Heparin for DVT prophylaxis.     Problem/Plan - 10:  Problem: Goals of care, counseling/discussion. Plan; discussed GOC with daughterRaquel  patient is FULL CODE for now.

## 2021-01-08 NOTE — PROGRESS NOTE ADULT - ATTENDING COMMENTS
Patient was examined at the bedside with Dr. Huerta.     She is alert, oriented to person, only.   Vital Signs Last 24 Hrs  T(C): 36.8 (08 Jan 2021 13:44), Max: 38.7 (08 Jan 2021 02:05)  T(F): 98.2 (08 Jan 2021 13:44), Max: 101.7 (08 Jan 2021 02:05)  HR: 112 (08 Jan 2021 13:44) (100 - 112)  BP: 132/70 (08 Jan 2021 13:44) (132/70 - 154/73)  BP(mean): --  RR: 18 (08 Jan 2021 13:44) (18 - 18)  SpO2: 95% (08 Jan 2021 13:44) (92% - 95%)  Lungs, clear  Cor, irreg  Abdomen, soft                        9.1    6.17  )-----------( 172      ( 08 Jan 2021 05:58 )             26.9   01-08    138  |  104  |  33<H>  ----------------------------<  200<H>  3.1<L>   |  24  |  2.14<H>    Ca    7.6<L>      08 Jan 2021 05:58  Phos  2.7     01-08  Mg     1.9     01-08    TPro  5.8<L>  /  Alb  2.2<L>  /  TBili  0.4  /  DBili  x   /  AST  26  /  ALT  21  /  AlkPhos  188<H>  01-08    vee Stain:   Growth in aerobic bottle: Gram Positive Rods   Growth in anaerobic bottle: Gram Positive Rods (01.06.21 @ 10:13)   lactobacilli    < from: CT Abdomen and Pelvis w/ Oral Cont (01.06.21 @ 22:11) >    Pelvic organs/Bladder: Uterus appears within normal limits. No adnexal masses. Bladder is decompressed containing Domingo catheter. Small pelvic free fluid.    Abdominal wall: A small fat containing umbilical hernia is noted.  Bones and soft tissues: Multilevel degenerative changes of the spine noted. Mild anterolisthesis of L5 on S1 secondary to degenerative facet spondylolysis    IMPRESSION:    Mild left hydroureteronephrosis with perinephric stranding and trace fluid. Suggestion of tubular dilatation at the level of left mid ureter containing vague hyperdensity (see key image). Findings are nonspecific, this may represent hemorrhagic products. Consider nonemergent retrograde evaluation to exclude underlying ureteral hemorrhagic lesion. Correlate with urinalysis and laboratory values to assess for superimposed ascending urinary tract infection or left pyelitis.    Alternatively findings may represent thrombus within left gonadal vein. Consider correlation with Doppler study if indicated.    Additional findings as mentioned above.    < end of copied text >    IMP:  Encephalopathy, resolved.  Patient remains at baseline dementia.           Sepsis, responded to fluids and antibiotics.          lactobacillus bacteremia, possible pelvic source          PARTH,  likely ATN, possibly also mild hydronephrosis.  Improving          Anemia, no evidence of bleeding         afib, previous PAC's           HTN, controlled          LV remodeling  Plan:  Continue [facility]          Urology f/u          ID consultation, Dr. Velasquez.

## 2021-01-09 NOTE — PROGRESS NOTE ADULT - ATTENDING COMMENTS
Patient was examined at the bedside and discussed with Dr. Huerta and with patient's family.     She is alert and responsive today.  Oriented to person, with decreased short-term memory, which family states that this is her baseline.   Vital Signs Last 24 Hrs  T(C): 37.4 (09 Jan 2021 14:38), Max: 37.4 (09 Jan 2021 14:38)  T(F): 99.3 (09 Jan 2021 14:38), Max: 99.3 (09 Jan 2021 14:38)  HR: 94 (09 Jan 2021 14:38) (84 - 115)  BP: 115/78 (09 Jan 2021 14:38) (115/78 - 126/69)  BP(mean): --  RR: 18 (09 Jan 2021 14:38) (16 - 18)  SpO2: 99% (09 Jan 2021 14:38) (93% - 99%)  Lungs, clear  Cor, RRR II/VI systolic murmur  Abdomen, soft  Neurological, non-focal                        8.2    9.67  )-----------( 161      ( 09 Jan 2021 06:49 )             25.0   01-09    143  |  111<H>  |  32<H>  ----------------------------<  172<H>  3.8   |  21<L>  |  2.32<H>    Ca    7.8<L>      09 Jan 2021 06:49  Phos  2.7     01-09  Mg     2.0     01-09    TPro  5.0<L>  /  Alb  x   /  TBili  x   /  DBili  x   /  AST  x   /  ALT  x   /  AlkPhos  x   01-09  < from: CT Abdomen and Pelvis w/ Oral Cont (01.06.21 @ 22:11) >      Mild left hydroureteronephrosis with perinephric stranding and trace fluid. Suggestion of tubular dilatation at the level of left mid ureter containing vague hyperdensity (see key image). Findings are nonspecific, this may represent hemorrhagic products. Consider nonemergent retrograde evaluation to exclude underlying ureteral hemorrhagic lesion. Correlate with urinalysis and laboratory values to assess for superimposed ascending urinary tract infection or left pyelitis.    Alternatively findings may represent thrombus within left gonadal vein. Consider correlation with Doppler study if indicated.    Additional findings as mentioned above.    < end of copied text >    t< from: Transthoracic Echocardiogram (01.06.21 @ 07:49) >    CONCLUSIONS:  TECHNICALLY VERY DIFFICULT STUDY, POOR ACOUSTIC WINDOWS    1. Mitral annular calcification.  2. Increased relative wall thickness with normal left  ventricular (LV) mass index, consistent with concentric LV  remodeling.  3. Normal left ventricular systolic function.  4. Normal right ventricular size and function.  5. A prominent epicardial fat pad is noted,  allthough an  iso-echoic, dense pericardial effusion cannot be entirely  excluded on this study.  Consider chest CT for better  evaluation.    < end of copied text >    IMP:  Sepsis, possibly secondary to Lactobacillus bacteremia, responded to fluid and antibiotics.           Dementia, at baseline.  Encephalopathy, secondary to sepsis, resolved.           PARTH, slightly improved.  Patient is NOT currently on vancomycin.  Zosyn is adjusted for creatitine clearance.          Possible hydronephrosis, possible gonadal vein thrombosis          anemia  Plan: Continue antibiotics          IV fluids          Doppler of pelvic veins          anemia w/u          ID consultation, Dr. Velasquez

## 2021-01-09 NOTE — PROGRESS NOTE ADULT - PROBLEM SELECTOR PLAN 5
-patient on metformin at home  -start SSI while in hospital  -HgbA1C 6.7  -Monitor BS and adjust insulin as needed -patient on metformin at home  -start SSI while in hospital  -HgbA1C 6.7  -Monitor BS and adjust insulin as needed  -FS are running high on/off, changed Zosyn in dextrose to NS

## 2021-01-09 NOTE — CONSULT NOTE ADULT - ASSESSMENT
Sepsis  Bacteremia with Lactobacillus  Fevers  R/o Endocarditis    Plan - Currently on Zosyn 3.375 gms iv q12 hrs  will likely switch to Rocephin in next couple of days   Would get ROSELYN to r/o endocarditis.

## 2021-01-09 NOTE — PROGRESS NOTE ADULT - SUBJECTIVE AND OBJECTIVE BOX
[facility] NEPHROLOGY- PROGRESS NOTE    Patient is a 81yo Irish speaking Female with CHF, DM, HTN, and hypothyroidism, who p/w slurred speech r/o CVA. Pt a/w Sepsis 2/2 UTI/ GPR bacteremia and hypotension. Nephrology consulted for Elevated serum creatinine.    Hospital Medications: Medications reviewed.    REVIEW OF SYSTEMS: [employer]  #[id number]  CONSTITUTIONAL: +fevers overnight  RESPIRATORY: No shortness of breath, +dry cough  CARDIOVASCULAR: No chest pain.  GASTROINTESTINAL: No nausea, vomiting, diarrhea or abdominal pain.   VASCULAR: No bilateral lower extremity edema.       VITALS:  T(F): 98 (21 @ 05:34), Max: 98.2 (21 @ 13:44)  HR: 115 (21 @ 05:34)  BP: 126/69 (21 @ 05:34)  RR: 16 (21 @ 05:34)  SpO2: 93% (21 @ 05:34)  Wt(kg): --        PHYSICAL EXAM:  Gen: NAD,  HEENT: anicteric  Neck: no JVD  Cards: RRR, +S1/S2,   Resp: CTA ant  GI: soft, NT/ND, NABS  Extremities: no LE edema B/L  Derm: mild LE hyperpigmentation  +b/l hand tremors        LABS:      143  |  111<H>  |  32<H>  ----------------------------<  172<H>  3.8   |  21<L>  |  2.32<H>    Ca    7.8<L>      2021 06:49  Phos  2.7       Mg     2.0         TPro  5.6<L>  /  Alb  1.9<L>  /  TBili  0.4  /  DBili      /  AST  22  /  ALT  20  /  AlkPhos  165<H>      Creatinine Trend: 2.32 <--, 2.14 <--, 2.18 <--, 2.23 <--, 2.98 <--, 3.19 <--, 2.82 <--                        8.2    9.67  )-----------( 161      ( 2021 06:49 )             25.0     Urine Studies:  Urinalysis Basic - ( 2021 08:24 )    Color: Yellow / Appearance: very cloudy / S.015 / pH:   Gluc:  / Ketone: Negative  / Bili: Negative / Urobili: Negative   Blood:  / Protein: 30 mg/dL / Nitrite: Negative   Leuk Esterase: Trace / RBC: 2-5 /HPF / WBC 3-5 /HPF   Sq Epi:  / Non Sq Epi: Few /HPF / Bacteria: Trace /HPF      Sodium, Random Urine: 20 mmol/L ( @ 14:32)  Creatinine, Random Urine: 134 mg/dL ( @ 14:32)  Chloride, Random Urine: 32 mmol/L ( @ 14:32)  Potassium, Random Urine: 62 mmol/L ( @ 14:32)        < from: CT Cervical Spine No Cont (21 @ 09:00) >  IMPRESSION:  Cervical degenerative changes. Degree of spinal canal narrowing would be better assessed on MRI cervical spine.    < end of copied text >

## 2021-01-09 NOTE — DISCHARGE NOTE PROVIDER - NSDCCPCAREPLAN_GEN_ALL_CORE_FT
PRINCIPAL DISCHARGE DIAGNOSIS  Diagnosis: Acute encephalopathy  Assessment and Plan of Treatment: You came with slurred speech and confusion likely in the setting of Transient ischemic attack with worsening dementia vs sepsis. CT head was unremarkable for any hemorrhage or acute infarct. Carotid doppler was unremarkable. Vitamins..............MMA..... Homocysteine .......Neurology was consulted .      SECONDARY DISCHARGE DIAGNOSES  Diagnosis: Bacteremia  Assessment and Plan of Treatment: You were found to be having bacteria in your blood growing gram positive rods in both aerobic and aerobic bottle showing lactobacillus on 1/6/21, repeat BCx were negative on 1/7/21. You got 2 doses of vancomycin then you were started on Zosyn antibiotic to cover the bacteria. Consulted Infetious disease doctor. You were spiking fevers too likely due to infection in your blood.. You were hypotensive at one point ,ICU was consulted but blood pressure improved by giving fluids and holding Lasix and lisinopril.    Diagnosis: Atrial fibrillation, unspecified type  Assessment and Plan of Treatment: You came with new atrial fib, previous medical records showed that you had EP studies in Aug 2020 which showed premature atrial contractions. You were started on Eliquis 2.5mg BID. Covered by insurance. out of pocket would be 4$/month. ECHO was unremarkable. Cardiology consulted.    Diagnosis: PARTH (acute kidney injury)  Assessment and Plan of Treatment: You came with creatinine of 3.19 likely due tosome damage to kidney secondary to dehydration with infection in your blood. you got alot of fluids which improved your kidney function.. Nephrology consulted. CT /P showed Mild left hydroureteronephrosis with perinephric stranding and trace fluid. Suggestion of tubular dilatation at the level of left mid ureter containing vague hyperdensity. US renal showed right renal cyst. Lasix and lisinopril were held. Urology consulted for CT scan finding , recommended outpatient follow up.  Please visit Dr Chaudhari after your discharge for further care.    Diagnosis: Hypertension  Assessment and Plan of Treatment: Your BP medication was held due to low to normal blood pressure as well as due to kidney injury .    Diagnosis: Hypothyroidism  Assessment and Plan of Treatment: continue with your home medication    Diagnosis: Diabetes mellitus  Assessment and Plan of Treatment: You were started on sliding scale insulin.    Diagnosis: Anemia  Assessment and Plan of Treatment: Your hgb dropped initially likely in the setting of 3.5 Lit fluid . Later hemoglobin was stable. You didnot have any active signs of bleeding.     PRINCIPAL DISCHARGE DIAGNOSIS  Diagnosis: Infectious encephalopathy  Assessment and Plan of Treatment: You were diagnosed with acute encephalopathy secodary to baacteremia with Lactobacillus. You were seen by infectious disease doctor, on IV antibiotics Rocephin.   CT Head was negative for acute disease, CT cervical showed cervical degenerative changes, US doppler of both carotids was negative for stenosis, echo showed EF 55%, epicardial fat pad, Left ventricle remodeling, Homocysteine 11, SPEP- hypoalbuminemia, , vitamin B12 841, BREONNA 1:80 homogeneous, immunofixation no monoclonal, Rheumatoid factor 15. You were seen by neurologist who recommends vitamin B 12 supplementation.   Please follow up with PCP in a week from discharge.      SECONDARY DISCHARGE DIAGNOSES  Diagnosis: Anemia  Assessment and Plan of Treatment: You were diagnosed with anemia. Your hemoglobin has been around 8, no signs of active bleeding, stable hemoglobin, monitored on a daily basis. Anemia panel was compatible with anemia of chronic disease.   Please follow up with your PCP in a week from discharge for repeat CBC.    Diagnosis: PARTH (acute kidney injury)  Assessment and Plan of Treatment: You were diagnosed with acute kidney injury. Your creatinine was elevated around 2.9 most likely secondary to low blood pressure from underlying bacteremia and sepsis. You were seen by nephrologist, CT abdomen showed mild left hydroureteronephrosis with perinephric stranding and trace fluid. Suggestion of tubular dilatation at the level of left mid ureter containing vague hyperdensity. US renal showed right renal cyst, no hydronephrosis.   Please follow up with your PCP in a week from discharge       Diagnosis: Diabetes mellitus  Assessment and Plan of Treatment: You have a history of diabetes. Your HbA1c was 6.7 during this admission.  You need to continue monitoring your blood sugar levels closely and maintain healthy lifestyle by eating healthy diabetic regimen, weight loss and exercise regularly as tolerated.  Please continue to take your medications as prescribed.   Please follow up with your PCP/Endocrinologist within a week of discharge.      Diagnosis: Hypertension  Assessment and Plan of Treatment: Your BP medication was held due to low to normal blood pressure as well as due to kidney injury .    Diagnosis: Hypothyroidism  Assessment and Plan of Treatment: continue with your home medication    Diagnosis: Bacteremia  Assessment and Plan of Treatment: You were found to be having bacteria in your blood growing gram positive rods in both aerobic and aerobic bottle showing lactobacillus on 1/6/21, repeat BCx were negative on 1/7/21. You got 2 doses of vancomycin then you were started on Zosyn antibiotic to cover the bacteria. Consulted Infetious disease doctor. You were spiking fevers too likely due to infection in your blood.. You were hypotensive at one point ,ICU was consulted but blood pressure improved by giving fluids and holding Lasix and lisinopril.    Diagnosis: Atrial fibrillation, unspecified type  Assessment and Plan of Treatment: You came with new atrial fib, previous medical records showed that you had EP studies in Aug 2020 which showed premature atrial contractions. You were started on Eliquis 2.5mg BID. Covered by insurance. out of pocket would be 4$/month. ECHO was unremarkable. Cardiology consulted.     PRINCIPAL DISCHARGE DIAGNOSIS  Diagnosis: Infectious encephalopathy  Assessment and Plan of Treatment: You were diagnosed with acute encephalopathy secodary to baacteremia with Lactobacillus. You were seen by infectious disease doctor, on IV antibiotics Rocephin.   CT Head was negative for acute disease, CT cervical showed cervical degenerative changes, US doppler of both carotids was negative for stenosis, echo showed EF 55%, epicardial fat pad, Left ventricle remodeling, Homocysteine 11, SPEP- hypoalbuminemia, , vitamin B12 841, BREONNA 1:80 homogeneous, immunofixation no monoclonal, Rheumatoid factor 15. You were seen by neurologist who recommends vitamin B 12 supplementation.   Please follow up with PCP in a week from discharge.      SECONDARY DISCHARGE DIAGNOSES  Diagnosis: Bacteremia  Assessment and Plan of Treatment: You were found to be having bacteria in your blood growing gram positive rods in both aerobic and aerobic bottle showing lactobacillus on 1/6/21, repeat BCx were negative on 1/7/21. You got 2 doses of vancomycin then you were started on Zosyn antibiotic to cover the bacteria. Consulted Infetious disease doctor. You were spiking fevers too likely due to infection in your blood.. You were hypotensive at one point ,ICU was consulted but blood pressure improved by giving fluids and holding Lasix and lisinopril.    Diagnosis: PARTH (acute kidney injury)  Assessment and Plan of Treatment: You were diagnosed with acute kidney injury. Your creatinine was elevated around 2.9 most likely secondary to low blood pressure from underlying bacteremia and sepsis. You were seen by nephrologist, CT abdomen showed mild left hydroureteronephrosis with perinephric stranding and trace fluid. Suggestion of tubular dilatation at the level of left mid ureter containing vague hyperdensity. US renal showed right renal cyst, no hydronephrosis.   Please follow up with your PCP in a week from        Diagnosis: Atrial fibrillation, unspecified type  Assessment and Plan of Treatment: You were diagnosed with atrial fibrillation. Your EKG showed atrial fibrillation with elevated heart rate, Echo showed normal systolic function, ejection fraction 55%. You were seen by cardiologist and started on anticoagulation with Eliquis, heart rate control with Metoprolol.  Please follow up with your PCP and Cardiologist in 1 week from discharge.    Diagnosis: Anemia  Assessment and Plan of Treatment: You were diagnosed with anemia. Your hemoglobin has been around 8, no signs of active bleeding, stable hemoglobin, monitored on a daily basis. Anemia panel was compatible with anemia of chronic disease.   Please follow up with your PCP in a week from discharge for repeat CBC.    Diagnosis: Diabetes mellitus  Assessment and Plan of Treatment: You have a history of diabetes. Your HbA1c was 6.7 during this admission.  You need to continue monitoring your blood sugar levels closely and maintain healthy lifestyle by eating healthy diabetic regimen, weight loss and exercise regularly as tolerated.  Please continue to take your medications as prescribed and follow up with your PCP/Endocrinologist within a week of discharge.      Diagnosis: Hypertension  Assessment and Plan of Treatment: You have a history of Hypertension.   On this admission, your Blood Pressure was adequately controlled with home meds Metoprolol.   Your blood pressure target is 120-140/80-90, maintain healthy lifestyle, low salt diet, avoid fatty food, weight loss.  Notify your doctor if you have any of the following symptoms:   (Dizziness, Lightheadedness, Blurry vision, Headache, Chest pain, Shortness of breath.)  Please continue to take medications as prescribed and follow-up with your PCP in 1 week from discharge.      Diagnosis: Congestive heart failure (CHF)  Assessment and Plan of Treatment: You have history of CHF.  On this admission, your CXR showed XXXX, Echo showed XXXX and Stress test showed XXX. You were treated with Lasix XXX which is a water pill that helps with diuresis helping to relieve your shortness of breath and leg swelling.   Please weigh yourself daily and If you gain 3lbs in 3 days, or 5lbs in a week and /or have any swelling or increased swelling in your feet, ankles, and/or stomach call your Health Care Provider.  Do not eat or drink foods containing more than 2000 mg of salt (sodium) in your diet every day.  Please take your medication as prescribed.  Please follow up with your PCP/Cardiologis in 1 week.      Diagnosis: Hypothyroidism  Assessment and Plan of Treatment: You have history of Hypothyroidism. Your TSH was 2.76 on this admission. Please continue to take your home medication and follow up with your PCP/Endocrinologist in a week from discharge.     PRINCIPAL DISCHARGE DIAGNOSIS  Diagnosis: Acute encephalopathy  Assessment and Plan of Treatment: You were diagnosed with acute encephalopathy secodary to baacteremia with Lactobacillus and vitamin abnormalities.   You were seen by infectious disease doctor, who recommends IV antibiotics with Rocephin till 02/03/21.   CT Head was negative for acute disease, CT cervical showed cervical degenerative changes, US doppler of both carotids was negative for stenosis, echo showed EF 55%, epicardial fat pad, Left ventricle remodeling, Homocysteine 11, SPEP- hypoalbuminemia, , vitamin B12 841, BREONNA 1:80 homogeneous, immunofixation no monoclonal, Rheumatoid factor 15. You were seen by neurologist who recommends weekly cyanocobalamin, folic acid daily and B complex daily. Also follow up results of repeated labs folate, homocysteine, MMA, BREONNA, CCP. Please follow up with PCP and Neurologist in a week from discharge.      SECONDARY DISCHARGE DIAGNOSES  Diagnosis: Bacteremia  Assessment and Plan of Treatment: You were diagnosed with bacteremia with Lactobacillus which is presence of bacteria in your blood. You have been treated with IV antibiotics which needs to be continue for 4 weeks till 2/3/21.   Please follow up with PCP in a week from discharge for repeat labs.    Diagnosis: PARTH (acute kidney injury)  Assessment and Plan of Treatment: You were diagnosed with acute kidney injury. Your creatinine was elevated around 2.9 most likely secondary to low blood pressure from underlying bacteremia and sepsis. You were seen by nephrologist, CT abdomen showed mild left hydroureteronephrosis with perinephric stranding and trace fluid. Suggestion of tubular dilatation at the level of left mid ureter containing vague hyperdensity. US renal showed right renal cyst, no hydronephrosis.   Please follow up with your PCP in a week from        Diagnosis: Atrial fibrillation, unspecified type  Assessment and Plan of Treatment: You were diagnosed with atrial fibrillation. Your EKG showed atrial fibrillation with elevated heart rate, Echo showed normal systolic function, ejection fraction 55%. You were seen by cardiologist and started on anticoagulation with Eliquis, heart rate control with Metoprolol.  Please follow up with your PCP and Cardiologist in 1 week from discharge.    Diagnosis: Anemia  Assessment and Plan of Treatment: You were diagnosed with anemia. Your hemoglobin has been around 8, no signs of active bleeding, stable hemoglobin, monitored on a daily basis. Anemia panel was compatible with anemia of chronic disease.   Please follow up with your PCP in a week from discharge for repeat CBC.    Diagnosis: Diabetes mellitus  Assessment and Plan of Treatment: You have a history of diabetes. Your HbA1c was 6.7 during this admission.  You need to continue monitoring your blood sugar levels closely and maintain healthy lifestyle by eating healthy diabetic regimen, weight loss and exercise regularly as tolerated.  Your Metformin was discontinued for now due to your acute kidney injury in the process of resolution. You are being discharge on insulin sliding scale before meals.   Please follow up with your PCP/Endocrinologist within a week from discharge to adjust medications.      Diagnosis: Hypertension  Assessment and Plan of Treatment: You have a history of Hypertension.   On this admission, your Blood Pressure was adequately controlled with home meds Metoprolol.   Your blood pressure target is 120-140/80-90, maintain healthy lifestyle, low salt diet, avoid fatty food, weight loss.  Notify your doctor if you have any of the following symptoms:   (Dizziness, Lightheadedness, Blurry vision, Headache, Chest pain, Shortness of breath.)  Please take medications as prescribed and follow-up with your PCP in 1 week from discharge.      Diagnosis: Congestive heart failure (CHF)  Assessment and Plan of Treatment: You have history of CHF.  On this admission, your CXR showed XXXX, Echo showed XXXX and Stress test showed XXX. You were treated with Lasix XXX which is a water pill that helps with diuresis helping to relieve your shortness of breath and leg swelling.   Please weigh yourself daily and If you gain 3lbs in 3 days, or 5lbs in a week and /or have any swelling or increased swelling in your feet, ankles, and/or stomach call your Health Care Provider.  Do not eat or drink foods containing more than 2000 mg of salt (sodium) in your diet every day.  Please take your medication as prescribed.  Please follow up with your PCP/Cardiologis in 1 week.      Diagnosis: Hypothyroidism  Assessment and Plan of Treatment: You have history of Hypothyroidism. Your TSH was 2.76 on this admission. Please continue to take your home medication and follow up with your PCP/Endocrinologist in a week from discharge.     PRINCIPAL DISCHARGE DIAGNOSIS  Diagnosis: Acute encephalopathy  Assessment and Plan of Treatment: You were diagnosed with acute encephalopathy secodary to baacteremia with Lactobacillus and vitamin abnormalities.   You were seen by infectious disease doctor, who recommends IV antibiotics with Rocephin till 02/03/21.   CT Head was negative for acute disease, CT cervical showed cervical degenerative changes, US doppler of both carotids was negative for stenosis, echo showed EF 55%, epicardial fat pad, Left ventricle remodeling, Homocysteine 11, SPEP- hypoalbuminemia, , vitamin B12 841, BREONNA 1:80 homogeneous, immunofixation no monoclonal, Rheumatoid factor 15. You were seen by neurologist who recommends weekly cyanocobalamin, folic acid daily and B complex daily. Also follow up results of repeated labs folate, homocysteine, MMA, BREONNA, CCP. Please follow up with PCP and Neurologist in a week from discharge.      SECONDARY DISCHARGE DIAGNOSES  Diagnosis: Bacteremia  Assessment and Plan of Treatment: You were diagnosed with bacteremia with Lactobacillus which is presence of bacteria in your blood. You have been treated with IV antibiotics which needs to be continue for 4 weeks till 2/3/21.   Please follow up with PCP in a week from discharge for repeat labs.    Diagnosis: PARTH (acute kidney injury)  Assessment and Plan of Treatment: You were diagnosed with acute kidney injury. Your creatinine was elevated around 2.9 most likely secondary to low blood pressure from underlying bacteremia and sepsis. You were seen by nephrologist, CT abdomen showed mild left hydroureteronephrosis with perinephric stranding and trace fluid. Suggestion of tubular dilatation at the level of left mid ureter containing vague hyperdensity. US renal showed right renal cyst, no hydronephrosis.   Please follow up with your PCP in a week from        Diagnosis: Atrial fibrillation, unspecified type  Assessment and Plan of Treatment: You were diagnosed with atrial fibrillation. Your EKG showed atrial fibrillation with elevated heart rate, Echo showed normal systolic function, ejection fraction 55%. You were seen by cardiologist and started on anticoagulation with Eliquis, heart rate control with Metoprolol.  Please follow up with your PCP and Cardiologist in 1 week from discharge.    Diagnosis: Anemia  Assessment and Plan of Treatment: You were diagnosed with anemia. Your hemoglobin has been around 8, no signs of active bleeding, stable hemoglobin, monitored on a daily basis. Anemia panel was compatible with anemia of chronic disease.   Please follow up with your PCP in a week from discharge for repeat CBC.    Diagnosis: Diabetes mellitus  Assessment and Plan of Treatment: You have a history of diabetes. Your HbA1c was 6.7 during this admission.  You need to continue monitoring your blood sugar levels closely and maintain healthy lifestyle by eating healthy diabetic regimen, weight loss and exercise regularly as tolerated.  Your Metformin was discontinued for now due to your acute kidney injury in the process of resolution. You are being discharge on insulin sliding scale before meals.   Please follow up with your PCP/Endocrinologist within a week from discharge to adjust medications.      Diagnosis: Hypertension  Assessment and Plan of Treatment: You have a history of Hypertension.   On this admission, your Blood Pressure was adequately controlled with home meds Metoprolol.   Your blood pressure target is 120-140/80-90, maintain healthy lifestyle, low salt diet, avoid fatty food, weight loss.  Notify your doctor if you have any of the following symptoms:   (Dizziness, Lightheadedness, Blurry vision, Headache, Chest pain, Shortness of breath.)  Your Lasix and Lisinopril was discontinued as per Nephrologist recommendation due to acute kidney injury. Please follow-up with your PCP in 1 week from discharge for blood pressure medication adjustments as needed.      Diagnosis: Congestive heart failure (CHF)  Assessment and Plan of Treatment: You have history of CHF.  On this admission, you were NOT having acute decompensation.   You are recommended to weigh yourself daily and If you gain 3lbs in 3 days, or 5lbs in a week and /or have any swelling or increased swelling in your feet, ankles, and/or stomach call your Health Care Provider.  Do not eat or drink foods containing more than 2000 mg of salt (sodium) in your diet every day.  Your Lasix was discontinued as you were found to have acute kidney injury and no signs of congestion. Please follow up with your PCP/Cardiologis in 1 week from discharge.      Diagnosis: Hypothyroidism  Assessment and Plan of Treatment: You have history of Hypothyroidism. Your TSH was 2.76 on this admission. Please continue to take your home medication and follow up with your PCP/Endocrinologist in a week from discharge.     PRINCIPAL DISCHARGE DIAGNOSIS  Diagnosis: Acute encephalopathy  Assessment and Plan of Treatment: You were diagnosed with acute encephalopathy secodary to baacteremia with Lactobacillus and vitamin abnormalities.   You were seen by infectious disease doctor, who recommends IV antibiotics with Rocephin till 02/03/21.   CT Head was negative for acute disease, CT cervical showed cervical degenerative changes, US doppler of both carotids was negative for stenosis, echo showed EF 55%, epicardial fat pad, Left ventricle remodeling, Homocysteine 11, SPEP- hypoalbuminemia, , vitamin B12 841, BREONNA 1:80 homogeneous, immunofixation no monoclonal, Rheumatoid factor 15. You were seen by neurologist who recommends weekly cyanocobalamin, folic acid daily and B complex daily. Also follow up results of repeated labs folate, homocysteine, MMA, BREONNA, CCP. Please follow up with PCP and Neurologist in a week from discharge.      SECONDARY DISCHARGE DIAGNOSES  Diagnosis: Congestive heart failure (CHF)  Assessment and Plan of Treatment: You have history of CHF.  On this admission, you were NOT having acute decompensation.   You are recommended to weigh yourself daily and If you gain 3lbs in 3 days, or 5lbs in a week and /or have any swelling or increased swelling in your feet, ankles, and/or stomach call your Health Care Provider.  Do not eat or drink foods containing more than 2000 mg of salt (sodium) in your diet every day.  Your Lasix was discontinued as you were found to have acute kidney injury and no signs of congestion. Please follow up with your PCP/Cardiologis in 1 week from discharge.      Diagnosis: Anemia  Assessment and Plan of Treatment: You were diagnosed with anemia. Your hemoglobin has been around 8, no signs of active bleeding, stable hemoglobin, monitored on a daily basis. Anemia panel was compatible with anemia of chronic disease.   Please follow up with your PCP in a week from discharge for repeat CBC.    Diagnosis: PARTH (acute kidney injury)  Assessment and Plan of Treatment: You were diagnosed with acute kidney injury. Your creatinine was elevated around 2.9 most likely secondary to low blood pressure from underlying bacteremia and sepsis. You were seen by nephrologist, CT abdomen showed mild left hydroureteronephrosis with perinephric stranding and trace fluid. Suggestion of tubular dilatation at the level of left mid ureter containing vague hyperdensity. US renal showed right renal cyst, no hydronephrosis.   Please follow up with your PCP in a week from        Diagnosis: Diabetes mellitus  Assessment and Plan of Treatment: You have a history of diabetes. Your HbA1c was 6.7 during this admission.  You need to continue monitoring your blood sugar levels closely and maintain healthy lifestyle by eating healthy diabetic regimen, weight loss and exercise regularly as tolerated.  Your Metformin was discontinued for now due to your acute kidney injury in the process of resolution. You are being discharge on insulin sliding scale before meals.   Please follow up with your PCP/Endocrinologist within a week from discharge to adjust medications.1 Unit(s) if Glucose 151 - 200  2 Unit(s) if Glucose 201 - 250  3 Unit(s) if Glucose 251 - 300  4 Unit(s) if Glucose 301 - 350  5 Unit(s) if Glucose 351 - 400  6 Unit(s) if Glucose Greater Than 400      Diagnosis: Hypertension  Assessment and Plan of Treatment: You have a history of Hypertension.   On this admission, your Blood Pressure was adequately controlled with home meds Metoprolol.   Your blood pressure target is 120-140/80-90, maintain healthy lifestyle, low salt diet, avoid fatty food, weight loss.  Notify your doctor if you have any of the following symptoms:   (Dizziness, Lightheadedness, Blurry vision, Headache, Chest pain, Shortness of breath.)  Your Lasix and Lisinopril was discontinued as per Nephrologist recommendation due to acute kidney injury. Please follow-up with your PCP in 1 week from discharge for blood pressure medication adjustments as needed.      Diagnosis: Hypothyroidism  Assessment and Plan of Treatment: You have history of Hypothyroidism. Your TSH was 2.76 on this admission. Please continue to take your home medication and follow up with your PCP/Endocrinologist in a week from discharge.    Diagnosis: Bacteremia  Assessment and Plan of Treatment: You were diagnosed with bacteremia with Lactobacillus which is presence of bacteria in your blood. You have been treated with IV antibiotics which needs to be continue for 4 weeks till 2/3/21.   Please follow up with PCP in a week from discharge for repeat labs.    Diagnosis: Atrial fibrillation, unspecified type  Assessment and Plan of Treatment: You were diagnosed with atrial fibrillation. Your EKG showed atrial fibrillation with elevated heart rate, Echo showed normal systolic function, ejection fraction 55%. You were seen by cardiologist and started on anticoagulation with Eliquis, heart rate control with Metoprolol.  Please follow up with your PCP and Cardiologist in 1 week from discharge.     PRINCIPAL DISCHARGE DIAGNOSIS  Diagnosis: Acute encephalopathy  Assessment and Plan of Treatment: You were diagnosed with acute encephalopathy secodary to bacteremia with Lactobacillus and vitamin abnormalities.   You were seen by infectious disease doctor, who recommended IV antibiotics with Rocephin till 02/03/21.   CT Head was negative for acute disease, CT cervical showed cervical degenerative changes, US doppler of both carotids was negative for stenosis, echo showed EF 55%, epicardial fat pad, left ventricle remodeling, Homocysteine 11, SPEP- hypoalbuminemia, , vitamin B12 841, BREONNA 1:80 homogeneous, immunofixation no monoclonal, Rheumatoid factor 15. You were seen by Neurologist who recommends weekly cyanocobalamin, folic acid daily and B complex daily. Also follow up results of repeated labs done folate, MMA, BREONNA, CCP, C3, C4, DsDNA antibody. Please follow up with PCP and Neurologist in a week from discharge.      SECONDARY DISCHARGE DIAGNOSES  Diagnosis: Bacteremia  Assessment and Plan of Treatment: You were diagnosed with bacteremia with Lactobacillus which is presence of bacteria in your blood. You have been treated with IV antibiotics which needs to be continue for 4 weeks total, end date 2/3/21. Please follow up with PCP in a week from discharge for repeat labs CBC, CMP weekly.    Diagnosis: PARTH (acute kidney injury)  Assessment and Plan of Treatment: You were diagnosed with acute kidney injury. Your creatinine was elevated around 2.9 most likely secondary to low blood pressure from underlying bacteremia and sepsis. You were seen by nephrologist, CT abdomen showed mild left hydroureteronephrosis with perinephric stranding and trace fluid. Suggestion of tubular dilatation at the level of left mid ureter containing vague hyperdensity. US renal showed right renal cyst, no hydronephrosis. Please follow up with your PCP/Nephrologist in a week from        Diagnosis: Atrial fibrillation, unspecified type  Assessment and Plan of Treatment: You were diagnosed with atrial fibrillation. Your EKG showed atrial fibrillation with elevated heart rate, Echo showed normal systolic function, ejection fraction 55%. You were seen by cardiologist and started on anticoagulation with Eliquis, heart rate control with Metoprolol. Please follow up with your PCP and Cardiologist in 1 week from discharge.    Diagnosis: Anemia  Assessment and Plan of Treatment: You were diagnosed with anemia. Your hemoglobin has been around 8, no signs of active bleeding, stable hemoglobin, monitored on a daily basis. Anemia panel was compatible with anemia of chronic disease.   Please follow up with your PCP in a week from discharge for repeat CBC.    Diagnosis: Diabetes mellitus  Assessment and Plan of Treatment: You have a history of diabetes. Your HbA1c was 6.7 during this admission.  You need to continue monitoring your blood sugar levels closely and maintain healthy lifestyle by eating healthy diabetic regimen, weight loss.  Your Metformin was discontinued for now due to your acute kidney injury in the process of resolution. You are being discharge on insulin sliding scale before meals.   1 Unit(s) if Glucose 151 - 200  2 Unit(s) if Glucose 201 - 250  3 Unit(s) if Glucose 251 - 300  4 Unit(s) if Glucose 301 - 350  5 Unit(s) if Glucose 351 - 400  6 Unit(s) if Glucose Greater Than 400  Please follow up with your PCP/Endocrinologist within a week from discharge to adjust/resume medications as needed.      Diagnosis: Hypertension  Assessment and Plan of Treatment: You have a history of Hypertension.   On this admission, your Blood Pressure was adequately controlled with home meds Metoprolol.   Your blood pressure target is 120-140/80-90, maintain healthy lifestyle, low salt diet, avoid fatty food, weight loss.  Notify your doctor if you have any of the following symptoms:   (Dizziness, Lightheadedness, Blurry vision, Headache, Chest pain, Shortness of breath.)  Your Lasix and Lisinopril was discontinued as per Nephrologist recommendation due to acute kidney injury. Please follow-up with your PCP in 1 week from discharge for blood pressure monitoring and adjust/resume medication as needed.      Diagnosis: Congestive heart failure (CHF)  Assessment and Plan of Treatment: You have history of CHF.  On this admission, you were NOT having acute decompensation.   You are recommended to weigh yourself daily and If you gain 3lbs in 3 days, or 5lbs in a week and /or have any swelling or increased swelling in your feet, ankles, and/or stomach call your Health Care Provider.  Do not eat or drink foods containing more than 2000 mg of salt (sodium) in your diet every day.  Your Lasix was discontinued as you were found to have acute kidney injury and no signs of lung congestion. Please follow up with your PCP/Cardiologis in 1 week from discharge to adjust/resume medicatons as needed.      Diagnosis: Hypothyroidism  Assessment and Plan of Treatment: You have history of Hypothyroidism. Your TSH was 2.76 on this admission. Please continue to take your home medication and follow up with your PCP/Endocrinologist in a week from discharge.

## 2021-01-09 NOTE — PROGRESS NOTE ADULT - ATTENDING COMMENTS
Patient was seen and examined by me on 01/09/2021,interim events noted,labs and radiology studies reviewed.  Faizan Edmond MD,[facility]  [address] [place] [address].  [id number]. [phone]

## 2021-01-09 NOTE — PROGRESS NOTE ADULT - SUBJECTIVE AND OBJECTIVE BOX
PGY-1 Progress Note discussed with attending    PAGER #: [[phone]] TILL 5:00 PM  PLEASE CONTACT ON CALL TEAM:  - On Call Team (Please refer to Jose) FROM 5:00 PM - 8:30PM  - Nightfloat Team FROM 8:30 -7:30 AM    CHIEF COMPLAINT & BRIEF HOSPITAL COURSE:    INTERVAL HPI/OVERNIGHT EVENTS:       REVIEW OF SYSTEMS:  CONSTITUTIONAL: No fever, weight loss, or fatigue  RESPIRATORY: No cough, wheezing, chills or hemoptysis; No shortness of breath  CARDIOVASCULAR: No chest pain, palpitations, dizziness, or leg swelling  GASTROINTESTINAL: No abdominal pain. No nausea, vomiting, or hematemesis; No diarrhea or constipation. No melena or hematochezia.  GENITOURINARY: No dysuria or hematuria, urinary frequency  NEUROLOGICAL: No headaches, memory loss, loss of strength, numbness, or tremors  SKIN: No itching, burning, rashes, or lesions     Vital Signs Last 24 Hrs  T(C): 36.7 (09 Jan 2021 05:34), Max: 36.8 (08 Jan 2021 13:44)  T(F): 98 (09 Jan 2021 05:34), Max: 98.2 (08 Jan 2021 13:44)  HR: 115 (09 Jan 2021 05:34) (84 - 115)  BP: 126/69 (09 Jan 2021 05:34) (120/68 - 132/70)  BP(mean): --  RR: 16 (09 Jan 2021 05:34) (16 - 18)  SpO2: 93% (09 Jan 2021 05:34) (93% - 95%)    PHYSICAL EXAMINATION:  GENERAL: NAD, well built  HEAD:  Atraumatic, Normocephalic  EYES:  conjunctiva and sclera clear  NECK: Supple, No JVD, Normal thyroid  CHEST/LUNG: Clear to auscultation. Clear to percussion bilaterally; No rales, rhonchi, wheezing, or rubs  HEART: Regular rate and rhythm; No murmurs, rubs, or gallops  ABDOMEN: Soft, Nontender, Nondistended; Bowel sounds present  NERVOUS SYSTEM:  Alert & Oriented X3,    EXTREMITIES:  2+ Peripheral Pulses, No clubbing, cyanosis, or edema  SKIN: warm dry                          8.2    9.67  )-----------( 161      ( 09 Jan 2021 06:49 )             25.0     01-09    143  |  111<H>  |  32<H>  ----------------------------<  172<H>  3.8   |  21<L>  |  2.32<H>    Ca    7.8<L>      09 Jan 2021 06:49  Phos  2.7     01-09  Mg     2.0     01-09    TPro  5.0<L>  /  Alb  x   /  TBili  x   /  DBili  x   /  AST  x   /  ALT  x   /  AlkPhos  x   01-09    LIVER FUNCTIONS - ( 09 Jan 2021 12:14 )  Alb: x     / Pro: 5.0 g/dL / ALK PHOS: x     / ALT: x     / AST: x     / GGT: x               PTT - ( 08 Jan 2021 02:05 )  PTT:76.9 sec    CAPILLARY BLOOD GLUCOSE      RADIOLOGY & ADDITIONAL TESTS:                   PGY-1 Progress Note discussed with attending    PAGER #: [[phone]] TILL 5:00 PM  PLEASE CONTACT ON CALL TEAM:  - On Call Team (Please refer to Jose) FROM 5:00 PM - 8:30PM  - Nightfloat Team FROM 8:30 -7:30 AM    CHIEF COMPLAINT & BRIEF HOSPITAL COURSE:Patient is an 80 year old female from home, with PMH of DM, HTN, CHF and hypothyroidism, Czech speaking, does understand some english,  who presented to the ED due to slurred speech, resolved now . Likely in the setting of TIA or stroke or UTI. CT head was unremarkable for any hemorrhage or acute infarct. CT /P showed Mild left hydroureteronephrosis with perinephric stranding and trace fluid. Suggestion of tubular dilatation at the level of left mid ureter containing vague hyperdensity (see key image). Findings are nonspecific, this may represent hemorrhagic products. Consider nonemergent retrograde evaluation to exclude underlying ureteral hemorrhagic lesion. US renal showed right renal cyst. Carotid doppler was unremarkable. Pt with new fib, previous medical records showed that pt had EP studies in Aug 2020 which showed premature atrial contractions. Pt with bacteremia growing gram positive rods in both aerobic and aerobic bottle showing lactobacillus. s/p 2 doses of vancomycin. Continue with zosyn [id number] . On Eliquis 2.5mg BID for AFib. (new onset).     INTERVAL HPI/ OVERNIGHT EVENTS: No event overnight. Pt examined at bedside AAOX1. Alert , awake and oriented to self.       REVIEW OF SYSTEMS:  Unable to assess in detailed due to pt dementia as pt meanwhile started saying random stuff but denied any chest pain, difficulty in breathing , abdominal pain.     Vital Signs Last 24 Hrs  T(C): 36.7 (09 Jan 2021 05:34), Max: 36.8 (08 Jan 2021 13:44)  T(F): 98 (09 Jan 2021 05:34), Max: 98.2 (08 Jan 2021 13:44)  HR: 115 (09 Jan 2021 05:34) (84 - 115)  BP: 126/69 (09 Jan 2021 05:34) (120/68 - 132/70)  BP(mean): --  RR: 16 (09 Jan 2021 05:34) (16 - 18)  SpO2: 93% (09 Jan 2021 05:34) (93% - 95%)    PHYSICAL EXAMINATION:  GENERAL: NAD, lying comfortably on bed, appeared calmer comparatively.   HEAD:  Atraumatic, Normocephalic  EYES:  conjunctiva and sclera clear  NECK: Supple, No JVD, Normal thyroid   CHEST/LUNG: Clear to auscultation. Clear to percussion bilaterally; No rales, rhonchi, wheezing, or rubs  HEART: Irregular rate   ABDOMEN: Soft, Nontender, Nondistended; Bowel sounds present  NERVOUS SYSTEM:  Alert & Oriented X1,    EXTREMITIES:  2+ Peripheral Pulses, No clubbing, cyanosis, or edema  SKIN: warm dry     MEDICATIONS  (STANDING):  apixaban 2.5 milliGRAM(s) Oral every 12 hours  aspirin  chewable 81 milliGRAM(s) Oral daily  atorvastatin 40 milliGRAM(s) Oral at bedtime  escitalopram 10 milliGRAM(s) Oral daily  insulin lispro (ADMELOG) corrective regimen sliding scale   SubCutaneous Before meals and at bedtime  levothyroxine 75 MICROGram(s) Oral daily  metoprolol tartrate 25 milliGRAM(s) Oral two times a day  OLANZapine 5 milliGRAM(s) Oral at bedtime  pantoprazole    Tablet 40 milliGRAM(s) Oral before breakfast  piperacillin/tazobactam IVPB.. 3.375 Gram(s) IV Intermittent every 12 hours  sodium chloride 0.9%. 1000 milliLiter(s) (75 mL/Hr) IV Continuous <Continuous>    MEDICATIONS  (PRN):  acetaminophen   Tablet .. 650 milliGRAM(s) Oral every 6 hours PRN Temp greater or equal to 38C (100.4F), Moderate Pain (4 - 6)  ondansetron Injectable 4 milliGRAM(s) IV Push every 6 hours PRN Nausea and/or Vomiting                          8.2    9.67  )-----------( 161      ( 09 Jan 2021 06:49 )             25.0     01-09    143  |  111<H>  |  32<H>  ----------------------------<  172<H>  3.8   |  21<L>  |  2.32<H>    Ca    7.8<L>      09 Jan 2021 06:49  Phos  2.7     01-09  Mg     2.0     01-09    TPro  5.0<L>  /  Alb  x   /  TBili  x   /  DBili  x   /  AST  x   /  ALT  x   /  AlkPhos  x   01-09    LIVER FUNCTIONS - ( 09 Jan 2021 12:14 )  Alb: x     / Pro: 5.0 g/dL / ALK PHOS: x     / ALT: x     / AST: x     / GGT: x               PTT - ( 08 Jan 2021 02:05 )  PTT:76.9 sec    CAPILLARY BLOOD GLUCOSE      RADIOLOGY & ADDITIONAL TESTS:

## 2021-01-09 NOTE — DISCHARGE NOTE PROVIDER - PROVIDER TOKENS
PROVIDER:[TOKEN:[[id number]],FOLLOWUP:[1 week]],PROVIDER:[TOKEN:[[id number]],FOLLOWUP:[1 week]] PROVIDER:[TOKEN:[[id number]],FOLLOWUP:[1 week]],PROVIDER:[TOKEN:[[id number]],FOLLOWUP:[1 week]],PROVIDER:[TOKEN:[[id number]],FOLLOWUP:[1 week]]

## 2021-01-09 NOTE — PROGRESS NOTE ADULT - PROBLEM SELECTOR PLAN 4
-Noted to have creatinine of 2.82 on admission , baseline 0.7   -Fena 0.3% prerenal  -Cr trending down   -ATN possible, on IVF NS  -CT with mild left hydroureteronephrosis with perinephric stranding/ tubular dilation at the level of left mid ureter containing vague hyperdensity   -Continue to hold Lasix   - Urology Consulted -Noted to have creatinine of 2.82 on admission , baseline 0.7   -Fena 0.3% prerenal  -Cr trending down   -ATN possible, on IVF NS  -CT with mild left hydroureteronephrosis with perinephric stranding/ tubular dilation at the level of left mid ureter containing vague hyperdensity   --f/u US abdomen and Pelvis  -Continue to hold Lasix   - Urology Consulted, O/P f/u

## 2021-01-09 NOTE — DISCHARGE NOTE PROVIDER - CARE PROVIDERS DIRECT ADDRESSES
,DirectAddress_Unknown,[email or web address] ,DirectAddress_Unknown,[email or web address],DirectAddress_Unknown

## 2021-01-09 NOTE — PROGRESS NOTE ADULT - PROBLEM SELECTOR PLAN 3
-Patient noted to have afib on tele monitor  -HR ranges  currently  -Pt with past history of EP studies in Aug 2020 that showed premature atrial contraction but no AFib.   -CHADs-Vasc score of 6  -lisinopril is hold off in the setting of low BP  -Resumed metoprolol 25mg BID  -Eliquis 2.5mg BID , Covered 4$ /month   -Cardio, Dr. Edmond consulted for further recommendation    -Anemia : H/H stable   -No active signs of bleeding -Patient presented due to slurred speech and confusion  -Slurred speech resolved now  -Likely TIA vs worsening dementia   -CT head negative  -Neuro Dr. Elias consulted  -Started aspirin, Plavix, statin   -fall, aspiration and seizure precautions  -f/u Homocysteine, MMA, BREONNA, and SPEP    LE weakness:  Likely multifactorial as per Neuro.  C Spine CT for possible myelopathy and compression was unremarkable showed some degenerations. -Patient presented due to slurred speech and confusion  -Slurred speech resolved now  -Likely TIA vs worsening dementia vs sepsis   -CT head negative  -Neuro Dr. Elias consulted  -Started aspirin, Plavix, statin   -fall, aspiration and seizure precautions  -Homocysteine normal. MMA, BREONNA, and SPEP    LE weakness:  Likely multifactorial as per Neuro.  C Spine CT for possible myelopathy and compression was unremarkable showed some degenerations.

## 2021-01-09 NOTE — DISCHARGE NOTE PROVIDER - NSDCMRMEDTOKEN_GEN_ALL_CORE_FT
Aspir 81 oral delayed release tablet: 1 tab(s) orally once a day  cyanocobalamin 1000 mcg oral tablet: 1 tab(s) orally once a day  Eliquis 2.5 mg oral tablet: 1 tab(s) orally every 12 hours   escitalopram 10 mg oral tablet: 1 tab(s) orally once a day  metFORMIN 1000 mg oral tablet: 1 tab(s) orally 2 times a day  metoprolol succinate 100 mg oral tablet, extended release: 1 tab(s) orally once a day  OLANZapine 5 mg oral tablet: 1 tab(s) orally once a day (at bedtime)   Aspir 81 oral delayed release tablet: 1 tab(s) orally once a day  cefTRIAXone: 2 gram(s) intravenous once a day for 20 days. End date 02/03/2021  cyanocobalamin 1000 mcg oral tablet: 1 tab(s) orally once a day  Eliquis 2.5 mg oral tablet: 1 tab(s) orally every 12 hours   escitalopram 10 mg oral tablet: 1 tab(s) orally once a day  folic acid 1 mg oral tablet: 3 tab(s) orally once a day  HumaLOG KwikPen 100 units/mL injectable solution: 1 Unit  - 200  2 Unit  - 250  3 Unit  - 300  4 Unit  - 350  5 Unit  - 400  6 Unit BSL more than 400  levothyroxine 75 mcg (0.075 mg) oral tablet: 1 tab(s) orally once a day  metoprolol succinate 100 mg oral tablet, extended release: 1 tab(s) orally once a day  OLANZapine 5 mg oral tablet: 1 tab(s) orally once a day (at bedtime)  Vitamin B Complex with C oral tablet: 1 tab(s) orally once a day

## 2021-01-09 NOTE — PROGRESS NOTE ADULT - SUBJECTIVE AND OBJECTIVE BOX
DATE OF SERVICE:   01/09/2021     Patient seen and examined.Interim events reviewed.    CHIEF COMPLAINT & BRIEF HOSPITAL COURSE:Patient is an 80 year old female from home, with PMH of DM, HTN, CHF and hypothyroidism, Maltese speaking, does understand some english,  who presented to the ED due to slurred speech, resolved now . Likely in the setting of TIA or stroke or UTI. CT head was unremarkable for any hemorrhage or acute infarct. CT /P showed Mild left hydroureteronephrosis with perinephric stranding and trace fluid. Suggestion of tubular dilatation at the level of left mid ureter containing vague hyperdensity (see key image). Findings are nonspecific, this may represent hemorrhagic products. Consider nonemergent retrograde evaluation to exclude underlying ureteral hemorrhagic lesion. US renal showed right renal cyst. Carotid doppler was unremarkable. Pt with new fib, previous medical records showed that pt had EP studies in Aug 2020 which showed premature atrial contractions. Pt with bacteremia growing gram positive rods in both aerobic and aerobic bottle showing lactobacillus. s/p 2 doses of vancomycin. Continue with zosyn [id number] . On Eliquis 2.5mg BID for AFib. (new onset).     INTERVAL HPI/ OVERNIGHT EVENTS: No event overnight. Pt examined at bedside AAOX1. Alert , awake and oriented to self.       REVIEW OF SYSTEMS:  Unable to assess in detailed due to pt dementia as pt meanwhile started saying random stuff but denied any chest pain, difficulty in breathing , abdominal pain.     Vital Signs Last 24 Hrs  T(C): 36.7 (09 Jan 2021 05:34), Max: 36.8 (08 Jan 2021 13:44)  T(F): 98 (09 Jan 2021 05:34), Max: 98.2 (08 Jan 2021 13:44)  HR: 115 (09 Jan 2021 05:34) (84 - 115)  BP: 126/69 (09 Jan 2021 05:34) (120/68 - 132/70)  RR: 16 (09 Jan 2021 05:34) (16 - 18)  SpO2: 93% (09 Jan 2021 05:34) (93% - 95%)    PHYSICAL EXAMINATION:  GENERAL: NAD, lying comfortably on bed, appeared calmer comparatively.   HEAD:  Atraumatic, Normocephalic  EYES:  conjunctiva and sclera clear  NECK: Supple, No JVD, Normal thyroid   CHEST/LUNG: Clear to auscultation. Clear to percussion bilaterally; No rales, rhonchi, wheezing, or rubs  HEART: Irregular rate   ABDOMEN: Soft, Nontender, Nondistended; Bowel sounds present  NERVOUS SYSTEM:  Alert & Oriented X1,    EXTREMITIES:  2+ Peripheral Pulses, No clubbing, cyanosis, or edema  SKIN: warm dry     MEDICATIONS  (STANDING):  apixaban 2.5 milliGRAM(s) Oral every 12 hours  aspirin  chewable 81 milliGRAM(s) Oral daily  atorvastatin 40 milliGRAM(s) Oral at bedtime  escitalopram 10 milliGRAM(s) Oral daily  insulin lispro (ADMELOG) corrective regimen sliding scale   SubCutaneous Before meals and at bedtime  levothyroxine 75 MICROGram(s) Oral daily  metoprolol tartrate 25 milliGRAM(s) Oral two times a day  OLANZapine 5 milliGRAM(s) Oral at bedtime  pantoprazole    Tablet 40 milliGRAM(s) Oral before breakfast  piperacillin/tazobactam IVPB.. 3.375 Gram(s) IV Intermittent every 12 hours  sodium chloride 0.9%. 1000 milliLiter(s) (75 mL/Hr) IV Continuous <Continuous>    MEDICATIONS  (PRN):  acetaminophen   Tablet .. 650 milliGRAM(s) Oral every 6 hours PRN Temp greater or equal to 38C (100.4F), Moderate Pain (4 - 6)  ondansetron Injectable 4 milliGRAM(s) IV Push every 6 hours PRN Nausea and/or Vomiting       LABS:                       8.2    9.67  )-----------( 161      ( 09 Jan 2021 06:49 )             25.0     01-09    143  |  111<H>  |  32<H>  ----------------------------<  172<H>  3.8   |  21<L>  |  2.32<H>    Ca    7.8<L>      09 Jan 2021 06:49  Phos  2.7     01-09  Mg     2.0     01-09    TPro  5.0<L>  /  Alb  x   /  TBili  x   /  DBili  x   /  AST  x   /  ALT  x   /  AlkPhos  x   01-09    LIVER FUNCTIONS - ( 09 Jan 2021 12:14 )  Alb: x     / Pro: 5.0 g/dL / ALK PHOS: x     / ALT: x     / AST: x     / GGT: x               PTT - ( 08 Jan 2021 02:05 )  PTT:76.9 sec                  Assessment and Plan:   · Assessment      Patient is an 80 year old female from home, with PMH of DM, HTN, CHF and hypothyroidism, who presented to the ED due to slurred speech.    Mild leukocytosis of 13K. Hgb of 10.2. Creatinine of 2.82. UA noted to be positive. CT head negative. COVID negative. Troponin negative x1. Given dose of aspirin. Noted to have Afib on tele monitor.     Problem/Plan - 1:  ·  Problem: Bacteremia.  Plan: -BCx growing gram positive rods in both aerobic and aerobic bottles from 1/6/21 showing lactobacillus?   -Second BCx sent on 1/7/21 came back negative  -Starts on empiric antibiotics Zosyn 3.375 q12h renally dose  -s/p 2 dose of vancomycin    Problem/Plan - 2:  ·  Problem: Afib.  Plan: -Patient noted to have afib on tele monitor  -HR ranges  currently  -Pt with past history of EP studies in Aug 2020 that showed premature atrial contraction but no AFib.   -CHADs-Vasc score of 6  -lisinopril is hold off in the setting of low BP  -Resumed metoprolol 25mg BID  -Eliquis 2.5mg BID , Covered 4$ /month     -Anemia :   -Pt with dropped of hgb initially   Now H/H stable   -No active signs of bleeding.     Problem/Plan - 3:  ·  Problem: Acute encephalopathy.  Plan: -Patient presented due to slurred speech and confusion  -Slurred speech resolved now  -Likely TIA vs worsening dementia vs sepsis   -CT head negative  -Neuro Dr. Elias consulted  -Started aspirin, Plavix, statin   -fall, aspiration and seizure precautions  -Homocysteine normal. MMA, BREONNA, and SPEP    LE weakness:  Likely multifactorial as per Neuro.  C Spine CT for possible myelopathy and compression was unremarkable showed some degenerations.    Problem/Plan - 4:  ·  Problem: PARTH (acute kidney injury).  Plan: -Noted to have creatinine of 2.82 on admission , baseline 0.7   -Fena 0.3% prerenal  -Cr trending down   -ATN possible, on IVF NS  -CT with mild left hydroureteronephrosis with perinephric stranding/ tubular dilation at the level of left mid ureter containing vague hyperdensity   --f/u US abdomen and Pelvis  -Continue to hold Lasix   - Urology Consulted, O/P f/u.    Problem/Plan - 5:  ·  Problem: Diabetes mellitus.  Plan: -patient on metformin at home  -start SSI while in hospital  -HgbA1C 6.7  -Monitor BS and adjust insulin as needed  -FS are running high on/off, changed Zosyn in dextrose to NS.    Problem/Plan - 6:  Problem: Hypertension. Plan: -hold home med of lisinopril and Lasix in setting of PARTH and hypotension  -monitor BP and add meds as needed  -Resumed metoprolol.    Problem/Plan - 7:  ·  Problem: Congestive heart failure (CHF).  Plan: ECHO is unremarkable except some left ventricle remodeling   Hold off lisinopril   -strict I/O, daily weight.     Problem/Plan - 8:  ·  Problem: Hypothyroidism.  Plan: -continue home med of synthroid  -TSH >2.76.     Problem/Plan - 9:  ·  Problem: Need for prophylactic measure.  Plan: On Eliquis.     Problem/Plan - 10:  Problem: Goals of care, counseling/discussion. Plan; discussed GOC with daughter, Raquel  patient is FULL CODE for now.

## 2021-01-09 NOTE — CONSULT NOTE ADULT - SUBJECTIVE AND OBJECTIVE BOX
HPI:  Patient is an 80 year old female from home, with PMH of DM, HTN, CHF and hypothyroidism, who presented to the ED due to slurred speech. Patient is a poor historian. Unsure about exactly what happened that caused her to come to the ED. Patient just states she is feeling unwell. Denies any pain anywhere. Spoke to daughter, Raquel [phone]. Daughter states that she calls her mother at least once or twice a day to make sure she had taken her medications. If the daughter does not call, then the patient will call her. Yesterday, the daughter did not get to call the patient and daughter states that the patient never called her. Daughter called the patient and stated that the patient was not making sense and also appeared to have slurred speech. Patient denies any symptoms similar to this previously and denies any history of stroke. Currently, patient denies any chest pain, shortness of breath, abdominal pain, nausea, vomiting, diarrhea or constipation.  (2021 00:09)      PAST MEDICAL & SURGICAL HISTORY:  Hypothyroidism    Congestive heart failure (CHF)    Hypertension    Diabetes mellitus    Anxiety    Obesity    DM (diabetes mellitus)    HTN (hypertension)    History of         No Known Allergies      Meds:  acetaminophen   Tablet .. 650 milliGRAM(s) Oral every 6 hours PRN  apixaban 2.5 milliGRAM(s) Oral every 12 hours  aspirin  chewable 81 milliGRAM(s) Oral daily  atorvastatin 40 milliGRAM(s) Oral at bedtime  escitalopram 10 milliGRAM(s) Oral daily  insulin lispro (ADMELOG) corrective regimen sliding scale   SubCutaneous Before meals and at bedtime  levothyroxine 75 MICROGram(s) Oral daily  metoprolol tartrate 25 milliGRAM(s) Oral two times a day  OLANZapine 5 milliGRAM(s) Oral at bedtime  ondansetron Injectable 4 milliGRAM(s) IV Push every 6 hours PRN  pantoprazole    Tablet 40 milliGRAM(s) Oral before breakfast  piperacillin/tazobactam IVPB.. 3.375 Gram(s) IV Intermittent every 12 hours  sodium chloride 0.9%. 1000 milliLiter(s) IV Continuous <Continuous>      SOCIAL HISTORY:  Smoker:  YES / NO        PACK YEARS:                         WHEN QUIT?  ETOH use:  YES / NO               FREQUENCY / QUANTITY:  Ilicit Drug use:  YES / NO  Occupation:  Assisted device use (Cane / Walker):  Live with:    FAMILY HISTORY:      VITALS:  Vital Signs Last 24 Hrs  T(C): 37.4 (2021 14:38), Max: 37.4 (2021 14:38)  T(F): 99.3 (2021 14:38), Max: 99.3 (2021 14:38)  HR: 94 (2021 14:38) (84 - 115)  BP: 115/78 (2021 14:38) (115/78 - 126/69)  BP(mean): --  RR: 18 (2021 14:38) (16 - 18)  SpO2: 99% (2021 14:38) (93% - 99%)    LABS/DIAGNOSTIC TESTS:                          8.2    9.67  )-----------( 161      ( 2021 06:49 )             25.0     WBC Count: 9.67 K/uL ( @ 06:49)  WBC Count: 6.17 K/uL ( @ 05:58)  WBC Count: 6.14 K/uL ( @ 02:05)  WBC Count: 4.35 K/uL ( @ 06:26)          143  |  111<H>  |  32<H>  ----------------------------<  172<H>  3.8   |  21<L>  |  2.32<H>    Ca    7.8<L>      2021 06:49  Phos  2.7       Mg     2.0         TPro  5.0<L>  /  Alb  x   /  TBili  x   /  DBili  x   /  AST  x   /  ALT  x   /  AlkPhos  x         Urinalysis Basic - ( 2021 08:24 )    Color: Yellow / Appearance: very cloudy / S.015 / pH: x  Gluc: x / Ketone: Negative  / Bili: Negative / Urobili: Negative   Blood: x / Protein: 30 mg/dL / Nitrite: Negative   Leuk Esterase: Trace / RBC: 2-5 /HPF / WBC 3-5 /HPF   Sq Epi: x / Non Sq Epi: Few /HPF / Bacteria: Trace /HPF        LIVER FUNCTIONS - ( 2021 12:14 )  Alb: x     / Pro: 5.0 g/dL / ALK PHOS: x     / ALT: x     / AST: x     / GGT: x             PTT - ( 2021 02:05 )  PTT:76.9 sec    LACTATE:    ABG -     CULTURES:   .Blood Blood-Peripheral   @ 18:57   No growth to date.  --  --      .Urine Clean Catch (Midstream)   @ 19:02   <10,000 CFU/mL Normal Urogenital Nadia  --  --      .Blood Blood-Peripheral   @ 10:13   Growth in aerobic and anaerobic bottles: Gram Positive Rods  Most closely resembling Lactobacillus species          RADIOLOGY:< from: CT Abdomen and Pelvis w/ Oral Cont (21 @ 22:11) >  EXAM:  CT ABDOMEN AND PELVIS OC                            PROCEDURE DATE:  2021          INTERPRETATION:  CT ABDOMEN AND PELVIS WITHOUT CONTRAST    INDICATION: Sepsis and fever.    TECHNIQUE: Abdominopelvic CT without intravenous contrast.Images are reformatted in the sagittal and coronal planes.    COMPARISON: None.    FINDINGS:    Absence of intravenous contrast limits evaluation for focal lesions, neoplasm, and vascular pathology.    Lower Thorax: No consolidation or effusion. Cardiomegaly. Coronary artery calcifications.    Liver: Punctate hepatic calcifications.  Biliary: No significant dilatation. Cholelithiasis.  Spleen: No suspicious lesions.  Pancreas: No inflammatory changes or ductal dilatation.  Adrenals: Normal.  Kidneys: There is mild left hydroureteronephrosis with perinephric stranding and trace fluid. Suggestion of tubular dilatation at the level of left mid ureter containing vague hyperdensity as best seen on image 58 of series 4. Findings are nonspecific, this may represent hemorrhagic products. Consider nonemergent retrograde evaluation to exclude underlying hemorrhagic lesion. Alternatively findings may represent thrombus within left gonadal vein. Consider correlation with Doppler study if indicated. 5 mmnonobstructive stone in the inferior pole of left kidney. No right hydronephrosis.  Vessels: Atherosclerotic disease of the aorta and its branches.    GI tract: There is mild wall thickening of the hepatic flexure surrounding stranding, difficult to assess secondary to inadequate distention. No evidence of small bowel obstruction. Hyperdensity within the colonic loops, likely contrast ingested material. Appendix is not visualized without secondary findings of acute appendicitis.  Peritoneum/retroperitoneum and mesentery: No free air. No organized fluid collection. No adenopathy.    Pelvic organs/Bladder: Uterus appears within normal limits. No adnexal masses. Bladder is decompressed containing Domingo catheter. Small pelvic free fluid.    Abdominal wall: A small fat containing umbilical hernia is noted.  Bones and soft tissues: Multilevel degenerative changes of the spine noted. Mild anterolisthesis of L5 on S1 secondary to degenerative facet spondylolysis    IMPRESSION:    Mild left hydroureteronephrosis with perinephric stranding and trace fluid. Suggestion of tubular dilatation at the level of left mid ureter containing vague hyperdensity (see key image). Findings are nonspecific, this may represent hemorrhagic products. Consider nonemergent retrograde evaluation to exclude underlying ureteral hemorrhagic lesion. Correlate with urinalysis and laboratory values to assess for superimposed ascending urinary tract infection or left pyelitis.    Alternatively findings may represent thrombus within left gonadal vein. Consider correlation with Doppler study if indicated.    Additional findings as mentioned above.                ARVIN MARIA MD; Attending Radiologist  This document has been electronically signed. 2021 12:49AM    ------------------------------------------------------------------------------------------------------------------------------------------------------------------------------------------  EXAM:  US KIDNEY(S)                            PROCEDURE DATE:  2021          INTERPRETATION:  CLINICAL INFORMATION: Acute kidney injury    COMPARISON: None available.    TECHNIQUE: Sonography of the kidneys and bladder.    FINDINGS:    Rightkidney: 11.4 cm. No renal mass, hydronephrosis or calculi. 1.6 cm cyst in the upper pole of the right kidney.    Left kidney: 11.4 cm. No renal mass, hydronephrosis or calculi.    Urinary bladder: Within normal limits.    IMPRESSION:    No hydronephrosis.    Small right renal cyst.      --------------------------------------------------------------------------------------------------------------------------------------------------------------------------------------------  EXAM:  XR CHEST PORTABLE URGENT 1V                            PROCEDURE DATE:  2021          INTERPRETATION:  Fever.    AP chest.  Impression:  Heart magnified by AP film shallow inspiration. Calcified aortic arch. Streaky fibrotic/atelectatic changes left base. Trace left pleural effusion. No focal consolidation.            FELIZ ABREU MD; Attending Radiologist  This document has been electronically signed. 2021 10:44AM    -----------------------------------------------------------------------------------------------------------------------------------------------------------------------------------------------------  EXAM:  CT BRAIN STROKE PROTOCOL                            PROCEDURE DATE:  2021          INTERPRETATION:  HISTORY: Loss of consciousness; stroke scale 1    Evaluation demonstrates no evidence of mass-effect or midline shift. No intraparenchymal mass lesions or hemorrhage is identified. There is no evidence of hydrocephalus. No extra-axial collections are noted.    The bone windows demonstrate no gross osseous abnormalities.    Impression:  1. Unremarkable noncontrast CT scan of the brain.                  MATT LAMAS MD; Attending Radiologist  This document has been electronically signed. 2021  8:24PM    < end of copied text >        ROS  [  ] UNABLE TO ELICIT               HPI:  Patient is an 80 year old female from home, with PMH of DM, HTN, CHF and hypothyroidism, who presented to the ED due to slurred speech. Patient is a poor historian. Unsure about exactly what happened that caused her to come to the ED. Patient just states she is feeling unwell. Denies any pain anywhere. Spoke to daughter, Raquel [phone]. Daughter states that she calls her mother at least once or twice a day to make sure she had taken her medications. If the daughter does not call, then the patient will call her. Yesterday, the daughter did not get to call the patient and daughter states that the patient never called her. Daughter called the patient and stated that the patient was not making sense and also appeared to have slurred speech. Patient denies any symptoms similar to this previously and denies any history of stroke. Currently, patient denies any chest pain, shortness of breath, abdominal pain, nausea, vomiting, diarrhea or constipation.  (2021 00:09)      History as above, pt is confused and mostly speaks Frisian, her 2 daughters were on facetime with her and so got some history from them. Asked to eval patient as she was found to have Lactobacillus Bacteremia. Her daughters deny that she takes any probiotic containing lactobacillus or that she was admitted recently or had any IV lines in the last few weeks. She presented to the hospital because her daughter felt she was slurring her speech but has resolved since coming to the hospital, but has been having fevers here as high as 104. She has no complaints currently and keeps screaming that she wants to go home.  Her repeat blood cultures are already negative and her fevers have improved.    PAST MEDICAL & SURGICAL HISTORY:  Hypothyroidism    Congestive heart failure (CHF)    Hypertension    Diabetes mellitus    Anxiety    Obesity    DM (diabetes mellitus)    HTN (hypertension)    History of         No Known Allergies      Meds:  acetaminophen   Tablet .. 650 milliGRAM(s) Oral every 6 hours PRN  apixaban 2.5 milliGRAM(s) Oral every 12 hours  aspirin  chewable 81 milliGRAM(s) Oral daily  atorvastatin 40 milliGRAM(s) Oral at bedtime  escitalopram 10 milliGRAM(s) Oral daily  insulin lispro (ADMELOG) corrective regimen sliding scale   SubCutaneous Before meals and at bedtime  levothyroxine 75 MICROGram(s) Oral daily  metoprolol tartrate 25 milliGRAM(s) Oral two times a day  OLANZapine 5 milliGRAM(s) Oral at bedtime  ondansetron Injectable 4 milliGRAM(s) IV Push every 6 hours PRN  pantoprazole    Tablet 40 milliGRAM(s) Oral before breakfast  piperacillin/tazobactam IVPB.. 3.375 Gram(s) IV Intermittent every 12 hours  sodium chloride 0.9%. 1000 milliLiter(s) IV Continuous <Continuous>      SOCIAL HISTORY:  Smoker:  no  ETOH use:  no      FAMILY HISTORY: unknown      VITALS:  Vital Signs Last 24 Hrs  T(C): 37.4 (2021 14:38), Max: 37.4 (2021 14:38)  T(F): 99.3 (2021 14:38), Max: 99.3 (2021 14:38)  HR: 94 (2021 14:38) (84 - 115)  BP: 115/78 (2021 14:38) (115/78 - 126/69)  BP(mean): --  RR: 18 (2021 14:38) (16 - 18)  SpO2: 99% (2021 14:38) (93% - 99%)    LABS/DIAGNOSTIC TESTS:                          8.2    9.67  )-----------( 161      ( 2021 06:49 )             25.0     WBC Count: 9.67 K/uL ( @ 06:49)  WBC Count: 6.17 K/uL ( @ 05:58)  WBC Count: 6.14 K/uL ( @ 02:05)  WBC Count: 4.35 K/uL ( @ 06:26)          143  |  111<H>  |  32<H>  ----------------------------<  172<H>  3.8   |  21<L>  |  2.32<H>    Ca    7.8<L>      2021 06:49  Phos  2.7       Mg     2.0         TPro  5.0<L>  /  Alb  x   /  TBili  x   /  DBili  x   /  AST  x   /  ALT  x   /  AlkPhos  x   01-09      Urinalysis Basic - ( 2021 08:24 )    Color: Yellow / Appearance: very cloudy / S.015 / pH: x  Gluc: x / Ketone: Negative  / Bili: Negative / Urobili: Negative   Blood: x / Protein: 30 mg/dL / Nitrite: Negative   Leuk Esterase: Trace / RBC: 2-5 /HPF / WBC 3-5 /HPF   Sq Epi: x / Non Sq Epi: Few /HPF / Bacteria: Trace /HPF        LIVER FUNCTIONS - ( 2021 12:14 )  Alb: x     / Pro: 5.0 g/dL / ALK PHOS: x     / ALT: x     / AST: x     / GGT: x             PTT - ( 2021 02:05 )  PTT:76.9 sec    LACTATE:    ABG -     CULTURES:   .Blood Blood-Peripheral   @ 18:57   No growth to date.  --  --      .Urine Clean Catch (Midstream)   @ 19:02   <10,000 CFU/mL Normal Urogenital Nadia  --  --      .Blood Blood-Peripheral   @ 10:13   Growth in aerobic and anaerobic bottles: Gram Positive Rods  Most closely resembling Lactobacillus species          RADIOLOGY:< from: CT Abdomen and Pelvis w/ Oral Cont (21 @ 22:11) >  EXAM:  CT ABDOMEN AND PELVIS OC                            PROCEDURE DATE:  2021          INTERPRETATION:  CT ABDOMEN AND PELVIS WITHOUT CONTRAST    INDICATION: Sepsis and fever.    TECHNIQUE: Abdominopelvic CT without intravenous contrast.Images are reformatted in the sagittal and coronal planes.    COMPARISON: None.    FINDINGS:    Absence of intravenous contrast limits evaluation for focal lesions, neoplasm, and vascular pathology.    Lower Thorax: No consolidation or effusion. Cardiomegaly. Coronary artery calcifications.    Liver: Punctate hepatic calcifications.  Biliary: No significant dilatation. Cholelithiasis.  Spleen: No suspicious lesions.  Pancreas: No inflammatory changes or ductal dilatation.  Adrenals: Normal.  Kidneys: There is mild left hydroureteronephrosis with perinephric stranding and trace fluid. Suggestion of tubular dilatation at the level of left mid ureter containing vague hyperdensity as best seen on image 58 of series 4. Findings are nonspecific, this may represent hemorrhagic products. Consider nonemergent retrograde evaluation to exclude underlying hemorrhagic lesion. Alternatively findings may represent thrombus within left gonadal vein. Consider correlation with Doppler study if indicated. 5 mmnonobstructive stone in the inferior pole of left kidney. No right hydronephrosis.  Vessels: Atherosclerotic disease of the aorta and its branches.    GI tract: There is mild wall thickening of the hepatic flexure surrounding stranding, difficult to assess secondary to inadequate distention. No evidence of small bowel obstruction. Hyperdensity within the colonic loops, likely contrast ingested material. Appendix is not visualized without secondary findings of acute appendicitis.  Peritoneum/retroperitoneum and mesentery: No free air. No organized fluid collection. No adenopathy.    Pelvic organs/Bladder: Uterus appears within normal limits. No adnexal masses. Bladder is decompressed containing Domingo catheter. Small pelvic free fluid.    Abdominal wall: A small fat containing umbilical hernia is noted.  Bones and soft tissues: Multilevel degenerative changes of the spine noted. Mild anterolisthesis of L5 on S1 secondary to degenerative facet spondylolysis    IMPRESSION:    Mild left hydroureteronephrosis with perinephric stranding and trace fluid. Suggestion of tubular dilatation at the level of left mid ureter containing vague hyperdensity (see key image). Findings are nonspecific, this may represent hemorrhagic products. Consider nonemergent retrograde evaluation to exclude underlying ureteral hemorrhagic lesion. Correlate with urinalysis and laboratory values to assess for superimposed ascending urinary tract infection or left pyelitis.    Alternatively findings may represent thrombus within left gonadal vein. Consider correlation with Doppler study if indicated.    Additional findings as mentioned above.                ARVIN MARIA MD; Attending Radiologist  This document has been electronically signed. 2021 12:49AM    ------------------------------------------------------------------------------------------------------------------------------------------------------------------------------------------  EXAM:  US KIDNEY(S)                            PROCEDURE DATE:  2021          INTERPRETATION:  CLINICAL INFORMATION: Acute kidney injury    COMPARISON: None available.    TECHNIQUE: Sonography of the kidneys and bladder.    FINDINGS:    Rightkidney: 11.4 cm. No renal mass, hydronephrosis or calculi. 1.6 cm cyst in the upper pole of the right kidney.    Left kidney: 11.4 cm. No renal mass, hydronephrosis or calculi.    Urinary bladder: Within normal limits.    IMPRESSION:    No hydronephrosis.    Small right renal cyst.      --------------------------------------------------------------------------------------------------------------------------------------------------------------------------------------------  EXAM:  XR CHEST PORTABLE URGENT 1V                            PROCEDURE DATE:  2021          INTERPRETATION:  Fever.    AP chest.  Impression:  Heart magnified by AP film shallow inspiration. Calcified aortic arch. Streaky fibrotic/atelectatic changes left base. Trace left pleural effusion. No focal consolidation.            FELIZ ABREU MD; Attending Radiologist  This document has been electronically signed. 2021 10:44AM    -----------------------------------------------------------------------------------------------------------------------------------------------------------------------------------------------------  EXAM:  CT BRAIN STROKE PROTOCOL                            PROCEDURE DATE:  2021          INTERPRETATION:  HISTORY: Loss of consciousness; stroke scale 1    Evaluation demonstrates no evidence of mass-effect or midline shift. No intraparenchymal mass lesions or hemorrhage is identified. There is no evidence of hydrocephalus. No extra-axial collections are noted.    The bone windows demonstrate no gross osseous abnormalities.    Impression:  1. Unremarkable noncontrast CT scan of the brain.                  MATT LAMAS MD; Attending Radiologist  This document has been electronically signed. 2021  8:24PM    < end of copied text >        ROS  [ x] UNABLE TO ELICIT

## 2021-01-09 NOTE — CONSULT NOTE ADULT - GASTROINTESTINAL DETAILS
soft/nontender/no distention/no masses palpable/bowel sounds normal/no guarding/no rigidity/no organomegaly

## 2021-01-09 NOTE — PROGRESS NOTE ADULT - PROBLEM SELECTOR PLAN 1
-Patient presented due to slurred speech and confusion  -Slurred speech resolved now  -Likely TIA vs worsening dementia   -CT head negative  -Neuro Dr. Elias consulted  -Started aspirin, Plavix, statin   -fall, aspiration and seizure precautions  -f/u Homocysteine, MMA, BREONNA, and SPEP    LE weakness:  Likely multifactorial as per Neuro.  f/u C Spine CT for possible myelopathy and compression -BCx growing gram positive rods in both aerobic and aerobic bottles from 1/6/21 showing lactobacillus?   -Second BCx sent on 1/7/21 came back negative  -Starts on empiric antibiotics Zosyn 3.375 q12h renally dose  -s/p 2 dose of vancomycin  -Dr Velasquez was consulted by Dr Alcantara on 1/8/21

## 2021-01-09 NOTE — DISCHARGE NOTE PROVIDER - CARE PROVIDER_API CALL
Katerina Ortiz)  Internal Medicine  [address] [place] [address]  Phone: [phone]  Fax: [phone]  Follow Up Time: 1 week    Jared Olmstead)  Internal Medicine  [address], [address]  [place] [address]  Phone: [phone]  Fax: [phone]  Follow Up Time: 1 week   Katerina Ortiz)  Internal Medicine  [address] [place] [address]  Phone: [phone]  Fax: [phone]  Follow Up Time: 1 week    Jared Olmstead)  Internal Medicine  [address], [address]  [place] [address]  Phone: [phone]  Fax: [phone]  Follow Up Time: 1 week    Rafael Elias)  [place] [address]  Phone: [phone]  Fax: [phone]  Follow Up Time: 1 week

## 2021-01-09 NOTE — DISCHARGE NOTE PROVIDER - ATTENDING COMMENTS
Patient seen and examined on day of discharge 1/14/21, plan of care discussed w/ medical team. Patient is a 80 year old female w/ a PMH of DM, HTN, CHF and hypothyroidism who presented to the ED due to slurred speech found to have sepsis/bacteremia with lactobacillus. Repeat blood culture negative from 1/7, ID Dr. Velasquez following, planned for Ceftriaxone x 4 weeks, now s/p PICC line placement 1/13, continue Ceftraixone until 2/3/21. Patient's daughters said they cannot manage her IV abx at home therefore she was arranged for SNF placement to complete her course of IV abx. For her new onset Afib, cardiology Dr. Edmond was consulted, is on Metoprolol and is rate controlled, started on Eliquis for AC. Also noted to PARTH, secondary to ATN in the setting of sepsis, Lisinopril and Lasix were held, Nephrology, Dr. Ortiz was following as well. Cr continues to slowly improve. CT with mild left hydroureteronephrosis with perinephric stranding/ tubular dilation at the level of left mid ureter, urology was also consulted and recommended outpatient follow up. Neurology was consulted for her slurred speech, suspect secondary to TIA vs dementia vs infection, suspected vitamin deficiency and recommended supplementation. Possible incidental finding of left gonadal thrombus, unable to obtain pelvic US w/ doppler in house, low suspicion of thrombus, however already on AC as above w/ Eliquis for Afib. Also noted to have anemia, appeared consistent with anemia of chronic disease. Patient stable for discharge to [facility] for completion of IV abx.

## 2021-01-09 NOTE — CONSULT NOTE ADULT - CONSULT REQUESTED DATE/TIME
06-Jan-2021 14:59
06-Jan-2021 12:00
08-Jan-2021 15:40
09-Jan-2021 19:29
06-Jan-2021 19:18
07-Jan-2021 10:30

## 2021-01-09 NOTE — PROGRESS NOTE ADULT - ASSESSMENT
Patient is a 79yo Greek speaking Female with CHF, DM, HTN, and hypothyroidism, who p/w slurred speech r/o CVA. Pt a/w Sepsis 2/2 UTI/ GPR bacteremia and hypotension. Nephrology consulted for Elevated serum creatinine.    1. PARTH- previous Scr 0.8-1. PARTH likely ATN in the setting of septic shock/ hypotension. UA with 30 protein, trace LE, small blood with hyaline and granular casts. FeNa 0.32%. Continue to hold Lisinopril/ Lasix.   Renal function stabilized. Patient on IVF. Can discontinue if PO intake adequate. s/p TTE with EF >55%. Renal US with no hydro. s/p CT with mild left hydroureteronephrosis with perinephric stranding/ tubular dilation at the level of left mid ureter containing vague hyperdensity ?blood. UA unremarkable and urology following.  hgb improving; LDH wnl, hapto elevated; not consistent with TMA  Strict I/Os. Avoid nephrotoxins/ NSAIDs/ RCA. Monitor BMP.    2. Septic shock 2/2 UTI with gram +tayler bacteremia. s/p Vanco 1g IV x1 and Zosyn q12hrs. Urine culture negative. Monitor Vanco level.     3. Hypotension- BP controlled off with tachycardia. On metoprolol. As per primary team. Monitor BP.     4. CHF- patient not volume overloaded. Hold Lasix.

## 2021-01-09 NOTE — PROGRESS NOTE ADULT - ATTENDING COMMENTS
[facility] NEPHROLOGY  Cornelius Frank M.D.  Davey Burroughs D.O.  Katerina Ortiz M.D.  Nina Bryson, MSN, ANP-C  [phone]    [address] [place] [address]

## 2021-01-09 NOTE — DISCHARGE NOTE PROVIDER - HOSPITAL COURSE
Patient is an 80 year old female from home, with PMH of DM, HTN, CHF and hypothyroidism, Martiniquais speaking  who presented to the ED due to slurred speech and confusion likely in the setting of TIA with worsening dementia.     - Slurred speech and confusion likely in the setting of TIA with worsening dementia vs sepsis. CT head was unremarkable for any hemorrhage or acute infarct. Carotid doppler was unremarkable. Vitamins..............MMA..... Homocysteine .......Neurology was consulted     -Pt with new fib, previous medical records showed that pt had EP studies in Aug 2020 which showed premature atrial contractions. Started on Eliquis 2.5mg BID. Covered out of pocket would be 4$/month. ECHO was unremarkable. Cardiology consulted.    -Pt with bacteremia growing gram positive rods in both aerobic and aerobic bottle showing lactobacillus on 1/6/21, repeat BCx negative on 1/7/21. s/p 2 doses of vancomycin. Consulted ID. Pt was spiking fevers. Pt was hypotensive at point ,ICU was consulted but blood pressure improve by giving fluids and holding Lasix and lisinopril.     -Pt with H/H dropped initially likely in the setting of 3.5 Lit fluid . Later H/H was stable, no active signs of bleeding.     -Pt with PARTH likely due to ATN, Cr was initially 3.1 improved after IV fluids. Nephrology consulted. CT /P showed Mild left hydroureteronephrosis with perinephric stranding and trace fluid. Suggestion of tubular dilatation at the level of left mid ureter containing vague hyperdensity. US renal showed right renal cyst. Lasix and lisinopril were held. Urology consulted for CT scan finding , recommended outpatient follow up. 80 year old female from home, with PMH of DM, HTN, CHF and hypothyroidism presented to the ED due to slurred speech. Patient is a poor historian, unsure about exactly what happened that caused her to come to the ED.   Patient admitted for acute encephalopathy, sepsis/bacteremia, on admission, tachycardic, hypotensive, febrile, leucocytosis, 1/6 blood cx lactobacillus, 1/7 repeat blood cx negative, S/p vancomycin and zosyn, now on Rocephin, CT abdomen showed mild left hydronephrosis, thrombus left gonadal vein, no testing available in house, low suspicious of thrombus plus patient is on AC for a. fib   Patient initially w/ slurred speech, CTH negative, CT cervical w/ cervical degenerative changes, US carotids negative for stenosis, echo showed EF 55%, epicardial fat pad, LV remodeling, Homocysteine 11, SPEP- hypoalbuminemia, , vit B12 841, BREONNA 1:80 homogeneous, immunofixation no monoclonal, Rheumatoid factor 15, neuro on board   Patient with new onset A. fib, on Eliquis and metoprolol, TSH normal on synthroid.   Patient refused ROSELYN, PICC line placed by IR on 1/13 left basilic.   Patient also noted to have anemia compatible with chronic disease, has been stable, no active signs of bleeding, FOBT pending 80 year old female from home, with PMH of DM, HTN, CHF and hypothyroidism presented to the ED due to slurred speech. Patient is a poor historian, unsure about exactly what happened that caused her to come to the ED.   Patient admitted for acute encephalopathy, sepsis/bacteremia, on admission, tachycardic, hypotensive, febrile, leucocytosis, 1/6 blood cx lactobacillus, 1/7 repeat blood cx negative, S/p vancomycin and zosyn, now on Rocephin, CT abdomen showed mild left hydronephrosis, thrombus left gonadal vein, unable to obtain pelvic US w/ doppler in house, low suspicious of thrombus plus patient is on AC for a. fib   Patient initially w/ slurred speech, CTH negative, CT cervical w/ cervical degenerative changes, US carotids negative for stenosis, echo showed EF 55%, epicardial fat pad, LV remodeling, Homocysteine 11, SPEP- hypoalbuminemia, , vit B12 841, BREONNA 1:80 homogeneous, immunofixation no monoclonal, Rheumatoid factor 15, neuro on board, recommended repeat labs, outpatient follow up and discharge on cyanocobalamin weekly, folic acid daily and complex B daily,   Patient with new onset A. fib, on Eliquis and metoprolol, TSH normal on synthroid.   Patient refused ROSELYN, PICC line placed by IR on 1/13 left basilic to continue Rocephin till 2/3/21 as per ID recommendation   Patient also noted to have anemia compatible with chronic disease, has been stable, no active signs of bleeding, to be follow up as outpatient 80 year old female from home, with PMH of DM, HTN, CHF and hypothyroidism presented to the ED due to slurred speech. Patient is a poor historian, unsure about exactly what happened that caused her to come to the ED.   Patient admitted for acute encephalopathy, sepsis/bacteremia, on admission, tachycardic, hypotensive, febrile, leucocytosis, 1/6 blood cx lactobacillus, 1/7 repeat blood cx negative, S/p vancomycin and zosyn, now on Rocephin, CT abdomen showed mild left hydronephrosis, thrombus left gonadal vein, unable to obtain pelvic US w/ doppler in house, low suspicious of thrombus plus patient is on AC for a. fib   Patient initially w/ slurred speech, CTH negative, CT cervical w/ cervical degenerative changes, US carotids negative for stenosis, echo showed EF 55%, epicardial fat pad, LV remodeling, Homocysteine 11, SPEP- hypoalbuminemia, , vit B12 841, BREONNA 1:80 homogeneous, immunofixation no monoclonal, Rheumatoid factor 15, neuro on board, recommended repeat labs, outpatient follow up and discharge on cyanocobalamin weekly, folic acid daily and complex B daily,   Patient with new onset A. fib, on Eliquis and metoprolol, TSH normal on synthroid. Regarding DM patient is being discharge on sliding scale, metformin discontinued as patient has PARTH  Patient refused ROSELYN, PICC line placed by IR on 1/13 left basilic to continue Rocephin till 2/3/21 as per ID recommendation   Patient also noted to have anemia compatible with chronic disease, has been stable, no active signs of bleeding, to be follow up as outpatient . For hypertension, patient was continued on Metoprolol, discontinued Lasix and Lisinopril for now due to PARTH 80 year old female from home, with PMH of DM, HTN, CHF and hypothyroidism presented to the ED due to slurred speech. Patient is a poor historian, unsure about exactly what happened that caused her to come to the ED.   Patient admitted for acute encephalopathy, sepsis/bacteremia, on admission, tachycardic, hypotensive, febrile, leucocytosis, 1/6/21 blood cx lactobacillus, 1/7/21 repeat blood cx negative, S/p vancomycin and zosyn, now on Rocephin, CT abdomen showed mild left hydronephrosis, thrombus left gonadal vein, unable to obtain pelvic US w/ doppler in house, low suspicious of thrombus plus patient is on AC for a. fib   Patient initially w/ slurred speech, CTH negative, CT cervical w/ cervical degenerative changes, US carotids negative for stenosis, echo showed EF 55%, epicardial fat pad, LV remodeling, Homocysteine 11, SPEP- hypoalbuminemia, , vit B12 841, BREONNA 1:80 homogeneous, immunofixation no monoclonal, Rheumatoid factor 15, neuro on board, recommended repeat labs, outpatient follow up and discharge on cyanocobalamin weekly, folic acid daily and complex B daily,   Patient with new onset A. fib, on Eliquis and metoprolol, TSH normal on synthroid. Regarding DM patient is being discharge on sliding scale, metformin discontinued as patient has PARTH  Patient refused ROSELYN to r/o endocarditis, PICC line placed by IR on 1/13 left basilic to continue Rocephin till 2/3/21 as per ID recommendation   Patient also noted to have anemia compatible with chronic disease, has been stable, no active signs of bleeding, to be follow up as outpatient. For hypertension, patient was continued on Metoprolol, discontinued Lasix and Lisinopril for now due to PARTH.     Patient is stable for discharge and is advised to follow up with PCP as outpatient  Please refer to patient's complete medical chart with documents for a full hospital course, for this is only a brief summary. 80 year old female from home, with PMH of DM, HTN, CHF and hypothyroidism presented to the ED due to slurred speech. Patient is a poor historian, unsure about exactly what happened that caused her to come to the ED.   Patient admitted for acute encephalopathy, sepsis/bacteremia, on admission, tachycardic, hypotensive, febrile, leucocytosis, 1/6/21 blood cx positive for lactobacillus x 4 bottles, 1/7/21 repeat blood cx negative, S/p vancomycin and zosyn, switched to Rocephin, CT abdomen showed mild left hydronephrosis, thrombus left gonadal vein, unable to obtain pelvic US w/ doppler in house, low suspicious of thrombus plus patient was started on AC for new onset a. fib.    Patient initially w/ slurred speech, CTH negative, CT cervical w/ cervical degenerative changes, US carotids negative for stenosis, echo showed EF 55%, epicardial fat pad, LV remodeling, Homocysteine 11, SPEP- hypoalbuminemia, , vit B12 841, BREONNA 1:80 homogeneous, immunofixation no monoclonal, Rheumatoid factor 15, neuro on board, recommended repeat labs, outpatient follow up and discharge on cyanocobalamin weekly, folic acid daily and complex B daily,   Patient noted to have PARTH due to ATN in the setting of sepsis, her Cr has been slowly improving 3.19 to 1.78 on day of discharge.  Patient with new onset A. fib, on Eliquis and metoprolol, TSH normal on synthroid. Regarding DM patient is being discharge on sliding scale, metformin discontinued as patient has PARTH from ATN, can resume as outpatient if Cr continues to improve. Sent to [facility] with sliding scale insulin instead, can discontinue sliding scale upon discharge from [facility].  Patient refused ROSELYN to r/o endocarditis, PICC line placed by IR on 1/13 left basilic to continue Rocephin for 4 weeks of treatment until 2/3/21 as per ID recommendation   Patient also noted to have anemia compatible with chronic disease, has been stable, no active signs of bleeding, to be follow up as outpatient. For hypertension, patient was continued on Metoprolol, discontinued Lasix and Lisinopril for now due to PARTH.     Patient is stable for discharge to [facility] to complete her course of IV antibiotics and is advised to follow up with PCP as outpatient  Please refer to patient's complete medical chart with documents for a full hospital course, for this is only a brief summary.

## 2021-01-10 NOTE — PROGRESS NOTE ADULT - ASSESSMENT
Patient is a 79yo Slovenian speaking Female with CHF, DM, HTN, and hypothyroidism, who p/w slurred speech r/o CVA. Pt a/w Sepsis 2/2 UTI/ GPR bacteremia and hypotension. Nephrology consulted for Elevated serum creatinine.    1. PARTH- previous Scr 0.8-1. PARTH likely ATN in the setting of septic shock/ hypotension. UA with 30 protein, trace LE, small blood with hyaline and granular casts. FeNa 0.32%. Continue to hold Lisinopril/ Lasix.   Renal function stabilized. Patient now off of IVF. Encourage PO intake. s/p TTE with EF >55%. Renal US with no hydro. s/p CT with mild left hydroureteronephrosis with perinephric stranding/ tubular dilation at the level of left mid ureter containing vague hyperdensity ?blood. UA unremarkable and urology following with no plans for intervention at this time.  hgb improving; LDH wnl, hapto elevated; not consistent with TMA  Strict I/Os. Avoid nephrotoxins/ NSAIDs/ RCA. Monitor BMP.    2. Septic shock 2/2 UTI with gram +tayler bacteremia. s/p Vanco 1g IV x1 and Zosyn q12hrs. Urine culture negative. Monitor Vanco level.     3. Hypotension- BP controlled with tachycardia. On metoprolol. As per primary team. Monitor BP.     4. CHF- patient not volume overloaded. Hold Lasix.

## 2021-01-10 NOTE — PROGRESS NOTE ADULT - SUBJECTIVE AND OBJECTIVE BOX
MEDICAL ATTENDING NOTE  Patient is a 80y old  Female who presents with a chief complaint of slurred speech (10 Santi 2021 10:57)      HPI:  Patient is an 80 year old female from home, with PMH of DM, HTN, CHF and hypothyroidism, who presented to the ED due to slurred speech. Patient is a poor historian. Unsure about exactly what happened that caused her to come to the ED. Patient just states she is feeling unwell. Denies any pain anywhere. Spoke to daughter, Raquel [phone]. Daughter states that she calls her mother at least once or twice a day to make sure she had taken her medications. If the daughter does not call, then the patient will call her. Yesterday, the daughter did not get to call the patient and daughter states that the patient never called her. Daughter called the patient and stated that the patient was not making sense and also appeared to have slurred speech. Patient denies any symptoms similar to this previously and denies any history of stroke. Currently, patient denies any chest pain, shortness of breath, abdominal pain, nausea, vomiting, diarrhea or constipation.  (2021 00:09)      INTERVAL HPI/OVERNIGHT EVENTS: no new complaints    MEDICATIONS  (STANDING):  atorvastatin 40 milliGRAM(s) Oral at bedtime  escitalopram 10 milliGRAM(s) Oral daily  insulin lispro (ADMELOG) corrective regimen sliding scale   SubCutaneous Before meals and at bedtime  levothyroxine 75 MICROGram(s) Oral daily  metoprolol tartrate 25 milliGRAM(s) Oral two times a day  OLANZapine 5 milliGRAM(s) Oral at bedtime  pantoprazole    Tablet 40 milliGRAM(s) Oral before breakfast  piperacillin/tazobactam IVPB.. 3.375 Gram(s) IV Intermittent every 12 hours  sodium chloride 0.9%. 1000 milliLiter(s) (75 mL/Hr) IV Continuous <Continuous>    MEDICATIONS  (PRN):  acetaminophen   Tablet .. 650 milliGRAM(s) Oral every 6 hours PRN Temp greater or equal to 38C (100.4F), Moderate Pain (4 - 6)  ondansetron Injectable 4 milliGRAM(s) IV Push every 6 hours PRN Nausea and/or Vomiting      __________________________________________________  REVIEW OF SYSTEMS:    CONSTITUTIONAL: No fever,   EYES: no acute visual disturbances  NECK: No pain or stiffness  RESPIRATORY: No cough; No shortness of breath  CARDIOVASCULAR: No chest pain, no palpitations  GASTROINTESTINAL: No pain. No nausea or vomiting; No diarrhea   NEUROLOGICAL: No headache or numbness, no tremors  MUSCULOSKELETAL: No joint pain, no muscle pain  GENITOURINARY: no dysuria, no frequency, no hesitancy  PSYCHIATRY: no depression , no anxiety  ALL OTHER  ROS negative        Vital Signs Last 24 Hrs  T(C): 36.4 (10 Santi 2021 14:43), Max: 37.1 (2021 21:00)  T(F): 97.6 (10 Santi 2021 14:43), Max: 98.7 (2021 21:00)  HR: 115 (10 Santi 2021 14:43) (107 - 115)  BP: 132/81 (10 Santi 2021 14:43) (120/74 - 132/81)  BP(mean): --  RR: 18 (10 Santi 2021 14:43) (17 - 18)  SpO2: 95% (10 Santi 2021 14:43) (93% - 96%)    ________________________________________________  PHYSICAL EXAM:  GENERAL: NAD, alert, oriented to person   HEENT: Normocephalic;  conjunctivae and sclerae clear; moist mucous membranes;   NECK : supple  CHEST/LUNG: Clear to auscultation bilaterally with good air entry   HEART: S1 S2  regular; no murmurs, gallops or rubs  ABDOMEN: Soft, Nontender, Nondistended; Bowel sounds present  EXTREMITIES: no cyanosis; no edema; no calf tenderness  SKIN: warm and dry; no rash  NERVOUS SYSTEM:  Awake and alert; Oriented  to person; no new deficits    _________________________________________________  LABS:                        8.2    9.67  )-----------( 161      ( 2021 06:49 )             25.0     -    143  |  111<H>  |  32<H>  ----------------------------<  172<H>  3.8   |  21<L>  |  2.32<H>    Ca    7.8<L>      2021 06:49  Phos  2.7       Mg     2.0         TPro  5.0<L>  /  Alb  x   /  TBili  x   /  DBili  x   /  AST  x   /  ALT  x   /  AlkPhos  x         Urinalysis Basic - ( 2021 08:24 )    Color: Yellow / Appearance: very cloudy / S.015 / pH: x  Gluc: x / Ketone: Negative  / Bili: Negative / Urobili: Negative   Blood: x / Protein: 30 mg/dL / Nitrite: Negative   Leuk Esterase: Trace / RBC: 2-5 /HPF / WBC 3-5 /HPF   Sq Epi: x / Non Sq Epi: Few /HPF / Bacteria: Trace /HPF      CAPILLARY BLOOD GLUCOSE      POCT Blood Glucose.: 131 mg/dL (10 Santi 2021 16:34)  POCT Blood Glucose.: 162 mg/dL (10 Santi 2021 11:33)  POCT Blood Glucose.: 164 mg/dL (10 Santi 2021 07:54)

## 2021-01-10 NOTE — PROGRESS NOTE ADULT - PROBLEM SELECTOR PLAN 1
-BCx growing gram positive rods in both aerobic and aerobic bottles from 1/6/21 showing lactobacillus?   -Second BCx sent on 1/7/21 came back negative  -Starts on empiric antibiotics Zosyn 3.375 q12h renally dose  -s/p 2 dose of vancomycin  -Dr Velasquez was consulted by Dr Alcantara on 1/8/21

## 2021-01-10 NOTE — PHYSICAL THERAPY INITIAL EVALUATION ADULT - PERTINENT HX OF CURRENT PROBLEM, REHAB EVAL
Patient is an 80 year old female from home, with PMH of DM, HTN, CHF and hypothyroidism, who presented to the ED due to slurred speech.

## 2021-01-10 NOTE — PROGRESS NOTE ADULT - ATTENDING COMMENTS
[facility] NEPHROLOGY  Cornelius Frank M.D.  Davey Burroughs D.O.  Katerina Ortiz M.D.  Nina Bryson, MSN, ANP-C  [phone]    [address] [place] [address]

## 2021-01-10 NOTE — PROGRESS NOTE ADULT - PROBLEM SELECTOR PLAN 2
-Patient noted to have afib on tele monitor  -HR ranges  currently  -Pt with past history of EP studies in Aug 2020 that showed premature atrial contraction but no AFib.   -CHADs-Vasc score of 6  -lisinopril is hold off in the setting of low BP  -Resumed metoprolol 25mg BID  -Eliquis 2.5mg BID , Covered 4$ /month   -Cardio, Dr. Edmond consulted for further recommendation    -Anemia :   -Pt with dropped of hgb initially   Now H/H stable   -No active signs of bleeding

## 2021-01-10 NOTE — PROGRESS NOTE ADULT - PROBLEM SELECTOR PLAN 4
-Noted to have creatinine of 2.82 on admission , baseline 0.7   -Fena 0.3% prerenal  -Cr trending down   -ATN possible, on IVF NS  -CT with mild left hydroureteronephrosis with perinephric stranding/ tubular dilation at the level of left mid ureter containing vague hyperdensity   --f/u US abdomen and Pelvis  -Continue to hold Lasix   - Urology Consulted, O/P f/u Reason for Call: Request for an order or referral:    Order or referral being requested: urine and glucose     Date needed: as soon as possible    Has the patient been seen by the PCP for this problem? NO    Additional comments: Patient believes she has a UTI and would like to do a urinalysis. She would also like to get her glucose tested as well. Rupinder Cast has a lab apt tomorrow 11/2 at  LAB    Phone number Patient can be reached at:  Home number on file [phone] (home)    Best Time:  ASAP    Can we leave a detailed message on this number?  YES    Call taken on 11/1/2018 at 3:58 PM by Regina Garcia

## 2021-01-10 NOTE — PROGRESS NOTE ADULT - SUBJECTIVE AND OBJECTIVE BOX
DATE OF SERVICE:  01/10/2021      Patient seen and examined.Interim events reviewed.    CHIEF COMPLAINT & BRIEF HOSPITAL COURSE:Patient is an 80 year old female from home, with PMH of DM, HTN, CHF and hypothyroidism, Romanian speaking, does understand some english,  who presented to the ED due to slurred speech,Carotid doppler was unremarkable. Pt with new fib, previous medical records showed that pt had EP studies in Aug 2020 which showed premature atrial contractions. Pt with bacteremia growing gram positive rods in both aerobic and aerobic bottle showing lactobacillus.On Eliquis 2.5mg BID for AFib. (new onset).       INTERIM:Awake confused at baseline Afebrile remains in Atrial fibrillation    MEDICATIONS  (STANDING):  atorvastatin 40 milliGRAM(s) Oral at bedtime  escitalopram 10 milliGRAM(s) Oral daily  insulin lispro (ADMELOG) corrective regimen sliding scale   SubCutaneous Before meals and at bedtime  levothyroxine 75 MICROGram(s) Oral daily  metoprolol tartrate 25 milliGRAM(s) Oral two times a day  OLANZapine 5 milliGRAM(s) Oral at bedtime  pantoprazole    Tablet 40 milliGRAM(s) Oral before breakfast  piperacillin/tazobactam IVPB.. 3.375 Gram(s) IV Intermittent every 12 hours  sodium chloride 0.9%. 1000 milliLiter(s) (75 mL/Hr) IV Continuous <Continuous>    MEDICATIONS  (PRN):  acetaminophen   Tablet .. 650 milliGRAM(s) Oral every 6 hours PRN Temp greater or equal to 38C (100.4F), Moderate Pain (4 - 6)  ondansetron Injectable 4 milliGRAM(s) IV Push every 6 hours PRN Nausea and/or Vomiting      __________________________________________________  REVIEW OF SYSTEMS:    CONSTITUTIONAL: No fever,   EYES: no acute visual disturbances  NECK: No pain or stiffness  RESPIRATORY: No cough; No shortness of breath  CARDIOVASCULAR: No chest pain, no palpitations  GASTROINTESTINAL: No pain. No nausea or vomiting; No diarrhea   NEUROLOGICAL: No headache or numbness, no tremors  MUSCULOSKELETAL: No joint pain, no muscle pain  GENITOURINARY: no dysuria, no frequency, no hesitancy  PSYCHIATRY: no depression , no anxiety  ALL OTHER  ROS negative        Vital Signs Last 24 Hrs  T(C): 36.4 (10 Santi 2021 14:43), Max: 37.1 (2021 21:00)  T(F): 97.6 (10 Santi 2021 14:43), Max: 98.7 (2021 21:00)  HR: 115 (10 Santi 2021 14:43) (107 - 115)  BP: 132/81 (10 Santi 2021 14:43) (120/74 - 132/81)  BP(mean): --  RR: 18 (10 Santi 2021 14:43) (17 - 18)  SpO2: 95% (10 Santi 2021 14:43) (93% - 96%)    ________________________________________________  PHYSICAL EXAM:  GENERAL: NAD, alert, oriented to person   HEENT: Normocephalic;  conjunctivae and sclerae clear; moist mucous membranes;   NECK : supple  CHEST/LUNG: Clear to auscultation bilaterally with good air entry   HEART: Irreegular; 2/6 Systolic murmur, gallops or rubs  ABDOMEN: Soft, Nontender, Nondistended; Bowel sounds present  EXTREMITIES: no cyanosis; no edema; no calf tenderness  SKIN: warm and dry; no rash  NERVOUS SYSTEM:  Awake and alert; Oriented  to person; no new deficits    _________________________________________________  LABS:                        8.2    9.67  )-----------( 161      ( 2021 06:49 )             25.0     01-09    143  |  111<H>  |  32<H>  ----------------------------<  172<H>  3.8   |  21<L>  |  2.32<H>    Ca    7.8<L>      2021 06:49  Phos  2.7     01-09  Mg     2.0     01-09    TPro  5.0<L>  /  Alb  x   /  TBili  x   /  DBili  x   /  AST  x   /  ALT  x   /  AlkPhos  x         Urinalysis Basic - ( 2021 08:24 )    Color: Yellow / Appearance: very cloudy / S.015 / pH: x  Gluc: x / Ketone: Negative  / Bili: Negative / Urobili: Negative   Blood: x / Protein: 30 mg/dL / Nitrite: Negative   Leuk Esterase: Trace / RBC: 2-5 /HPF / WBC 3-5 /HPF   Sq Epi: x / Non Sq Epi: Few /HPF / Bacteria: Trace /HPF

## 2021-01-10 NOTE — PROGRESS NOTE ADULT - PROBLEM SELECTOR PLAN 5
-patient on metformin at home  -start SSI while in hospital  -HgbA1C 6.7  -Monitor BS and adjust insulin as needed  -FS are running high on/off, changed Zosyn in dextrose to NS

## 2021-01-10 NOTE — PROGRESS NOTE ADULT - PROBLEM SELECTOR PLAN 3
-Patient presented due to slurred speech and confusion  -Slurred speech resolved now  -Likely TIA vs worsening dementia vs sepsis   -CT head negative  -Neuro Dr. Elias consulted  -Started aspirin, Plavix, statin   -fall, aspiration and seizure precautions  -Homocysteine normal. MMA, BREONNA, and SPEP    LE weakness:  Likely multifactorial as per Neuro.  C Spine CT for possible myelopathy and compression was unremarkable showed some degenerations.

## 2021-01-10 NOTE — PROGRESS NOTE ADULT - SUBJECTIVE AND OBJECTIVE BOX
[facility] NEPHROLOGY- PROGRESS NOTE    Patient is a 81yo Syriac speaking Female with CHF, DM, HTN, and hypothyroidism, who p/w slurred speech r/o CVA. Pt a/w Sepsis 2/2 UTI/ GPR bacteremia and hypotension. Nephrology consulted for Elevated serum creatinine.    Hospital Medications: Medications reviewed.    REVIEW OF SYSTEMS: [employer]  #[id number]  CONSTITUTIONAL: +fevers overnight  RESPIRATORY: No shortness of breath, +dry cough  CARDIOVASCULAR: No chest pain.  GASTROINTESTINAL: No nausea, vomiting, diarrhea or abdominal pain, + decreased PO intake  VASCULAR: No bilateral lower extremity edema.       VITALS:  T(F): 97.5 (01-10-21 @ 06:00), Max: 99.3 (21 @ 14:38)  HR: 108 (01-10-21 @ 06:00)  BP: 130/68 (01-10-21 @ 06:00)  RR: 17 (01-10-21 @ 06:00)  SpO2: 93% (01-10-21 @ 06:00)  Wt(kg): --        PHYSICAL EXAM:  Gen: NAD,  HEENT: anicteric  Neck: no JVD  Cards: RRR, +S1/S2,   Resp: CTA ant  GI: soft, NT/ND, NABS  Extremities: no LE edema B/L  Derm: mild LE hyperpigmentation  +b/l hand tremors        LABS:      143  |  111<H>  |  32<H>  ----------------------------<  172<H>  3.8   |  21<L>  |  2.32<H>    Ca    7.8<L>      2021 06:49  Phos  2.7       Mg     2.0         TPro  5.0<L>  /  Alb      /  TBili      /  DBili      /  AST      /  ALT      /  AlkPhos          Creatinine Trend: 2.32 <--, 2.14 <--, 2.18 <--, 2.23 <--, 2.98 <--, 3.19 <--, 2.82 <--                        8.2    9.67  )-----------( 161      ( 2021 06:49 )             25.0     Urine Studies:  Urinalysis Basic - ( 2021 08:24 )    Color: Yellow / Appearance: very cloudy / S.015 / pH:   Gluc:  / Ketone: Negative  / Bili: Negative / Urobili: Negative   Blood:  / Protein: 30 mg/dL / Nitrite: Negative   Leuk Esterase: Trace / RBC: 2-5 /HPF / WBC 3-5 /HPF   Sq Epi:  / Non Sq Epi: Few /HPF / Bacteria: Trace /HPF      Sodium, Random Urine: 20 mmol/L ( @ 14:32)  Creatinine, Random Urine: 134 mg/dL ( @ 14:32)  Chloride, Random Urine: 32 mmol/L ( @ 14:32)  Potassium, Random Urine: 62 mmol/L ( @ 14:32)

## 2021-01-10 NOTE — PROGRESS NOTE ADULT - ASSESSMENT
80 year old female from home, with PMH of T2DM, HTN, CHF and hypothyroidism, who presented to the ED due to slurred speech.    Mild leukocytosis of 13K. Hgb of 10.2. Creatinine of 2.82. UA noted to be positive. CT head negative. COVID negative. Troponin negative x1. Given dose of aspirin. Noted to have Atrial Fibrillation on tele monitor.     Problem/Plan - 1:  ·  Problem: Bacteremia.  Plan: -BCx growing gram positive rods in both aerobic and aerobic bottles from 1/6/21 showing lactobacillus?   -Second BCx sent on 1/7/21 came back negative  -Starts on empiric antibiotics Zosyn 3.375 q12h renally dose  -s/p 2 dose of vancomycin  -For ROSELYN in AM    Problem/Plan - 2:  ·  Problem: Atrial Fibrillation.  Plan: -Patient noted to have afib on tele monitor  -HR ranges  currently  -Pt with past history of EP studies in Aug 2020 that showed premature atrial contraction but no AFib.   -CHADs-Vasc score of 6  -lisinopril is hold off in the setting of low BP  -Resumed metoprolol 25mg BID  -Eliquis 2.5mg BID ,         -Anemia :   -Pt with dropped of hgb initially   Now H/H stable   -No active signs of bleeding.     Problem/Plan - 3:  ·  Problem: Acute encephalopathy.  Plan: -Patient presented due to slurred speech and confusion  -Slurred speech resolved now  -Likely TIA vs worsening dementia vs sepsis   -CT head negative  -Neuro Dr. Elias consulted  -Started aspirin, Plavix, statin   -fall, aspiration and seizure precautions  -Homocysteine normal. MMA, BREONNA, and SPEP    LE weakness:  Likely multifactorial as per Neuro.  C Spine CT for possible myelopathy and compression was unremarkable showed some degenerations.    Problem/Plan - 4:  ·  Problem: PARTH (acute kidney injury).  Plan: -Noted to have creatinine of 2.82 on admission , baseline 0.7   -Fena 0.3% prerenal  -Cr trending down   -ATN possible, on IVF NS  -CT with mild left hydroureteronephrosis with perinephric stranding/ tubular dilation at the level of left mid ureter containing vague hyperdensity   --f/u US abdomen and Pelvis  -Continue to hold Lasix       Problem/Plan - 5:  ·  Problem: Diabetes mellitus.  Plan: -patient on metformin at home  -start SSI while in hospital  -HgbA1C 6.7  -Monitor BS and adjust insulin as needed  -FS are running high on/off, changed Zosyn in dextrose to NS.    Problem/Plan - 6:  Problem: Hypertension. Plan: -hold home med of lisinopril and Lasix in setting of PARTH and hypotension  -monitor BP and add meds as needed  -Resumed metoprolol.    Problem/Plan - 7:  ·  Problem: Congestive heart failure (CHF).  Plan: ECHO is unremarkable except some left ventricle remodeling   Hold off lisinopril   -strict I/O, daily weight.     Problem/Plan - 8:  ·  Problem: Hypothyroidism.  Plan: -continue home med of synthroid  -TSH >2.76.     Problem/Plan - 9:  ·  Problem: Need for prophylactic measure.  Plan: On Eliquis.     Problem/Plan - 10:  Problem: Goals of care, counseling/discussion. Plan; discussed GOC with daughterRaquel  patient is FULL CODE for now.

## 2021-01-10 NOTE — PHYSICAL THERAPY INITIAL EVALUATION ADULT - ADDITIONAL COMMENTS
History taken from patient and daughter. Patient lives on the 1st floor of a 2 verito apartment [facility] with 4 steps to access front door. No steps inside apartment. Patient needs assistance in ADL's, NO Home health assist at home, daughter assist in bathing and shower. Patient reports having walker at home but not admits she does not use it as she has limited ambulation.

## 2021-01-10 NOTE — PROGRESS NOTE ADULT - ATTENDING COMMENTS
Patient was seen and examined by me on 01/10/2021,interim events noted,labs and radiology studies reviewed.  Faizan Edmond MD,[facility]  [address] [place] [address].  [id number]. [phone]

## 2021-01-10 NOTE — PROGRESS NOTE ADULT - ATTENDING COMMENTS
Patient was examined at the bedside and discussed with RN, ID, and Cardiology    She is alert and responsive today.  Oriented to person, with decreased short-term memory, which family states that this is her baseline.   vs, as above    Lungs, clear  Cor, RRR II/VI systolic murmur  Abdomen, soft  Neurological, non-focal         No new labs:                 8.2    9.67  )-----------( 161      ( 09 Jan 2021 06:49 )             25.0   01-09    143  |  111<H>  |  32<H>  ----------------------------<  172<H>  3.8   |  21<L>  |  2.32<H>    Ca    7.8<L>      09 Jan 2021 06:49  Phos  2.7     01-09  Mg     2.0     01-09    TPro  5.0<L>  /  Alb  x   /  TBili  x   /  DBili  x   /  AST  x   /  ALT  x   /  AlkPhos  x   01-09  < from: CT Abdomen and Pelvis w/ Oral Cont (01.06.21 @ 22:11) >      Mild left hydroureteronephrosis with perinephric stranding and trace fluid. Suggestion of tubular dilatation at the level of left mid ureter containing vague hyperdensity (see key image). Findings are nonspecific, this may represent hemorrhagic products. Consider nonemergent retrograde evaluation to exclude underlying ureteral hemorrhagic lesion. Correlate with urinalysis and laboratory values to assess for superimposed ascending urinary tract infection or left pyelitis.    Alternatively findings may represent thrombus within left gonadal vein. Consider correlation with Doppler study if indicated.    Additional findings as mentioned above.    < end of copied text >    t< from: Transthoracic Echocardiogram (01.06.21 @ 07:49) >    CONCLUSIONS:  TECHNICALLY VERY DIFFICULT STUDY, POOR ACOUSTIC WINDOWS    1. Mitral annular calcification.  2. Increased relative wall thickness with normal left  ventricular (LV) mass index, consistent with concentric LV  remodeling.  3. Normal left ventricular systolic function.  4. Normal right ventricular size and function.  5. A prominent epicardial fat pad is noted,  allthough an  iso-echoic, dense pericardial effusion cannot be entirely  excluded on this study.  Consider chest CT for better  evaluation.    < end of copied text >    IMP:  Sepsis, possibly secondary to Lactobacillus bacteremia, responded to fluid and antibiotics.           Dementia, at baseline.  Encephalopathy, secondary to sepsis, resolved.           PARTH, slightly improved.  Patient is NOT currently on vancomycin.  Zosyn is adjusted for creatitine clearance.          Possible hydronephrosis, possible gonadal vein thrombosis          anemia  Plan: Continue antibiotics. Will switch to ceftriaxone in AM.           IV fluids          Doppler of pelvic veins has been requested          anemia w/u, ordered          ID consultation, Dr. Velasquez, appreciated          Possible ROSELYN in AM, NPO after midnight. Patient was examined at the bedside and discussed with RN, ID, and Cardiology, and daughter.    She is alert and responsive today.  Oriented to person, with decreased short-term memory, which family states that this is her baseline.   vs, as above    Lungs, clear  Cor, RRR II/VI systolic murmur  Abdomen, soft  Neurological, non-focal         No new labs:                 8.2    9.67  )-----------( 161      ( 09 Jan 2021 06:49 )             25.0   01-09    143  |  111<H>  |  32<H>  ----------------------------<  172<H>  3.8   |  21<L>  |  2.32<H>    Ca    7.8<L>      09 Jan 2021 06:49  Phos  2.7     01-09  Mg     2.0     01-09    TPro  5.0<L>  /  Alb  x   /  TBili  x   /  DBili  x   /  AST  x   /  ALT  x   /  AlkPhos  x   01-09  < from: CT Abdomen and Pelvis w/ Oral Cont (01.06.21 @ 22:11) >      Mild left hydroureteronephrosis with perinephric stranding and trace fluid. Suggestion of tubular dilatation at the level of left mid ureter containing vague hyperdensity (see key image). Findings are nonspecific, this may represent hemorrhagic products. Consider nonemergent retrograde evaluation to exclude underlying ureteral hemorrhagic lesion. Correlate with urinalysis and laboratory values to assess for superimposed ascending urinary tract infection or left pyelitis.    Alternatively findings may represent thrombus within left gonadal vein. Consider correlation with Doppler study if indicated.    Additional findings as mentioned above.    < end of copied text >    t< from: Transthoracic Echocardiogram (01.06.21 @ 07:49) >    CONCLUSIONS:  TECHNICALLY VERY DIFFICULT STUDY, POOR ACOUSTIC WINDOWS    1. Mitral annular calcification.  2. Increased relative wall thickness with normal left  ventricular (LV) mass index, consistent with concentric LV  remodeling.  3. Normal left ventricular systolic function.  4. Normal right ventricular size and function.  5. A prominent epicardial fat pad is noted,  allthough an  iso-echoic, dense pericardial effusion cannot be entirely  excluded on this study.  Consider chest CT for better  evaluation.    < end of copied text >    IMP:  Sepsis, possibly secondary to Lactobacillus bacteremia, responded to fluid and antibiotics.           Dementia, at baseline.  Encephalopathy, secondary to sepsis, resolved.           PARTH, slightly improved.  Patient is NOT currently on vancomycin.  Zosyn is adjusted for creatitine clearance.          Possible hydronephrosis, possible gonadal vein thrombosis          anemia  Plan: Continue antibiotics. Will switch to ceftriaxone in AM.           IV fluids          Doppler of pelvic veins has been requested          anemia w/u, ordered          ID consultation, Dr. Velasquez, appreciated          Possible ROSELYN in AM, NPO after midnight.

## 2021-01-10 NOTE — PHYSICAL THERAPY INITIAL EVALUATION ADULT - GENERAL OBSERVATIONS, REHAB EVAL
Patient was seen for PT evaluation today. EMR, laboratory and radiology results reviewed. Pt received supine in bed, NAD, and cooperative with PT eval.

## 2021-01-10 NOTE — PHYSICAL THERAPY INITIAL EVALUATION ADULT - PATIENT PROFILE REVIEW, REHAB EVAL
EMR, Laboratory and Radiology results reviewed.. CT Brain: Unremarkable noncontrast CT scan of the brain./yes

## 2021-01-11 NOTE — PROGRESS NOTE ADULT - PROBLEM SELECTOR PLAN 5
-Patient is on metformin at home  -C/w SSI  -HgbA1C 6.7  -Monitor BS and adjust insulin as needed Anemia panel compatible w/ iron deficiency anemia  Monitor CBC daily. Anemia panel compatible w/ anemia of chronic disease   Monitor CBC daily.

## 2021-01-11 NOTE — PROGRESS NOTE ADULT - PROBLEM SELECTOR PLAN 3
-Patient presented due to slurred speech and confusion  -Slurred speech resolved now  -Most likely TIA vs worsening dementia vs bacteremia   -CT head negative  -Neuro Dr. Elias on board  -Fall, aspiration and seizure precautions  -Homocysteine normal  -F/u MMA, BREONNA, and SPEP  -C Spine CT for possible myelopathy and compression was unremarkable showed some degenerative changes -Patient presented due to slurred speech and confusion  -Slurred speech resolved now  -Most likely TIA vs worsening dementia vs bacteremia   -CT head negative  -Neuro Dr. Elias on board  -Fall, aspiration and seizure precautions  -Homocysteine normal  -C Spine CT for possible myelopathy and compression was unremarkable showed some degenerative changes  -F/u MMA, BREONNA, and SPEP

## 2021-01-11 NOTE — PROGRESS NOTE ADULT - SUBJECTIVE AND OBJECTIVE BOX
PGY-1 Progress Note discussed with attending    PAGER #: [[phone]] TILL 5:00 PM  PLEASE CONTACT ON CALL TEAM:  - On Call Team (Please refer to Jose) FROM 5:00 PM - 8:30PM  - Nightfloat Team FROM 8:30 -7:30 AM    CHIEF COMPLAINT & BRIEF HOSPITAL COURSE:      INTERVAL HPI/OVERNIGHT EVENTS: use  [id number], patient refers she is feeling fine, denies any pain, asked for medication for sleep     MEDICATIONS  (STANDING):  atorvastatin 40 milliGRAM(s) Oral at bedtime  cefTRIAXone   IVPB      escitalopram 10 milliGRAM(s) Oral daily  insulin lispro (ADMELOG) corrective regimen sliding scale   SubCutaneous Before meals and at bedtime  levothyroxine 75 MICROGram(s) Oral daily  metoprolol tartrate 25 milliGRAM(s) Oral two times a day  OLANZapine 5 milliGRAM(s) Oral at bedtime  pantoprazole    Tablet 40 milliGRAM(s) Oral before breakfast  sodium chloride 0.9%. 1000 milliLiter(s) (75 mL/Hr) IV Continuous <Continuous>    MEDICATIONS  (PRN):  acetaminophen   Tablet .. 650 milliGRAM(s) Oral every 6 hours PRN Temp greater or equal to 38C (100.4F), Moderate Pain (4 - 6)  ondansetron Injectable 4 milliGRAM(s) IV Push every 6 hours PRN Nausea and/or Vomiting      Vital Signs Last 24 Hrs  T(C): 36.9 (11 Jan 2021 13:32), Max: 37.3 (10 Santi 2021 22:04)  T(F): 98.5 (11 Jan 2021 13:32), Max: 99.1 (10 Santi 2021 22:04)  HR: 96 (11 Jan 2021 13:32) (96 - 106)  BP: 138/80 (11 Jan 2021 13:32) (129/74 - 142/82)  BP(mean): --  RR: 18 (11 Jan 2021 13:32) (18 - 18)  SpO2: 96% (11 Jan 2021 13:32) (95% - 96%)    PHYSICAL EXAMINATION:  GENERAL: NAD, obese  HEAD:  Atraumatic, Normocephalic  EYES:  conjunctiva and sclera clear  NECK: Supple, No JVD, Normal thyroid  CHEST/LUNG: Clear to auscultation. Clear to percussion bilaterally; No rales, rhonchi, wheezing, or rubs  HEART: Regular rate and rhythm; No murmurs, rubs, or gallops  ABDOMEN: Soft, Nontender, Nondistended; Bowel sounds present, no pain on palpation   NERVOUS SYSTEM:  Alert & Oriented X3  EXTREMITIES:  2+ Peripheral Pulses, No clubbing, cyanosis, or edema  SKIN: warm dry                          8.6    9.35  )-----------( 265      ( 11 Jan 2021 06:43 )             25.7     01-11    143  |  110<H>  |  29<H>  ----------------------------<  132<H>  3.4<L>   |  23  |  2.18<H>    Ca    7.9<L>      11 Jan 2021 06:43  Phos  3.2     01-11  Mg     2.0     01-11    TPro  5.7<L>  /  Alb  1.8<L>  /  TBili  0.3  /  DBili  x   /  AST  23  /  ALT  20  /  AlkPhos  197<H>  01-11    LIVER FUNCTIONS - ( 11 Jan 2021 06:43 )  Alb: 1.8 g/dL / Pro: 5.7 g/dL / ALK PHOS: 197 U/L / ALT: 20 U/L DA / AST: 23 U/L / GGT: x               PT/INR - ( 11 Jan 2021 06:43 )   PT: 14.4 sec;   INR: 1.22 ratio             I&O's Summary    10 Santi 2021 07:01  -  11 Jan 2021 07:00  --------------------------------------------------------  IN: 0 mL / OUT: 2 mL / NET: -2 mL                       PGY-1 Progress Note discussed with attending    PAGER #: [[phone]] TILL 5:00 PM  PLEASE CONTACT ON CALL TEAM:  - On Call Team (Please refer to Jose) FROM 5:00 PM - 8:30PM  - Nightfloat Team FROM 8:30 -7:30 AM    CHIEF COMPLAINT & BRIEF HOSPITAL COURSE:  80 year old female from home, with PMH of DM, HTN, CHF and hypothyroidism presented to the ED due to slurred speech. Patient is a poor historian. Unsure about exactly what happened that caused her to come to the ED. Patient just states she is feeling unwell. Denies any pain anywhere.  Currently, patient denies any chest pain, shortness of breath, abdominal pain, nausea, vomiting, diarrhea or constipation.     S/p vanco and zosyn      INTERVAL HPI/OVERNIGHT EVENTS: use  [id number], patient refers she is feeling fine, denies any pain, asked for medication for sleep     MEDICATIONS  (STANDING):  atorvastatin 40 milliGRAM(s) Oral at bedtime  cefTRIAXone   IVPB      escitalopram 10 milliGRAM(s) Oral daily  insulin lispro (ADMELOG) corrective regimen sliding scale   SubCutaneous Before meals and at bedtime  levothyroxine 75 MICROGram(s) Oral daily  metoprolol tartrate 25 milliGRAM(s) Oral two times a day  OLANZapine 5 milliGRAM(s) Oral at bedtime  pantoprazole    Tablet 40 milliGRAM(s) Oral before breakfast  sodium chloride 0.9%. 1000 milliLiter(s) (75 mL/Hr) IV Continuous <Continuous>    MEDICATIONS  (PRN):  acetaminophen   Tablet .. 650 milliGRAM(s) Oral every 6 hours PRN Temp greater or equal to 38C (100.4F), Moderate Pain (4 - 6)  ondansetron Injectable 4 milliGRAM(s) IV Push every 6 hours PRN Nausea and/or Vomiting      Vital Signs Last 24 Hrs  T(C): 36.9 (11 Jan 2021 13:32), Max: 37.3 (10 Santi 2021 22:04)  T(F): 98.5 (11 Jan 2021 13:32), Max: 99.1 (10 Santi 2021 22:04)  HR: 96 (11 Jan 2021 13:32) (96 - 106)  BP: 138/80 (11 Jan 2021 13:32) (129/74 - 142/82)  BP(mean): --  RR: 18 (11 Jan 2021 13:32) (18 - 18)  SpO2: 96% (11 Jan 2021 13:32) (95% - 96%)    PHYSICAL EXAMINATION:  GENERAL: NAD, obese  HEAD:  Atraumatic, Normocephalic  EYES:  conjunctiva and sclera clear  NECK: Supple, No JVD, Normal thyroid  CHEST/LUNG: Clear to auscultation. Clear to percussion bilaterally; No rales, rhonchi, wheezing, or rubs  HEART: Regular rate and rhythm; No murmurs, rubs, or gallops  ABDOMEN: Soft, Nontender, Nondistended; Bowel sounds present, no pain on palpation   NERVOUS SYSTEM:  Alert & Oriented X3  EXTREMITIES:  2+ Peripheral Pulses, No clubbing, cyanosis, or edema  SKIN: warm dry                          8.6    9.35  )-----------( 265      ( 11 Jan 2021 06:43 )             25.7     01-11    143  |  110<H>  |  29<H>  ----------------------------<  132<H>  3.4<L>   |  23  |  2.18<H>    Ca    7.9<L>      11 Jan 2021 06:43  Phos  3.2     01-11  Mg     2.0     01-11    TPro  5.7<L>  /  Alb  1.8<L>  /  TBili  0.3  /  DBili  x   /  AST  23  /  ALT  20  /  AlkPhos  197<H>  01-11    LIVER FUNCTIONS - ( 11 Jan 2021 06:43 )  Alb: 1.8 g/dL / Pro: 5.7 g/dL / ALK PHOS: 197 U/L / ALT: 20 U/L DA / AST: 23 U/L / GGT: x               PT/INR - ( 11 Jan 2021 06:43 )   PT: 14.4 sec;   INR: 1.22 ratio             I&O's Summary    10 Santi 2021 07:01  -  11 Jan 2021 07:00  --------------------------------------------------------  IN: 0 mL / OUT: 2 mL / NET: -2 mL                       PGY-1 Progress Note discussed with attending    PAGER #: [[phone]] TILL 5:00 PM  PLEASE CONTACT ON CALL TEAM:  - On Call Team (Please refer to Jose) FROM 5:00 PM - 8:30PM  - Nightfloat Team FROM 8:30 -7:30 AM    CHIEF COMPLAINT & BRIEF HOSPITAL COURSE:  80 year old female from home, with PMH of DM, HTN, CHF and hypothyroidism presented to the ED due to slurred speech. Patient is a poor historian. Unsure about exactly what happened that caused her to come to the ED. Patient just states she is feeling unwell. Denies any chest pain, shortness of breath, abdominal pain, nausea, vomiting, diarrhea or constipation.   Patient admitted for bacteremia, blood cx lactobacillus, repeat blood cx negative, S/p vancomycin and zosyn, now on Rocephin, CT abdomen showed mild left hydronephrosis, thrombus left gonadal vein.  Patient initially w/ slurred speech, CTH negative, CT cervical w/ cervical degenerative changes, US carotids negative for stenosis, echo showed EF 55%, epicardial fat pad, LV remodeling.   Patient with new onset A. fibrillation, on Eliquis and metoprolol, TSH normal on synthroid.   Pending ROSELYN      INTERVAL HPI/OVERNIGHT EVENTS: use  [id number], patient refers she is feeling fine, denies any pain, asked for medication for sleep     MEDICATIONS  (STANDING):  atorvastatin 40 milliGRAM(s) Oral at bedtime  cefTRIAXone   IVPB      escitalopram 10 milliGRAM(s) Oral daily  insulin lispro (ADMELOG) corrective regimen sliding scale   SubCutaneous Before meals and at bedtime  levothyroxine 75 MICROGram(s) Oral daily  metoprolol tartrate 25 milliGRAM(s) Oral two times a day  OLANZapine 5 milliGRAM(s) Oral at bedtime  pantoprazole    Tablet 40 milliGRAM(s) Oral before breakfast  sodium chloride 0.9%. 1000 milliLiter(s) (75 mL/Hr) IV Continuous <Continuous>    MEDICATIONS  (PRN):  acetaminophen   Tablet .. 650 milliGRAM(s) Oral every 6 hours PRN Temp greater or equal to 38C (100.4F), Moderate Pain (4 - 6)  ondansetron Injectable 4 milliGRAM(s) IV Push every 6 hours PRN Nausea and/or Vomiting      Vital Signs Last 24 Hrs  T(C): 36.9 (11 Jan 2021 13:32), Max: 37.3 (10 Santi 2021 22:04)  T(F): 98.5 (11 Jan 2021 13:32), Max: 99.1 (10 Santi 2021 22:04)  HR: 96 (11 Jan 2021 13:32) (96 - 106)  BP: 138/80 (11 Jan 2021 13:32) (129/74 - 142/82)  BP(mean): --  RR: 18 (11 Jan 2021 13:32) (18 - 18)  SpO2: 96% (11 Jan 2021 13:32) (95% - 96%)    PHYSICAL EXAMINATION:  GENERAL: NAD, obese  HEAD:  Atraumatic, Normocephalic  EYES:  conjunctiva and sclera clear  NECK: Supple, No JVD, Normal thyroid  CHEST/LUNG: Clear to auscultation. Clear to percussion bilaterally; No rales, rhonchi, wheezing, or rubs  HEART: Regular rate and rhythm; No murmurs, rubs, or gallops  ABDOMEN: Soft, Nontender, Nondistended; Bowel sounds present, no pain on palpation   NERVOUS SYSTEM:  Alert & Oriented X3  EXTREMITIES:  2+ Peripheral Pulses, No clubbing, cyanosis, or edema  SKIN: warm dry                          8.6    9.35  )-----------( 265      ( 11 Jan 2021 06:43 )             25.7     01-11    143  |  110<H>  |  29<H>  ----------------------------<  132<H>  3.4<L>   |  23  |  2.18<H>    Ca    7.9<L>      11 Jan 2021 06:43  Phos  3.2     01-11  Mg     2.0     01-11    TPro  5.7<L>  /  Alb  1.8<L>  /  TBili  0.3  /  DBili  x   /  AST  23  /  ALT  20  /  AlkPhos  197<H>  01-11    LIVER FUNCTIONS - ( 11 Jan 2021 06:43 )  Alb: 1.8 g/dL / Pro: 5.7 g/dL / ALK PHOS: 197 U/L / ALT: 20 U/L DA / AST: 23 U/L / GGT: x               PT/INR - ( 11 Jan 2021 06:43 )   PT: 14.4 sec;   INR: 1.22 ratio             I&O's Summary    10 Santi 2021 07:01  -  11 Jan 2021 07:00  --------------------------------------------------------  IN: 0 mL / OUT: 2 mL / NET: -2 mL                       PGY-1 Progress Note discussed with attending    PAGER #: [[phone]] TILL 5:00 PM  PLEASE CONTACT ON CALL TEAM:  - On Call Team (Please refer to Jose) FROM 5:00 PM - 8:30PM  - Nightfloat Team FROM 8:30 -7:30 AM    CHIEF COMPLAINT & BRIEF HOSPITAL COURSE:  80 year old female from home, with PMH of DM, HTN, CHF and hypothyroidism presented to the ED due to slurred speech. Patient is a poor historian. Unsure about exactly what happened that caused her to come to the ED. Patient just states she is feeling unwell. Denies any chest pain, shortness of breath, abdominal pain, nausea, vomiting, diarrhea or constipation.   Patient admitted for sepsis/bacteremia, on admission, tachycardic, hypotensive, febrile, leucocytosis, 1/6 blood cx lactobacillus, 1/7 repeat blood cx negative, S/p vancomycin and zosyn, now on Rocephin, CT abdomen showed mild left hydronephrosis, thrombus left gonadal vein.  Patient initially w/ slurred speech, CTH negative, CT cervical w/ cervical degenerative changes, US carotids negative for stenosis, echo showed EF 55%, epicardial fat pad, LV remodeling.   Patient with new onset A. fibrillation, on Eliquis and metoprolol, TSH normal on synthroid.   Pending ROSELYN      INTERVAL HPI/OVERNIGHT EVENTS: use  [id number], patient refers she is feeling fine, denies any pain, asked for medication for sleep     MEDICATIONS  (STANDING):  atorvastatin 40 milliGRAM(s) Oral at bedtime  cefTRIAXone   IVPB      escitalopram 10 milliGRAM(s) Oral daily  insulin lispro (ADMELOG) corrective regimen sliding scale   SubCutaneous Before meals and at bedtime  levothyroxine 75 MICROGram(s) Oral daily  metoprolol tartrate 25 milliGRAM(s) Oral two times a day  OLANZapine 5 milliGRAM(s) Oral at bedtime  pantoprazole    Tablet 40 milliGRAM(s) Oral before breakfast  sodium chloride 0.9%. 1000 milliLiter(s) (75 mL/Hr) IV Continuous <Continuous>    MEDICATIONS  (PRN):  acetaminophen   Tablet .. 650 milliGRAM(s) Oral every 6 hours PRN Temp greater or equal to 38C (100.4F), Moderate Pain (4 - 6)  ondansetron Injectable 4 milliGRAM(s) IV Push every 6 hours PRN Nausea and/or Vomiting      Vital Signs Last 24 Hrs  T(C): 36.9 (11 Jan 2021 13:32), Max: 37.3 (10 Asnti 2021 22:04)  T(F): 98.5 (11 Jan 2021 13:32), Max: 99.1 (10 Santi 2021 22:04)  HR: 96 (11 Jan 2021 13:32) (96 - 106)  BP: 138/80 (11 Jan 2021 13:32) (129/74 - 142/82)  BP(mean): --  RR: 18 (11 Jan 2021 13:32) (18 - 18)  SpO2: 96% (11 Jan 2021 13:32) (95% - 96%)    PHYSICAL EXAMINATION:  GENERAL: NAD, obese  HEAD:  Atraumatic, Normocephalic  EYES:  conjunctiva and sclera clear  NECK: Supple, No JVD, Normal thyroid  CHEST/LUNG: Clear to auscultation. Clear to percussion bilaterally; No rales, rhonchi, wheezing, or rubs  HEART: Regular rate and rhythm; No murmurs, rubs, or gallops  ABDOMEN: Soft, Nontender, Nondistended; Bowel sounds present, no pain on palpation   NERVOUS SYSTEM:  Alert & Oriented X3  EXTREMITIES:  2+ Peripheral Pulses, No clubbing, cyanosis, or edema  SKIN: warm dry                          8.6    9.35  )-----------( 265      ( 11 Jan 2021 06:43 )             25.7     01-11    143  |  110<H>  |  29<H>  ----------------------------<  132<H>  3.4<L>   |  23  |  2.18<H>    Ca    7.9<L>      11 Jan 2021 06:43  Phos  3.2     01-11  Mg     2.0     01-11    TPro  5.7<L>  /  Alb  1.8<L>  /  TBili  0.3  /  DBili  x   /  AST  23  /  ALT  20  /  AlkPhos  197<H>  01-11    LIVER FUNCTIONS - ( 11 Jan 2021 06:43 )  Alb: 1.8 g/dL / Pro: 5.7 g/dL / ALK PHOS: 197 U/L / ALT: 20 U/L DA / AST: 23 U/L / GGT: x               PT/INR - ( 11 Jan 2021 06:43 )   PT: 14.4 sec;   INR: 1.22 ratio             I&O's Summary    10 Santi 2021 07:01  -  11 Jan 2021 07:00  --------------------------------------------------------  IN: 0 mL / OUT: 2 mL / NET: -2 mL                       PGY-1 Progress Note discussed with attending    PAGER #: [[phone]] TILL 5:00 PM  PLEASE CONTACT ON CALL TEAM:  - On Call Team (Please refer to Jose) FROM 5:00 PM - 8:30PM  - Nightfloat Team FROM 8:30 -7:30 AM    CHIEF COMPLAINT & BRIEF HOSPITAL COURSE:  80 year old female from home, with PMH of DM, HTN, CHF and hypothyroidism presented to the ED due to slurred speech. Patient is a poor historian. Unsure about exactly what happened that caused her to come to the ED. Patient just states she is feeling unwell. Denies any chest pain, shortness of breath, abdominal pain, nausea, vomiting, diarrhea or constipation.   Patient admitted for sepsis/bacteremia, on admission, tachycardic, hypotensive, febrile, leucocytosis, 1/6 blood cx lactobacillus, 1/7 repeat blood cx negative, S/p vancomycin and zosyn, now on Rocephin, CT abdomen showed mild left hydronephrosis, thrombus left gonadal vein.  Patient initially w/ slurred speech, CTH negative, CT cervical w/ cervical degenerative changes, US carotids negative for stenosis, echo showed EF 55%, epicardial fat pad, LV remodeling.   Patient with new onset A. fibrillation, on Eliquis and metoprolol, TSH normal on synthroid.   Pending ROSELYN      INTERVAL HPI/OVERNIGHT EVENTS: use  [id number], patient refers she is feeling fine, denies any pain, asked for medication for sleep     MEDICATIONS  (STANDING):  atorvastatin 40 milliGRAM(s) Oral at bedtime  cefTRIAXone   IVPB      escitalopram 10 milliGRAM(s) Oral daily  insulin lispro (ADMELOG) corrective regimen sliding scale   SubCutaneous Before meals and at bedtime  levothyroxine 75 MICROGram(s) Oral daily  metoprolol tartrate 25 milliGRAM(s) Oral two times a day  OLANZapine 5 milliGRAM(s) Oral at bedtime  pantoprazole    Tablet 40 milliGRAM(s) Oral before breakfast  sodium chloride 0.9%. 1000 milliLiter(s) (75 mL/Hr) IV Continuous <Continuous>    MEDICATIONS  (PRN):  acetaminophen   Tablet .. 650 milliGRAM(s) Oral every 6 hours PRN Temp greater or equal to 38C (100.4F), Moderate Pain (4 - 6)  ondansetron Injectable 4 milliGRAM(s) IV Push every 6 hours PRN Nausea and/or Vomiting      Vital Signs Last 24 Hrs  T(C): 36.9 (11 Jan 2021 13:32), Max: 37.3 (10 Santi 2021 22:04)  T(F): 98.5 (11 Jan 2021 13:32), Max: 99.1 (10 Santi 2021 22:04)  HR: 96 (11 Jan 2021 13:32) (96 - 106)  BP: 138/80 (11 Jan 2021 13:32) (129/74 - 142/82)  BP(mean): --  RR: 18 (11 Jan 2021 13:32) (18 - 18)  SpO2: 96% (11 Jan 2021 13:32) (95% - 96%)    PHYSICAL EXAMINATION:  GENERAL: NAD, obese  HEAD:  Atraumatic, Normocephalic  EYES:  conjunctiva and sclera clear  NECK: Supple, No JVD, Normal thyroid  CHEST/LUNG: Clear to auscultation. Clear to percussion bilaterally; No rales, rhonchi, wheezing, or rubs  HEART: Irregular rate and rhythm; No murmurs, rubs, or gallops  ABDOMEN: Soft, Nontender, Nondistended; Bowel sounds present, no pain on palpation   NERVOUS SYSTEM:  Alert & Oriented X3  EXTREMITIES:  2+ Peripheral Pulses, No clubbing, or cyanosis, bilateral non pitting edema  SKIN: warm very dry skin                          8.6    9.35  )-----------( 265      ( 11 Jan 2021 06:43 )             25.7     01-11    143  |  110<H>  |  29<H>  ----------------------------<  132<H>  3.4<L>   |  23  |  2.18<H>    Ca    7.9<L>      11 Jan 2021 06:43  Phos  3.2     01-11  Mg     2.0     01-11    TPro  5.7<L>  /  Alb  1.8<L>  /  TBili  0.3  /  DBili  x   /  AST  23  /  ALT  20  /  AlkPhos  197<H>  01-11    LIVER FUNCTIONS - ( 11 Jan 2021 06:43 )  Alb: 1.8 g/dL / Pro: 5.7 g/dL / ALK PHOS: 197 U/L / ALT: 20 U/L DA / AST: 23 U/L / GGT: x               PT/INR - ( 11 Jan 2021 06:43 )   PT: 14.4 sec;   INR: 1.22 ratio             I&O's Summary    10 Santi 2021 07:01  -  11 Jan 2021 07:00  --------------------------------------------------------  IN: 0 mL / OUT: 2 mL / NET: -2 mL

## 2021-01-11 NOTE — PROGRESS NOTE ADULT - ASSESSMENT
80 year old female from home, with PMH of DM, HTN, CHF and hypothyroidism presented to the ED due to slurred speech admitted for bacteremia    Mild leukocytosis of 13K. Hgb of 10.2. Creatinine of 2.82. UA noted to be positive. CT head negative. COVID negative. Troponin negative x1. Given dose of aspirin. Noted to have Afib on tele monitor.  80 year old female from home, with PMH of DM, HTN, CHF and hypothyroidism presented to the ED due to slurred speech admitted for sepsis/bacteremia

## 2021-01-11 NOTE — PROGRESS NOTE ADULT - ATTENDING COMMENTS
[facility] NEPHROLOGY  Cornelius Frank M.D.  Davey Burroughs D.O.  Katerina Ortiz M.D.  Nina Bryson, MSN, ANP-C  [phone]    [address] [place] [address]

## 2021-01-11 NOTE — PROGRESS NOTE ADULT - PROBLEM SELECTOR PLAN 1
-1/6 Bcx growing gram positive rods - lactobacillus  -1/7 Bcx negative  -S/p Zosyn and vancomycin   -Started on Rocephin 1/11  -Dr Velasquez -1/6 Bcx growing gram positive rods - lactobacillus  -1/7 Bcx negative  -S/p Zosyn and vancomycin   -Started on Rocephin 1/11  -NPO after midnight for ROSELYN  -Dr Velasquez

## 2021-01-11 NOTE — PROGRESS NOTE ADULT - ATTENDING COMMENTS
Patient was seen and examined by me on 01/11/2021,interim events noted,labs and radiology studies reviewed.  Faizan Edmond MD,[facility]  [address] [place] [address].  [facility]. [phone]

## 2021-01-11 NOTE — PROGRESS NOTE ADULT - ATTENDING COMMENTS
Patient was examined and discussed with Dr. Palacio.     She remains alert, oriented to person.   Vital Signs Last 24 Hrs  T(C): 36.7 (11 Jan 2021 17:49), Max: 37.3 (10 Santi 2021 22:04)  T(F): 98 (11 Jan 2021 17:49), Max: 99.1 (10 Santi 2021 22:04)  HR: 96 (11 Jan 2021 13:32) (96 - 106)  BP: 150/89 (11 Jan 2021 17:49) (129/74 - 150/89)  BP(mean): --  RR: 17 (11 Jan 2021 17:49) (17 - 18)  SpO2: 96% (11 Jan 2021 17:49) (95% - 96%)  Lungs, clear  Cor, irreg, II/VI systolic murmur  Abdomen, soft  Back, no breakdown  Ext, stasis changes with erythema at the distal LE  Labs, as above  < from: CT Abdomen and Pelvis w/ Oral Cont (01.06.21 @ 22:11) >      Mild left hydroureteronephrosis with perinephric stranding and trace fluid. Suggestion of tubular dilatation at the level of left mid ureter containing vague hyperdensity (see key image). Findings are nonspecific, this may represent hemorrhagic products. Consider nonemergent retrograde evaluation to exclude underlying ureteral hemorrhagic lesion. Correlate with urinalysis and laboratory values to assess for superimposed ascending urinary tract infection or left pyelitis.    Alternatively findings may represent thrombus within left gonadal vein. Consider correlation with Doppler study if indicated.    Additional findings as mentioned above.    < end of copied text >    IMP:  Lactobacillus bacteremia, cleared with antibiotic treatment.  R/o endocarditis vs pelvic source          PARTH, left hydronephrosis, peripelvic stranding, possible pararenal source of infection          CT abnormality--r/o gonadal vein thrombosis  Plan: ROSELYN tomorrow AM.  NPO after midnight tonight.          Pelvic ultrasound has been re-ordered according to radiology instructions-- "limited pelvic u/s f/u."          If ROSELYN is positive, patient will need 4 weeks of IV antibiotics, now switched to ceftriaxone,  if no evidence of endocarditis, 2 weeks may suffice.           Will need extended dwell.

## 2021-01-11 NOTE — PROGRESS NOTE ADULT - ASSESSMENT
81yo Upper sorbian speaking Female with CHF, DM, HTN, and hypothyroidism, who p/w slurred speech patient became febrile to 104, found to have gram positive rods in blood, CT shows mild left hydro in setting of PARTH.  Patient unlikely to have obstructive uropathy (minor hydro on CT, none seen on US), UA contaminated, UCx negative. PVR 0.  Per nephro likely ATN picture    Recommendations:  - no acute urologic intervention  - trend SCr  - PVR was minimal. may follow outpatient with dr. rainey for f/u ultrasound.    Urology to sign off, please call with any further questions

## 2021-01-11 NOTE — PROGRESS NOTE ADULT - SUBJECTIVE AND OBJECTIVE BOX
[facility] NEPHROLOGY- PROGRESS NOTE    Patient is a 79yo Bengali speaking Female with CHF, DM, HTN, and hypothyroidism, who p/w slurred speech r/o CVA. Pt a/w Sepsis 2/2 UTI/ GPR bacteremia and hypotension. Nephrology consulted for Elevated serum creatinine.    Hospital Medications: Medications reviewed.    REVIEW OF SYSTEMS:  CONSTITUTIONAL: No fevers or chills; +rt neck pain  RESPIRATORY: No shortness of breath, +dry cough  CARDIOVASCULAR: No chest pain.  GASTROINTESTINAL: No nausea, vomiting, diarrhea or abdominal pain, +pt was NPO; requesting food  VASCULAR: No bilateral lower extremity edema.     VITALS:  T(F): 97.9 (21 @ 05:26), Max: 99.1 (01-10-21 @ 22:04)  HR: 106 (21 @ 05:26)  BP: 142/82 (21 @ 05:26)  RR: 18 (21 @ 05:26)  SpO2: 95% (21 @ 05:26)  Wt(kg): --    01-10 @ 07:01  -   @ 07:00  --------------------------------------------------------  IN: 0 mL / OUT: 2 mL / NET: -2 mL        PHYSICAL EXAM:  Gen: NAD,  HEENT: anicteric  Neck: no JVD  Cards: RRR, +S1/S2,   Resp: CTA ant  GI: soft, NT/ND, NABS  Extremities: no LE edema B/L  Derm: mild LE hyperpigmentation    LABS:      143  |  110<H>  |  29<H>  ----------------------------<  132<H>  3.4<L>   |  23  |  2.18<H>    Ca    7.9<L>      2021 06:43  Phos  3.2       Mg     2.0         TPro  5.7<L>  /  Alb  1.8<L>  /  TBili  0.3  /  DBili      /  AST  23  /  ALT  20  /  AlkPhos  197<H>      Creatinine Trend: 2.18 <--, 2.32 <--, 2.14 <--, 2.18 <--, 2.23 <--, 2.98 <--, 3.19 <--, 2.82 <--                        8.6    9.35  )-----------( 265      ( 2021 06:43 )             25.7     Urine Studies:  Urinalysis Basic - ( 2021 08:24 )    Color: Yellow / Appearance: very cloudy / S.015 / pH:   Gluc:  / Ketone: Negative  / Bili: Negative / Urobili: Negative   Blood:  / Protein: 30 mg/dL / Nitrite: Negative   Leuk Esterase: Trace / RBC: 2-5 /HPF / WBC 3-5 /HPF   Sq Epi:  / Non Sq Epi: Few /HPF / Bacteria: Trace /HPF      Sodium, Random Urine: 20 mmol/L ( @ 14:32)  Creatinine, Random Urine: 134 mg/dL ( @ 14:32)  Chloride, Random Urine: 32 mmol/L ( @ 14:32)  Potassium, Random Urine: 62 mmol/L ( @ 14:32)

## 2021-01-11 NOTE — PROGRESS NOTE ADULT - PROBLEM SELECTOR PLAN 2
-Patient noted to have  a fib on tele monitor  -HR ranges   -Pt has past history of EP studies in Aug 2020 that showed premature atrial contraction but no AFib  -CHADs-Vasc score of 6  -Hold lisinopril, Cr 2.18  -C/w metoprolol 25mg BID  -Eliquis 2.5mg BID , Covered 4$ /month   -Cardio, Dr. Edmond consulted for further recommendation    -Anemia :   -Pt with dropped of hgb initially   Now H/H stable   -No active signs of bleeding -Patient noted to have  a fib on tele monitor  -HR ranges   -Pt has past history of EP studies in Aug 2020 that showed premature atrial contraction but no AFib  -CHADs-Vasc score of 6  -Hold lisinopril, Cr 2.18  -C/w metoprolol 25mg BID  -Holding Eliquis 2.5mg BID  -Cardio, Dr. Edmond consulted for further recommendation

## 2021-01-11 NOTE — PROGRESS NOTE ADULT - SUBJECTIVE AND OBJECTIVE BOX
HPI  pt seen and examied. no acute events. pt feels well      PAST MEDICAL & SURGICAL HISTORY:  Hypothyroidism    Congestive heart failure (CHF)    Hypertension    Diabetes mellitus    Anxiety    Obesity    DM (diabetes mellitus)    HTN (hypertension)    History of         MEDICATIONS  (STANDING):  atorvastatin 40 milliGRAM(s) Oral at bedtime  cefTRIAXone   IVPB      escitalopram 10 milliGRAM(s) Oral daily  insulin lispro (ADMELOG) corrective regimen sliding scale   SubCutaneous Before meals and at bedtime  levothyroxine 75 MICROGram(s) Oral daily  melatonin 5 milliGRAM(s) Oral at bedtime  metoprolol tartrate 25 milliGRAM(s) Oral two times a day  OLANZapine 5 milliGRAM(s) Oral at bedtime  pantoprazole    Tablet 40 milliGRAM(s) Oral before breakfast  sodium chloride 0.9%. 1000 milliLiter(s) (75 mL/Hr) IV Continuous <Continuous>    MEDICATIONS  (PRN):  acetaminophen   Tablet .. 650 milliGRAM(s) Oral every 6 hours PRN Temp greater or equal to 38C (100.4F), Moderate Pain (4 - 6)  ondansetron Injectable 4 milliGRAM(s) IV Push every 6 hours PRN Nausea and/or Vomiting      FAMILY HISTORY:      Allergies    No Known Allergies    Intolerances        SOCIAL HISTORY:    REVIEW OF SYSTEMS: Otherwise negative as stated in HPI    Physical Exam  Vital signs  T(C): 36.9 (21 @ 13:32), Max: 37.3 (01-10-21 @ 22:04)  HR: 96 (21 @ 13:32)  BP: 138/80 (21 @ 13:32)  SpO2: 96% (21 @ 13:32)  Wt(kg): --    Output        Gen:  NAD    Pulm:  No respiratory distress  	  CV:  RRR    GI:  S/ND/NT    :      MSK:      LABS:       @ 06:43    WBC 9.35  / Hct 25.7  / SCr 2.18         143  |  110<H>  |  29<H>  ----------------------------<  132<H>  3.4<L>   |  23  |  2.18<H>    Ca    7.9<L>      2021 06:43  Phos  3.2       Mg     2.0         TPro  5.7<L>  /  Alb  1.8<L>  /  TBili  0.3  /  DBili  x   /  AST  23  /  ALT  20  /  AlkPhos  197<H>      PT/INR - ( 2021 06:43 )   PT: 14.4 sec;   INR: 1.22 ratio               Specimen Source: .Urine Clean Catch (Midstream) (21 @ 19:02)        RADIOLOGY:

## 2021-01-11 NOTE — PROGRESS NOTE ADULT - ASSESSMENT
Patient is a 79yo Khmer speaking Female with CHF, DM, HTN, and hypothyroidism, who p/w slurred speech r/o CVA. Pt a/w Sepsis 2/2 UTI/ GPR bacteremia and hypotension. Nephrology consulted for Elevated serum creatinine.    1. PARTH- previous Scr 0.8-1. PARTH likely ATN in the setting of septic shock/ hypotension. UA with 30 protein, trace LE, small blood with hyaline and granular casts. FeNa 0.32%. Continue to hold Lisinopril/ Lasix.   Renal function stabilized. Encourage PO intake. s/p TTE with EF >55%. Renal US with no hydro. s/p CT with mild left hydroureteronephrosis with perinephric stranding/ tubular dilation at the level of left mid ureter containing vague hyperdensity ?blood. UA unremarkable and urology following with no plans for intervention at this time.  hgb improving; LDH wnl, hapto elevated; not consistent with TMA  Strict I/Os. Avoid nephrotoxins/ NSAIDs/ RCA. Monitor BMP.    2. Septic shock 2/2 UTI with gram +tayler bacteremia. Pt on Ceftriaxone. Urine culture negative. Monitor Vanco level.     3. Hypotension- BP controlled with tachycardia. On metoprolol. As per primary team. Monitor BP.     4. CHF- patient not volume overloaded. Hold Lasix.

## 2021-01-11 NOTE — PROGRESS NOTE ADULT - SUBJECTIVE AND OBJECTIVE BOX
DATE OF SERVICE:   01/10/2021     Patient seen and examined.Interim events reviewed.    CHIEF COMPLAINT & BRIEF HOSPITAL COURSE:  80 year old female from home, with PMH of DM, HTN, CHF and hypothyroidism presented to the ED due to slurred speech.Patient admitted for sepsis/bacteremia, on admission, tachycardic, hypotensive, febrile, leucocytosis, 1/6 blood cx lactobacillus, 1/7 repeat blood cx negative, S/p vancomycin and zosyn, now on Rocephin, CT abdomen showed mild left hydronephrosis, thrombus left gonadal vein.  Patient initially w/ slurred speech, CTH negative, CT cervical w/ cervical degenerative changes, US carotids negative for stenosis, echo showed EF 55%, epicardial fat pad, LV remodeling.   Patient with new onset A. fibrillation, on Eliquis and metoprolol, TSH normal on synthroid.     INTERVAL HPI/OVERNIGHT EVENTS: use  [id number], patient refers she is feeling fine, denies any pain, asked for medication for sleep  ROSELYN possible tomorrow      MEDICATIONS  (STANDING):  atorvastatin 40 milliGRAM(s) Oral at bedtime  cefTRIAXone   IVPB      escitalopram 10 milliGRAM(s) Oral daily  insulin lispro (ADMELOG) corrective regimen sliding scale   SubCutaneous Before meals and at bedtime  levothyroxine 75 MICROGram(s) Oral daily  metoprolol tartrate 25 milliGRAM(s) Oral two times a day  OLANZapine 5 milliGRAM(s) Oral at bedtime  pantoprazole    Tablet 40 milliGRAM(s) Oral before breakfast  sodium chloride 0.9%. 1000 milliLiter(s) (75 mL/Hr) IV Continuous <Continuous>    MEDICATIONS  (PRN):  acetaminophen   Tablet .. 650 milliGRAM(s) Oral every 6 hours PRN Temp greater or equal to 38C (100.4F), Moderate Pain (4 - 6)  ondansetron Injectable 4 milliGRAM(s) IV Push every 6 hours PRN Nausea and/or Vomiting      Vital Signs Last 24 Hrs  T(C): 36.9 (11 Jan 2021 13:32), Max: 37.3 (10 Santi 2021 22:04)  T(F): 98.5 (11 Jan 2021 13:32), Max: 99.1 (10 Santi 2021 22:04)  HR: 96 (11 Jan 2021 13:32) (96 - 106)  BP: 138/80 (11 Jan 2021 13:32) (129/74 - 142/82)  RR: 18 (11 Jan 2021 13:32) (18 - 18)  SpO2: 96% (11 Jan 2021 13:32) (95% - 96%)    PHYSICAL EXAMINATION:  GENERAL: NAD, obese  HEAD:  Atraumatic, Normocephalic  EYES:  conjunctiva and sclera clear  NECK: Supple, No JVD, Normal thyroid  CHEST/LUNG: Clear to auscultation. Clear to percussion bilaterally; No rales, rhonchi, wheezing, or rubs  HEART: Irregular rate and rhythm; No murmurs, rubs, or gallops  ABDOMEN: Soft, Nontender, Nondistended; Bowel sounds present, no pain on palpation   NERVOUS SYSTEM:  Alert & Oriented X3  EXTREMITIES:  2+ Peripheral Pulses, No clubbing, or cyanosis, bilateral non pitting edema  SKIN: warm very dry skin                          8.6    9.35  )-----------( 265      ( 11 Jan 2021 06:43 )             25.7     01-11    143  |  110<H>  |  29<H>  ----------------------------<  132<H>  3.4<L>   |  23  |  2.18<H>    Ca    7.9<L>      11 Jan 2021 06:43  Phos  3.2     01-11  Mg     2.0     01-11    TPro  5.7<L>  /  Alb  1.8<L>  /  TBili  0.3  /  DBili  x   /  AST  23  /  ALT  20  /  AlkPhos  197<H>  01-11    LIVER FUNCTIONS - ( 11 Jan 2021 06:43 )  Alb: 1.8 g/dL / Pro: 5.7 g/dL / ALK PHOS: 197 U/L / ALT: 20 U/L DA / AST: 23 U/L / GGT: x               PT/INR - ( 11 Jan 2021 06:43 )   PT: 14.4 sec;   INR: 1.22 ratio             I&O's Summary    10 Santi 2021 07:01  -  11 Jan 2021 07:00  --------------------------------------------------------  IN: 0 mL / OUT: 2 mL / NET: -2 mL                          Assessment and Plan:   · Assessment      80 year old female from home, with PMH of DM, HTN, CHF and hypothyroidism presented to the ED due to slurred speech admitted for sepsis/bacteremia        Problem/Plan - 1:  ·  Problem: Bacteremia.  Plan: -1/6 Bcx growing gram positive rods - lactobacillus  -1/7 Bcx negative  -S/p Zosyn and vancomycin   -Started on Rocephin 1/11  -NPO after midnight for ROSELYN  -Dr Velasquez.     Problem/Plan - 2:  ·  Problem: Afib.  Plan: -Patient noted to have  a fib on tele monitor  -HR ranges   -Pt has past history of EP studies in Aug 2020 that showed premature atrial contraction but no AFib  -CHADs-Vasc score of 6  -Hold lisinopril, Cr 2.18  -C/w metoprolol 25mg BID  -Holding Eliquis 2.5mg BID    Problem/Plan - 3:  ·  Problem: Acute encephalopathy.  Plan: -Patient presented due to slurred speech and confusion  -Slurred speech resolved now  -Most likely TIA vs worsening dementia vs bacteremia   -CT head negative  -Neuro Dr. Elias on board  -Fall, aspiration and seizure precautions  -Homocysteine normal  -C Spine CT for possible myelopathy and compression was unremarkable showed some degenerative changes  -F/u MMA, BREONNA, and SPEP.     Problem/Plan - 4:  ·  Problem: PARTH (acute kidney injury).  Plan: -Creatinine 2.82 on admission, baseline 0.7   -Cr trending down   -ATN possible, on IVF NS  -CT abdomen with mild left hydroureteronephrosis with perinephric stranding/ tubular dilation at the level of left mid ureter containing vague hyper density,   -Incidental finding of left gonadal thrombus, no testing available in house, low suspicious of thrombus   -US renal showed no hydronephrosis   -Continue to hold Lasix   -Urology recommends outpatient follow up      Problem/Plan - 5:  ·  Problem: Anemia.  Plan: Anemia panel compatible w/ anemia of chronic disease   Monitor CBC daily.    Problem/Plan - 6:  Problem: Diabetes mellitus. Plan: -Patient is on metformin at home  -C/w SSI  -HgbA1C 6.7  -Monitor BS and adjust insulin as needed.    Problem/Plan - 7:  ·  Problem: Hypertension.  Plan: -Hold home med of Lisinopril and Lasix in setting of PARTH   -Monitor BP and add meds as needed  -C/w metoprolol.     Problem/Plan - 8:  ·  Problem: Congestive heart failure (CHF).  Plan: -1/6 Echo left ventricle remodeling, epicardial fat pad   Hold off lisinopril   -strict I/O, daily weight.     Problem/Plan - 9:  ·  Problem: Hypothyroidism.  Plan: -C/w home med of synthroid  -TSH 2.76.     Problem/Plan - 10:  Problem: Need for prophylactic measure. Plan; Off Eliquis for ROSELYN in the am  Patient is Full code.

## 2021-01-11 NOTE — PROGRESS NOTE ADULT - PROBLEM SELECTOR PLAN 7
-1/6 Echo left ventricle remodeling, epicardial fat pad   Hold off lisinopril   -strict I/O, daily weight -Hold home med of Lisinopril and Lasix in setting of PARTH   -Monitor BP and add meds as needed  -C/w metoprolol

## 2021-01-11 NOTE — PROGRESS NOTE ADULT - PROBLEM SELECTOR PLAN 8
-C/w home med of synthroid  -TSH 2.76 -1/6 Echo left ventricle remodeling, epicardial fat pad   Hold off lisinopril   -strict I/O, daily weight

## 2021-01-11 NOTE — PROGRESS NOTE ADULT - PROBLEM SELECTOR PLAN 6
-Hold home med of Lisinopril and Lasix in setting of PARTH   -Monitor BP and add meds as needed  -C/w metoprolol -Patient is on metformin at home  -C/w SSI  -HgbA1C 6.7  -Monitor BS and adjust insulin as needed

## 2021-01-11 NOTE — PROGRESS NOTE ADULT - PROBLEM SELECTOR PLAN 4
-Creatinine 2.82 on admission, baseline 0.7   -Fe na 0.3% prerenal  -Cr trending down   -ATN possible, on IVF NS  -CT with mild left hydroureteronephrosis with perinephric stranding/ tubular dilation at the level of left mid ureter containing vague hyper density   -US renal showed no hydronephrosis   -Continue to hold Lasix   -Urology recommends outpatient follow up -Creatinine 2.82 on admission, baseline 0.7   -Cr trending down   -ATN possible, on IVF NS  -CT with mild left hydroureteronephrosis with perinephric stranding/ tubular dilation at the level of left mid ureter containing vague hyper density   -US renal showed no hydronephrosis   -Continue to hold Lasix   -Urology recommends outpatient follow up -Creatinine 2.82 on admission, baseline 0.7   -Cr trending down   -ATN possible, on IVF NS  -CT abdomen with mild left hydroureteronephrosis with perinephric stranding/ tubular dilation at the level of left mid ureter containing vague hyper density,   -Incidental finding of left gonadal thrombus, no testing available in house, low suspicious of thrombus   -US renal showed no hydronephrosis   -Continue to hold Lasix   -Urology recommends outpatient follow up -Creatinine 2.82 on admission, baseline 0.7   -Cr trending down   -ATN possible, on IVF NS  -CT abdomen with mild left hydroureteronephrosis with perinephric stranding/ tubular dilation at the level of left mid ureter containing vague hyper density,   -Incidental finding of left gonadal thrombus, no testing available in house, low suspicious of thrombus   -US renal showed no hydronephrosis   -Continue to hold Lasix   -Urology recommends outpatient follow up  -Nephro Dr Ortiz on board

## 2021-01-12 NOTE — PROGRESS NOTE ADULT - PROBLEM SELECTOR PLAN 1
-1/6 Bcx growing gram positive rods - lactobacillus  -1/7 Bcx negative  -S/p Zosyn and vancomycin   -C/w Rocephin 1/11, as per Dr Velasquez should be continue for 4 more weeks, PICC line scheduled for 1/13/21  -Patient refused ROSELYN  -Dr Velasquez on board

## 2021-01-12 NOTE — PROGRESS NOTE ADULT - PROBLEM SELECTOR PLAN 3
-Patient presented due to slurred speech and confusion  -Slurred speech resolved now  -Most likely TIA vs worsening dementia vs bacteremia   -CT head negative  -Neuro Dr. Elias on board  -Fall, aspiration and seizure precautions  -Homocysteine normal  -SPEP- hypoalbuminemia   -C Spine CT for possible myelopathy and compression was unremarkable showed some degenerative changes  -F/u MMA, BREONNA

## 2021-01-12 NOTE — PROGRESS NOTE ADULT - ATTENDING COMMENTS
Patient seen and examined this morning. Plan of care discussed w/ medical team, ID, and daughters Raquel and Luciana. Patient is an 80 year old female w/ a PMH of DM, HTN, CHF and hypothyroidism who presented to the ED due to slurred speech found to have sepsis/bacteremia with lactobacillus. Repeat blood culture negative from 1/7, ID Dr. Velasquez following, planned for Ceftriaxone x 4 weeks, scheduled for PICC line placement tomorrow. Spoke to daughters who said they cannot manage her IV abx at home therefore plan is for SNF placement to complete her IV abx. For her new onset Afib, cardiology Dr. Edmond was consulted, is on Metoprolol for rate control, plan is to start Eliquis s/p PICC for an elevated CV score of 6. Also noted to PARTH, likely secondary to ATN in the setting of sepsis, Lisinopril is on hold, Nephrology, Dr. Ortiz is following, Cr is slowly improving. CT with mild left hydroureteronephrosis with perinephric stranding/ tubular dilation at the level of left mid ureter, urology was also consulted and recommended outpatient follow up. Neurology was consulted for her slurred speech, suspect secondary to TIA vs dementia vs infection, neuro work up generally unremarkable, MMA and BREONNA pending. Possible incidental finding of left gonadal thrombus, unable to obtain pelvic US w/ doppler in house, low suspicion of thrombus, however will already be on AC as above w/ Eliquis for Afib.    Rest as per resident's note.

## 2021-01-12 NOTE — PROGRESS NOTE ADULT - SUBJECTIVE AND OBJECTIVE BOX
[facility] NEPHROLOGY- PROGRESS NOTE    Patient is a 81yo Slovak speaking Female with CHF, DM, HTN, and hypothyroidism, who p/w slurred speech r/o CVA. Pt a/w Sepsis 2/2 UTI/ GPR bacteremia and hypotension. Nephrology consulted for Elevated serum creatinine.    Hospital Medications: Medications reviewed.    REVIEW OF SYSTEMS: Pt states she is fine; she does not want the ROSELYN;  CONSTITUTIONAL: No fevers or chills;   RESPIRATORY: No shortness of breath,   CARDIOVASCULAR: No chest pain.  GASTROINTESTINAL: No nausea, vomiting, diarrhea or abdominal pain,   VASCULAR: No bilateral lower extremity edema.     VITALS:  T(F): 98.2 (21 @ 04:40), Max: 98.5 (21 @ 13:32)  HR: 96 (21 @ 09:05)  BP: 155/94 (21 @ 09:05)  RR: 18 (21 @ 04:40)  SpO2: 95% (21 @ 09:05)  Wt(kg): --     @ 07:  -   @ 07:00  --------------------------------------------------------  IN: 240 mL / OUT: 0 mL / NET: 240 mL        PHYSICAL EXAM:  Gen: NAD,  HEENT: anicteric  Neck: no JVD  Cards: RRR, +S1/S2,   Resp: CTA ant  GI: soft, NT/ND, NABS  Extremities: no LE edema B/L  Derm: mild LE hyperpigmentation    LABS:      145  |  112<H>  |  24<H>  ----------------------------<  131<H>  4.2   |  25  |  2.09<H>    Ca    8.2<L>      2021 07:38  Phos  3.5       Mg     2.1         TPro  5.7<L>  /  Alb  1.8<L>  /  TBili  0.3  /  DBili      /  AST  23  /  ALT  20  /  AlkPhos  197<H>      Creatinine Trend: 2.09 <--, 2.18 <--, 2.32 <--, 2.14 <--, 2.18 <--, 2.23 <--, 2.98 <--, 3.19 <--, 2.82 <--                        8.8    7.98  )-----------( 320      ( 2021 07:38 )             27.4     Urine Studies:  Urinalysis Basic - ( 2021 08:24 )    Color: Yellow / Appearance: very cloudy / S.015 / pH:   Gluc:  / Ketone: Negative  / Bili: Negative / Urobili: Negative   Blood:  / Protein: 30 mg/dL / Nitrite: Negative   Leuk Esterase: Trace / RBC: 2-5 /HPF / WBC 3-5 /HPF   Sq Epi:  / Non Sq Epi: Few /HPF / Bacteria: Trace /HPF      Sodium, Random Urine: 20 mmol/L ( @ 14:32)  Creatinine, Random Urine: 134 mg/dL ( @ 14:32)  Chloride, Random Urine: 32 mmol/L ( @ 14:32)  Potassium, Random Urine: 62 mmol/L ( @ 14:32)

## 2021-01-12 NOTE — PROGRESS NOTE ADULT - PROBLEM SELECTOR PLAN 5
-Patient is on metformin at home  -C/w SSI  -HgbA1C 6.7  -Monitor BS and adjust insulin as needed Hb 10  Most likely iron deficiency anemia Hb 10  Anemia panel compatible w/ iron deficiency anemia  Monitor CBC daily Hb 10  Anemia panel compatible w/ anemia of chronic disease   Monitor CBC daily

## 2021-01-12 NOTE — PROGRESS NOTE ADULT - SUBJECTIVE AND OBJECTIVE BOX
PGY-1 Progress Note discussed with attending    PAGER #: [[phone]] TILL 5:00 PM  PLEASE CONTACT ON CALL TEAM:  - On Call Team (Please refer to Jose) FROM 5:00 PM - 8:30PM  - Nightfloat Team FROM 8:30 -7:30 AM    CHIEF COMPLAINT & BRIEF HOSPITAL COURSE:      INTERVAL HPI/OVERNIGHT EVENTS:       MEDICATIONS  (STANDING):  atorvastatin 40 milliGRAM(s) Oral at bedtime  cefTRIAXone   IVPB 2000 milliGRAM(s) IV Intermittent every 24 hours  cefTRIAXone   IVPB      escitalopram 10 milliGRAM(s) Oral daily  insulin lispro (ADMELOG) corrective regimen sliding scale   SubCutaneous Before meals and at bedtime  levothyroxine 75 MICROGram(s) Oral daily  melatonin 5 milliGRAM(s) Oral at bedtime  metoprolol tartrate 25 milliGRAM(s) Oral two times a day  OLANZapine 5 milliGRAM(s) Oral at bedtime  pantoprazole    Tablet 40 milliGRAM(s) Oral before breakfast  sodium chloride 0.9%. 1000 milliLiter(s) (75 mL/Hr) IV Continuous <Continuous>    MEDICATIONS  (PRN):  acetaminophen   Tablet .. 650 milliGRAM(s) Oral every 6 hours PRN Temp greater or equal to 38C (100.4F), Moderate Pain (4 - 6)  ondansetron Injectable 4 milliGRAM(s) IV Push every 6 hours PRN Nausea and/or Vomiting      Vital Signs Last 24 Hrs  T(C): 36.8 (12 Jan 2021 04:40), Max: 36.9 (11 Jan 2021 13:32)  T(F): 98.2 (12 Jan 2021 04:40), Max: 98.5 (11 Jan 2021 13:32)  HR: 112 (12 Jan 2021 04:40) (96 - 112)  BP: 148/88 (12 Jan 2021 04:40) (138/80 - 151/97)  BP(mean): --  RR: 18 (12 Jan 2021 04:40) (17 - 18)  SpO2: 94% (12 Jan 2021 04:40) (94% - 97%)    PHYSICAL EXAMINATION:  GENERAL: NAD, well built  HEAD:  Atraumatic, Normocephalic  EYES:  conjunctiva and sclera clear  NECK: Supple, No JVD, Normal thyroid  CHEST/LUNG: Clear to auscultation. Clear to percussion bilaterally; No rales, rhonchi, wheezing, or rubs  HEART: Regular rate and rhythm; No murmurs, rubs, or gallops  ABDOMEN: Soft, Nontender, Nondistended; Bowel sounds present  NERVOUS SYSTEM:  Alert & Oriented X3,    EXTREMITIES:  2+ Peripheral Pulses, No clubbing, cyanosis, or edema  SKIN: warm dry                          8.8    7.98  )-----------( 320      ( 12 Jan 2021 07:38 )             27.4     01-12    145  |  112<H>  |  24<H>  ----------------------------<  131<H>  4.2   |  25  |  2.09<H>    Ca    8.2<L>      12 Jan 2021 07:38  Phos  3.5     01-12  Mg     2.1     01-12    TPro  5.7<L>  /  Alb  1.8<L>  /  TBili  0.3  /  DBili  x   /  AST  23  /  ALT  20  /  AlkPhos  197<H>  01-11    LIVER FUNCTIONS - ( 11 Jan 2021 06:43 )  Alb: 1.8 g/dL / Pro: 5.7 g/dL / ALK PHOS: 197 U/L / ALT: 20 U/L DA / AST: 23 U/L / GGT: x               PT/INR - ( 11 Jan 2021 06:43 )   PT: 14.4 sec;   INR: 1.22 ratio             I&O's Summary    11 Jan 2021 07:01  -  12 Jan 2021 07:00  --------------------------------------------------------  IN: 240 mL / OUT: 0 mL / NET: 240 mL                         PGY-1 Progress Note discussed with attending    PAGER #: [[phone]] TILL 5:00 PM  PLEASE CONTACT ON CALL TEAM:  - On Call Team (Please refer to Jose) FROM 5:00 PM - 8:30PM  - Nightfloat Team FROM 8:30 -7:30 AM    CHIEF COMPLAINT & BRIEF HOSPITAL COURSE:  80 year old female from home, with PMH of DM, HTN, CHF and hypothyroidism presented to the ED due to slurred speech. Patient is a poor historian. Unsure about exactly what happened that caused her to come to the ED. Patient just states she is feeling unwell. Denies any chest pain, shortness of breath, abdominal pain, nausea, vomiting, diarrhea or constipation.   Patient admitted for sepsis/bacteremia, on admission, tachycardic, hypotensive, febrile, leucocytosis, 1/6 blood cx lactobacillus, 1/7 repeat blood cx negative, S/p vancomycin and zosyn, now on Rocephin, CT abdomen showed mild left hydronephrosis, thrombus left gonadal vein.  Patient initially w/ slurred speech, CTH negative, CT cervical w/ cervical degenerative changes, US carotids negative for stenosis, echo showed EF 55%, epicardial fat pad, LV remodeling.   Patient with new onset A. fibrillation, on Eliquis and metoprolol, TSH normal on synthroid.   Patient refused ROSELYN today. Patient is pending PICC line placement     INTERVAL HPI/OVERNIGHT EVENTS: patient referred she was able to sleep better, denies sob, chest pain or discomfort.      MEDICATIONS  (STANDING):  atorvastatin 40 milliGRAM(s) Oral at bedtime  cefTRIAXone   IVPB 2000 milliGRAM(s) IV Intermittent every 24 hours  cefTRIAXone   IVPB      escitalopram 10 milliGRAM(s) Oral daily  insulin lispro (ADMELOG) corrective regimen sliding scale   SubCutaneous Before meals and at bedtime  levothyroxine 75 MICROGram(s) Oral daily  melatonin 5 milliGRAM(s) Oral at bedtime  metoprolol tartrate 25 milliGRAM(s) Oral two times a day  OLANZapine 5 milliGRAM(s) Oral at bedtime  pantoprazole    Tablet 40 milliGRAM(s) Oral before breakfast  sodium chloride 0.9%. 1000 milliLiter(s) (75 mL/Hr) IV Continuous <Continuous>    MEDICATIONS  (PRN):  acetaminophen   Tablet .. 650 milliGRAM(s) Oral every 6 hours PRN Temp greater or equal to 38C (100.4F), Moderate Pain (4 - 6)  ondansetron Injectable 4 milliGRAM(s) IV Push every 6 hours PRN Nausea and/or Vomiting      Vital Signs Last 24 Hrs  T(C): 36.8 (12 Jan 2021 04:40), Max: 36.9 (11 Jan 2021 13:32)  T(F): 98.2 (12 Jan 2021 04:40), Max: 98.5 (11 Jan 2021 13:32)  HR: 112 (12 Jan 2021 04:40) (96 - 112)  BP: 148/88 (12 Jan 2021 04:40) (138/80 - 151/97)  BP(mean): --  RR: 18 (12 Jan 2021 04:40) (17 - 18)  SpO2: 94% (12 Jan 2021 04:40) (94% - 97%)    PHYSICAL EXAMINATION:  GENERAL: NAD, obese  HEAD:  Atraumatic, Normocephalic  EYES:  conjunctiva and sclera clear  NECK: Supple, No JVD, Normal thyroid  CHEST/LUNG: Clear to auscultation. Clear to percussion bilaterally; No rales, rhonchi, wheezing, or rubs  HEART: Irregular rate and rhythm; No murmurs, rubs, or gallops  ABDOMEN: Soft, Nontender, Nondistended; Bowel sounds present, no pain on palpation   NERVOUS SYSTEM:  Alert & Oriented X3  EXTREMITIES:  2+ Peripheral Pulses, No clubbing, or cyanosis, bilateral non pitting edema  SKIN: warm very dry skin                          8.8    7.98  )-----------( 320      ( 12 Jan 2021 07:38 )             27.4     01-12    145  |  112<H>  |  24<H>  ----------------------------<  131<H>  4.2   |  25  |  2.09<H>    Ca    8.2<L>      12 Jan 2021 07:38  Phos  3.5     01-12  Mg     2.1     01-12    TPro  5.7<L>  /  Alb  1.8<L>  /  TBili  0.3  /  DBili  x   /  AST  23  /  ALT  20  /  AlkPhos  197<H>  01-11    LIVER FUNCTIONS - ( 11 Jan 2021 06:43 )  Alb: 1.8 g/dL / Pro: 5.7 g/dL / ALK PHOS: 197 U/L / ALT: 20 U/L DA / AST: 23 U/L / GGT: x               PT/INR - ( 11 Jan 2021 06:43 )   PT: 14.4 sec;   INR: 1.22 ratio             I&O's Summary    11 Jan 2021 07:01  -  12 Jan 2021 07:00  --------------------------------------------------------  IN: 240 mL / OUT: 0 mL / NET: 240 mL                         PGY-1 Progress Note discussed with attending    PAGER #: [[phone]] TILL 5:00 PM  PLEASE CONTACT ON CALL TEAM:  - On Call Team (Please refer to Jose) FROM 5:00 PM - 8:30PM  - Nightfloat Team FROM 8:30 -7:30 AM    CHIEF COMPLAINT & BRIEF HOSPITAL COURSE:  80 year old female from home, with PMH of DM, HTN, CHF and hypothyroidism presented to the ED due to slurred speech. Patient is a poor historian, unsure about exactly what happened that caused her to come to the ED.   Patient admitted for acute encephalopathy, sepsis/bacteremia, on admission, tachycardic, hypotensive, febrile, leucocytosis, 1/6 blood cx lactobacillus, 1/7 repeat blood cx negative, S/p vancomycin and zosyn, now on Rocephin, CT abdomen showed mild left hydronephrosis, thrombus left gonadal vein.  Patient initially w/ slurred speech, CTH negative, CT cervical w/ cervical degenerative changes, US carotids negative for stenosis, echo showed EF 55%, epicardial fat pad, LV remodeling.   Patient with new onset A. fibrillation, on Eliquis and metoprolol, TSH normal on synthroid.   Patient refused ROSELYN today. Patient is pending PICC line placement     INTERVAL HPI/OVERNIGHT EVENTS: patient referred she was able to sleep better, denies sob, chest pain or discomfort.      MEDICATIONS  (STANDING):  atorvastatin 40 milliGRAM(s) Oral at bedtime  cefTRIAXone   IVPB 2000 milliGRAM(s) IV Intermittent every 24 hours  cefTRIAXone   IVPB      escitalopram 10 milliGRAM(s) Oral daily  insulin lispro (ADMELOG) corrective regimen sliding scale   SubCutaneous Before meals and at bedtime  levothyroxine 75 MICROGram(s) Oral daily  melatonin 5 milliGRAM(s) Oral at bedtime  metoprolol tartrate 25 milliGRAM(s) Oral two times a day  OLANZapine 5 milliGRAM(s) Oral at bedtime  pantoprazole    Tablet 40 milliGRAM(s) Oral before breakfast  sodium chloride 0.9%. 1000 milliLiter(s) (75 mL/Hr) IV Continuous <Continuous>    MEDICATIONS  (PRN):  acetaminophen   Tablet .. 650 milliGRAM(s) Oral every 6 hours PRN Temp greater or equal to 38C (100.4F), Moderate Pain (4 - 6)  ondansetron Injectable 4 milliGRAM(s) IV Push every 6 hours PRN Nausea and/or Vomiting      Vital Signs Last 24 Hrs  T(C): 36.8 (12 Jan 2021 04:40), Max: 36.9 (11 Jan 2021 13:32)  T(F): 98.2 (12 Jan 2021 04:40), Max: 98.5 (11 Jan 2021 13:32)  HR: 112 (12 Jan 2021 04:40) (96 - 112)  BP: 148/88 (12 Jan 2021 04:40) (138/80 - 151/97)  BP(mean): --  RR: 18 (12 Jan 2021 04:40) (17 - 18)  SpO2: 94% (12 Jan 2021 04:40) (94% - 97%)    PHYSICAL EXAMINATION:  GENERAL: NAD, obese  HEAD:  Atraumatic, Normocephalic  EYES:  conjunctiva and sclera clear  NECK: Supple, No JVD, Normal thyroid  CHEST/LUNG: Clear to auscultation. Clear to percussion bilaterally; No rales, rhonchi, wheezing, or rubs  HEART: Irregular rate and rhythm; No murmurs, rubs, or gallops  ABDOMEN: Soft, Nontender, Nondistended; Bowel sounds present, no pain on palpation   NERVOUS SYSTEM:  Alert & Oriented X3  EXTREMITIES:  2+ Peripheral Pulses, No clubbing, or cyanosis, bilateral non pitting edema  SKIN: warm very dry skin                          8.8    7.98  )-----------( 320      ( 12 Jan 2021 07:38 )             27.4     01-12    145  |  112<H>  |  24<H>  ----------------------------<  131<H>  4.2   |  25  |  2.09<H>    Ca    8.2<L>      12 Jan 2021 07:38  Phos  3.5     01-12  Mg     2.1     01-12    TPro  5.7<L>  /  Alb  1.8<L>  /  TBili  0.3  /  DBili  x   /  AST  23  /  ALT  20  /  AlkPhos  197<H>  01-11    LIVER FUNCTIONS - ( 11 Jan 2021 06:43 )  Alb: 1.8 g/dL / Pro: 5.7 g/dL / ALK PHOS: 197 U/L / ALT: 20 U/L DA / AST: 23 U/L / GGT: x               PT/INR - ( 11 Jan 2021 06:43 )   PT: 14.4 sec;   INR: 1.22 ratio             I&O's Summary    11 Jan 2021 07:01  -  12 Jan 2021 07:00  --------------------------------------------------------  IN: 240 mL / OUT: 0 mL / NET: 240 mL                         PGY-1 Progress Note discussed with attending    PAGER #: [[phone]] TILL 5:00 PM  PLEASE CONTACT ON CALL TEAM:  - On Call Team (Please refer to Jose) FROM 5:00 PM - 8:30PM  - Nightfloat Team FROM 8:30 -7:30 AM    CHIEF COMPLAINT & BRIEF HOSPITAL COURSE:  80 year old female from home, with PMH of DM, HTN, CHF and hypothyroidism presented to the ED due to slurred speech. Patient is a poor historian, unsure about exactly what happened that caused her to come to the ED.   Patient admitted for acute encephalopathy, sepsis/bacteremia, on admission, tachycardic, hypotensive, febrile, leucocytosis, 1/6 blood cx lactobacillus, 1/7 repeat blood cx negative, S/p vancomycin and zosyn, now on Rocephin, CT abdomen showed mild left hydronephrosis, thrombus left gonadal vein.  Patient initially w/ slurred speech, CTH negative, CT cervical w/ cervical degenerative changes, US carotids negative for stenosis, echo showed EF 55%, epicardial fat pad, LV remodeling.   Patient with new onset A. fibrillation, on Eliquis and metoprolol, TSH normal on synthroid.   Patient refused ROSELYN today. Patient is pending PICC line placement     INTERVAL HPI/OVERNIGHT EVENTS:  [id number], patient referred she was able to sleep better, denies sob, chest pain or discomfort.      MEDICATIONS  (STANDING):  atorvastatin 40 milliGRAM(s) Oral at bedtime  cefTRIAXone   IVPB 2000 milliGRAM(s) IV Intermittent every 24 hours  cefTRIAXone   IVPB      escitalopram 10 milliGRAM(s) Oral daily  insulin lispro (ADMELOG) corrective regimen sliding scale   SubCutaneous Before meals and at bedtime  levothyroxine 75 MICROGram(s) Oral daily  melatonin 5 milliGRAM(s) Oral at bedtime  metoprolol tartrate 25 milliGRAM(s) Oral two times a day  OLANZapine 5 milliGRAM(s) Oral at bedtime  pantoprazole    Tablet 40 milliGRAM(s) Oral before breakfast  sodium chloride 0.9%. 1000 milliLiter(s) (75 mL/Hr) IV Continuous <Continuous>    MEDICATIONS  (PRN):  acetaminophen   Tablet .. 650 milliGRAM(s) Oral every 6 hours PRN Temp greater or equal to 38C (100.4F), Moderate Pain (4 - 6)  ondansetron Injectable 4 milliGRAM(s) IV Push every 6 hours PRN Nausea and/or Vomiting      Vital Signs Last 24 Hrs  T(C): 36.8 (12 Jan 2021 04:40), Max: 36.9 (11 Jan 2021 13:32)  T(F): 98.2 (12 Jan 2021 04:40), Max: 98.5 (11 Jan 2021 13:32)  HR: 112 (12 Jan 2021 04:40) (96 - 112)  BP: 148/88 (12 Jan 2021 04:40) (138/80 - 151/97)  BP(mean): --  RR: 18 (12 Jan 2021 04:40) (17 - 18)  SpO2: 94% (12 Jan 2021 04:40) (94% - 97%)    PHYSICAL EXAMINATION:  GENERAL: NAD, obese  HEAD:  Atraumatic, Normocephalic  EYES:  conjunctiva and sclera clear  NECK: Supple, No JVD, Normal thyroid  CHEST/LUNG: Clear to auscultation. Clear to percussion bilaterally; No rales, rhonchi, wheezing, or rubs  HEART: Irregular rate and rhythm; No murmurs, rubs, or gallops  ABDOMEN: Soft, Nontender, Nondistended; Bowel sounds present, no pain on palpation   NERVOUS SYSTEM:  Alert & Oriented X3  EXTREMITIES:  2+ Peripheral Pulses, No clubbing, or cyanosis, bilateral non pitting edema  SKIN: warm very dry skin                          8.8    7.98  )-----------( 320      ( 12 Jan 2021 07:38 )             27.4     01-12    145  |  112<H>  |  24<H>  ----------------------------<  131<H>  4.2   |  25  |  2.09<H>    Ca    8.2<L>      12 Jan 2021 07:38  Phos  3.5     01-12  Mg     2.1     01-12    TPro  5.7<L>  /  Alb  1.8<L>  /  TBili  0.3  /  DBili  x   /  AST  23  /  ALT  20  /  AlkPhos  197<H>  01-11    LIVER FUNCTIONS - ( 11 Jan 2021 06:43 )  Alb: 1.8 g/dL / Pro: 5.7 g/dL / ALK PHOS: 197 U/L / ALT: 20 U/L DA / AST: 23 U/L / GGT: x               PT/INR - ( 11 Jan 2021 06:43 )   PT: 14.4 sec;   INR: 1.22 ratio             I&O's Summary    11 Jan 2021 07:01  -  12 Jan 2021 07:00  --------------------------------------------------------  IN: 240 mL / OUT: 0 mL / NET: 240 mL

## 2021-01-12 NOTE — PROGRESS NOTE ADULT - SUBJECTIVE AND OBJECTIVE BOX
DATE OF SERVICE:  01/12/2021      Patient seen and examined.Interim events reviewed.    CHIEF COMPLAINT & BRIEF HOSPITAL COURSE:  80 year old female from home, with PMH of DM, HTN, CHF and hypothyroidism presented to the ED due to slurred speech.       INTERVAL HPI/OVERNIGHT EVENTS:  [id number], patient referred she was able to sleep better, denies sob, chest pain or discomfort.Patient with new onset A. fibrillation, on Eliquis and metoprolol, TSH normal on synthroid.   Patient refused ROSELYN today. Patient is pending PICC line placement     MEDICATIONS  (STANDING):  atorvastatin 40 milliGRAM(s) Oral at bedtime  cefTRIAXone   IVPB 2000 milliGRAM(s) IV Intermittent every 24 hours  cefTRIAXone   IVPB      escitalopram 10 milliGRAM(s) Oral daily  insulin lispro (ADMELOG) corrective regimen sliding scale   SubCutaneous Before meals and at bedtime  levothyroxine 75 MICROGram(s) Oral daily  melatonin 5 milliGRAM(s) Oral at bedtime  metoprolol tartrate 25 milliGRAM(s) Oral two times a day  OLANZapine 5 milliGRAM(s) Oral at bedtime  pantoprazole    Tablet 40 milliGRAM(s) Oral before breakfast  sodium chloride 0.9%. 1000 milliLiter(s) (75 mL/Hr) IV Continuous <Continuous>    MEDICATIONS  (PRN):  acetaminophen   Tablet .. 650 milliGRAM(s) Oral every 6 hours PRN Temp greater or equal to 38C (100.4F), Moderate Pain (4 - 6)  ondansetron Injectable 4 milliGRAM(s) IV Push every 6 hours PRN Nausea and/or Vomiting      Vital Signs Last 24 Hrs  T(C): 36.8 (12 Jan 2021 04:40), Max: 36.9 (11 Jan 2021 13:32)  T(F): 98.2 (12 Jan 2021 04:40), Max: 98.5 (11 Jan 2021 13:32)  HR: 112 (12 Jan 2021 04:40) (96 - 112)  BP: 148/88 (12 Jan 2021 04:40) (138/80 - 151/97)  RR: 18 (12 Jan 2021 04:40) (17 - 18)  SpO2: 94% (12 Jan 2021 04:40) (94% - 97%)    PHYSICAL EXAMINATION:  GENERAL: NAD, obese  HEAD:  Atraumatic, Normocephalic  EYES:  conjunctiva and sclera clear  NECK: Supple, No JVD, Normal thyroid  CHEST/LUNG: Clear to auscultation. Clear to percussion bilaterally; No rales, rhonchi, wheezing, or rubs  HEART: Irregular rate and rhythm; No murmurs, rubs, or gallops  ABDOMEN: Soft, Nontender, Nondistended; Bowel sounds present, no pain on palpation   NERVOUS SYSTEM:  Alert & Oriented X3  EXTREMITIES:  2+ Peripheral Pulses, No clubbing, or cyanosis, bilateral non pitting edema  SKIN: warm very dry skin                          8.8    7.98  )-----------( 320      ( 12 Jan 2021 07:38 )             27.4     01-12    145  |  112<H>  |  24<H>  ----------------------------<  131<H>  4.2   |  25  |  2.09<H>    Ca    8.2<L>      12 Jan 2021 07:38  Phos  3.5     01-12  Mg     2.1     01-12    TPro  5.7<L>  /  Alb  1.8<L>  /  TBili  0.3  /  DBili  x   /  AST  23  /  ALT  20  /  AlkPhos  197<H>  01-11    LIVER FUNCTIONS - ( 11 Jan 2021 06:43 )  Alb: 1.8 g/dL / Pro: 5.7 g/dL / ALK PHOS: 197 U/L / ALT: 20 U/L DA / AST: 23 U/L / GGT: x               PT/INR - ( 11 Jan 2021 06:43 )   PT: 14.4 sec;   INR: 1.22 ratio             I&O's Summary    11 Jan 2021 07:01  -  12 Jan 2021 07:00  --------------------------------------------------------  IN: 240 mL / OUT: 0 mL / NET: 240 mL                            Assessment and Plan:   · Assessment      80 year old female from home, with PMH of DM, HTN, CHF and hypothyroidism presented to the ED due to slurred speech admitted for sepsis/bacteremia        Problem/Plan - 1:  ·  Problem: Bacteremia.  Plan: -1/6 Bcx growing gram positive rods - lactobacillus  -1/7 Bcx negative  -S/p Zosyn and vancomycin   -C/w Rocephin 1/11, as per Dr Velasquez should be continue for 4 more weeks, PICC line scheduled for 1/13/21  -Patient refused ROSELYN      Problem/Plan - 2:  ·  Problem: Afib.  Plan: -Patient noted to have  a fib on tele monitor  -HR ranges   -Pt has past history of EP studies in Aug 2020 that showed premature atrial contraction but no AFib  -CHADs-Vasc score of 6  -Hold lisinopril, Cr 2.18  -C/w metoprolol 25mg BID  -Holding Eliquis 2.5mg BID as patient is scheduled for PICC line 1/13/21      Problem/Plan - 3:  ·  Problem: Acute encephalopathy.  Plan: -Patient presented due to slurred speech and confusion  -Slurred speech resolved now  -Most likely TIA vs worsening dementia vs bacteremia   -CT head negative  -Neuro Dr. Elias  notes reviewed  -Fall, aspiration and seizure precautions  -Homocysteine normal  -SPEP- hypoalbuminemia   -C Spine CT for possible myelopathy and compression was unremarkable showed some degenerative changes  -F/u MMA, BREONNA.     Problem/Plan - 4:  ·  Problem: PARTH (acute kidney injury).  Plan: -Creatinine 2.82 on admission, baseline 0.7   -Cr trending down, today 2.09  -Most likely ATN  -CT with mild left hydroureteronephrosis with perinephric stranding/ tubular dilation at the level of left mid ureter containing vague hyper density,   -Incidental finding of left gonadal thrombus, no testing available in house, low suspicious of thrombus   -US renal showed no hydronephrosis   -Continue to hold Lasix   -Urology recommends outpatient follow up      Problem/Plan - 5:  ·  Problem: Anemia.  Plan: Hb 10  Anemia panel compatible w/ anemia of chronic disease   Monitor CBC daily.    Problem/Plan - 6:  Problem: Diabetes mellitus. Plan: -Patient is on metformin at home  -C/w SSI  -HgbA1C 6.7  -Monitor BS and adjust insulin as needed.    Problem/Plan - 7:  ·  Problem: Hypertension.  Plan: -Hold home med of Lisinopril and Lasix in setting of PARTH   -Monitor BP and add meds as needed  -C/w metoprolol.     Problem/Plan - 8:  ·  Problem: Congestive heart failure (CHF).  Plan: -1/6 Echo left ventricle remodeling, epicardial fat pad   Hold off lisinopril   -strict I/O, daily weight.     Problem/Plan - 9:  ·  Problem: Hypothyroidism.  Plan: -C/w home med of synthroid  -TSH 2.76.     Problem/Plan - 10:  Problem: Need for prophylactic measure. Plan; Off Eliquis, PICC line in the am 1/13  Patient is Full code.

## 2021-01-12 NOTE — PROGRESS NOTE ADULT - ATTENDING COMMENTS
[facility] NEPHROLOGY  Cornelius Frank M.D.  Davey Burroughs D.O.  Katerina Ortiz M.D.  Nina Bryson, MSN, ANP-C  [phone]    [address] [place] [address]

## 2021-01-12 NOTE — DISCHARGE NOTE NURSING/CASE MANAGEMENT/SOCIAL WORK - PATIENT PORTAL LINK FT
You can access the FollowMyHealth Patient Portal offered by [facility] by registering at the following website: [email or web address]. By joining [employer]’s FollowMyHealth portal, you will also be able to view your health information using other applications (apps) compatible with our system.

## 2021-01-12 NOTE — PROGRESS NOTE ADULT - PROBLEM SELECTOR PLAN 2
-Patient noted to have  a fib on tele monitor  -HR ranges   -Pt has past history of EP studies in Aug 2020 that showed premature atrial contraction but no AFib  -CHADs-Vasc score of 6  -Hold lisinopril, Cr 2.18  -C/w metoprolol 25mg BID  -Holding Eliquis 2.5mg BID as patient is scheduled for PICC line 1/13/21  -Cardio, Dr. Edmond on board

## 2021-01-12 NOTE — DISCHARGE NOTE NURSING/CASE MANAGEMENT/SOCIAL WORK - NSDCPEPTCAREGIVEDUMATLIST _GEN_ALL_CORE
Heart Failure/Stroke/Diabetes/Influenza Vaccination Heart Failure/Stroke/Diabetes/Influenza Vaccination/Apixaban/Eliquis

## 2021-01-12 NOTE — PROGRESS NOTE ADULT - ASSESSMENT
80 year old female from home, with PMH of DM, HTN, CHF and hypothyroidism presented to the ED due to slurred speech admitted for sepsis/bacteremia

## 2021-01-12 NOTE — PROGRESS NOTE ADULT - ASSESSMENT
Patient is a 81yo Romanian speaking Female with CHF, DM, HTN, and hypothyroidism, who p/w slurred speech r/o CVA. Pt a/w Sepsis 2/2 UTI/ GPR bacteremia and hypotension. Nephrology consulted for Elevated serum creatinine.    1. PARTH- previous Scr 0.8-1. PARTH likely ATN in the setting of septic shock/ hypotension. UA with 30 protein, trace LE, small blood with hyaline and granular casts. FeNa 0.32%. Continue to hold Lisinopril/ Lasix.   Renal function stabilized. Encourage PO intake. s/p TTE with EF >55%. Renal US with no hydro. s/p CT with mild left hydroureteronephrosis with perinephric stranding/ tubular dilation at the level of left mid ureter containing vague hyperdensity ?blood. UA unremarkable and urology following with no plans for intervention at this time.  hgb improving; LDH wnl, hapto elevated; not consistent with TMA  Strict I/Os. Avoid nephrotoxins/ NSAIDs/ RCA. Monitor BMP.    2. Septic shock 2/2 UTI with gram +tayler bacteremia. Pt on Ceftriaxone. Urine culture negative. Monitor Vanco level.     3. Hypotension- BP controlled with tachycardia. On metoprolol. As per primary team. Monitor BP.     4. CHF- patient not volume overloaded. Hold Lasix.

## 2021-01-12 NOTE — PROGRESS NOTE ADULT - PROBLEM SELECTOR PLAN 10
GOC with daughter, Raquel  FULL CODE Off Eliquis for ROSELYN in the am Off Eliquis   Patient is Full code Off Eliquis, PICC line in the am 1/13  Patient is Full code

## 2021-01-12 NOTE — PROGRESS NOTE ADULT - ATTENDING COMMENTS
Patient was seen and examined by me on 01/12/2021,interim events noted,labs and radiology studies reviewed.  Faizan Edmond MD,FACC.  [address] [place] [address].  [facility]. [phone]

## 2021-01-12 NOTE — PROGRESS NOTE ADULT - PROBLEM SELECTOR PLAN 4
-Creatinine 2.82 on admission, baseline 0.7   -Cr trending down, today 2.09  -Most likely ATN   -CT with mild left hydroureteronephrosis with perinephric stranding/ tubular dilation at the level of left mid ureter containing vague hyper density,   -Incidental finding of left gonadal thrombus, no testing available in house, low suspicious of thrombus   -US renal showed no hydronephrosis   -Continue to hold Lasix   -Urology recommends outpatient follow up -Creatinine 2.82 on admission, baseline 0.7   -Cr trending down, today 2.09  -Most likely ATN  -CT with mild left hydroureteronephrosis with perinephric stranding/ tubular dilation at the level of left mid ureter containing vague hyper density,   -Incidental finding of left gonadal thrombus, no testing available in house, low suspicious of thrombus   -US renal showed no hydronephrosis   -Continue to hold Lasix   -Urology recommends outpatient follow up -Creatinine 2.82 on admission, baseline 0.7   -Cr trending down, today 2.09  -Most likely ATN  -CT with mild left hydroureteronephrosis with perinephric stranding/ tubular dilation at the level of left mid ureter containing vague hyper density,   -Incidental finding of left gonadal thrombus, no testing available in house, low suspicious of thrombus   -US renal showed no hydronephrosis   -Continue to hold Lasix   -Urology recommends outpatient follow up  -Nephro Dr Ortiz on board

## 2021-01-13 NOTE — PROCEDURE NOTE - NSTIMEOUT_GEN_A_CORE
patient/regarding LHC Patient's first and last name, , procedure, and correct site confirmed prior to the start of procedure.

## 2021-01-13 NOTE — PROGRESS NOTE ADULT - PROBLEM SELECTOR PLAN 8
-1/6 Echo left ventricle remodeling, epicardial fat pad   Hold off lisinopril   -strict I/O, daily weight

## 2021-01-13 NOTE — PROGRESS NOTE ADULT - PROBLEM SELECTOR PLAN 4
-Creatinine 2.82 on admission, baseline 0.7   -Cr trending down, today 1.8  -Most likely ATN  -CT with mild left hydroureteronephrosis with perinephric stranding/ tubular dilation at the level of left mid ureter containing vague hyper density,   -Incidental finding of left gonadal thrombus, no testing available in house, low suspicious of thrombus   -US renal showed no hydronephrosis   -Continue to hold Lasix   -Urology recommends outpatient follow up  -Nephro Dr Ortiz on board

## 2021-01-13 NOTE — PROGRESS NOTE ADULT - ATTENDING COMMENTS
[facility] NEPHROLOGY  Cornelius Frank M.D.  Davey Burroughs D.O.  Katerina Ortiz M.D.  Nina Bryson, MSN, ANP-C  [phone]    [address] [place] [address]

## 2021-01-13 NOTE — PROGRESS NOTE ADULT - SUBJECTIVE AND OBJECTIVE BOX
Interventional Radiology Pre-Procedure Note    Procedure:    Diagnosis/Indication: Patient is a 80y old  Female who presents with a chief complaint of slurred speech (2021 12:50)      PAST MEDICAL & SURGICAL HISTORY:  Hypothyroidism    Congestive heart failure (CHF)    Hypertension    Diabetes mellitus    Anxiety    Obesity    DM (diabetes mellitus)    HTN (hypertension)    History of          Allergies: No Known Allergies      LABS:  CBC Full  -  ( 2021 07:23 )  WBC Count : 8.05 K/uL  RBC Count : 3.07 M/uL  Hemoglobin : 8.7 g/dL  Hematocrit : 27.0 %  Platelet Count - Automated : 362 K/uL  Mean Cell Volume : 87.9 fl  Mean Cell Hemoglobin : 28.3 pg  Mean Cell Hemoglobin Concentration : 32.2 gm/dL  Auto Neutrophil # : x  Auto Lymphocyte # : x  Auto Monocyte # : x  Auto Eosinophil # : x  Auto Basophil # : x  Auto Neutrophil % : x  Auto Lymphocyte % : x  Auto Monocyte % : x  Auto Eosinophil % : x  Auto Basophil % : x        143  |  111<H>  |  21<H>  ----------------------------<  139<H>  3.6   |  24  |  1.80<H>    Ca    8.3<L>      2021 07:23  Phos  3.1       Mg     2.1         TPro  6.2  /  Alb  1.9<L>  /  TBili  0.3  /  DBili  x   /  AST  18  /  ALT  19  /  AlkPhos  159<H>      PT/INR - ( 2021 07:23 )   PT: 14.0 sec;   INR: 1.19 ratio         PTT - ( 2021 07:23 )  PTT:28.7 sec    Procedure/ risks/ benefits/ alternatives were discussed with the daughter , who verbalizes understanding, and witnessed informed consent was obtained. Dr Rai spoke to nephrologist Dr Bearden no contra-indication to proceed with PICC line peripherally .         Interventional Radiology Pre-Procedure Note    Procedure: PICC placement    Diagnosis/Indication: Patient is a 80y old Female who presents with a chief complaint of slurred speech and bacteremia. PICC placement was requested. Dr Rai spoke to nephrologist Dr Ortiz no contra-indication to proceed with PICC line peripherally.     PAST MEDICAL & SURGICAL HISTORY:  Hypothyroidism    Congestive heart failure (CHF)    Hypertension    Diabetes mellitus    Anxiety    Obesity    DM (diabetes mellitus)    HTN (hypertension)    History of       Allergies: No Known Allergies      LABS:  CBC Full  -  ( 2021 07:23 )  WBC Count : 8.05 K/uL  RBC Count : 3.07 M/uL  Hemoglobin : 8.7 g/dL  Hematocrit : 27.0 %  Platelet Count - Automated : 362 K/uL  Mean Cell Volume : 87.9 fl  Mean Cell Hemoglobin : 28.3 pg  Mean Cell Hemoglobin Concentration : 32.2 gm/dL        143  |  111<H>  |  21<H>  ----------------------------<  139<H>  3.6   |  24  |  1.80<H>    Ca    8.3<L>      2021 07:23  Phos  3.1       Mg     2.1         TPro  6.2  /  Alb  1.9<L>  /  TBili  0.3  /  DBili  x   /  AST  18  /  ALT  19  /  AlkPhos  159<H>      PT/INR - ( 2021 07:23 )   PT: 14.0 sec;   INR: 1.19 ratio       PTT - ( 2021 07:23 )  PTT:28.7 sec    Procedure/ risks/ benefits/ alternatives were discussed with the patient's daughter, who verbalizes understanding, and witnessed informed consent was obtained.

## 2021-01-13 NOTE — PROGRESS NOTE ADULT - PROBLEM/PLAN-1
DISPLAY PLAN FREE TEXT
123
DISPLAY PLAN FREE TEXT

## 2021-01-13 NOTE — PROGRESS NOTE ADULT - PROBLEM SELECTOR PLAN 3
-Patient presented due to slurred speech and confusion  -Slurred speech resolved now  -Most likely TIA vs worsening dementia vs bacteremia   -CT head negative  -Neuro Dr. Elias on board  -Fall, aspiration and seizure precautions  -Homocysteine normal  -SPEP- hypoalbuminemia   -, B12 841, BREONNA homogeneous, immunofixation no monoclonal   -C Spine CT for possible myelopathy and compression was unremarkable showed some degenerative changes -Patient presented due to slurred speech and confusion  -Slurred speech resolved now  -Most likely TIA vs worsening dementia vs bacteremia   -CT head negative  -Fall, aspiration and seizure precautions  -Homocysteine 11, SPEP- hypoalbuminemia, , vit B12 841, BREONNA 1:80 homogeneous, immunofixation no monoclonal, Rheumatoid factor 15  -C Spine CT for possible myelopathy and compression was unremarkable showed some degenerative changes  -Neuro Dr. Elias on board, made aware about lab results, awaiting recommendations -Patient presented due to slurred speech and confusion  -Slurred speech resolved now  -Most likely TIA vs worsening dementia vs bacteremia   -CT head negative  -Fall, aspiration and seizure precautions  -Homocysteine 11, SPEP- hypoalbuminemia, , vit B12 841, BREONNA 1:80 homogeneous, immunofixation no monoclonal, Rheumatoid factor 15  -F/u folate, MMA, Homocysteine and intrinsic factor ab  -C Spine CT for possible myelopathy and compression was unremarkable showed some degenerative changes  -Neuro Dr. Elias on board, made aware about lab results, recommends to repeat labs and then start Vit B12 weekly

## 2021-01-13 NOTE — PROGRESS NOTE ADULT - PROBLEM SELECTOR PLAN 7
-Hold home med of Lisinopril and Lasix in setting of PARTH   -Monitor BP and add meds as needed  -C/w metoprolol

## 2021-01-13 NOTE — PROGRESS NOTE ADULT - ASSESSMENT
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  The assay for B12 does not measure B12 level directly.  False elevations occur with pernicious anemia due to intrinsic factor antibodies, which may or may not be detected by the antibody test.  B12-like compounds of vegetable origin (a problem with vegans) without cobalamin activity also can lead to falsely elevated determinations.  Methylmalonic acid (MMA)and homocysteine (Hcy) determinations are necessary to elucidate what is happenings.  Irrespective of B12 and folate levels:       high Hcy w normal MMA implies folate deficiency;        high MMA and Hcy implies B12 deficiency +/- folate deficiency.    RECOMMENDATIONS    Repeat MMA (to confirm) and folate levels (incase hemolysis led to false result previously).  Repeat BREONNA.    DS-DNA, CCP, C3 and C4 complement.        PLEASE WAIT UNTIL BLOOD SAMPLES HAVE BEEN SENT TO LAB FOR MMA and HCY.  Then, don't wait for results but administer cyanocobalamin 1000mcg IM, folic acid 5mg PO, B complex tab.  Afterwords cyanocobalamin 1000mcg IM weekly x 3, folic acid 2mg PO daily, B complex daily.  In out-Pt follow-up, if either the MMA or Hcy result from here turns out high, need to repeat MMA and Hcy to ascertain if there was a response to the vitamin supplement treatment regimen.  Then manage as appropriate.    Assuming Pt will be discharged before all test results ar available she will need ou-PT follow-up to assess the results.

## 2021-01-13 NOTE — PROGRESS NOTE ADULT - ATTENDING COMMENTS
Patient seen and examined this morning. Plan of care discussed w/ medical team. Patient is an 80 year old female w/ a PMH of DM, HTN, CHF and hypothyroidism who presented to the ED due to slurred speech found to have sepsis/bacteremia with lactobacillus. Repeat blood culture negative from 1/7, ID Dr. Velasquez following, planned for Ceftriaxone x 4 weeks, now s/p PICC line placement 1/13. Patient's daughters said they cannot manage her IV abx at home therefore plan is for SNF placement to complete her course of IV abx. For her new onset Afib, cardiology Dr. Edmond was consulted, is on Metoprolol and is rate controlled, restarted Eliquis for AC. Also noted to PARTH, secondary to ATN in the setting of sepsis, Lisinopril and Lasix are on hold, Nephrology, Dr. Ortiz is following, Cr continues to slowly improve. CT with mild left hydroureteronephrosis with perinephric stranding/ tubular dilation at the level of left mid ureter, urology was also consulted and recommended outpatient follow up. Neurology was consulted for her slurred speech, suspect secondary to TIA vs dementia vs infection, recommended to obtain MMA, BREONNA, and RF which were all elevated, will follow up with Neurology regarding findings. Possible incidental finding of left gonadal thrombus, unable to obtain pelvic US w/ doppler in house, low suspicion of thrombus, however already on AC as above w/ Eliquis for Afib. Also noted to have anemia, likely anemia of chronic disease, will obtain FOBT. Obtain nutrition eval for protein-calorie malnutrition. Plan for discharge to [facility] for IV abx, COVID screen sent today, can discharge in the next 1-2 days once accepted to [facility].    Rest as per resident's note.

## 2021-01-13 NOTE — PROGRESS NOTE ADULT - SUBJECTIVE AND OBJECTIVE BOX
[facility] NEPHROLOGY- PROGRESS NOTE    Patient is a 81yo Lao speaking Female with CHF, DM, HTN, and hypothyroidism, who p/w slurred speech r/o CVA. Pt a/w Sepsis 2/2 UTI/ GPR bacteremia and hypotension. Nephrology consulted for Elevated serum creatinine.    Hospital Medications: Medications reviewed.    REVIEW OF SYSTEMS:   CONSTITUTIONAL: No fevers or chills;   RESPIRATORY: No shortness of breath,   CARDIOVASCULAR: No chest pain.  GASTROINTESTINAL: No nausea, vomiting, or abdominal pain, + diarrhea - 3 to 4 episodes  VASCULAR: No bilateral lower extremity edema.     VITALS:  T(F): 97.4 (21 @ 14:42), Max: 97.8 (21 @ 10:09)  HR: 103 (21 @ 14:42)  BP: 175/66 (21 @ 14:42)  RR: 18 (21 @ 14:42)  SpO2: 96% (21 @ 14:42)  Wt(kg): --      PHYSICAL EXAM:  Gen: NAD,  HEENT: anicteric  Neck: no JVD  Cards: RRR, +S1/S2,   Resp: CTA ant  GI: soft, NT/ND, NABS  Extremities: no LE edema B/L  +LUE PICC    LABS:      143  |  111<H>  |  21<H>  ----------------------------<  139<H>  3.6   |  24  |  1.80<H>    Ca    8.3<L>      2021 07:23  Phos  3.1       Mg     2.1         TPro  6.2  /  Alb  1.9<L>  /  TBili  0.3  /  DBili      /  AST  18  /  ALT  19  /  AlkPhos  159<H>      Creatinine Trend: 1.80 <--, 2.09 <--, 2.18 <--, 2.32 <--, 2.14 <--, 2.18 <--, 2.23 <--                        8.7    8.05  )-----------( 362      ( 2021 07:23 )             27.0     Urine Studies:  Urinalysis Basic - ( 2021 08:24 )    Color: Yellow / Appearance: very cloudy / S.015 / pH:   Gluc:  / Ketone: Negative  / Bili: Negative / Urobili: Negative   Blood:  / Protein: 30 mg/dL / Nitrite: Negative   Leuk Esterase: Trace / RBC: 2-5 /HPF / WBC 3-5 /HPF   Sq Epi:  / Non Sq Epi: Few /HPF / Bacteria: Trace /HPF

## 2021-01-13 NOTE — PROGRESS NOTE ADULT - PROBLEM SELECTOR PLAN 1
-1/6 Bcx growing gram positive rods - lactobacillus  -1/7 Bcx negative  -S/p Zosyn and vancomycin   -C/w Rocephin 1/11, as per Dr Velasquez should be continue for 4 more weeks, PICC line scheduled for 1/13/21  -Patient refused ROSELYN  -Dr Velasquez on board -1/6 Bcx growing gram positive rods - lactobacillus  -1/7 Bcx negative  -S/p Zosyn and vancomycin   -C/w Rocephin 1/11, as per Dr Velasquez should be continue for 4 more weeks  -S/p PICC line left basilic 1/13/21  -Patient refused ROSELYN  -Restarted on Eliquis 2.5 mg BID  -F/u COVID19 for OCHOA placement   -Dr Velasquez on board

## 2021-01-13 NOTE — PROGRESS NOTE ADULT - SUBJECTIVE AND OBJECTIVE BOX
DATE OF SERVICE:  01/13/2021      Patient seen and examined.Interim events reviewed.    CHIEF COMPLAINT & BRIEF HOSPITAL COURSE:  80 year old female from home, with PMH of DM, HTN, CHF and hypothyroidism presented to the ED due to slurred speech. Patient initially w/ slurred speech, CTH negative, CT cervical w/ cervical degenerative changes, US carotids negative for stenosis, echo showed EF 55%, epicardial fat pad, LV remodelingPatient with new onset A. fib, on Eliquis and metoprolol, TSH normal on synthroid.   Patient refused ROSELYN, PICC line to be  placed by IR on 1/13 left basilic.       INTERVAL HPI/OVERNIGHT EVENTS:  [id number], patient explained about need of PICC line for IV antibiotics, she referred sore throat, denies cough or chest pain or discomfort.     MEDICATIONS  (STANDING):  atorvastatin 40 milliGRAM(s) Oral at bedtime  cefTRIAXone   IVPB 2000 milliGRAM(s) IV Intermittent every 24 hours  cefTRIAXone   IVPB      escitalopram 10 milliGRAM(s) Oral daily  insulin lispro (ADMELOG) corrective regimen sliding scale   SubCutaneous Before meals and at bedtime  levothyroxine 75 MICROGram(s) Oral daily  melatonin 5 milliGRAM(s) Oral at bedtime  metoprolol tartrate 25 milliGRAM(s) Oral two times a day  OLANZapine 5 milliGRAM(s) Oral at bedtime  pantoprazole    Tablet 40 milliGRAM(s) Oral before breakfast  sodium chloride 0.9%. 1000 milliLiter(s) (75 mL/Hr) IV Continuous <Continuous>    MEDICATIONS  (PRN):  acetaminophen   Tablet .. 650 milliGRAM(s) Oral every 6 hours PRN Temp greater or equal to 38C (100.4F), Moderate Pain (4 - 6)  ondansetron Injectable 4 milliGRAM(s) IV Push every 6 hours PRN Nausea and/or Vomiting      Vital Signs Last 24 Hrs  T(C): 36.6 (13 Jan 2021 10:09), Max: 36.7 (12 Jan 2021 14:21)  T(F): 97.8 (13 Jan 2021 10:09), Max: 98.1 (12 Jan 2021 14:21)  HR: 75 (13 Jan 2021 10:09) (52 - 78)  BP: 167/84 (13 Jan 2021 10:09) (149/97 - 167/84)  BP(mean): --  RR: 18 (13 Jan 2021 10:09) (18 - 18)  SpO2: 97% (13 Jan 2021 10:09) (95% - 98%)    PHYSICAL EXAMINATION:  GENERAL: NAD, obese, left basilic PICC line   HEAD:  Atraumatic, Normocephalic  EYES:  conjunctiva and sclera clear  NECK: Supple, No JVD, Normal thyroid  CHEST/LUNG: Clear to auscultation. Clear to percussion bilaterally; No rales, rhonchi, wheezing, or rubs  HEART: Irregular rate and rhythm; No murmurs, rubs, or gallops  ABDOMEN: Soft, Nontender, Nondistended; Bowel sounds present, no pain on palpation   NERVOUS SYSTEM:  Alert & Oriented X2-3  EXTREMITIES:  2+ Peripheral Pulses, No clubbing, or cyanosis, bilateral non pitting edema  SKIN: warm very dry skin                          8.7    8.05  )-----------( 362      ( 13 Jan 2021 07:23 )             27.0     01-13    143  |  111<H>  |  21<H>  ----------------------------<  139<H>  3.6   |  24  |  1.80<H>    Ca    8.3<L>      13 Jan 2021 07:23  Phos  3.1     01-13  Mg     2.1     01-13    TPro  6.2  /  Alb  1.9<L>  /  TBili  0.3  /  DBili  x   /  AST  18  /  ALT  19  /  AlkPhos  159<H>  01-13    LIVER FUNCTIONS - ( 13 Jan 2021 07:23 )  Alb: 1.9 g/dL / Pro: 6.2 g/dL / ALK PHOS: 159 U/L / ALT: 19 U/L DA / AST: 18 U/L / GGT: x               PT/INR - ( 13 Jan 2021 07:23 )   PT: 14.0 sec;   INR: 1.19 ratio         PTT - ( 13 Jan 2021 07:23 )  PTT:28.7 sec    I&O's Summary                        Assessment and Plan:   · Assessment      80 year old female from home, with PMH of DM, HTN, CHF and hypothyroidism presented to the ED due to slurred speech admitted for sepsis/bacteremia        Problem/Plan - 1:  ·  Problem: Bacteremia.  Plan: -1/6 Bcx growing gram positive rods - lactobacillus  -1/7 Bcx negative  -S/p Zosyn and vancomycin   -C/w Rocephin 1/11, as per Dr Velasquez should be continue for 4 more weeks  -S/p PICC line left basilic 1/13/21  -Patient refused ROSELYN  -Restarted on Eliquis 2.5 mg BID  -F/u COVID19 for OCHOA placement       Problem/Plan - 2:  ·  Problem: Afib.  Plan: -Patient noted to have  a fib on tele monitor  -HR ranges   -Pt has past history of EP studies in Aug 2020 that showed premature atrial contraction but no AFib  -CHADs-Vasc score of 6  -Hold lisinopril, Cr 1.8  -Increased metoprolol to 50 mg BID  -Restarted on Eliquis 2.5 mg BID       Problem/Plan - 3:  ·  Problem: Acute encephalopathy.  Plan: -Patient presented due to slurred speech and confusion  -Slurred speech resolved now  -Most likely TIA vs worsening dementia vs bacteremia   -CT head negative  -Fall, aspiration and seizure precautions  -Homocysteine 11, SPEP- hypoalbuminemia, , vit B12 841, BREONNA 1:80 homogeneous, immunofixation no monoclonal, Rheumatoid factor 15  -F/u folate, MMA, Homocysteine and intrinsic factor ab  -C Spine CT for possible myelopathy and compression was unremarkable showed some degenerative changes  -Neuro Dr. Elias on board, made aware about lab results, recommends to repeat labs and then start Vit B12 weekly.     Problem/Plan - 4:  ·  Problem: PARTH (acute kidney injury).  Plan: -Creatinine 2.82 on admission, baseline 0.7   -Cr trending down, today 1.8  -Most likely ATN  -CT with mild left hydroureteronephrosis with perinephric stranding/ tubular dilation at the level of left mid ureter containing vague hyper density,   -Incidental finding of left gonadal thrombus, no testing available in house, low suspicious of thrombus   -US renal showed no hydronephrosis   -Continue to hold Lasix   -Urology recommends outpatient follow up      Problem/Plan - 5:  ·  Problem: Anemia.  Plan: Hb 10  Anemia panel compatible w/ anemia of chronic disease   No signs of active bleeding   F/u FOBT  CT abdomen noted   Monitor CBC daily  Nutritionist consulted.    Problem/Plan - 6:  Problem: Diabetes mellitus. Plan: -Patient is on metformin at home  -C/w SSI  -HgbA1C 6.7  -Monitor BS and adjust insulin as needed.    Problem/Plan - 7:  ·  Problem: Hypertension.  Plan: -Hold home med of Lisinopril and Lasix in setting of PARTH   -Monitor BP and add meds as needed  -C/w metoprolol.     Problem/Plan - 8:  ·  Problem: Congestive heart failure (CHF).  Plan: -1/6 Echo left ventricle remodeling, epicardial fat pad   Hold off lisinopril   -strict I/O, daily weight.     Problem/Plan - 9:  ·  Problem: Hypothyroidism.  Plan: -C/w home med of synthroid  -TSH 2.76.     Problem/Plan - 10:  Problem: Need for prophylactic measure. Plan; Restarted on Eliquis 2.5 mg BID   Patient is Full code.

## 2021-01-13 NOTE — PROGRESS NOTE ADULT - PROBLEM SELECTOR PLAN 5
Hb 10  Anemia panel compatible w/ anemia of chronic disease   Monitor CBC daily Hb 10  Anemia panel compatible w/ anemia of chronic disease   No signs of active bleeding   CT abdomen noted   Monitor CBC daily Hb 10  Anemia panel compatible w/ anemia of chronic disease   No signs of active bleeding   F/u FOBT  CT abdomen noted   Monitor CBC daily Hb 10  Anemia panel compatible w/ anemia of chronic disease   No signs of active bleeding   F/u FOBT  CT abdomen noted   Monitor CBC daily  Nutritionist consulted

## 2021-01-13 NOTE — PROCEDURE NOTE - ADDITIONAL PROCEDURE DETAILS
During procedure chest x-ray  was obtained,  however catheter was going to jugular area. Then Dr Aubree Rai was called  to reposition the catheter via fluoroscopy. Catheter is at SVC . May use immediately . dressing intact . no bleeding During procedure chest x-ray was obtained, demonstrated catheter tip in the left jugular vein. Catheter was repositioned into appropriate location using fluoroscopic guidance. Catheter tip at SVC-RA junction. May use PICC immediately. Dressing intact. No bleeding.

## 2021-01-13 NOTE — PROGRESS NOTE ADULT - PROBLEM SELECTOR PLAN 2
-Patient noted to have  a fib on tele monitor  -HR ranges   -Pt has past history of EP studies in Aug 2020 that showed premature atrial contraction but no AFib  -CHADs-Vasc score of 6  -Hold lisinopril, Cr 2.18  -C/w metoprolol 25mg BID  -Holding Eliquis 2.5mg BID as patient is scheduled for PICC line 1/13/21  -Cardio, Dr. Edmond on board -Patient noted to have  a fib on tele monitor  -HR ranges   -Pt has past history of EP studies in Aug 2020 that showed premature atrial contraction but no AFib  -CHADs-Vasc score of 6  -Hold lisinopril, Cr 1.8  -C/w metoprolol 25mg BID  -Restarted on Eliquis 2.5 mg BID  -Cardio, Dr. Edmond on board -Patient noted to have  a fib on tele monitor  -HR ranges   -Pt has past history of EP studies in Aug 2020 that showed premature atrial contraction but no AFib  -CHADs-Vasc score of 6  -Hold lisinopril, Cr 1.8  -Increased metoprolol to 50 mg BID  -Restarted on Eliquis 2.5 mg BID  -Cardio, Dr. Edmond on board

## 2021-01-13 NOTE — PROGRESS NOTE ADULT - SUBJECTIVE AND OBJECTIVE BOX
This is in follow-up to the initial Neurology Consult note of 1/6/21.  As previously noted:    B12/folate 1/6/21  841/16.2  (partially hemolized -  may cause false rise in folate level)  B12 8/9/20 150  (it seems she was treated during Aug 2020 hospitalization, she is on oral supplementation w 1000mcg daily)  TSH 2.76  TSH 8/9/20  45.13   ESR/CRP  91/20.05    Additional tests were recommended.  Available result as follow:     Methylmalonic acid (MMA)  significantly elevated at 460   Homocysteine (Hcy) 11.6  {current normal range is <15 but previously it was <10}  RF  15  BREONNA  1:80 homogenous  (questionable significance; warrants repeating; the elevated RF, ESR and CRP increase the suspicion of possible autoimmune problem).   SPEP  low albumin 2.2 but no abnormal pattern.  Serum immunofixation - no monoclonal band.  CCP - not found    Exam findings unchanged.

## 2021-01-13 NOTE — PROGRESS NOTE ADULT - ASSESSMENT
Patient is a 81yo Hungarian speaking Female with CHF, DM, HTN, and hypothyroidism, who p/w slurred speech r/o CVA. Pt a/w Sepsis 2/2 UTI/ GPR bacteremia and hypotension. Nephrology consulted for Elevated serum creatinine.    1. PARTH- previous Scr 0.8-1. PARTH likely ATN in the setting of septic shock/ hypotension. UA with 30 protein, trace LE, small blood with hyaline and granular casts. FeNa 0.32%. Continue to hold Lisinopril/ Lasix.   Renal function stabilized with mild improvement today. Encourage PO intake. s/p TTE with EF >55%. Renal US with no hydro. s/p CT with mild left hydroureteronephrosis with perinephric stranding/ tubular dilation at the level of left mid ureter containing vague hyperdensity ?blood. UA unremarkable and urology following with no plans for intervention at this time.  hgb staboe; LDH wnl, hapto elevated; not consistent with TMA  Strict I/Os. Avoid nephrotoxins/ NSAIDs/ RCA. Monitor BMP.    2. Septic shock 2/2 UTI with gram +tayler bacteremia. Pt on Ceftriaxone. Urine culture negative.     3. Hypotension- BP elevated with tachycardia. Recc to increase metoprolol to 50mg PO bid. Rate control as per primary team. Monitor BP.     4. CHF- patient not volume overloaded. Hold Lasix.

## 2021-01-13 NOTE — PROGRESS NOTE ADULT - ATTENDING COMMENTS
Patient was seen and examined by me on 01/13/2021,interim events noted,labs and radiology studies reviewed.  Faizan Edmond MD,[facility].  [address] [place] [address].  [facility]. [phone]

## 2021-01-13 NOTE — PROGRESS NOTE ADULT - PROBLEM SELECTOR PLAN 10
Off Eliquis, PICC line in the am 1/13  Patient is Full code Restarted on Eliquis 2.5 mg BID   Patient is Full code

## 2021-01-13 NOTE — PROGRESS NOTE ADULT - SUBJECTIVE AND OBJECTIVE BOX
PGY-1 Progress Note discussed with attending    PAGER #: [[phone]] TILL 5:00 PM  PLEASE CONTACT ON CALL TEAM:  - On Call Team (Please refer to Jose) FROM 5:00 PM - 8:30PM  - Nightfloat Team FROM 8:30 -7:30 AM    CHIEF COMPLAINT & BRIEF HOSPITAL COURSE:  80 year old female from home, with PMH of DM, HTN, CHF and hypothyroidism presented to the ED due to slurred speech. Patient is a poor historian, unsure about exactly what happened that caused her to come to the ED.   Patient admitted for acute encephalopathy, sepsis/bacteremia, on admission, tachycardic, hypotensive, febrile, leucocytosis, 1/6 blood cx lactobacillus, 1/7 repeat blood cx negative, S/p vancomycin and zosyn, now on Rocephin, CT abdomen showed mild left hydronephrosis, thrombus left gonadal vein.  Patient initially w/ slurred speech, CTH negative, CT cervical w/ cervical degenerative changes, US carotids negative for stenosis, echo showed EF 55%, epicardial fat pad, LV remodeling.   Patient with new onset A. fibrillation, on Eliquis and metoprolol, TSH normal on synthroid.   Patient refused ROSELYN today. Patient is pending PICC line placement     INTERVAL HPI/OVERNIGHT EVENTS:  [id number], patient explained about need of PICC line for IV antibiotics, she referred sore throat, denies cough or chest pain or discomfort.         MEDICATIONS  (STANDING):  atorvastatin 40 milliGRAM(s) Oral at bedtime  cefTRIAXone   IVPB 2000 milliGRAM(s) IV Intermittent every 24 hours  cefTRIAXone   IVPB      escitalopram 10 milliGRAM(s) Oral daily  insulin lispro (ADMELOG) corrective regimen sliding scale   SubCutaneous Before meals and at bedtime  levothyroxine 75 MICROGram(s) Oral daily  melatonin 5 milliGRAM(s) Oral at bedtime  metoprolol tartrate 25 milliGRAM(s) Oral two times a day  OLANZapine 5 milliGRAM(s) Oral at bedtime  pantoprazole    Tablet 40 milliGRAM(s) Oral before breakfast  sodium chloride 0.9%. 1000 milliLiter(s) (75 mL/Hr) IV Continuous <Continuous>    MEDICATIONS  (PRN):  acetaminophen   Tablet .. 650 milliGRAM(s) Oral every 6 hours PRN Temp greater or equal to 38C (100.4F), Moderate Pain (4 - 6)  ondansetron Injectable 4 milliGRAM(s) IV Push every 6 hours PRN Nausea and/or Vomiting      Vital Signs Last 24 Hrs  T(C): 36.6 (13 Jan 2021 10:09), Max: 36.7 (12 Jan 2021 14:21)  T(F): 97.8 (13 Jan 2021 10:09), Max: 98.1 (12 Jan 2021 14:21)  HR: 75 (13 Jan 2021 10:09) (52 - 78)  BP: 167/84 (13 Jan 2021 10:09) (149/97 - 167/84)  BP(mean): --  RR: 18 (13 Jan 2021 10:09) (18 - 18)  SpO2: 97% (13 Jan 2021 10:09) (95% - 98%)    PHYSICAL EXAMINATION:  GENERAL: NAD, obese  HEAD:  Atraumatic, Normocephalic  EYES:  conjunctiva and sclera clear  NECK: Supple, No JVD, Normal thyroid  CHEST/LUNG: Clear to auscultation. Clear to percussion bilaterally; No rales, rhonchi, wheezing, or rubs  HEART: Irregular rate and rhythm; No murmurs, rubs, or gallops  ABDOMEN: Soft, Nontender, Nondistended; Bowel sounds present, no pain on palpation   NERVOUS SYSTEM:  Alert & Oriented X3  EXTREMITIES:  2+ Peripheral Pulses, No clubbing, or cyanosis, bilateral non pitting edema  SKIN: warm very dry skin                          8.7    8.05  )-----------( 362      ( 13 Jan 2021 07:23 )             27.0     01-13    143  |  111<H>  |  21<H>  ----------------------------<  139<H>  3.6   |  24  |  1.80<H>    Ca    8.3<L>      13 Jan 2021 07:23  Phos  3.1     01-13  Mg     2.1     01-13    TPro  6.2  /  Alb  1.9<L>  /  TBili  0.3  /  DBili  x   /  AST  18  /  ALT  19  /  AlkPhos  159<H>  01-13    LIVER FUNCTIONS - ( 13 Jan 2021 07:23 )  Alb: 1.9 g/dL / Pro: 6.2 g/dL / ALK PHOS: 159 U/L / ALT: 19 U/L DA / AST: 18 U/L / GGT: x               PT/INR - ( 13 Jan 2021 07:23 )   PT: 14.0 sec;   INR: 1.19 ratio         PTT - ( 13 Jan 2021 07:23 )  PTT:28.7 sec    I&O's Summary                     PGY-1 Progress Note discussed with attending    PAGER #: [[phone]] TILL 5:00 PM  PLEASE CONTACT ON CALL TEAM:  - On Call Team (Please refer to Jose) FROM 5:00 PM - 8:30PM  - Nightfloat Team FROM 8:30 -7:30 AM    CHIEF COMPLAINT & BRIEF HOSPITAL COURSE:  80 year old female from home, with PMH of DM, HTN, CHF and hypothyroidism presented to the ED due to slurred speech. Patient is a poor historian, unsure about exactly what happened that caused her to come to the ED.   Patient admitted for acute encephalopathy, sepsis/bacteremia, on admission, tachycardic, hypotensive, febrile, leucocytosis, 1/6 blood cx lactobacillus, 1/7 repeat blood cx negative, S/p vancomycin and zosyn, now on Rocephin, CT abdomen showed mild left hydronephrosis, thrombus left gonadal vein.  Patient initially w/ slurred speech, CTH negative, CT cervical w/ cervical degenerative changes, US carotids negative for stenosis, echo showed EF 55%, epicardial fat pad, LV remodeling.   Patient with new onset A. fibrillation, on Eliquis and metoprolol, TSH normal on synthroid.   Patient refused ROSELYN, PICC line placed by IR on 1/13 left basilic.     INTERVAL HPI/OVERNIGHT EVENTS:  [id number], patient explained about need of PICC line for IV antibiotics, she referred sore throat, denies cough or chest pain or discomfort.       MEDICATIONS  (STANDING):  atorvastatin 40 milliGRAM(s) Oral at bedtime  cefTRIAXone   IVPB 2000 milliGRAM(s) IV Intermittent every 24 hours  cefTRIAXone   IVPB      escitalopram 10 milliGRAM(s) Oral daily  insulin lispro (ADMELOG) corrective regimen sliding scale   SubCutaneous Before meals and at bedtime  levothyroxine 75 MICROGram(s) Oral daily  melatonin 5 milliGRAM(s) Oral at bedtime  metoprolol tartrate 25 milliGRAM(s) Oral two times a day  OLANZapine 5 milliGRAM(s) Oral at bedtime  pantoprazole    Tablet 40 milliGRAM(s) Oral before breakfast  sodium chloride 0.9%. 1000 milliLiter(s) (75 mL/Hr) IV Continuous <Continuous>    MEDICATIONS  (PRN):  acetaminophen   Tablet .. 650 milliGRAM(s) Oral every 6 hours PRN Temp greater or equal to 38C (100.4F), Moderate Pain (4 - 6)  ondansetron Injectable 4 milliGRAM(s) IV Push every 6 hours PRN Nausea and/or Vomiting      Vital Signs Last 24 Hrs  T(C): 36.6 (13 Jan 2021 10:09), Max: 36.7 (12 Jan 2021 14:21)  T(F): 97.8 (13 Jan 2021 10:09), Max: 98.1 (12 Jan 2021 14:21)  HR: 75 (13 Jan 2021 10:09) (52 - 78)  BP: 167/84 (13 Jan 2021 10:09) (149/97 - 167/84)  BP(mean): --  RR: 18 (13 Jan 2021 10:09) (18 - 18)  SpO2: 97% (13 Jan 2021 10:09) (95% - 98%)    PHYSICAL EXAMINATION:  GENERAL: NAD, obese, left basilic PICC line   HEAD:  Atraumatic, Normocephalic  EYES:  conjunctiva and sclera clear  NECK: Supple, No JVD, Normal thyroid  CHEST/LUNG: Clear to auscultation. Clear to percussion bilaterally; No rales, rhonchi, wheezing, or rubs  HEART: Irregular rate and rhythm; No murmurs, rubs, or gallops  ABDOMEN: Soft, Nontender, Nondistended; Bowel sounds present, no pain on palpation   NERVOUS SYSTEM:  Alert & Oriented X2-3  EXTREMITIES:  2+ Peripheral Pulses, No clubbing, or cyanosis, bilateral non pitting edema  SKIN: warm very dry skin                          8.7    8.05  )-----------( 362      ( 13 Jan 2021 07:23 )             27.0     01-13    143  |  111<H>  |  21<H>  ----------------------------<  139<H>  3.6   |  24  |  1.80<H>    Ca    8.3<L>      13 Jan 2021 07:23  Phos  3.1     01-13  Mg     2.1     01-13    TPro  6.2  /  Alb  1.9<L>  /  TBili  0.3  /  DBili  x   /  AST  18  /  ALT  19  /  AlkPhos  159<H>  01-13    LIVER FUNCTIONS - ( 13 Jan 2021 07:23 )  Alb: 1.9 g/dL / Pro: 6.2 g/dL / ALK PHOS: 159 U/L / ALT: 19 U/L DA / AST: 18 U/L / GGT: x               PT/INR - ( 13 Jan 2021 07:23 )   PT: 14.0 sec;   INR: 1.19 ratio         PTT - ( 13 Jan 2021 07:23 )  PTT:28.7 sec    I&O's Summary                     PGY-1 Progress Note discussed with attending    PAGER #: [[phone]] TILL 5:00 PM  PLEASE CONTACT ON CALL TEAM:  - On Call Team (Please refer to Jose) FROM 5:00 PM - 8:30PM  - Nightfloat Team FROM 8:30 -7:30 AM    CHIEF COMPLAINT & BRIEF HOSPITAL COURSE:  80 year old female from home, with PMH of DM, HTN, CHF and hypothyroidism presented to the ED due to slurred speech. Patient is a poor historian, unsure about exactly what happened that caused her to come to the ED.   Patient admitted for acute encephalopathy, sepsis/bacteremia, on admission, tachycardic, hypotensive, febrile, leucocytosis, 1/6 blood cx lactobacillus, 1/7 repeat blood cx negative, S/p vancomycin and zosyn, now on Rocephin, CT abdomen showed mild left hydronephrosis, thrombus left gonadal vein, no testing available in house, low suspicious of thrombus plus patient is on AC for a. fib   Patient initially w/ slurred speech, CTH negative, CT cervical w/ cervical degenerative changes, US carotids negative for stenosis, echo showed EF 55%, epicardial fat pad, LV remodeling, Homocysteine 11, SPEP- hypoalbuminemia, , vit B12 841, BREONNA 1:80 homogeneous, immunofixation no monoclonal, Rheumatoid factor 15, neuro on board   Patient with new onset A. fib, on Eliquis and metoprolol, TSH normal on synthroid.   Patient refused ROSELYN, PICC line placed by IR on 1/13 left basilic.   Patient also noted to have anemia compatible with chronic disease, has been stable, no active signs of bleeding, FOBT pending     INTERVAL HPI/OVERNIGHT EVENTS:  [id number], patient explained about need of PICC line for IV antibiotics, she referred sore throat, denies cough or chest pain or discomfort.       MEDICATIONS  (STANDING):  atorvastatin 40 milliGRAM(s) Oral at bedtime  cefTRIAXone   IVPB 2000 milliGRAM(s) IV Intermittent every 24 hours  cefTRIAXone   IVPB      escitalopram 10 milliGRAM(s) Oral daily  insulin lispro (ADMELOG) corrective regimen sliding scale   SubCutaneous Before meals and at bedtime  levothyroxine 75 MICROGram(s) Oral daily  melatonin 5 milliGRAM(s) Oral at bedtime  metoprolol tartrate 25 milliGRAM(s) Oral two times a day  OLANZapine 5 milliGRAM(s) Oral at bedtime  pantoprazole    Tablet 40 milliGRAM(s) Oral before breakfast  sodium chloride 0.9%. 1000 milliLiter(s) (75 mL/Hr) IV Continuous <Continuous>    MEDICATIONS  (PRN):  acetaminophen   Tablet .. 650 milliGRAM(s) Oral every 6 hours PRN Temp greater or equal to 38C (100.4F), Moderate Pain (4 - 6)  ondansetron Injectable 4 milliGRAM(s) IV Push every 6 hours PRN Nausea and/or Vomiting      Vital Signs Last 24 Hrs  T(C): 36.6 (13 Jan 2021 10:09), Max: 36.7 (12 Jan 2021 14:21)  T(F): 97.8 (13 Jan 2021 10:09), Max: 98.1 (12 Jan 2021 14:21)  HR: 75 (13 Jan 2021 10:09) (52 - 78)  BP: 167/84 (13 Jan 2021 10:09) (149/97 - 167/84)  BP(mean): --  RR: 18 (13 Jan 2021 10:09) (18 - 18)  SpO2: 97% (13 Jan 2021 10:09) (95% - 98%)    PHYSICAL EXAMINATION:  GENERAL: NAD, obese, left basilic PICC line   HEAD:  Atraumatic, Normocephalic  EYES:  conjunctiva and sclera clear  NECK: Supple, No JVD, Normal thyroid  CHEST/LUNG: Clear to auscultation. Clear to percussion bilaterally; No rales, rhonchi, wheezing, or rubs  HEART: Irregular rate and rhythm; No murmurs, rubs, or gallops  ABDOMEN: Soft, Nontender, Nondistended; Bowel sounds present, no pain on palpation   NERVOUS SYSTEM:  Alert & Oriented X2-3  EXTREMITIES:  2+ Peripheral Pulses, No clubbing, or cyanosis, bilateral non pitting edema  SKIN: warm very dry skin                          8.7    8.05  )-----------( 362      ( 13 Jan 2021 07:23 )             27.0     01-13    143  |  111<H>  |  21<H>  ----------------------------<  139<H>  3.6   |  24  |  1.80<H>    Ca    8.3<L>      13 Jan 2021 07:23  Phos  3.1     01-13  Mg     2.1     01-13    TPro  6.2  /  Alb  1.9<L>  /  TBili  0.3  /  DBili  x   /  AST  18  /  ALT  19  /  AlkPhos  159<H>  01-13    LIVER FUNCTIONS - ( 13 Jan 2021 07:23 )  Alb: 1.9 g/dL / Pro: 6.2 g/dL / ALK PHOS: 159 U/L / ALT: 19 U/L DA / AST: 18 U/L / GGT: x               PT/INR - ( 13 Jan 2021 07:23 )   PT: 14.0 sec;   INR: 1.19 ratio         PTT - ( 13 Jan 2021 07:23 )  PTT:28.7 sec    I&O's Summary                     PGY-1 Progress Note discussed with attending    PAGER #: [[phone]] TILL 5:00 PM  PLEASE CONTACT ON CALL TEAM:  - On Call Team (Please refer to Jose) FROM 5:00 PM - 8:30PM  - Nightfloat Team FROM 8:30 -7:30 AM    CHIEF COMPLAINT & BRIEF HOSPITAL COURSE:  80 year old female from home, with PMH of DM, HTN, CHF and hypothyroidism presented to the ED due to slurred speech. Patient is a poor historian, unsure about exactly what happened that caused her to come to the ED.   Patient admitted for acute encephalopathy, sepsis/bacteremia, on admission, tachycardic, hypotensive, febrile, leucocytosis, 1/6 blood cx lactobacillus, 1/7 repeat blood cx negative, S/p vancomycin and zosyn, now on Rocephin, CT abdomen showed mild left hydronephrosis, thrombus left gonadal vein, unable to obtain pelvic US w/ doppler in house, low suspicious of thrombus plus patient is on AC for a. fib   Patient initially w/ slurred speech, CTH negative, CT cervical w/ cervical degenerative changes, US carotids negative for stenosis, echo showed EF 55%, epicardial fat pad, LV remodeling, Homocysteine 11, SPEP- hypoalbuminemia, , vit B12 841, BREONNA 1:80 homogeneous, immunofixation no monoclonal, Rheumatoid factor 15, neuro on board   Patient with new onset A. fib, on Eliquis and metoprolol, TSH normal on synthroid.   Patient refused ROSELYN, PICC line placed by IR on 1/13 left basilic.   Patient also noted to have anemia compatible with chronic disease, has been stable, no active signs of bleeding, FOBT pending     INTERVAL HPI/OVERNIGHT EVENTS:  [id number], patient explained about need of PICC line for IV antibiotics, she referred sore throat, denies cough or chest pain or discomfort.       MEDICATIONS  (STANDING):  atorvastatin 40 milliGRAM(s) Oral at bedtime  cefTRIAXone   IVPB 2000 milliGRAM(s) IV Intermittent every 24 hours  cefTRIAXone   IVPB      escitalopram 10 milliGRAM(s) Oral daily  insulin lispro (ADMELOG) corrective regimen sliding scale   SubCutaneous Before meals and at bedtime  levothyroxine 75 MICROGram(s) Oral daily  melatonin 5 milliGRAM(s) Oral at bedtime  metoprolol tartrate 25 milliGRAM(s) Oral two times a day  OLANZapine 5 milliGRAM(s) Oral at bedtime  pantoprazole    Tablet 40 milliGRAM(s) Oral before breakfast  sodium chloride 0.9%. 1000 milliLiter(s) (75 mL/Hr) IV Continuous <Continuous>    MEDICATIONS  (PRN):  acetaminophen   Tablet .. 650 milliGRAM(s) Oral every 6 hours PRN Temp greater or equal to 38C (100.4F), Moderate Pain (4 - 6)  ondansetron Injectable 4 milliGRAM(s) IV Push every 6 hours PRN Nausea and/or Vomiting      Vital Signs Last 24 Hrs  T(C): 36.6 (13 Jan 2021 10:09), Max: 36.7 (12 Jan 2021 14:21)  T(F): 97.8 (13 Jan 2021 10:09), Max: 98.1 (12 Jan 2021 14:21)  HR: 75 (13 Jan 2021 10:09) (52 - 78)  BP: 167/84 (13 Jan 2021 10:09) (149/97 - 167/84)  BP(mean): --  RR: 18 (13 Jan 2021 10:09) (18 - 18)  SpO2: 97% (13 Jan 2021 10:09) (95% - 98%)    PHYSICAL EXAMINATION:  GENERAL: NAD, obese, left basilic PICC line   HEAD:  Atraumatic, Normocephalic  EYES:  conjunctiva and sclera clear  NECK: Supple, No JVD, Normal thyroid  CHEST/LUNG: Clear to auscultation. Clear to percussion bilaterally; No rales, rhonchi, wheezing, or rubs  HEART: Irregular rate and rhythm; No murmurs, rubs, or gallops  ABDOMEN: Soft, Nontender, Nondistended; Bowel sounds present, no pain on palpation   NERVOUS SYSTEM:  Alert & Oriented X2-3  EXTREMITIES:  2+ Peripheral Pulses, No clubbing, or cyanosis, bilateral non pitting edema  SKIN: warm very dry skin                          8.7    8.05  )-----------( 362      ( 13 Jan 2021 07:23 )             27.0     01-13    143  |  111<H>  |  21<H>  ----------------------------<  139<H>  3.6   |  24  |  1.80<H>    Ca    8.3<L>      13 Jan 2021 07:23  Phos  3.1     01-13  Mg     2.1     01-13    TPro  6.2  /  Alb  1.9<L>  /  TBili  0.3  /  DBili  x   /  AST  18  /  ALT  19  /  AlkPhos  159<H>  01-13    LIVER FUNCTIONS - ( 13 Jan 2021 07:23 )  Alb: 1.9 g/dL / Pro: 6.2 g/dL / ALK PHOS: 159 U/L / ALT: 19 U/L DA / AST: 18 U/L / GGT: x               PT/INR - ( 13 Jan 2021 07:23 )   PT: 14.0 sec;   INR: 1.19 ratio         PTT - ( 13 Jan 2021 07:23 )  PTT:28.7 sec    I&O's Summary

## 2021-01-14 NOTE — PROGRESS NOTE ADULT - PROVIDER SPECIALTY LIST ADULT
Cardiology
Cardiology
Intervent Radiology
Nephrology
Nephrology
Urology
Cardiology
Infectious Disease
Internal Medicine
Nephrology
Cardiology
Nephrology
Neurology
Internal Medicine

## 2021-01-14 NOTE — PROGRESS NOTE ADULT - SUBJECTIVE AND OBJECTIVE BOX
DATE OF SERVICE:     01/14/2021   Patient seen and examined.Interim events reviewed.    Hospital Course	  80 year old female from home, with PMH of DM, HTN, CHF and hypothyroidism presented to the ED due to slurred speech.Patient admitted for acute encephalopathy, sepsis/bacteremia, on admission, tachycardic, hypotensive, febrile, leucocytosis, 1/6/21 blood cx positive for lactobacillus x 4 bottles, 1/7/21 repeat blood cx negative, S/p vancomycin and zosyn, switched to Rocephin, CT abdomen showed mild left hydronephrosis, thrombus left gonadal vein, unable to obtain pelvic US w/ doppler in house, low suspicious of thrombus     Patient initially w/ slurred speech, CTH negative, CT cervical w/ cervical degenerative changes, US carotids negative for stenosis, echo showed EF 55%, epicardial fat pad, LV remodeling,     Patient with new onset A. fib, on Eliquis and metoprolol, TSH normal on synthroid.     Patient refused ROSELYN to r/o endocarditis, PICC line placed by IR on 1/13 left basilic to continue Rocephin for 4 weeks of treatment until 2/3/21 as per ID recommendation     VITALS:  Vital Signs Last 24 Hrs  T(C): 36.4 (14 Jan 2021 05:43), Max: 36.4 (14 Jan 2021 05:43)  T(F): 97.5 (14 Jan 2021 05:43), Max: 97.5 (14 Jan 2021 05:43)  HR: 74 (14 Jan 2021 05:43) (74 - 103)  BP: 155/75 (14 Jan 2021 05:43) (154/83 - 175/66)  BP(mean): --  RR: 16 (14 Jan 2021 05:43) (16 - 18)  SpO2: 95% (14 Jan 2021 05:43) (95% - 99%)PHYSICAL EXAMINATION:  GENERAL: NAD, obese, left basilic PICC line   HEAD:  Atraumatic, Normocephalic  EYES:  conjunctiva and sclera clear  NECK: Supple, No JVD, Normal thyroid  CHEST/LUNG: Clear to auscultation. Clear to percussion bilaterally; No rales, rhonchi, wheezing, or rubs  HEART: Irregular rate and rhythm; No murmurs, rubs, or gallops  ABDOMEN: Soft, Nontender, Nondistended; Bowel sounds present, no pain on palpation   NERVOUS SYSTEM:  Alert & Oriented X2-3  EXTREMITIES:  2+ Peripheral Pulses, No clubbing, or cyanosis, bilateral non pitting edema  SKIN: warm very dry skin  LABS:  01-13    143  |  111<H>  |  21<H>  ----------------------------<  139<H>  3.6   |  24  |  1.80<H>    Ca    8.3<L>      13 Jan 2021 07:23  Phos  3.1     01-13  Mg     2.1     01-13    TPro  6.2  /  Alb  1.9<L>  /  TBili  0.3  /  DBili      /  AST  18  /  ALT  19  /  AlkPhos  159<H>  01-13    Creatinine Trend: 1.80 <--, 2.09 <--, 2.18 <--, 2.32 <--, 2.14 <--, 2.18 <--, 2.23 <--                        8.7    8.05  )-----------( 362      ( 13 Jan 2021 07:23 )             27.0 CULTURES:   .Blood Blood-Peripheral  01-07 @ 18:57   No Growth Final  --  --  · Assessment	  80 year old female from home, with PMH of DM, HTN, CHF and hypothyroidism presented to the ED due to slurred speech admitted for sepsis/bacteremia        Problem/Plan - 1:  ·  Problem: Bacteremia.  Plan: -1/6 Bcx growing gram positive rods - lactobacillus  -1/7 Bcx negative  -S/p Zosyn and vancomycin   -C/w Rocephin 1/11, as per Dr Velasquez should be continue for 4 more weeks  -S/p PICC line left basilic 1/13/21  -Patient refused ROSELYN  -Restarted on Eliquis 2.5 mg BID  -F/u COVID19 for OCHOA placement       Problem/Plan - 2:  ·  Problem: Afib.  Plan: -Patient noted to have  a fib on tele monitor  -HR ranges   -Pt has past history of EP studies in Aug 2020 that showed premature atrial contraction but no AFib  -CHADs-Vasc score of 6  -Hold lisinopril, Cr 1.8  -Increased metoprolol to 50 mg BID  -Restarted on Eliquis 2.5 mg BID  -Atrial Fibrillation CHADS-6 rate controlled  -Cardiac status stable for discharge       Problem/Plan - 3:  ·  Problem: Acute encephalopathy.  Plan: -Patient presented due to slurred speech and confusion  -Slurred speech resolved now  -Most likely TIA vs worsening dementia vs bacteremia   -CT head negative  -Fall, aspiration and seizure precautions  -Homocysteine 11, SPEP- hypoalbuminemia, , vit B12 841, BREONNA 1:80 homogeneous, immunofixation no monoclonal, Rheumatoid factor 15  -F/u folate, MMA, Homocysteine and intrinsic factor ab  -C Spine CT for possible myelopathy and compression was unremarkable showed some degenerative changes  -Neuro Dr. Elias on board, made aware about lab results, recommends to repeat labs and then start Vit B12 weekly.     Problem/Plan - 4:  ·  Problem: PARTH (acute kidney injury).  Plan: -Creatinine 2.82 on admission, baseline 0.7   -Cr trending down, today 1.8  -Most likely ATN  -CT with mild left hydroureteronephrosis with perinephric stranding/ tubular dilation at the level of left mid ureter containing vague hyper density,   -Incidental finding of left gonadal thrombus, no testing available in house, low suspicious of thrombus   -US renal showed no hydronephrosis   -Continue to hold Lasix   -Urology recommends outpatient follow up      Problem/Plan - 5:  ·  Problem: Anemia.  Plan: Hb 10  Anemia panel compatible w/ anemia of chronic disease   No signs of active bleeding   F/u FOBT  CT abdomen noted   Monitor CBC daily  Nutritionist consulted.    Problem/Plan - 6:  Problem: Diabetes mellitus. Plan: -Patient is on metformin at home  -C/w SSI  -HgbA1C 6.7  -Monitor BS and adjust insulin as needed.    Problem/Plan - 7:  ·  Problem: Hypertension.  Plan: -Hold home med of Lisinopril and Lasix in setting of PARTH   -Monitor BP and add meds as needed  -C/w metoprolol.     Problem/Plan - 8:  ·  Problem: Congestive heart failure (CHF).  Plan: -1/6 Echo left ventricle remodeling, epicardial fat pad   Hold off lisinopril   -strict I/O, daily weight.     Problem/Plan - 9:  ·  Problem: Hypothyroidism.  Plan: -C/w home med of synthroid  -TSH 2.76.     Problem/Plan - 10:  Problem: Need for prophylactic measure. Plan; Restarted on Eliquis 2.5 mg BID   Patient is Full code.

## 2021-01-14 NOTE — PROGRESS NOTE ADULT - ATTENDING COMMENTS
[facility] NEPHROLOGY  Cornelius Frank M.D.  Davey uBrroughs D.O.  Katerina Ortiz M.D.  Nina Bryson, MSN, ANP-C  [phone]    [address] [place] [address]

## 2021-01-14 NOTE — PROGRESS NOTE ADULT - NSHPATTENDINGPLANDISCUSS_GEN_ALL_CORE
Daughter Raquel
RN, ID, Cardiology
Dr. Huerta
Dr. Palacio, Cardiology, ID
Dr. Palacio
Dr. Huerta, daughter, ID
Dr. Palacio

## 2021-01-14 NOTE — PROGRESS NOTE ADULT - ASSESSMENT
Bacteremia with Lactobacillus  S/p sepsis and fevers   ·	dc planning today  ·	cont Rocephin 2gm IV daily until 2/03  ·	reconsult prn

## 2021-01-14 NOTE — PROGRESS NOTE ADULT - SUBJECTIVE AND OBJECTIVE BOX
[facility] NEPHROLOGY- PROGRESS NOTE    Patient is a 79yo Latvian speaking Female with CHF, DM, HTN, and hypothyroidism, who p/w slurred speech r/o CVA. Pt a/w Sepsis 2/2 UTI/ GPR bacteremia and hypotension. Nephrology consulted for Elevated serum creatinine.    Hospital Medications: Medications reviewed.    REVIEW OF SYSTEMS:   CONSTITUTIONAL: No fevers or chills;   RESPIRATORY: No shortness of breath,   CARDIOVASCULAR: No chest pain.  GASTROINTESTINAL: No nausea, vomiting, or abdominal pain,   VASCULAR: No bilateral lower extremity edema.     VITALS:  T(F): 97.5 (21 @ 05:43), Max: 97.5 (21 @ 05:43)  HR: 74 (21 @ 05:43)  BP: 155/75 (21 @ 05:43)  RR: 16 (21 @ 05:43)  SpO2: 95% (21 @ 05:43)  Wt(kg): --        PHYSICAL EXAM:  Gen: NAD,  HEENT: anicteric  Neck: no JVD  Cards: RRR, +S1/S2,   Resp: CTA ant  GI: soft, NT/ND, NABS  Extremities: no LE edema B/L  +LUE PICC    LABS:      143  |  109<H>  |  18  ----------------------------<  135<H>  3.4<L>   |  25  |  1.78<H>    Ca    8.2<L>      2021 06:53  Phos  3.2       Mg     1.9         TPro  6.5  /  Alb  2.0<L>  /  TBili  0.3  /  DBili      /  AST  18  /  ALT  19  /  AlkPhos  156<H>      Creatinine Trend: 1.78 <--, 1.80 <--, 2.09 <--, 2.18 <--, 2.32 <--, 2.14 <--, 2.18 <--                        8.8    8.76  )-----------( 389      ( 2021 06:53 )             27.0     Urine Studies:  Urinalysis Basic - ( 2021 08:24 )    Color: Yellow / Appearance: very cloudy / S.015 / pH:   Gluc:  / Ketone: Negative  / Bili: Negative / Urobili: Negative   Blood:  / Protein: 30 mg/dL / Nitrite: Negative   Leuk Esterase: Trace / RBC: 2-5 /HPF / WBC 3-5 /HPF   Sq Epi:  / Non Sq Epi: Few /HPF / Bacteria: Trace /HPF

## 2021-01-14 NOTE — PROGRESS NOTE ADULT - SUBJECTIVE AND OBJECTIVE BOX
80y Female is under our care for bacteremia with Lactobacillus and prior sepsis. Patient is doing well and has no complaints at this time. Remains afebrile with normal wbc count. Renal function has improved. Due for dc to STR today to continue IV antibiotics via PICC line.     REVIEW OF SYSTEMS:  [  ] Not able to illicit  General: no fevers no malaise  Chest: no cough no sob  GI: no nvd  Skin: no rashes  Neuro: no ha's no dizziness 	    MEDS:  cefTRIAXone   IVPB 2000 milliGRAM(s) IV Intermittent every 24 hours       ALLERGIES: Allergies    No Known Allergies    Intolerances      VITALS:  Vital Signs Last 24 Hrs  T(C): 36.4 (14 Jan 2021 05:43), Max: 36.4 (14 Jan 2021 05:43)  T(F): 97.5 (14 Jan 2021 05:43), Max: 97.5 (14 Jan 2021 05:43)  HR: 74 (14 Jan 2021 05:43) (74 - 103)  BP: 155/75 (14 Jan 2021 05:43) (154/83 - 175/66)  BP(mean): --  RR: 16 (14 Jan 2021 05:43) (16 - 18)  SpO2: 95% (14 Jan 2021 05:43) (95% - 99%)      PHYSICAL EXAM:  HEENT: n/a  Neck: supple no LN's   Respiratory: lungs clear no rales  Cardiovascular: S1 S2 reg no murmurs  Gastrointestinal: +BS with soft, nondistended abdomen; nontender  Extremities: no edema, left arm PICC line  Skin: no rashes  Ortho: n/a  Neuro: AAO x 2      LABS/DIAGNOSTIC TESTS:                        8.8    8.76  )-----------( 389      ( 14 Jan 2021 06:53 )             27.0     WBC Count: 8.76 K/uL (01-14 @ 06:53)  WBC Count: 8.05 K/uL (01-13 @ 07:23)  WBC Count: 7.98 K/uL (01-12 @ 07:38)  WBC Count: 9.35 K/uL (01-11 @ 06:43)    01-14    143  |  109<H>  |  18  ----------------------------<  135<H>  3.4<L>   |  25  |  1.78<H> 1.80 < 2.09    Ca    8.2<L>      14 Jan 2021 06:53  Phos  3.2     01-14  Mg     1.9     01-14    TPro  6.5  /  Alb  2.0<L>  /  TBili  0.3  /  DBili  x   /  AST  18  /  ALT  19  /  AlkPhos  156<H>  01-14        CULTURES:   .Blood Blood-Peripheral  01-07 @ 18:57   No Growth Final  --  --      .Urine Clean Catch (Midstream)  01-06 @ 19:02   <10,000 CFU/mL Normal Urogenital Nadia  --  --      .Blood Blood-Peripheral  01-06 @ 10:13   Growth in aerobic and anaerobic bottles: Gram Positive Rods  Most closely resembling Lactobacillus species  "Susceptibilities not performed"        RADIOLOGY:  no new studies

## 2021-01-14 NOTE — PROGRESS NOTE ADULT - ATTENDING COMMENTS
Patient was seen and examined by me on 01/14/2021,interim events noted,labs and radiology studies reviewed.  Faizan Edmond MD,[facility].  [address] [place] [address].  [id number]. [phone]

## 2021-01-14 NOTE — PROGRESS NOTE ADULT - ASSESSMENT
Patient is a 81yo Wolof speaking Female with CHF, DM, HTN, and hypothyroidism, who p/w slurred speech r/o CVA. Pt a/w Sepsis 2/2 UTI/ GPR bacteremia and hypotension. Nephrology consulted for Elevated serum creatinine.    1. PARTH- previous Scr 0.8-1. PARTH likely ATN in the setting of septic shock/ hypotension. UA with 30 protein, trace LE, small blood with hyaline and granular casts. FeNa 0.32%. Continue to hold Lisinopril/ Lasix.   Renal function stabilized with mild improvement today. Encourage PO intake. s/p TTE with EF >55%. Renal US with no hydro. s/p CT with mild left hydroureteronephrosis with perinephric stranding/ tubular dilation at the level of left mid ureter containing vague hyperdensity ?blood. UA unremarkable and urology following with no plans for intervention at this time.  hgb staboe; LDH wnl, hapto elevated; not consistent with TMA  Strict I/Os. Avoid nephrotoxins/ NSAIDs/ RCA. Monitor BMP.    2. Septic shock 2/2 UTI with gram +tayler bacteremia. Pt on Ceftriaxone. Urine culture negative.     3. Hypotension- BP acceptable on metoprolol 50mg PO bid. Rate control as per primary team. Monitor BP.     4. CHF- patient not volume overloaded. Hold Lasix.

## 2021-01-14 NOTE — PROGRESS NOTE ADULT - REASON FOR ADMISSION
slurred speech

## 2021-03-01 PROBLEM — E03.9 HYPOTHYROIDISM, UNSPECIFIED: Chronic | Status: ACTIVE | Noted: 2021-01-01

## 2021-03-01 PROBLEM — E11.9 TYPE 2 DIABETES MELLITUS WITHOUT COMPLICATIONS: Chronic | Status: ACTIVE | Noted: 2021-01-01

## 2021-03-01 PROBLEM — I10 ESSENTIAL (PRIMARY) HYPERTENSION: Chronic | Status: ACTIVE | Noted: 2021-01-01

## 2021-03-01 PROBLEM — I50.9 HEART FAILURE, UNSPECIFIED: Chronic | Status: ACTIVE | Noted: 2021-01-01

## 2021-03-03 NOTE — H&P ADULT - NSICDXPASTMEDICALHX_GEN_ALL_CORE_FT
PAST MEDICAL HISTORY:  Anxiety     Congestive heart failure (CHF)     Diabetes mellitus     DM (diabetes mellitus)     HTN (hypertension)     Hypertension     Hypothyroidism     Obesity

## 2021-03-03 NOTE — ED PROVIDER NOTE - CARE PLAN
Principal Discharge DX:	Septic shock  Secondary Diagnosis:	UTI (urinary tract infection)  Secondary Diagnosis:	AMS (altered mental status)

## 2021-03-03 NOTE — ED ADULT NURSE NOTE - NSIMPLEMENTINTERV_GEN_ALL_ED
Implemented All Fall with Harm Risk Interventions:  [place] to call system. Call bell, personal items and telephone within reach. Instruct patient to call for assistance. Room bathroom lighting operational. Non-slip footwear when patient is off stretcher. Physically safe environment: no spills, clutter or unnecessary equipment. Stretcher in lowest position, wheels locked, appropriate side rails in place. Provide visual cue, wrist band, yellow gown, etc. Monitor gait and stability. Monitor for mental status changes and reorient to person, place, and time. Review medications for side effects contributing to fall risk. Reinforce activity limits and safety measures with patient and family. Provide visual clues: red socks.

## 2021-03-03 NOTE — H&P ADULT - ATTENDING COMMENTS
Patient seen and examined with the resident.    Septic shock    Cap Pressors  Continue Damaris  She is a DNR/DNI/ no HD    Family at bedside    D/W Daughter at length

## 2021-03-03 NOTE — H&P ADULT - NSHPPHYSICALEXAM_GEN_ALL_CORE
ICU Vital Signs Last 24 Hrs  T(C): 34.5 (03 Mar 2021 15:21), Max: 34.5 (03 Mar 2021 15:21)  T(F): 94.1 (03 Mar 2021 15:21), Max: 94.1 (03 Mar 2021 15:21)  HR: 71 (03 Mar 2021 18:50) (71 - 116)  BP: 78/39 (03 Mar 2021 18:50) (72/25 - 150/53)  BP(mean): --  ABP: --  ABP(mean): --  RR: 25 (03 Mar 2021 18:50) (19 - 29)  SpO2: 100% (03 Mar 2021 18:50) (99% - 100%)      PHYSICAL EXAM:  GENERAL: female in bed In no acute distress.   HEENT: Normocephalic;  conjunctivae and sclerae clear; moist mucous membranes;   NECK : supple  CHEST/LUNG: Clear to auscultation bilaterally with good air entry   HEART: S1 S2  regular; no murmurs, gallops or rubs  ABDOMEN: Soft, Nontender, Nondistended; Bowel sounds present  EXTREMITIES: no cyanosis; no edema; no calf tenderness  SKIN: warm and dry; no rash  NERVOUS SYSTEM:  Awake and alert; Oriented  to place, person and time ; no new deficits ICU Vital Signs Last 24 Hrs  T(C): 34.5 (03 Mar 2021 15:21), Max: 34.5 (03 Mar 2021 15:21)  T(F): 94.1 (03 Mar 2021 15:21), Max: 94.1 (03 Mar 2021 15:21)  HR: 71 (03 Mar 2021 18:50) (71 - 116)  BP: 78/39 (03 Mar 2021 18:50) (72/25 - 150/53)  BP(mean): --  ABP: --  ABP(mean): --  RR: 25 (03 Mar 2021 18:50) (19 - 29)  SpO2: 100% (03 Mar 2021 18:50) (99% - 100%)      PHYSICAL EXAM:  GENERAL: female in bed , in mild respiratory distress   HEENT: Normocephalic;  conjunctivae and sclerae clear; moist mucous membranes;   NECK : supple  CHEST/LUNG: bilateral breath sounds present, bilateral crackles present   HEART: S1 s2   ABDOMEN: Soft, Nontender, Nondistended; Bowel sounds present  EXTREMITIES: no cyanosis; no edema; no calf tenderness  all four extremities : cold   SKIN: warm and dry; no rash  NERVOUS SYSTEM:  AAO x0

## 2021-03-03 NOTE — H&P ADULT - ASSESSMENT
ASSESSMENT AND PLAN:  80 year old female from home, with PMH of DM, HTN, CHF and hypothyroidism presented AMS. Pt was recently admitted to [facility] for sepsis with bacteremia(lactobacillus, unknown source), PICC line placed and pt was discharged to Rehab. Pt was discharged from Rehab last friday and has been confused(baseline is AAO x2), lethargic, not eating. Today, pt's mental status was worse so was brought to ED by daughter.   Ed course: pt noted to be AAO x0, WBC: 25K, lactate of 14, K: 7, creat: 8.9, bicarb: 5, ph: 6.8, UA: positive. Central line placed by Ed attending and pt received 2L NS bolus, cefepime, vanco one dose and started on levophed and bicarb drip. Also, received D50 with 5 units of IV insulin. ICU was consulted.     Assessment :   Septic shock due to UTI   Severe metabolic acidosis due to sepsis   PARTH   Hyperkalemia   Acute Metabolic encephalopathy   h/o DM, Hypothyroidism, CHF, HTN,     =================== Neuro============================  Acute Metabolic encephalopathy       ================= Cardiovascular==========================  Shock due to sepsis due to UTI   ================- Pulm=================================    ==================ID===================================  Septic shock due to UTI   Severe metabolic acidosis due to sepsis     ================= Nephro================================  PARTH   Hyperkalemia   =================GI====================================    ================ Heme==================================  No issues.    =================Endocrine===============================  DM    Hypothyroidism     ================= Skin/Catheters============================  No rashes. femoral line     - =================Prophylaxis =============================  heparin for DVT proph  Protonix for  GI proph    ==================GOC==================================  ICU spoke to daughter Ms García at bedside and Ms Chowdhury over phone. Prognosis discussed in detail. Both daughters agreed for DNR/DNI/no HD. Will continue with IV fluids, IV pressors and IV antibiotics.   ==================Disposition===============================  Pt accepted to ICU for further care   ASSESSMENT AND PLAN:  80 year old female from home, with PMH of DM, HTN, CHF and hypothyroidism presented AMS. Pt was recently admitted to [facility] for sepsis with bacteremia(lactobacillus, unknown source), PICC line placed and pt was discharged to Rehab. Pt was discharged from Rehab last friday and has been confused(baseline is AAO x2), lethargic, not eating. Today, pt's mental status was worse so was brought to ED by daughter.   Ed course: pt noted to be AAO x0, WBC: 25K, lactate of 14, K: 7, creat: 8.9, bicarb: 5, ph: 6.8, UA: positive. Central line placed by Ed attending and pt received 2L NS bolus, cefepime, vanco one dose and started on levophed and bicarb drip. Also, received D50 with 5 units of IV insulin. ICU was consulted.     Assessment :   Septic shock due to UTI   Severe metabolic acidosis due to sepsis   PARTH   Hyperkalemia   Acute Metabolic encephalopathy   Anemia   h/o DM, Hypothyroidism, CHF, HTN,     =================== Neuro============================  Acute Metabolic encephalopathy   most likely due to severe metabolic acidosis   AAxo      ================= Cardiovascular==========================  Shock due to sepsis due to UTI   s/p 2L Ns, cefepime and vanco one dose in ED.   c/w Zosyn   Follow blood and urine culture   On Levophed and pheny     ================- Pulm=================================  DNi     ==================ID===================================  Septic shock due to UTI   s/p 2L Ns, cefepime and vanco one dose in ED.   c/w Zosyn   Follow blood and urine culture   On Levophed and pheny   trend lactate     ================= Nephro================================  PARTH ;   BUN/creat: 78/8.99  s/p 2 L NS   Bicarb amp x3   IV fluids   Sodium bicarb drip   monitor Urine output       Hyperkalemia : D50 with 5 units of IV insulin.   follow repeat BMP   No HD     Severe metabolic acidosis due to sepsis   As above   DNR/DNi   =================GI====================================  NPO given the mental status   ================ Heme==================================  Anemia   Hb: 11    =================Endocrine===============================  DM: s/p 5 IV units of insulin   monitor FS     Hypothyroidism :   home dose: levothyroxine 75mcg, c/w IV levothyroxine 50mcg    ================= Skin/Catheters============================  No rashes. RIJ     - =================Prophylaxis =============================  heparin for DVT proph  Protonix for  GI proph    ==================GOC==================================  ICU spoke to daughter Ms García at bedside and Ms Chowdhury over phone. Prognosis discussed in detail. Both daughters agreed for DNR/DNI/no HD. Will continue with IV fluids, IV pressors and IV antibiotics.         ==================Disposition===============================  Pt accepted to ICU for further care   ASSESSMENT AND PLAN:  80 year old female from home, with PMH of DM, HTN, CHF and hypothyroidism presented AMS. Pt was recently admitted to [facility] for sepsis with bacteremia(lactobacillus, unknown source), PICC line placed and pt was discharged to Rehab. Pt was discharged from Rehab last friday and has been confused(baseline is AAO x2), lethargic, not eating. Today, pt's mental status was worse so was brought to ED by daughter.   Ed course: pt noted to be AAO x0, WBC: 25K, lactate of 14, K: 7, creat: 8.9, bicarb: 5, ph: 6.8, UA: positive. Central line placed by Ed attending and pt received 2L NS bolus, cefepime, vanco one dose and started on levophed and bicarb drip. Also, received D50 with 5 units of IV insulin. ICU was consulted.     Assessment :   Septic shock due to UTI   Severe metabolic acidosis due to sepsis   PARTH   Hyperkalemia   Acute Metabolic encephalopathy   Anemia   h/o DM, Hypothyroidism, CHF, HTN,     =================== Neuro============================  Acute Metabolic encephalopathy   most likely due to severe metabolic acidosis   AAxo      ================= Cardiovascular==========================  Shock due to sepsis due to UTI   s/p 2L Ns, cefepime and vanco one dose in ED.   c/w Zosyn   Follow blood and urine culture   On Levophed and pheny     ================- Pulm=================================  DNI  Dilaudid 1mg stat one dose  for respiratory distress   Dilaudid 0.5mg q1 PRN for respiratory distress      ==================ID===================================  Septic shock due to UTI   s/p 2L Ns, cefepime and vanco one dose in ED.   c/w Zosyn   Follow blood and urine culture   On Levophed and pheny   trend lactate     ================= Nephro================================  PARTH ;   BUN/creat: 78/8.99  s/p 2 L NS   Bicarb amp x3   IV fluids   Sodium bicarb drip   monitor Urine output       Hyperkalemia : D50 with 5 units of IV insulin.   follow repeat BMP   No HD     Severe metabolic acidosis due to sepsis   As above   DNR/DNi   =================GI====================================  NPO given the mental status   ================ Heme==================================  Anemia   Hb: 11    =================Endocrine===============================  DM: s/p 5 IV units of insulin   monitor FS     Hypothyroidism :   home dose: levothyroxine 75mcg, c/w IV levothyroxine 50mcg    ================= Skin/Catheters============================  No rashes. RIJ     - =================Prophylaxis =============================  heparin for DVT proph  Protonix for  GI proph    ==================GOC==================================  ICU spoke to daughter Ms García at bedside and Ms Chowdhury over phone. Prognosis discussed in detail. Both daughters agreed for DNR/DNI/no HD. Will continue with IV fluids, IV pressors and IV antibiotics.         ==================Disposition===============================  Pt accepted to ICU for further care

## 2021-03-03 NOTE — ED ADULT NURSE NOTE - OBJECTIVE STATEMENT
Pt arrived to ED via Ambulance from home , was just discharged from Rehab on Friday, notification for AMS   On arrival to ED pt was only responding to painful stimuli, noted urine foul smelling and very cloudy, has stage 2 to sacrum, redness to inguinal area

## 2021-03-03 NOTE — ED ADULT TRIAGE NOTE - CHIEF COMPLAINT QUOTE
BIba as notification for altered mental status since yesterday, was discharged from rehab on Friday s/p septic shock

## 2021-03-03 NOTE — ED PROVIDER NOTE - OBJECTIVE STATEMENT
vomiting, diarrhea, fell yesterday. not feeling well for a few days. was not able to get out of her chair this morning and would not take her medications. knew who family was today but just staring and not making sense. A&O2 at baseline. lives with . released from rehab on friday. complaining of dysuria. dementia. 80F, pmh of DM, HTN, CHF and hypothyroidism, dementia, presenting with altered mental status. history obtained from patient's daughter as patient is unable to answer. patient was discharged form the hospital in january after being admitted for sepsis and went to a nursing home for rehab and to continue antibiotic treatment. patient was discharged to home 5 days ago. over the past few days the patient was complaining of nausea, vomiting, and burning with urination. patient had a fall while at home yesterday (lives only with ). today the patient was confused, answering questions inappropriately and staring off into space. she was unable to get out of her chair and defecated on herself.

## 2021-03-03 NOTE — ED PROVIDER NOTE - PMH
Anxiety    Congestive heart failure (CHF)    Diabetes mellitus    DM (diabetes mellitus)    HTN (hypertension)    Hypertension    Hypothyroidism    Obesity

## 2021-03-03 NOTE — H&P ADULT - HISTORY OF PRESENT ILLNESS
80 year old female from home, with PMH of DM, HTN, CHF and hypothyroidism presented 80 year old female from home, with PMH of DM, HTN, CHF and hypothyroidism presented AMS. Pt was recently admitted to [facility] for sepsis with bacteremia(lactobacillus, unknown source), PICC line placed and pt was discharged to Rehab. Pt was discharged from Rehab last friday and has been confused(baseline is AAO x2), lethargic, not eating. Today, pt's mental status was worse so was brought to ED by daughter.   Ed course: pt noted to be AAO x0, WBC: 25K, lactate of 14, K: 7, creat: 8.9, bicarb: 5, ph: 6.8, UA: positive. RIJ Central line placed by Ed attending and pt received 2L NS bolus, cefepime, vanco one dose and started on levophed and bicarb drip. Also, received D50 with 5 units of IV insulin. ICU was consulted.     ICU spoke to daughter Ms García at bedside and Ms Chowdhury over phone. Prognosis discussed in detail. Both daughters agreed for DNR/DNI/no HD. Will continue with IV fluids, IV pressors and IV antibiotics.

## 2021-03-03 NOTE — ED PROVIDER NOTE - PHYSICAL EXAMINATION
General: moderate distress   HEENT: normocephalic, atraumatic, PERRL  Respiratory: normal work of breathing, lungs clear to auscultation bilaterally   Cardiac: tachycardia   Abdomen: soft, non-tender, no guarding or rebound   MSK: no swelling or tenderness of lower extremities, moving all extremities spontaneously   Skin: warm, dry   Neuro: A&Ox0, awake

## 2021-03-03 NOTE — ED PROVIDER NOTE - CLINICAL SUMMARY MEDICAL DECISION MAKING FREE TEXT BOX
80F brought in for AMS. patient awake but unresponsive. recently returned home from rehab. chart review showed sent to rehab for antibiotics. had PARTH which was resolving on discharge from hospital. spoke with patient's daughter regarding lab results and need for ICU and possible dialysis. patient initially full code. central line placed. given fluids, antibiotics, bicarb, hyperK cocktail, started on levophed. daughter arrived and after discussion with ICU patient made DNR/DNI. no dialysis.

## 2021-03-04 NOTE — CONSULT NOTE ADULT - PROBLEM SELECTOR RECOMMENDATION 4
2/2 acute illness. Prior to hospitalization, patient A&O x 2, from rehab. Patient now in shock/MOF; not responsive to pain. Grave prognosis; death likely to occur within hours.

## 2021-03-04 NOTE — PROGRESS NOTE ADULT - ATTENDING COMMENTS
Assessment:  1. Septic shock  2. Urinary tract infection   3. Metabolic encephalopathy   4. Acute kidney injury   5. Hyperkalemia   6. Lactic acidosis     Plan  - Overnight goals of care with the family for DNR/DNI code status. Family had deemed for no HD. Vasopressor were capped overnight as well. This morning patient appears to be in actively dying process. Hypotensive and bradycardic. On IV antibiotics and vasopressors. Prognosis is very poor. Family at bedside. Palliative care consulted.

## 2021-03-04 NOTE — CONSULT NOTE ADULT - PROBLEM SELECTOR RECOMMENDATION 5
Patient's daughter/Raquel previously identified as primary surrogate ([phone]). Patient appears to be actively dying - death likely to occur within hours.  Patient DNR / DNI / no HD as per family's wishes.

## 2021-03-04 NOTE — PROGRESS NOTE ADULT - ASSESSMENT
ASSESSMENT AND PLAN:  80 year old female from home, with PMH of DM, HTN, CHF and hypothyroidism presented AMS. Pt was recently admitted to [facility] for sepsis with bacteremia(lactobacillus, unknown source), PICC line placed and pt was discharged to Rehab. Pt was discharged from Rehab last friday and has been confused(baseline is AAO x2), lethargic, not eating. Today, pt's mental status was worse so was brought to ED by daughter.   Ed course: pt noted to be AAO x0, WBC: 25K, lactate of 14, K: 7, creat: 8.9, bicarb: 5, ph: 6.8, UA: positive. Central line placed by Ed attending and pt received 2L NS bolus, cefepime, vanco one dose and started on levophed and bicarb drip. Also, received D50 with 5 units of IV insulin. ICU was consulted.     Assessment :   Septic shock due to UTI   Severe metabolic acidosis due to sepsis   PARTH   Hyperkalemia   Acute Metabolic encephalopathy   Anemia   h/o DM, Hypothyroidism, CHF, HTN,     =================== Neuro============================  Acute Metabolic encephalopathy   most likely due to severe metabolic acidosis   AAxo      ================= Cardiovascular==========================  Shock due to sepsis due to UTI   s/p 2L Ns, cefepime and vanco one dose in ED.   c/w Zosyn   Follow blood and urine culture   On Levophed and pheny     ================- Pulm=================================  DNI  Dilaudid 1mg stat one dose  for respiratory distress   Dilaudid 0.5mg q1 PRN for respiratory distress      ==================ID===================================  Septic shock due to UTI   s/p 2L Ns, cefepime and vanco one dose in ED.   c/w Zosyn   Follow blood and urine culture   On Levophed and pheny   trend lactate     ================= Nephro================================  PARTH ;   BUN/creat: 78/8.99  s/p 2 L NS   Bicarb amp x3   IV fluids   Sodium bicarb drip   monitor Urine output       Hyperkalemia : D50 with 5 units of IV insulin.   follow repeat BMP   No HD     Severe metabolic acidosis due to sepsis   As above   DNR/DNi   =================GI====================================  NPO given the mental status   ================ Heme==================================  Anemia   Hb: 11    =================Endocrine===============================  DM: s/p 5 IV units of insulin   monitor FS     Hypothyroidism :   home dose: levothyroxine 75mcg, c/w IV levothyroxine 50mcg    ================= Skin/Catheters============================  No rashes. RIJ     - =================Prophylaxis =============================  heparin for DVT proph  Protonix for  GI proph    ==================GOC==================================  ICU spoke to daughter Ms García at bedside and Ms Chowdhury over phone. Prognosis discussed in detail. Both daughters agreed for DNR/DNI/no HD. Will continue with IV fluids, IV pressors and IV antibiotics.         ==================Disposition===============================  Pt accepted to ICU for further care   ASSESSMENT AND PLAN:  80 year old female from home, with PMH of DM, HTN, CHF and hypothyroidism presented AMS. Pt was recently admitted to [facility] for sepsis with bacteremia(lactobacillus, unknown source), PICC line placed and pt was discharged to Rehab. Pt was discharged from Rehab last friday and has been confused(baseline is AAO x2), lethargic, not eating. Today, pt's mental status was worse so was brought to ED by daughter.   Ed course: pt noted to be AAO x0, WBC: 25K, lactate of 14, K: 7, creat: 8.9, bicarb: 5, ph: 6.8, UA: positive. Central line placed by Ed attending and pt received 2L NS bolus, cefepime, vanco one dose and started on levophed and bicarb drip. Also, received D50 with 5 units of IV insulin. ICU was consulted.     Assessment :   Septic shock due to UTI   Severe metabolic acidosis due to sepsis   PARTH   Hyperkalemia   Acute Metabolic encephalopathy   Anemia   h/o DM, Hypothyroidism, CHF, HTN,     =================== Neuro============================  Acute Metabolic encephalopathy   most likely due to severe metabolic acidosis   AAxo      ================= Cardiovascular==========================  Shock due to sepsis due to UTI   s/p 2L Ns, cefepime and vanco one dose in ED.   c/w Zosyn   Blood cx Gram -ve rods  On Pheny     ================- Pulm=================================  DNI  Dilaudid 1mg stat one dose  for respiratory distress   Dilaudid 0.5mg q1 PRN for respiratory distress      ==================ID===================================  Septic shock due to UTI   s/p 2L Ns, cefepime and vanco one dose in ED.   c/w Zosyn   blood culture: Gram -ve rods  On pheny       ================= Nephro================================  PARTH ;   BUN/creat: 78/8.99  s/p 2 L NS   Bicarb amp x3   IV fluids   Sodium bicarb drip   monitor Urine output   Family refused HD    Hyperkalemia : D50 with 5 units of IV insulin.   follow repeat BMP   No HD     Severe metabolic acidosis due to sepsis   As above   DNR/DNi   =================GI====================================  NPO given the mental status   ================ Heme==================================  Anemia   Hb: 11    =================Endocrine===============================  DM: s/p 5 IV units of insulin   monitor FS     Hypothyroidism :   home dose: levothyroxine 75mcg, c/w IV levothyroxine 50mcg    ================= Skin/Catheters============================  No rashes. RIJ     - =================Prophylaxis =============================  heparin for DVT proph  Protonix for  GI proph    ==================GOC==================================  ICU spoke to daughter Ms García at bedside and Ms Chowdhury over phone. Prognosis discussed in detail. Both daughters agreed for DNR/DNI/no HD. Will continue with IV fluids, IV pressors and IV antibiotics.         ==================Disposition===============================  Pt accepted to ICU for further care

## 2021-03-04 NOTE — CONSULT NOTE ADULT - PROBLEM SELECTOR RECOMMENDATION 9
2/2 shock/UTI/bacteremia; involving lungs (patient on NRB); heart (on pressor): kidneys (PARTH, Cr 8.99). WBC 25.18. Lactate 14.8, K 7.6. Patient on NRB, IV abx, pressor; dilaudid PRN for sx management. Grave prognosis; patient appears to be actively dying - death likely to occur within hours. Daughter/Raquel is identified surrogate; DNR / DNI / no HD as per family's wishes.

## 2021-03-04 NOTE — CONSULT NOTE ADULT - SUBJECTIVE AND OBJECTIVE BOX
HPI:  80 year old female from home, with PMH of DM, HTN, CHF and hypothyroidism presented AMS. Pt was recently admitted to [facility] for sepsis with bacteremia(lactobacillus, unknown source), PICC line placed and pt was discharged to Rehab. Pt was discharged from Rehab last friday and has been confused(baseline is AAO x2), lethargic, not eating. Today, pt's mental status was worse so was brought to ED by daughter.   Ed course: pt noted to be AAO x0, WBC: 25K, lactate of 14, K: 7, creat: 8.9, bicarb: 5, ph: 6.8, UA: positive. RIJ Central line placed by Ed attending and pt received 2L NS bolus, cefepime, vanco one dose and started on levophed and bicarb drip. Also, received D50 with 5 units of IV insulin. ICU was consulted.     ICU spoke to daughter Ms García at bedside and Ms Chowdhury over phone. Prognosis discussed in detail. Both daughters agreed for DNR/DNI/no HD. Will continue with IV fluids, IV pressors and IV antibiotics.  (03 Mar 2021 17:57)    Patient admitted to ICU - no NRB, + pressor. Cr worsening 8.99. Hypotensive - patient appears to be actively dying.       PAST MEDICAL & SURGICAL HISTORY:  Hypothyroidism  Congestive heart failure (CHF)  Hypertension  Diabetes mellitus  Anxiety  Obesity  DM (diabetes mellitus)  HTN (hypertension)  History of     SOCIAL HISTORY:    Admitted from: rehab  Substance abuse history/   Tobacco hx/    Alcohol hx: unable to obtain 2/2 mental status             Home Opioid hx: none  Christian:       Anglican                             Preferred Language: English    Surrogate/HCP/Guardian:  Raquel Veronika (dtr): [phone]    FAMILY HISTORY:  No pertinent family history in first degree relatives      Baseline ADLs (prior to admission): Per report, patient A&O x 2, poor PO intake, lethargic prior to hospitalization    No Known Allergies    Present Symptoms:      Review of Systems: Unable to obtain due to poor mentation    MEDICATIONS  (STANDING):  dextrose 5% + sodium chloride 0.9%. 1000 milliLiter(s) (70 mL/Hr) IV Continuous <Continuous>  heparin   Injectable 5000 Unit(s) SubCutaneous every 12 hours  levothyroxine Injectable 50 MICROGram(s) IV Push at bedtime  pantoprazole  Injectable 40 milliGRAM(s) IV Push daily  phenylephrine    Infusion 0.1 MICROgram(s)/kG/Min (2.79 mL/Hr) IV Continuous <Continuous>  piperacillin/tazobactam IVPB.. 3.375 Gram(s) IV Intermittent every 12 hours    MEDICATIONS  (PRN):  HYDROmorphone  Injectable 0.5 milliGRAM(s) IV Push every 1 hour PRN for respiratory distress      PHYSICAL EXAM:    Vital Signs Last 24 Hrs  T(C): 35.4 (04 Mar 2021 12:00), Max: 35.8 (03 Mar 2021 20:43)  T(F): 95.8 (04 Mar 2021 12:00), Max: 96.4 (03 Mar 2021 20:43)  HR: 96 (04 Mar 2021 14:30) (30 - 116)  BP: 50/25 (04 Mar 2021 14:30) (36/17 - 150/53)  BP(mean): 31 (04 Mar 2021 14:30) (22 - 72)  RR: 10 (04 Mar 2021 14:30) (5 - 29)  SpO2: 100% (04 Mar 2021 14:30) (99% - 100%)    General: Patient on NRB, hypotensive, pale; not responsive to pain. No signs of distress.   Karnofsky Performance Score/Palliative Performance Status Version2:    10 %    HEENT: dry mouth     Lungs: comfortable, on NRB. PRN dilaudid. No signs of respiratory distress.    CV:    tachycardia, on pressor  GI:    incontinent  :   johnson  Musculoskeletal: patient unable to follow commands, dependent on ADLs  Skin: pale  Neuro: not responsive to pain, nonverbal, unable to follow commands   Oral intake ability: unable/only mouth care    Diet: NPO    LABS:                        11.1   25.18 )-----------( 567      ( 03 Mar 2021 15:51 )             39.2     03-03    138  |  104  |  78<H>  ----------------------------<  141<H>  7.6<HH>   |  5<LL>  |  8.99<H>    Ca    10.2      03 Mar 2021 15:51    TPro  8.2  /  Alb  3.2<L>  /  TBili  0.3  /  DBili  x   /  AST  17  /  ALT  21  /  AlkPhos  130<H>      Urinalysis Basic - ( 03 Mar 2021 15:51 )    Color: Yellow / Appearance: very cloudy / S.015 / pH: x  Gluc: x / Ketone: Small  / Bili: Negative / Urobili: Negative   Blood: x / Protein: 500 mg/dL / Nitrite: Negative   Leuk Esterase: Moderate / RBC: 5-10 /HPF / WBC >50 /HPF   Sq Epi: x / Non Sq Epi: Few /HPF / Bacteria: Few /HPF        RADIOLOGY & ADDITIONAL STUDIES:  < from: Xray Chest 1 View- PORTABLE-Urgent (Xray Chest 1 View- PORTABLE-Urgent .) (21 @ 19:06) >  EXAM:  XR CHEST PORTABLE URGENT 1V                        PROCEDURE DATE:  2021    INTERPRETATION:  AP chest on August 3, 2021 at 7:01 PM. Patient had right jugular line placement.  Heart enlargement noted.  Lungs remain clear.  Above findings are similar to earlier in the day.  Present film shows a right jugular line inserted with tip in the superior vena caval area.    IMPRESSION: Right jugular line inserted. Heart enlargement again noted.  < end of copied text >      ADVANCE DIRECTIVES: DNR / DNI / NO HD

## 2021-03-04 NOTE — CHART NOTE - NSCHARTNOTEFT_GEN_A_CORE
spoke to the daughter Ms García, informed that Pt's BP is in 50s/20s and HR is in 40s, and prognosis is poor. all questions answered and condolences offered.

## 2021-03-04 NOTE — PROGRESS NOTE ADULT - SUBJECTIVE AND OBJECTIVE BOX
INTERVAL HPI/OVERNIGHT EVENTS: ***    PRESSORS: [ ] YES [ ] NO  WHICH:    ANTIBIOTICS:                  DATE STARTED:  ANTIBIOTICS:                  DATE STARTED:  ANTIBIOTICS:                  DATE STARTED:    Antimicrobial:  piperacillin/tazobactam IVPB.. 3.375 Gram(s) IV Intermittent every 12 hours    Cardiovascular:  phenylephrine    Infusion 0.1 MICROgram(s)/kG/Min IV Continuous <Continuous>    Pulmonary:    Hematalogic:  heparin   Injectable 5000 Unit(s) SubCutaneous every 12 hours    Other:  dextrose 5% + sodium chloride 0.9%. 1000 milliLiter(s) IV Continuous <Continuous>  HYDROmorphone  Injectable 0.5 milliGRAM(s) IV Push every 1 hour PRN  levothyroxine Injectable 50 MICROGram(s) IV Push at bedtime  pantoprazole  Injectable 40 milliGRAM(s) IV Push daily    dextrose 5% + sodium chloride 0.9%. 1000 milliLiter(s) IV Continuous <Continuous>  heparin   Injectable 5000 Unit(s) SubCutaneous every 12 hours  HYDROmorphone  Injectable 0.5 milliGRAM(s) IV Push every 1 hour PRN  levothyroxine Injectable 50 MICROGram(s) IV Push at bedtime  pantoprazole  Injectable 40 milliGRAM(s) IV Push daily  phenylephrine    Infusion 0.1 MICROgram(s)/kG/Min IV Continuous <Continuous>  piperacillin/tazobactam IVPB.. 3.375 Gram(s) IV Intermittent every 12 hours    Drug Dosing Weight  Height (cm): 152.4 (03 Mar 2021 15:21)  Weight (kg): 74.4 (03 Mar 2021 15:21)  BMI (kg/m2): 32 (03 Mar 2021 15:21)  BSA (m2): 1.72 (03 Mar 2021 15:21)    CENTRAL LINE: [ ] YES [ ] NO  LOCATION:         [place]: [ ] YES [ ] NO          A-LINE:  [ ] YES [ ] NO  LOCATION:             ICU Vital Signs Last 24 Hrs  T(C): 35.6 (04 Mar 2021 07:30), Max: 35.8 (03 Mar 2021 20:43)  T(F): 96 (04 Mar 2021 07:30), Max: 96.4 (03 Mar 2021 20:43)  HR: 51 (04 Mar 2021 09:30) (37 - 116)  BP: 49/26 (04 Mar 2021 09:30) (36/17 - 150/53)  BP(mean): 32 (04 Mar 2021 09:30) (22 - 72)  ABP: --  ABP(mean): --  RR: 7 (04 Mar 2021 09:30) (5 - 29)  SpO2: 100% (04 Mar 2021 09:30) (99% - 100%)      ABG - ( 03 Mar 2021 15:30 )  pH, Arterial: 6.82  pH, Blood: x     /  pCO2: 13    /  pO2: 167   / HCO3: 2     / Base Excess: not reportable /  SaO2: 97                     @ 07:01  -  - @ 07:00  --------------------------------------------------------  IN: 893.5 mL / OUT: 20 mL / NET: 873.5 mL            REVIEW OF SYSTEMS:    CONSTITUTIONAL: No weakness, fevers or chills  NECK: No pain or stiffness  RESPIRATORY: No cough, wheezing, hemoptysis; No shortness of breath  CARDIOVASCULAR: No chest pain or palpitations  GASTROINTESTINAL: No abdominal or epigastric pain. No nausea, vomiting, No diarrhea or constipation. No melena or hematochezia.  GENITOURINARY: No dysuria, frequency or hematuria  NEUROLOGICAL: No numbness or weakness  All other review of systems is negative unless indicated above      PHYSICAL EXAM:    GENERAL: NAD, well-groomed, well-developed  EYES: EOMI, PERRLA,   NECK: Supple, No JVD; Normal thyroid; Trachea midline  NERVOUS SYSTEM:  Alert & Oriented X3,  Motor Strength 5/5 B/L upper and lower extremities; DTRs 2+ intact and symmetric  CHEST/LUNG: No rales, rhonchi, wheezing   HEART: Regular rate and rhythm; No murmurs,   ABDOMEN: Soft, Nontender, Nondistended; Bowel sounds present  EXTREMITIES:  2+ Peripheral Pulses, No clubbing, cyanosis, or edema        LABS:  CBC Full  -  ( 03 Mar 2021 15:51 )  WBC Count : 25.18 K/uL  RBC Count : 3.83 M/uL  Hemoglobin : 11.1 g/dL  Hematocrit : 39.2 %  Platelet Count - Automated : 567 K/uL  Mean Cell Volume : 102.3 fl  Mean Cell Hemoglobin : 29.0 pg  Mean Cell Hemoglobin Concentration : 28.3 gm/dL  Auto Neutrophil # : 19.64 K/uL  Auto Lymphocyte # : 4.28 K/uL  Auto Monocyte # : 0.50 K/uL  Auto Eosinophil # : 0.00 K/uL  Auto Basophil # : 0.00 K/uL  Auto Neutrophil % : 76.0 %  Auto Lymphocyte % : 17.0 %  Auto Monocyte % : 2.0 %  Auto Eosinophil % : 0.0 %  Auto Basophil % : 0.0 %        138  |  104  |  78<H>  ----------------------------<  141<H>  7.6<HH>   |  5<LL>  |  8.99<H>    Ca    10.2      03 Mar 2021 15:51    TPro  8.2  /  Alb  3.2<L>  /  TBili  0.3  /  DBili  x   /  AST  17  /  ALT  21  /  AlkPhos  130<H>      PT/INR - ( 03 Mar 2021 15:51 )   PT: 13.5 sec;   INR: 1.14 ratio         PTT - ( 03 Mar 2021 15:51 )  PTT:32.9 sec  Urinalysis Basic - ( 03 Mar 2021 15:51 )    Color: Yellow / Appearance: very cloudy / S.015 / pH: x  Gluc: x / Ketone: Small  / Bili: Negative / Urobili: Negative   Blood: x / Protein: 500 mg/dL / Nitrite: Negative   Leuk Esterase: Moderate / RBC: 5-10 /HPF / WBC >50 /HPF   Sq Epi: x / Non Sq Epi: Few /HPF / Bacteria: Few /HPF      Culture Results:   Growth in anaerobic bottle: Gram Negative Rods  ***Blood Panel PCR results on this specimen are available  approximately 3 hours after the Gram stain result.***  Gram stain, PCR, and/or culture results may not always  correspond due to difference in methodologies.  ************************************************************  This PCR assay was performed by multiplex PCR. This  Assay tests for 66 bacterial and resistance gene targets.  Please refer to the [facility] test directory  at [email or web address] for details. ( @ 22:09)      RADIOLOGY & ADDITIONAL STUDIES REVIEWED:  ***    [ ]GOALS OF CARE DISCUSSION WITH PATIENT/FAMILY/PROXY:    CRITICAL CARE TIME SPENT: 35 minutes INTERVAL HPI/OVERNIGHT EVENTS: Very poor prognosis.     PRESSORS: YES  WHICH: Phenylepherine    Antimicrobial:  piperacillin/tazobactam IVPB.. 3.375 Gram(s) IV Intermittent every 12 hours    Cardiovascular:  phenylephrine    Infusion 0.1 MICROgram(s)/kG/Min IV Continuous <Continuous>    Pulmonary:    Hematalogic:  heparin   Injectable 5000 Unit(s) SubCutaneous every 12 hours    Other:  dextrose 5% + sodium chloride 0.9%. 1000 milliLiter(s) IV Continuous <Continuous>  HYDROmorphone  Injectable 0.5 milliGRAM(s) IV Push every 1 hour PRN  levothyroxine Injectable 50 MICROGram(s) IV Push at bedtime  pantoprazole  Injectable 40 milliGRAM(s) IV Push daily    dextrose 5% + sodium chloride 0.9%. 1000 milliLiter(s) IV Continuous <Continuous>  heparin   Injectable 5000 Unit(s) SubCutaneous every 12 hours  HYDROmorphone  Injectable 0.5 milliGRAM(s) IV Push every 1 hour PRN  levothyroxine Injectable 50 MICROGram(s) IV Push at bedtime  pantoprazole  Injectable 40 milliGRAM(s) IV Push daily  phenylephrine    Infusion 0.1 MICROgram(s)/kG/Min IV Continuous <Continuous>  piperacillin/tazobactam IVPB.. 3.375 Gram(s) IV Intermittent every 12 hours    Drug Dosing Weight  Height (cm): 152.4 (03 Mar 2021 15:21)  Weight (kg): 74.4 (03 Mar 2021 15:21)  BMI (kg/m2): 32 (03 Mar 2021 15:21)  BSA (m2): 1.72 (03 Mar 2021 15:21)        ICU Vital Signs Last 24 Hrs  T(C): 35.6 (04 Mar 2021 07:30), Max: 35.8 (03 Mar 2021 20:43)  T(F): 96 (04 Mar 2021 07:30), Max: 96.4 (03 Mar 2021 20:43)  HR: 51 (04 Mar 2021 09:30) (37 - 116)  BP: 49/26 (04 Mar 2021 09:30) (36/17 - 150/53)  BP(mean): 32 (04 Mar 2021 09:30) (22 - 72)  ABP: --  ABP(mean): --  RR: 7 (04 Mar 2021 09:30) (5 - 29)  SpO2: 100% (04 Mar 2021 09:30) (99% - 100%)      ABG - ( 03 Mar 2021 15:30 )  pH, Arterial: 6.82  pH, Blood: x     /  pCO2: 13    /  pO2: 167   / HCO3: 2     / Base Excess: not reportable /  SaO2: 97                     @ 07:01  -  03-04 @ 07:00  --------------------------------------------------------  IN: 893.5 mL / OUT: 20 mL / NET: 873.5 mL      PHYSICAL EXAM:    GENERAL: NAD  EYES: PERRL  NECK: Supple, No JVD; Normal thyroid; Trachea midline  NERVOUS SYSTEM:  Alert & Oriented X0  CHEST/LUNG: No rales, rhonchi, wheezing   HEART: Regular rate and rhythm; No murmurs,   ABDOMEN: Soft, Nontender, Nondistended; Bowel sounds present  EXTREMITIES:  2+ Peripheral Pulses, No clubbing, cyanosis, or edema        LABS:  CBC Full  -  ( 03 Mar 2021 15:51 )  WBC Count : 25.18 K/uL  RBC Count : 3.83 M/uL  Hemoglobin : 11.1 g/dL  Hematocrit : 39.2 %  Platelet Count - Automated : 567 K/uL  Mean Cell Volume : 102.3 fl  Mean Cell Hemoglobin : 29.0 pg  Mean Cell Hemoglobin Concentration : 28.3 gm/dL  Auto Neutrophil # : 19.64 K/uL  Auto Lymphocyte # : 4.28 K/uL  Auto Monocyte # : 0.50 K/uL  Auto Eosinophil # : 0.00 K/uL  Auto Basophil # : 0.00 K/uL  Auto Neutrophil % : 76.0 %  Auto Lymphocyte % : 17.0 %  Auto Monocyte % : 2.0 %  Auto Eosinophil % : 0.0 %  Auto Basophil % : 0.0 %        138  |  104  |  78<H>  ----------------------------<  141<H>  7.6<HH>   |  5<LL>  |  8.99<H>    Ca    10.2      03 Mar 2021 15:51    TPro  8.2  /  Alb  3.2<L>  /  TBili  0.3  /  DBili  x   /  AST  17  /  ALT  21  /  AlkPhos  130<H>      PT/INR - ( 03 Mar 2021 15:51 )   PT: 13.5 sec;   INR: 1.14 ratio         PTT - ( 03 Mar 2021 15:51 )  PTT:32.9 sec  Urinalysis Basic - ( 03 Mar 2021 15:51 )    Color: Yellow / Appearance: very cloudy / S.015 / pH: x  Gluc: x / Ketone: Small  / Bili: Negative / Urobili: Negative   Blood: x / Protein: 500 mg/dL / Nitrite: Negative   Leuk Esterase: Moderate / RBC: 5-10 /HPF / WBC >50 /HPF   Sq Epi: x / Non Sq Epi: Few /HPF / Bacteria: Few /HPF      Culture Results:   Growth in anaerobic bottle: Gram Negative Rods  ***Blood Panel PCR results on this specimen are available  approximately 3 hours after the Gram stain result.***  Gram stain, PCR, and/or culture results may not always  correspond due to difference in methodologies.  ************************************************************  This PCR assay was performed by multiplex PCR. This  Assay tests for 66 bacterial and resistance gene targets.  Please refer to the [facility] test directory  at [email or web address] for details. ( @ 22:09)      RADIOLOGY & ADDITIONAL STUDIES REVIEWED:  ***    [ ]GOALS OF CARE DISCUSSION WITH PATIENT/FAMILY/PROXY:    CRITICAL CARE TIME SPENT: 35 minutes

## 2021-03-04 NOTE — CONSULT NOTE ADULT - PROBLEM SELECTOR RECOMMENDATION 3
2/2 MOF/shock due to UTI/bacteremia. Cr 8.99 - no HD as per family's wishes. Patient was A&O x 2, at rehab prior to hospitalization. Now not responsive to pain, nonverbal, unable to follow commands. Grave prognosis.

## 2021-03-04 NOTE — CONSULT NOTE ADULT - PROBLEM SELECTOR RECOMMENDATION 2
2/2 shock/UTI/bacteremia; comorbid MOF involving lungs, heart, kidneys. WBC 25.18. Lactate 14.8. Patient on NRB, IV abx, pressor; dilaudid PRN. Patient appears to be actively dying - death likely to occur within hours. Daughter/Raquel is identified surrogate; DNR / DNI / no HD as per family's wishes.

## 2021-03-04 NOTE — DISCHARGE NOTE FOR THE EXPIRED PATIENT - HOSPITAL COURSE
80 year old female from home, with PMH of DM, HTN, CHF and hypothyroidism presented AMS. Pt was recently admitted to [facility] for sepsis with bacteremia(lactobacillus, unknown source), PICC line placed and pt was discharged to Rehab. Pt was discharged from Rehab last friday and has been confused(baseline is AAO x2), lethargic, not eating. Today, pt's mental status was worse so was brought to ED by daughter.   Pt noted to be AAO x0, WBC: 25K, lactate of 14, K: 7, creat: 8.9, bicarb: 5, ph: 6.8, UA: positive. Central line placed by Ed attending and pt received 2L NS bolus, cefepime, vanco one dose and started on levophed and bicarb drip. Also, received D50 with 5 units of IV insulin. ICU was consulted. ICU attending spoke to daughter Ms García at bedside and Ms Chowdhury over phone. Prognosis discussed in detail. Both daughters agreed for DNR/DNI/no HD. Pt was continued on IV fluids, IV pressors and IV antibiotics. Pt was kept comfortable and passed due to cardiopulmonary arrest.     This just a brief hospital course. Please refer to the notes for a detailed course.   Attending made aware and daughter Raquel at bedside.

## 2021-03-05 LAB
SARS-COV-2 IGG SERPL QL IA: NEGATIVE — SIGNIFICANT CHANGE UP
SARS-COV-2 IGM SERPL IA-ACNC: <0.1 INDEX — SIGNIFICANT CHANGE UP

## 2021-03-06 LAB
-  AMIKACIN: SIGNIFICANT CHANGE UP
-  AMIKACIN: SIGNIFICANT CHANGE UP
-  AMOXICILLIN/CLAVULANIC ACID: SIGNIFICANT CHANGE UP
-  AMPICILLIN/SULBACTAM: SIGNIFICANT CHANGE UP
-  AMPICILLIN/SULBACTAM: SIGNIFICANT CHANGE UP
-  AMPICILLIN: SIGNIFICANT CHANGE UP
-  AMPICILLIN: SIGNIFICANT CHANGE UP
-  AZTREONAM: SIGNIFICANT CHANGE UP
-  AZTREONAM: SIGNIFICANT CHANGE UP
-  CEFAZOLIN: SIGNIFICANT CHANGE UP
-  CEFAZOLIN: SIGNIFICANT CHANGE UP
-  CEFEPIME: SIGNIFICANT CHANGE UP
-  CEFEPIME: SIGNIFICANT CHANGE UP
-  CEFOXITIN: SIGNIFICANT CHANGE UP
-  CEFOXITIN: SIGNIFICANT CHANGE UP
-  CEFTRIAXONE: SIGNIFICANT CHANGE UP
-  CEFTRIAXONE: SIGNIFICANT CHANGE UP
-  CIPROFLOXACIN: SIGNIFICANT CHANGE UP
-  CIPROFLOXACIN: SIGNIFICANT CHANGE UP
-  ERTAPENEM: SIGNIFICANT CHANGE UP
-  ERTAPENEM: SIGNIFICANT CHANGE UP
-  GENTAMICIN: SIGNIFICANT CHANGE UP
-  GENTAMICIN: SIGNIFICANT CHANGE UP
-  IMIPENEM: SIGNIFICANT CHANGE UP
-  IMIPENEM: SIGNIFICANT CHANGE UP
-  LEVOFLOXACIN: SIGNIFICANT CHANGE UP
-  LEVOFLOXACIN: SIGNIFICANT CHANGE UP
-  MEROPENEM: SIGNIFICANT CHANGE UP
-  MEROPENEM: SIGNIFICANT CHANGE UP
-  NITROFURANTOIN: SIGNIFICANT CHANGE UP
-  PIPERACILLIN/TAZOBACTAM: SIGNIFICANT CHANGE UP
-  PIPERACILLIN/TAZOBACTAM: SIGNIFICANT CHANGE UP
-  TIGECYCLINE: SIGNIFICANT CHANGE UP
-  TOBRAMYCIN: SIGNIFICANT CHANGE UP
-  TOBRAMYCIN: SIGNIFICANT CHANGE UP
-  TRIMETHOPRIM/SULFAMETHOXAZOLE: SIGNIFICANT CHANGE UP
-  TRIMETHOPRIM/SULFAMETHOXAZOLE: SIGNIFICANT CHANGE UP
CULTURE RESULTS: SIGNIFICANT CHANGE UP
CULTURE RESULTS: SIGNIFICANT CHANGE UP
METHOD TYPE: SIGNIFICANT CHANGE UP
METHOD TYPE: SIGNIFICANT CHANGE UP
ORGANISM # SPEC MICROSCOPIC CNT: SIGNIFICANT CHANGE UP
SPECIMEN SOURCE: SIGNIFICANT CHANGE UP
SPECIMEN SOURCE: SIGNIFICANT CHANGE UP

## 2021-03-08 LAB
CULTURE RESULTS: SIGNIFICANT CHANGE UP
SPECIMEN SOURCE: SIGNIFICANT CHANGE UP

## 2021-10-06 PROBLEM — I10 ESSENTIAL HYPERTENSION: Status: ACTIVE | Noted: 2020-01-01

## 2022-05-04 NOTE — DISCHARGE NOTE NURSING/CASE MANAGEMENT/SOCIAL WORK - CAREGIVER RELATION TO PATIENT
From: Andrzej Fatima  To:  Luiz Antunez MD  Sent: 5/4/2022 1:29 PM CDT  Subject: Med     [employer] to [employer] daughter

## 2022-09-07 NOTE — PROGRESS NOTE ADULT - PROBLEM SELECTOR PLAN 2
-BCx growing gram positive rods in both aerobic and aerobic bottles from 1/6/21  -Second BCx sent on 1/7/21  -Starts on empiric antibiotics Zosyn 3.375 q12h renally dose  -s/p 2 dose of vancomycin -Patient noted to have afib on tele monitor  -HR ranges  currently  -Pt with past history of EP studies in Aug 2020 that showed premature atrial contraction but no AFib.   -CHADs-Vasc score of 6  -lisinopril is hold off in the setting of low BP  -Resumed metoprolol 25mg BID  -Eliquis 2.5mg BID , Covered 4$ /month   -Cardio, Dr. Edmond consulted for further recommendation    -Anemia :   -Pt with dropped of hgb initially   Now H/H stable   -No active signs of bleeding cigarettes quit smoking August 2022/cigarettes

## 2022-12-07 NOTE — PROGRESS NOTE BEHAVIORAL HEALTH - ORIENTATION OTHER
Pt presents for failed outpt tx of L foot infection. Finished keflex, foot getting more swollen, specifically L great toe.  Pt also c/o worsening SOB-COPD
unable to assess

## 2023-01-05 NOTE — DIETITIAN INITIAL EVALUATION ADULT. - NUTRITION DIAGNOSIS
yes... Dutasteride Counseling: Dustasteride Counseling:  I discussed with the patient the risks of use of dutasteride including but not limited to decreased libido, decreased ejaculate volume, and gynecomastia. Women who can become pregnant should not handle medication.  All of the patient's questions and concerns were addressed. Dutasteride Male Counseling: Dustasteride Counseling:  I discussed with the patient the risks of use of dutasteride including but not limited to decreased libido, decreased ejaculate volume, and gynecomastia. Women who can become pregnant should not handle medication.  All of the patient's questions and concerns were addressed.

## 2024-01-09 NOTE — PROGRESS NOTE BEHAVIORAL HEALTH - ABNORMAL MOVEMENTS
PAST MEDICAL HISTORY:  Hypertension     
No abnormal movements

## 2024-06-25 NOTE — PATIENT PROFILE ADULT - NSASFALLNEEDSASSIST_GEN_A_NUR
In order to check the location, date, or time of your aftercare appointment, please refer to your Discharge Instructions Document given to you upon leaving the hospital.  If you have lost the instructions please call [phone]
yes

## 2025-02-19 NOTE — ED ADULT NURSE NOTE - TEMPLATE
Quality 431: Preventive Care And Screening: Unhealthy Alcohol Use - Screening: Patient not identified as an unhealthy alcohol user when screened for unhealthy alcohol use using a systematic screening method Quality 226: Preventive Care And Screening: Tobacco Use: Screening And Cessation Intervention: Patient screened for tobacco use and is an ex/non-smoker Quality 47: Advance Care Plan: Advance care planning not documented, reason not otherwise specified. Quality 130: Documentation Of Current Medications In The Medical Record: Current Medications Documented Detail Level: Detailed Quality 155 (Denominator): Falls Plan Of Care: Plan of Care not Documented, Reason not Otherwise Specified Additional Notes: The E/M service provided during this visit was medically necessary, significant, and independent from the procedure(s) provided on the same date of service and included documented elements above and beyond the usual pre-, intra-, and post-operative work associated with the procedure(s). Neuro

## 2025-07-01 NOTE — ED ADULT NURSE NOTE - PAIN RATING/NUMBER SCALE (0-10): ACTIVITY
Topical Retinoid Pregnancy And Lactation Text: This medication is Pregnancy Category C. It is unknown if this medication is excreted in breast milk. Winlevi Counseling:  I discussed with the patient the risks of topical clascoterone including but not limited to erythema, scaling, itching, and stinging. Patient voiced their understanding. Aklief counseling:  Patient advised to apply a pea-sized amount only at bedtime and wait 30 minutes after washing their face before applying.  If too drying, patient may add a non-comedogenic moisturizer.  The most commonly reported side effects including irritation, redness, scaling, dryness, stinging, burning, itching, and increased risk of sunburn.  The patient verbalized understanding of the proper use and possible adverse effects of retinoids.  All of the patient's questions and concerns were addressed. Azithromycin Pregnancy And Lactation Text: This medication is considered safe during pregnancy and is also secreted in breast milk. Doxycycline Counseling:  Patient counseled regarding possible photosensitivity and increased risk for sunburn.  Patient instructed to avoid sunlight, if possible.  When exposed to sunlight, patients should wear protective clothing, sunglasses, and sunscreen.  The patient was instructed to call the office immediately if the following severe adverse effects occur:  hearing changes, easy bruising/bleeding, severe headache, or vision changes.  The patient verbalized understanding of the proper use and possible adverse effects of doxycycline.  All of the patient's questions and concerns were addressed. Erythromycin Counseling:  I discussed with the patient the risks of erythromycin including but not limited to GI upset, allergic reaction, drug rash, diarrhea, increase in liver enzymes, and yeast infections. Include Pregnancy/Lactation Warning?: Add Automatically Based on Childbearing Potential and Patient Age Minocycline Pregnancy And Lactation Text: This medication is Pregnancy Category D and not consider safe during pregnancy. It is also excreted in breast milk. Tetracycline Counseling: Patient counseled regarding possible photosensitivity and increased risk for sunburn.  Patient instructed to avoid sunlight, if possible.  When exposed to sunlight, patients should wear protective clothing, sunglasses, and sunscreen.  The patient was instructed to call the office immediately if the following severe adverse effects occur:  hearing changes, easy bruising/bleeding, severe headache, or vision changes.  The patient verbalized understanding of the proper use and possible adverse effects of tetracycline.  All of the patient's questions and concerns were addressed. Patient understands to avoid pregnancy while on therapy due to potential birth defects. High Dose Vitamin A Pregnancy And Lactation Text: High dose vitamin A therapy is contraindicated during pregnancy and breast feeding. Spironolactone Counseling: Patient advised regarding risks of diarrhea, abdominal pain, hyperkalemia, birth defects (for female patients), liver toxicity and renal toxicity. The patient may need blood work to monitor liver and kidney function and potassium levels while on therapy. The patient verbalized understanding of the proper use and possible adverse effects of spironolactone.  All of the patient's questions and concerns were addressed. Azelaic Acid Pregnancy And Lactation Text: This medication is considered safe during pregnancy and breast feeding. 0 Benzoyl Peroxide Pregnancy And Lactation Text: This medication is Pregnancy Category C. It is unknown if benzoyl peroxide is excreted in breast milk. Dapsone Counseling: I discussed with the patient the risks of dapsone including but not limited to hemolytic anemia, agranulocytosis, rashes, methemoglobinemia, kidney failure, peripheral neuropathy, headaches, GI upset, and liver toxicity.  Patients who start dapsone require monitoring including baseline LFTs and weekly CBCs for the first month, then every month thereafter.  The patient verbalized understanding of the proper use and possible adverse effects of dapsone.  All of the patient's questions and concerns were addressed. Topical Sulfur Applications Counseling: Topical Sulfur Counseling: Patient counseled that this medication may cause skin irritation or allergic reactions.  In the event of skin irritation, the patient was advised to reduce the amount of the drug applied or use it less frequently.   The patient verbalized understanding of the proper use and possible adverse effects of topical sulfur application.  All of the patient's questions and concerns were addressed. Isotretinoin Pregnancy And Lactation Text: This medication is Pregnancy Category X and is considered extremely dangerous during pregnancy. It is unknown if it is excreted in breast milk. Birth Control Pills Counseling: Birth Control Pill Counseling: I discussed with the patient the potential side effects of OCPs including but not limited to increased risk of stroke, heart attack, thrombophlebitis, deep venous thrombosis, hepatic adenomas, breast changes, GI upset, headaches, and depression.  The patient verbalized understanding of the proper use and possible adverse effects of OCPs. All of the patient's questions and concerns were addressed. Sarecycline Counseling: Patient advised regarding possible photosensitivity and discoloration of the teeth, skin, lips, tongue and gums.  Patient instructed to avoid sunlight, if possible.  When exposed to sunlight, patients should wear protective clothing, sunglasses, and sunscreen.  The patient was instructed to call the office immediately if the following severe adverse effects occur:  hearing changes, easy bruising/bleeding, severe headache, or vision changes.  The patient verbalized understanding of the proper use and possible adverse effects of sarecycline.  All of the patient's questions and concerns were addressed. Erythromycin Pregnancy And Lactation Text: This medication is Pregnancy Category B and is considered safe during pregnancy. It is also excreted in breast milk. Topical Clindamycin Counseling: Patient counseled that this medication may cause skin irritation or allergic reactions.  In the event of skin irritation, the patient was advised to reduce the amount of the drug applied or use it less frequently.   The patient verbalized understanding of the proper use and possible adverse effects of clindamycin.  All of the patient's questions and concerns were addressed. Minocycline Counseling: Patient advised regarding possible photosensitivity and discoloration of the teeth, skin, lips, tongue and gums.  Patient instructed to avoid sunlight, if possible.  When exposed to sunlight, patients should wear protective clothing, sunglasses, and sunscreen.  The patient was instructed to call the office immediately if the following severe adverse effects occur:  hearing changes, easy bruising/bleeding, severe headache, or vision changes.  The patient verbalized understanding of the proper use and possible adverse effects of minocycline.  All of the patient's questions and concerns were addressed. Winlevi Pregnancy And Lactation Text: This medication is considered safe during pregnancy and breastfeeding. Doxycycline Pregnancy And Lactation Text: This medication is Pregnancy Category D and not consider safe during pregnancy. It is also excreted in breast milk but is considered safe for shorter treatment courses. Use Enhanced Medication Counseling?: No Aklief Pregnancy And Lactation Text: It is unknown if this medication is safe to use during pregnancy.  It is unknown if this medication is excreted in breast milk.  Breastfeeding women should use the topical cream on the smallest area of the skin for the shortest time needed while breastfeeding.  Do not apply to nipple and areola. Bactrim Counseling:  I discussed with the patient the risks of sulfa antibiotics including but not limited to GI upset, allergic reaction, drug rash, diarrhea, dizziness, photosensitivity, and yeast infections.  Rarely, more serious reactions can occur including but not limited to aplastic anemia, agranulocytosis, methemoglobinemia, blood dyscrasias, liver or kidney failure, lung infiltrates or desquamative/blistering drug rashes. Tazorac Counseling:  Patient advised that medication is irritating and drying.  Patient may need to apply sparingly and wash off after an hour before eventually leaving it on overnight.  The patient verbalized understanding of the proper use and possible adverse effects of tazorac.  All of the patient's questions and concerns were addressed. Azithromycin Counseling:  I discussed with the patient the risks of azithromycin including but not limited to GI upset, allergic reaction, drug rash, diarrhea, and yeast infections. Dapsone Pregnancy And Lactation Text: This medication is Pregnancy Category C and is not considered safe during pregnancy or breast feeding. Topical Sulfur Applications Pregnancy And Lactation Text: This medication is Pregnancy Category C and has an unknown safety profile during pregnancy. It is unknown if this topical medication is excreted in breast milk. Topical Retinoid counseling:  Patient advised to apply a pea-sized amount only at bedtime and wait 30 minutes after washing their face before applying.  If too drying, patient may add a non-comedogenic moisturizer. The patient verbalized understanding of the proper use and possible adverse effects of retinoids.  All of the patient's questions and concerns were addressed. Isotretinoin Counseling: Patient should get monthly blood tests, not donate blood, not drive at night if vision affected, not share medication, and not undergo elective surgery for 6 months after tx completed. Side effects reviewed, pt to contact office should one occur. Topical Clindamycin Pregnancy And Lactation Text: This medication is Pregnancy Category B and is considered safe during pregnancy. It is unknown if it is excreted in breast milk. Birth Control Pills Pregnancy And Lactation Text: This medication should be avoided if pregnant and for the first 30 days post-partum. High Dose Vitamin A Counseling: Side effects reviewed, pt to contact office should one occur. Spironolactone Pregnancy And Lactation Text: This medication can cause feminization of the male fetus and should be avoided during pregnancy. The active metabolite is also found in breast milk. Benzoyl Peroxide Counseling: Patient counseled that medicine may cause skin irritation and bleach clothing.  In the event of skin irritation, the patient was advised to reduce the amount of the drug applied or use it less frequently.   The patient verbalized understanding of the proper use and possible adverse effects of benzoyl peroxide.  All of the patient's questions and concerns were addressed. Azelaic Acid Counseling: Patient counseled that medicine may cause skin irritation and to avoid applying near the eyes.  In the event of skin irritation, the patient was advised to reduce the amount of the drug applied or use it less frequently.   The patient verbalized understanding of the proper use and possible adverse effects of azelaic acid.  All of the patient's questions and concerns were addressed. Tazorac Pregnancy And Lactation Text: This medication is not safe during pregnancy. It is unknown if this medication is excreted in breast milk. Detail Level: Detailed Bactrim Pregnancy And Lactation Text: This medication is Pregnancy Category D and is known to cause fetal risk.  It is also excreted in breast milk.

## 2025-07-17 NOTE — PHYSICAL THERAPY INITIAL EVALUATION ADULT - DIAGNOSIS, PT EVAL
no edema, no murmurs, regular rate and rhythm
Difficulty with bed mobility, transfers and ambulation due to generalized weakness
[Negative] : Heme/Lymph